# Patient Record
Sex: FEMALE | Race: WHITE | NOT HISPANIC OR LATINO | Employment: OTHER | URBAN - METROPOLITAN AREA
[De-identification: names, ages, dates, MRNs, and addresses within clinical notes are randomized per-mention and may not be internally consistent; named-entity substitution may affect disease eponyms.]

---

## 2020-08-06 ENCOUNTER — TRANSFERRED RECORDS (OUTPATIENT)
Dept: HEALTH INFORMATION MANAGEMENT | Facility: CLINIC | Age: 60
End: 2020-08-06

## 2020-08-06 LAB — EJECTION FRACTION: NORMAL %

## 2021-05-29 ENCOUNTER — RECORDS - HEALTHEAST (OUTPATIENT)
Dept: ADMINISTRATIVE | Facility: CLINIC | Age: 61
End: 2021-05-29

## 2023-04-19 ENCOUNTER — TRANSFERRED RECORDS (OUTPATIENT)
Dept: HEALTH INFORMATION MANAGEMENT | Facility: CLINIC | Age: 63
End: 2023-04-19

## 2023-04-19 LAB — EJECTION FRACTION: NORMAL %

## 2023-04-25 ENCOUNTER — TRANSFERRED RECORDS (OUTPATIENT)
Dept: HEALTH INFORMATION MANAGEMENT | Facility: CLINIC | Age: 63
End: 2023-04-25

## 2023-09-29 ENCOUNTER — TRANSFERRED RECORDS (OUTPATIENT)
Dept: HEALTH INFORMATION MANAGEMENT | Facility: CLINIC | Age: 63
End: 2023-09-29

## 2024-03-26 ENCOUNTER — OFFICE VISIT (OUTPATIENT)
Dept: CARDIOLOGY | Facility: CLINIC | Age: 64
End: 2024-03-26
Payer: MEDICARE

## 2024-03-26 VITALS
HEART RATE: 58 BPM | RESPIRATION RATE: 16 BRPM | DIASTOLIC BLOOD PRESSURE: 72 MMHG | WEIGHT: 94.4 LBS | SYSTOLIC BLOOD PRESSURE: 138 MMHG

## 2024-03-26 DIAGNOSIS — Z86.73 HISTORY OF STROKE: ICD-10-CM

## 2024-03-26 DIAGNOSIS — I48.91 ATRIAL FIBRILLATION, UNSPECIFIED TYPE (H): ICD-10-CM

## 2024-03-26 DIAGNOSIS — F17.200 SMOKER: ICD-10-CM

## 2024-03-26 DIAGNOSIS — Z95.2 S/P MVR (MITRAL VALVE REPLACEMENT): Primary | ICD-10-CM

## 2024-03-26 DIAGNOSIS — E78.5 HYPERLIPIDEMIA, UNSPECIFIED HYPERLIPIDEMIA TYPE: ICD-10-CM

## 2024-03-26 DIAGNOSIS — I10 ESSENTIAL HYPERTENSION: ICD-10-CM

## 2024-03-26 DIAGNOSIS — I25.10 NONOBSTRUCTIVE ATHEROSCLEROSIS OF CORONARY ARTERY: ICD-10-CM

## 2024-03-26 DIAGNOSIS — Z98.890 STATUS POST LIGATION OF LEFT ATRIAL APPENDAGE: ICD-10-CM

## 2024-03-26 PROCEDURE — 99204 OFFICE O/P NEW MOD 45 MIN: CPT | Performed by: GENERAL ACUTE CARE HOSPITAL

## 2024-03-26 PROCEDURE — G2211 COMPLEX E/M VISIT ADD ON: HCPCS | Performed by: GENERAL ACUTE CARE HOSPITAL

## 2024-03-26 RX ORDER — METOPROLOL TARTRATE 25 MG/1
25 TABLET, FILM COATED ORAL 2 TIMES DAILY
Qty: 180 TABLET | Refills: 3 | Status: ON HOLD | OUTPATIENT
Start: 2024-03-26 | End: 2024-09-20

## 2024-03-26 RX ORDER — AMLODIPINE BESYLATE 5 MG/1
5 TABLET ORAL DAILY
Qty: 90 TABLET | Refills: 3 | Status: SHIPPED | OUTPATIENT
Start: 2024-03-26 | End: 2024-09-05 | Stop reason: DRUGHIGH

## 2024-03-26 RX ORDER — GABAPENTIN 300 MG/1
1 CAPSULE ORAL 2 TIMES DAILY
COMMUNITY
End: 2024-04-16

## 2024-03-26 RX ORDER — PRAVASTATIN SODIUM 10 MG
1 TABLET ORAL
COMMUNITY
End: 2024-03-26

## 2024-03-26 RX ORDER — PRAVASTATIN SODIUM 10 MG
10 TABLET ORAL DAILY
Qty: 90 TABLET | Refills: 3 | Status: SHIPPED | OUTPATIENT
Start: 2024-03-26 | End: 2024-05-28

## 2024-03-26 RX ORDER — AMLODIPINE BESYLATE 5 MG/1
5 TABLET ORAL DAILY
COMMUNITY
End: 2024-03-26

## 2024-03-26 RX ORDER — ASPIRIN 325 MG
325 TABLET, DELAYED RELEASE (ENTERIC COATED) ORAL DAILY
COMMUNITY

## 2024-03-26 RX ORDER — LOSARTAN POTASSIUM 100 MG/1
1 TABLET ORAL DAILY
COMMUNITY
End: 2024-03-26

## 2024-03-26 RX ORDER — LOSARTAN POTASSIUM 100 MG/1
100 TABLET ORAL DAILY
Qty: 90 TABLET | Refills: 3 | Status: ON HOLD | OUTPATIENT
Start: 2024-03-26 | End: 2024-09-20

## 2024-03-26 RX ORDER — METOPROLOL TARTRATE 25 MG/1
1 TABLET, FILM COATED ORAL 2 TIMES DAILY
COMMUNITY
End: 2024-03-26

## 2024-03-26 NOTE — PROGRESS NOTES
HEART CARE ENCOUNTER NOTE      Thank you, Christina CASTILLO CNP, for asking the [facility] team to see Ms. Ernestine Morales to establish care for mitral valve disease.    Assessment/Recommendations   Assessment:    Severe mitral regurgitation (unknown mechanism) status post surgical mitral valve replacement with a bioprosthetic mitral valve (unknown manufacture or size) performed 6/9/2017 at [facility]. Transthoracic echocardiogram 4/19/2023 reported normal prosthetic valve function.  Persistent atrial fibrillation status post direct current cardioversion 4/17/2017 then reportedly Maze procedure and left atrial appendage ligation at the time of mitral valve surgery 6/9/2017. No evidence of recurrent atrial fibrillation. [id number] score is 5 (hypertension, age 65-74, history of stroke, female gender).  Left hemispheric cerebrovascular accident 1/9/2017 with residual dysarthria and right hemiparesis. I do not have further details on this but I suspect this was embolic from atrial fibrillation.  Nonobstructive coronary artery disease noted on coronary angiography 4/27/2017.  Essential hypertension. Controlled.  Hyperlipidemia.    Plan:  Transthoracic echocardiogram.  CT cardiac angiography to assess for complete ligation of the left atrial appendage. If there is significant residual appendage, I would recommend resuming oral anticoagulation preferably with a direct oral anticoagulation.  For now, continue aspirin which she prefers to take as full-strength 325 mg daily.  Pravastatin 10 mg daily.  Continue current anti-hypertensive medications.  She has requested a referral to neurology given her history of stroke so I have placed a referral.  Follow-up with me in 1 year.       The longitudinal plan of care for the diagnosis(es)/condition(s) as documented were addressed during this visit. Due to the added complexity in care, I will continue to support Talya in  the subsequent management and with ongoing continuity of care.    Mitral regurgitation status post mitral valve replacement.  Persistent atrial fibrillation    History of Present Illness   Ms. Ernestine Morales is a 64 year old female with a significant past history of MR s/p MVR in 2017, persistent AF s/p DCCV then Maze and ARIANNA ligation at the time of MVR, and CVA presenting to [facility] care.    She previously lived in [place] which is where she had all her cardiac care and just recently removed to [place]. We only have limited records at this time and she is a somewhat difficult historian due to residual deficits from her stroke. It appears she was in her normal state of health until she sustained a massive stroke 1/9/2017 for which she was hospitalized at [facility]. I do not know what intervention she had at the time if any, but it appears it was a left hemispheric stroke as she has speech difficulty and right hemiparesis. It is not clear if she was noted to have AF at that time, but she later on 4/17/2017 underwent ANGE-guided DCCV for AF and was on amiodarone and warfarin for a period of time.    During this evaluation, she was also noted to have severe MR (mechanism not detailed in available medical records). On 6/9/2017 she underwent surgical MVR with a bioprosthetic valve (size and  not known at this time) and reportedly also had a Maze procedure and ARIANNA ligation. At some point afterwards her anticoagulation was discontinued.    Her mobility is limited due to deficits from her stroke. No chest pain/pressure/tightness, shortness of breath at rest or with exertion, light headedness/dizziness, pre-syncope, syncope, lower extremity swelling, palpitations, paroxysmal nocturnal dyspnea (PND), or orthopnea.     Cardiac Problems and Cardiac Diagnostics     Most Recent Cardiac testing:  ECG dated 9/23/2023 (personaly reviewed and interpreted): SR, nonspecific ST change    ECHO 4/19/2023  (report reviewed):   Preserved LV systolic function with EF 55-60%. Abnormal septal motion due to postoperative state. Indeterminate diastolic filling pattern.  Left atrial cavity is severely dilated.  Mild (Grade I) aortic regurgitation. Sclerosis of the aortic valve.  Bioprosthetic mitral valve. Moderate calcification of the mitral annulus. Moderate mitral valve leaflet thickening with moderate calcification. No mitral valve regurgitation.   Moderate tricuspid regurgitation. RVSP measures 46 mmHg.  No pericardial effusion.    Lexiscan nuclear stress test 7/29/2020 (report reviewed):  Normal Lexiscan perfusion stress test with no evidence of ischemia or previous infarction.  Normal heart size with normal LVEF at 65%.  No prior comparison is available.    Cardiac cath 4/27/2017 (report reviewed):   Mild coronary artery disease.  Severe MR.     Medications  Allergies   Current Outpatient Medications   Medication Sig Dispense Refill    amLODIPine (NORVASC) 5 MG tablet Take 5 mg by mouth daily      aspirin (ASA) 325 MG EC tablet Take 325 mg by mouth daily      gabapentin (NEURONTIN) 300 MG capsule Take 1 capsule by mouth 2 times daily      losartan (COZAAR) 100 MG tablet Take 1 tablet by mouth daily      metoprolol tartrate (LOPRESSOR) 25 MG tablet Take 1 tablet by mouth 2 times daily      pravastatin (PRAVACHOL) 10 MG tablet Take 1 tablet by mouth        No Known Allergies     Physical Examination Review of Systems   /72 (BP Location: Right arm, Patient Position: Sitting, Cuff Size: Adult Small)   Pulse 58   Resp 16   Wt 42.8 kg (94 lb 6.4 oz)   Wt Readings from Last 3 Encounters:   03/26/24 42.8 kg (94 lb 6.4 oz)       General Appearance:   Pleasant  female, appears  stated age. no acute distress, thin body habitus   ENT/Mouth: membranes moist, no apparent gingival bleeding.      EYES:  no scleral icterus, normal conjunctivae   Neck: no carotid bruits. No anterior cervical lymphadenopaty   Respiratory:    lungs are clear to auscultation, no rales or wheezing, well-healed sternal scar, equal chest wall expansion    Cardiovascular:   Regular rhythm, normal rate. Normal first and second heart sounds with no murmurs, rubs, or gallops; the carotid, radial and posterior tibial pulses are intact, Jugular venous pressure normal, no edema bilaterally    Abdomen/GI:  no organomegaly, masses, bruits, or tenderness; bowel sounds are present   Extremities: no cyanosis or clubbing   Skin: no xanthelasma, warm.    Heme/lymph/ Immunology No apparent bleeding noted.   Neurologic: Alert and oriented. Right-sides weakness.     Psychiatric: Pleasant, calm, appropriate affect.    A complete 10 system review of systems was performed and is negative except as mentioned in the HPI/subjective.         Past History   Past Medical History:   Past Medical History:   Diagnosis Date    Atrial fibrillation (H)     Cerebrovascular accident (H) 01/09/2017    HLD (hyperlipidemia)     HTN (hypertension)     Mitral regurgitation        Past Surgical History:   Past Surgical History:   Procedure Laterality Date    REPLACE VALVE MITRAL  06/09/2017    bovine mitral valve with Maze and ARIANNA ligation       Family History:   Family History   Problem Relation Age of Onset    Hypertension Mother       Social History:   Social History     Socioeconomic History    Marital status: Single     Spouse name: Not on file    Number of children: Not on file    Years of education: Not on file    Highest education level: Not on file   Occupational History    Not on file   Tobacco Use    Smoking status: Every Day     Types: Cigarettes    Smokeless tobacco: Not on file   Substance and Sexual Activity    Alcohol use: Not on file    Drug use: Not on file    Sexual activity: Not on file   Other Topics Concern    Not on file   Social History Narrative    Not on file     Social Determinants of Health     Financial Resource Strain: Not on file   Food Insecurity: Not on file    Transportation Needs: Not on file   Physical Activity: Not on file   Stress: Not on file   Social Connections: Not on file   Interpersonal Safety: Not on file   Housing Stability: Not on file             Jorge Douglass MD [facility]  Non-Invasive Cardiologist  [facility]  Pager [phone]

## 2024-03-26 NOTE — LETTER
3/26/2024    Christina Fulton, APRN CNP  [address]  [place] [address]    RE: Ernestine Morales       Dear Colleague,     I had the pleasure of seeing Ernestine Morales in the [facility].  HEART CARE ENCOUNTER NOTE      Thank you, Christina Fulton APRN CNP, for asking the [facility] team to see Ms. Ernestine Morales to establish care for mitral valve disease.    Assessment/Recommendations   Assessment:    Severe mitral regurgitation (unknown mechanism) status post surgical mitral valve replacement with a bioprosthetic mitral valve (unknown manufacture or size) performed 6/9/2017 at [facility] [place]. Transthoracic echocardiogram 4/19/2023 reported normal prosthetic valve function.  Persistent atrial fibrillation status post direct current cardioversion 4/17/2017 then reportedly Maze procedure and left atrial appendage ligation at the time of mitral valve surgery 6/9/2017. No evidence of recurrent atrial fibrillation. [id number] score is 5 (hypertension, age 65-74, history of stroke, female gender).  Left hemispheric cerebrovascular accident 1/9/2017 with residual dysarthria and right hemiparesis. I do not have further details on this but I suspect this was embolic from atrial fibrillation.  Nonobstructive coronary artery disease noted on coronary angiography 4/27/2017.  Essential hypertension. Controlled.  Hyperlipidemia.    Plan:  Transthoracic echocardiogram.  CT cardiac angiography to assess for complete ligation of the left atrial appendage. If there is significant residual appendage, I would recommend resuming oral anticoagulation preferably with a direct oral anticoagulation.  For now, continue aspirin which she prefers to take as full-strength 325 mg daily.  Pravastatin 10 mg daily.  Continue current anti-hypertensive medications.  She has requested a referral to neurology given her history of stroke so I have placed a  referral.  Follow-up with me in 1 year.       The longitudinal plan of care for the diagnosis(es)/condition(s) as documented were addressed during this visit. Due to the added complexity in care, I will continue to support Talya in the subsequent management and with ongoing continuity of care.    Mitral regurgitation status post mitral valve replacement.  Persistent atrial fibrillation    History of Present Illness   Ms. Ernestine Morales is a 64 year old female with a significant past history of MR s/p MVR in 2017, persistent AF s/p DCCV then Maze and ARIANNA ligation at the time of MVR, and CVA presenting to [facility].    She previously lived in [place] which is where she had all her cardiac care and just recently removed to [place]. We only have limited records at this time and she is a somewhat difficult historian due to residual deficits from her stroke. It appears she was in her normal state of health until she sustained a massive stroke 1/9/2017 for which she was hospitalized at [facility]. I do not know what intervention she had at the time if any, but it appears it was a left hemispheric stroke as she has speech difficulty and right hemiparesis. It is not clear if she was noted to have AF at that time, but she later on 4/17/2017 underwent ANGE-guided DCCV for AF and was on amiodarone and warfarin for a period of time.    During this evaluation, she was also noted to have severe MR (mechanism not detailed in available medical records). On 6/9/2017 she underwent surgical MVR with a bioprosthetic valve (size and  not known at this time) and reportedly also had a Maze procedure and ARIANNA ligation. At some point afterwards her anticoagulation was discontinued.    Her mobility is limited due to deficits from her stroke. No chest pain/pressure/tightness, shortness of breath at rest or with exertion, light headedness/dizziness, pre-syncope, syncope, lower extremity swelling, palpitations,  paroxysmal nocturnal dyspnea (PND), or orthopnea.     Cardiac Problems and Cardiac Diagnostics     Most Recent Cardiac testing:  ECG dated 9/23/2023 (personaly reviewed and interpreted): SR, nonspecific ST change    ECHO 4/19/2023 (report reviewed):   Preserved LV systolic function with EF 55-60%. Abnormal septal motion due to postoperative state. Indeterminate diastolic filling pattern.  Left atrial cavity is severely dilated.  Mild (Grade I) aortic regurgitation. Sclerosis of the aortic valve.  Bioprosthetic mitral valve. Moderate calcification of the mitral annulus. Moderate mitral valve leaflet thickening with moderate calcification. No mitral valve regurgitation.   Moderate tricuspid regurgitation. RVSP measures 46 mmHg.  No pericardial effusion.    Lexiscan nuclear stress test 7/29/2020 (report reviewed):  Normal Lexiscan perfusion stress test with no evidence of ischemia or previous infarction.  Normal heart size with normal LVEF at 65%.  No prior comparison is available.    Cardiac cath 4/27/2017 (report reviewed):   Mild coronary artery disease.  Severe MR.     Medications  Allergies   Current Outpatient Medications   Medication Sig Dispense Refill    amLODIPine (NORVASC) 5 MG tablet Take 5 mg by mouth daily      aspirin (ASA) 325 MG EC tablet Take 325 mg by mouth daily      gabapentin (NEURONTIN) 300 MG capsule Take 1 capsule by mouth 2 times daily      losartan (COZAAR) 100 MG tablet Take 1 tablet by mouth daily      metoprolol tartrate (LOPRESSOR) 25 MG tablet Take 1 tablet by mouth 2 times daily      pravastatin (PRAVACHOL) 10 MG tablet Take 1 tablet by mouth        No Known Allergies     Physical Examination Review of Systems   /72 (BP Location: Right arm, Patient Position: Sitting, Cuff Size: Adult Small)   Pulse 58   Resp 16   Wt 42.8 kg (94 lb 6.4 oz)   Wt Readings from Last 3 Encounters:   03/26/24 42.8 kg (94 lb 6.4 oz)       General Appearance:   Pleasant  female, appears  stated age. no  acute distress, thin body habitus   ENT/Mouth: membranes moist, no apparent gingival bleeding.      EYES:  no scleral icterus, normal conjunctivae   Neck: no carotid bruits. No anterior cervical lymphadenopaty   Respiratory:   lungs are clear to auscultation, no rales or wheezing, well-healed sternal scar, equal chest wall expansion    Cardiovascular:   Regular rhythm, normal rate. Normal first and second heart sounds with no murmurs, rubs, or gallops; the carotid, radial and posterior tibial pulses are intact, Jugular venous pressure normal, no edema bilaterally    Abdomen/GI:  no organomegaly, masses, bruits, or tenderness; bowel sounds are present   Extremities: no cyanosis or clubbing   Skin: no xanthelasma, warm.    Heme/lymph/ Immunology No apparent bleeding noted.   Neurologic: Alert and oriented. Right-sides weakness.     Psychiatric: Pleasant, calm, appropriate affect.    A complete 10 system review of systems was performed and is negative except as mentioned in the HPI/subjective.         Past History   Past Medical History:   Past Medical History:   Diagnosis Date    Atrial fibrillation (H)     Cerebrovascular accident (H) 01/09/2017    HLD (hyperlipidemia)     HTN (hypertension)     Mitral regurgitation        Past Surgical History:   Past Surgical History:   Procedure Laterality Date    REPLACE VALVE MITRAL  06/09/2017    bovine mitral valve with Maze and ARIANNA ligation       Family History:   Family History   Problem Relation Age of Onset    Hypertension Mother       Social History:   Social History     Socioeconomic History    Marital status: Single     Spouse name: Not on file    Number of children: Not on file    Years of education: Not on file    Highest education level: Not on file   Occupational History    Not on file   Tobacco Use    Smoking status: Every Day     Types: Cigarettes    Smokeless tobacco: Not on file   Substance and Sexual Activity    Alcohol use: Not on file    Drug use: Not on file     Sexual activity: Not on file   Other Topics Concern    Not on file   Social History Narrative    Not on file     Social Determinants of Health     Financial Resource Strain: Not on file   Food Insecurity: Not on file   Transportation Needs: Not on file   Physical Activity: Not on file   Stress: Not on file   Social Connections: Not on file   Interpersonal Safety: Not on file   Housing Stability: Not on file             Jorge Douglass MD [facility]  Non-Invasive Cardiologist  [facility]  Pager [phone]      Thank you for allowing me to participate in the care of your patient.      Sincerely,     Jorge Douglass MD     [facility]  cc:   Christina Fulton, APRN CNP  [address] [place] [address]

## 2024-03-26 NOTE — PATIENT INSTRUCTIONS
We will schedule you for an echocardiogram.  We will also schedule a CT scan to make sure your left atrial appendage was completely removed so it would be safe to continue off blood thinners.  We will have you see a neurologist.  See me back in 1 year

## 2024-04-09 SDOH — HEALTH STABILITY: PHYSICAL HEALTH: ON AVERAGE, HOW MANY DAYS PER WEEK DO YOU ENGAGE IN MODERATE TO STRENUOUS EXERCISE (LIKE A BRISK WALK)?: 7 DAYS

## 2024-04-09 SDOH — HEALTH STABILITY: PHYSICAL HEALTH: ON AVERAGE, HOW MANY MINUTES DO YOU ENGAGE IN EXERCISE AT THIS LEVEL?: 30 MIN

## 2024-04-09 ASSESSMENT — SOCIAL DETERMINANTS OF HEALTH (SDOH): HOW OFTEN DO YOU GET TOGETHER WITH FRIENDS OR RELATIVES?: MORE THAN THREE TIMES A WEEK

## 2024-04-10 ENCOUNTER — HOSPITAL ENCOUNTER (OUTPATIENT)
Dept: CARDIOLOGY | Facility: HOSPITAL | Age: 64
Discharge: HOME | End: 2024-04-10
Attending: GENERAL ACUTE CARE HOSPITAL | Admitting: GENERAL ACUTE CARE HOSPITAL
Payer: MEDICARE

## 2024-04-10 DIAGNOSIS — Z98.890 STATUS POST LIGATION OF LEFT ATRIAL APPENDAGE: ICD-10-CM

## 2024-04-10 DIAGNOSIS — Z95.2 S/P MVR (MITRAL VALVE REPLACEMENT): ICD-10-CM

## 2024-04-10 DIAGNOSIS — I48.91 ATRIAL FIBRILLATION, UNSPECIFIED TYPE (H): ICD-10-CM

## 2024-04-10 PROCEDURE — 93306 TTE W/DOPPLER COMPLETE: CPT | Mod: 26 | Performed by: INTERNAL MEDICINE

## 2024-04-10 PROCEDURE — 93306 TTE W/DOPPLER COMPLETE: CPT

## 2024-04-16 ENCOUNTER — OFFICE VISIT (OUTPATIENT)
Dept: FAMILY MEDICINE | Facility: CLINIC | Age: 64
End: 2024-04-16
Payer: MEDICARE

## 2024-04-16 VITALS
RESPIRATION RATE: 17 BRPM | DIASTOLIC BLOOD PRESSURE: 82 MMHG | HEART RATE: 52 BPM | BODY MASS INDEX: 18.14 KG/M2 | WEIGHT: 96.1 LBS | SYSTOLIC BLOOD PRESSURE: 138 MMHG | HEIGHT: 61 IN

## 2024-04-16 DIAGNOSIS — Z78.0 POST-MENOPAUSAL: ICD-10-CM

## 2024-04-16 DIAGNOSIS — Z11.4 SCREENING FOR HIV (HUMAN IMMUNODEFICIENCY VIRUS): ICD-10-CM

## 2024-04-16 DIAGNOSIS — Z23 NEED FOR SHINGLES VACCINE: ICD-10-CM

## 2024-04-16 DIAGNOSIS — I63.9 CEREBROVASCULAR ACCIDENT (CVA), UNSPECIFIED MECHANISM (H): ICD-10-CM

## 2024-04-16 DIAGNOSIS — Z72.0 TOBACCO USE: ICD-10-CM

## 2024-04-16 DIAGNOSIS — I25.10 NONOBSTRUCTIVE ATHEROSCLEROSIS OF CORONARY ARTERY: ICD-10-CM

## 2024-04-16 DIAGNOSIS — Z29.11 NEED FOR VACCINATION AGAINST RESPIRATORY SYNCYTIAL VIRUS: ICD-10-CM

## 2024-04-16 DIAGNOSIS — R47.89 OTHER SPEECH DISTURBANCE: ICD-10-CM

## 2024-04-16 DIAGNOSIS — Z87.891 PERSONAL HISTORY OF NICOTINE DEPENDENCE: ICD-10-CM

## 2024-04-16 DIAGNOSIS — Z00.00 ENCOUNTER FOR MEDICARE ANNUAL WELLNESS EXAM: Primary | ICD-10-CM

## 2024-04-16 DIAGNOSIS — Z13.1 SCREENING FOR DIABETES MELLITUS: ICD-10-CM

## 2024-04-16 DIAGNOSIS — Z11.59 NEED FOR HEPATITIS C SCREENING TEST: ICD-10-CM

## 2024-04-16 PROBLEM — Z79.01 ANTICOAGULATED: Status: ACTIVE | Noted: 2024-04-16

## 2024-04-16 PROBLEM — J98.4 ACUTE PULMONARY INSUFFICIENCY: Status: ACTIVE | Noted: 2017-06-09

## 2024-04-16 PROBLEM — D62 ACUTE BLOOD LOSS ANEMIA: Status: ACTIVE | Noted: 2017-06-09

## 2024-04-16 PROBLEM — I35.8 AORTIC VALVE SCLEROSIS: Chronic | Status: ACTIVE | Noted: 2017-04-17

## 2024-04-16 PROBLEM — I48.92 ATRIAL FLUTTER (H): Status: ACTIVE | Noted: 2024-04-16

## 2024-04-16 PROBLEM — I50.21 ACUTE SYSTOLIC HEART FAILURE (H): Status: ACTIVE | Noted: 2017-06-09

## 2024-04-16 PROBLEM — I50.32 CHRONIC DIASTOLIC CHF (CONGESTIVE HEART FAILURE) (H): Chronic | Status: ACTIVE | Noted: 2017-04-17

## 2024-04-16 PROCEDURE — 90677 PCV20 VACCINE IM: CPT | Performed by: FAMILY MEDICINE

## 2024-04-16 PROCEDURE — G0009 ADMIN PNEUMOCOCCAL VACCINE: HCPCS | Performed by: FAMILY MEDICINE

## 2024-04-16 PROCEDURE — 99204 OFFICE O/P NEW MOD 45 MIN: CPT | Mod: 25 | Performed by: FAMILY MEDICINE

## 2024-04-16 PROCEDURE — 91320 SARSCV2 VAC 30MCG TRS-SUC IM: CPT | Performed by: FAMILY MEDICINE

## 2024-04-16 PROCEDURE — G0296 VISIT TO DETERM LDCT ELIG: HCPCS | Performed by: FAMILY MEDICINE

## 2024-04-16 PROCEDURE — 80048 BASIC METABOLIC PNL TOTAL CA: CPT | Performed by: FAMILY MEDICINE

## 2024-04-16 PROCEDURE — 36415 COLL VENOUS BLD VENIPUNCTURE: CPT | Performed by: FAMILY MEDICINE

## 2024-04-16 PROCEDURE — 84443 ASSAY THYROID STIM HORMONE: CPT | Performed by: FAMILY MEDICINE

## 2024-04-16 PROCEDURE — 90480 ADMN SARSCOV2 VAC 1/ONLY CMP: CPT | Performed by: FAMILY MEDICINE

## 2024-04-16 PROCEDURE — 87389 HIV-1 AG W/HIV-1&-2 AB AG IA: CPT | Performed by: FAMILY MEDICINE

## 2024-04-16 PROCEDURE — 86803 HEPATITIS C AB TEST: CPT | Performed by: FAMILY MEDICINE

## 2024-04-16 PROCEDURE — 82607 VITAMIN B-12: CPT | Performed by: FAMILY MEDICINE

## 2024-04-16 PROCEDURE — 80061 LIPID PANEL: CPT | Performed by: FAMILY MEDICINE

## 2024-04-16 PROCEDURE — G0438 PPPS, INITIAL VISIT: HCPCS | Performed by: FAMILY MEDICINE

## 2024-04-16 RX ORDER — RESPIRATORY SYNCYTIAL VIRUS VACCINE 120MCG/0.5
0.5 KIT INTRAMUSCULAR ONCE
Qty: 1 EACH | Refills: 0 | Status: SHIPPED | OUTPATIENT
Start: 2024-04-16 | End: 2024-04-16

## 2024-04-16 RX ORDER — GABAPENTIN 300 MG/1
300 CAPSULE ORAL 2 TIMES DAILY
Qty: 180 CAPSULE | Refills: 1 | Status: SHIPPED | OUTPATIENT
Start: 2024-04-16

## 2024-04-16 ASSESSMENT — PAIN SCALES - GENERAL: PAINLEVEL: NO PAIN (0)

## 2024-04-16 NOTE — PATIENT INSTRUCTIONS
Preventive Care Advice   This is general advice given by our system to help you stay healthy. However, your care team may have specific advice just for you. Please talk to your care team about your preventive care needs.  Nutrition  Eat 5 or more servings of fruits and vegetables each day.  Try wheat bread, brown rice and whole grain pasta (instead of white bread, rice, and pasta).  Get enough calcium and vitamin D. Check the label on foods and aim for 100% of the RDA (recommended daily allowance).  Lifestyle  Exercise at least 150 minutes each week   (30 minutes a day, 5 days a week).  Do muscle strengthening activities 2 days a week. These help control your weight and prevent disease.  No smoking.  Wear sunscreen to prevent skin cancer.  Have a dental exam and cleaning every 6 months.  Yearly exams  See your health care team every year to talk about:  Any changes in your health.  Any medicines your care team has prescribed.  Preventive care, family planning, and ways to prevent chronic diseases.  Shots (vaccines)   HPV shots (up to age 26), if you've never had them before.  Hepatitis B shots (up to age 59), if you've never had them before.  COVID-19 shot: Get this shot when it's due.  Flu shot: Get a flu shot every year.  Tetanus shot: Get a tetanus shot every 10 years.  Pneumococcal, hepatitis A, and RSV shots: Ask your care team if you need these based on your risk.  Shingles shot (for age 50 and up).  General health tests  Diabetes screening:  Starting at age 35, Get screened for diabetes at least every 3 years.  If you are younger than age 35, ask your care team if you should be screened for diabetes.  Cholesterol test: At age 39, start having a cholesterol test every 5 years, or more often if advised.  Bone density scan (DEXA): At age 50, ask your care team if you should have this scan for osteoporosis (brittle bones).  Hepatitis C: Get tested at least once in your life.  STIs (sexually transmitted  infections)  Before age 24: Ask your care team if you should be screened for STIs.  After age 24: Get screened for STIs if you're at risk. You are at risk for STIs (including HIV) if:  You are sexually active with more than one person.  You don't use condoms every time.  You or a partner was diagnosed with a sexually transmitted infection.  If you are at risk for HIV, ask about PrEP medicine to prevent HIV.  Get tested for HIV at least once in your life, whether you are at risk for HIV or not.  Cancer screening tests  Cervical cancer screening: If you have a cervix, begin getting regular cervical cancer screening tests at age 21. Most people who have regular screenings with normal results can stop after age 65. Talk about this with your provider.  Breast cancer scan (mammogram): If you've ever had breasts, begin having regular mammograms starting at age 40. This is a scan to check for breast cancer.  Colon cancer screening: It is important to start screening for colon cancer at age 45.  Have a colonoscopy test every 10 years (or more often if you're at risk) Or, ask your provider about stool tests like a FIT test every year or Cologuard test every 3 years.  To learn more about your testing options, visit: https://www.[employer]/784212.pdf.  For help making a decision, visit: https://bit.ly/qx38796.  Prostate cancer screening test: If you have a prostate and are age 55 to 69, ask your provider if you would benefit from a yearly prostate cancer screening test.  Lung cancer screening: If you are a current or former smoker age 50 to 80, ask your care team if ongoing lung cancer screenings are right for you.  For informational purposes only. Not to replace the advice of your health care provider. Copyright   2023 [employer]. All rights reserved. Clinically reviewed by the [facility] Transitions Program. [employer] 630673 - REV 01/24.    Learning About Activities of Daily Living  What are activities  of daily living?     Activities of daily living (ADLs) are the basic self-care tasks you do every day. These include eating, bathing, dressing, and moving around.  As you age, and if you have health problems, you may find that it's harder to do some of these tasks. If so, your doctor can suggest ideas that may help.  To measure what kind of help you may need, your doctor will ask how well you are able to do ADLs. Let your doctor know if there are any tasks that you are having trouble doing. This is an important first step to getting help. And when you have the help you need, you can stay as independent as possible.  How will a doctor assess your ADLs?  Asking about ADLs is part of a routine health checkup your doctor will likely do as you age. Your health check might be done in a doctor's office, in your home, or at a hospital. The goal is to find out if you are having any problems that could make it hard to care for yourself or that make it unsafe for you to be on your own.  To measure your ADLs, your doctor will ask how hard it is for you to do routine tasks. Your doctor may also want to know if you have changed the way you do a task because of a health problem. Your doctor may watch how you:  Walk back and forth.  Keep your balance while you stand or walk.  Move from sitting to standing or from a bed to a chair.  Button or unbutton a shirt or sweater.  Remove and put on your shoes.  It's common to feel a little worried or anxious if you find you can't do all the things you used to be able to do. Talking with your doctor about ADLs is a way to make sure you're as safe as possible and able to care for yourself as well as you can. You may want to bring a caregiver, friend, or family member to your checkup. They can help you talk to your doctor.  Follow-up care is a key part of your treatment and safety. Be sure to make and go to all appointments, and call your doctor if you are having problems. It's also a good idea  to know your test results and keep a list of the medicines you take.  Current as of: October 24, 2023               Content Version: 14.0    5671-5658 [employer].   Care instructions adapted under license by your healthcare professional. If you have questions about a medical condition or this instruction, always ask your healthcare professional. [employer] disclaims any warranty or liability for your use of this information.      Preventing Falls: Care Instructions  Injuries and health problems such as trouble walking or poor eyesight can increase your risk of falling. So can some medicines. But there are things you can do to help prevent falls. You can exercise to get stronger. You can also arrange your home to make it safer.    Talk to your doctor about the medicines you take. Ask if any of them increase the risk of falls and whether they can be changed or stopped.   Try to exercise regularly. It can help improve your strength and balance. This can help lower your risk of falling.     Practice fall safety and prevention.    Wear low-heeled shoes that fit well and give your feet good support. Talk to your doctor if you have foot problems that make this hard.  Carry a cellphone or wear a medical alert device that you can use to call for help.  Use stepladders instead of chairs to reach high objects. Don't climb if you're at risk for falls. Ask for help, if needed.  Wear the correct eyeglasses, if you need them.    Make your home safer.    Remove rugs, cords, clutter, and furniture from walkways.  Keep your house well lit. Use night-lights in hallways and bathrooms.  Install and use sturdy handrails on stairways.  Wear nonskid footwear, even inside. Don't walk barefoot or in socks without shoes.    Be safe outside.    Use handrails, curb cuts, and ramps whenever possible.  Keep your hands free by using a shoulder bag or backpack.  Try to walk in well-lit areas. Watch out for uneven ground,  "changes in pavement, and debris.  Be careful in the winter. Walk on the grass or gravel when sidewalks are slippery. Use de-icer on steps and walkways. Add non-slip devices to shoes.    Put grab bars and nonskid mats in your shower or tub and near the toilet. Try to use a shower chair or bath bench when bathing.   Get into a tub or shower by putting in your weaker leg first. Get out with your strong side first. Have a phone or medical alert device in the bathroom with you.   Where can you learn more?  Go to https://www.[employer]/patiented  Enter G117 in the search box to learn more about \"Preventing Falls: Care Instructions.\"  Current as of: July 17, 2023               Content Version: 14.0    5365-2753 [employer].   Care instructions adapted under license by your healthcare professional. If you have questions about a medical condition or this instruction, always ask your healthcare professional. [employer] disclaims any warranty or liability for your use of this information.      Learning About Stress  What is stress?     Stress is your body's response to a hard situation. Your body can have a physical, emotional, or mental response. Stress is a fact of life for most people, and it affects everyone differently. What causes stress for you may not be stressful for someone else.  A lot of things can cause stress. You may feel stress when you go on a job interview, take a test, or run a race. This kind of short-term stress is normal and even useful. It can help you if you need to work hard or react quickly. For example, stress can help you finish an important job on time.  Long-term stress is caused by ongoing stressful situations or events. Examples of long-term stress include long-term health problems, ongoing problems at work, or conflicts in your family. Long-term stress can harm your health.  How does stress affect your health?  When you are stressed, your body responds as though " you are in danger. It makes hormones that speed up your heart, make you breathe faster, and give you a burst of energy. This is called the fight-or-flight stress response. If the stress is over quickly, your body goes back to normal and no harm is done.  But if stress happens too often or lasts too long, it can have bad effects. Long-term stress can make you more likely to get sick, and it can make symptoms of some diseases worse. If you tense up when you are stressed, you may develop neck, shoulder, or low back pain. Stress is linked to high blood pressure and heart disease.  Stress also harms your emotional health. It can make you flores, tense, or depressed. Your relationships may suffer, and you may not do well at work or school.  What can you do to manage stress?  You can try these things to help manage stress:   Do something active. Exercise or activity can help reduce stress. Walking is a great way to get started. Even everyday activities such as housecleaning or yard work can help.  Try yoga or elton chi. These techniques combine exercise and meditation. You may need some training at first to learn them.  Do something you enjoy. For example, listen to music or go to a movie. Practice your hobby or do volunteer work.  Meditate. This can help you relax, because you are not worrying about what happened before or what may happen in the future.  Do guided imagery. Imagine yourself in any setting that helps you feel calm. You can use online videos, books, or a teacher to guide you.  Do breathing exercises. For example:  From a standing position, bend forward from the waist with your knees slightly bent. Let your arms dangle close to the floor.  Breathe in slowly and deeply as you return to a standing position. Roll up slowly and lift your head last.  Hold your breath for just a few seconds in the standing position.  Breathe out slowly and bend forward from the waist.  Let your feelings out. Talk, laugh, cry, and  "express anger when you need to. Talking with supportive friends or family, a counselor, or a alf leader about your feelings is a healthy way to relieve stress. Avoid discussing your feelings with people who make you feel worse.  Write. It may help to write about things that are bothering you. This helps you find out how much stress you feel and what is causing it. When you know this, you can find better ways to cope.  What can you do to prevent stress?  You might try some of these things to help prevent stress:  Manage your time. This helps you find time to do the things you want and need to do.  Get enough sleep. Your body recovers from the stresses of the day while you are sleeping.  Get support. Your family, friends, and community can make a difference in how you experience stress.  Limit your news feed. Avoid or limit time on social media or news that may make you feel stressed.  Do something active. Exercise or activity can help reduce stress. Walking is a great way to get started.  Where can you learn more?  Go to https://www.[employer].net/patiented  Enter N032 in the search box to learn more about \"Learning About Stress.\"  Current as of: October 24, 2023               Content Version: 14.0    4769-8694 [employer].   Care instructions adapted under license by your healthcare professional. If you have questions about a medical condition or this instruction, always ask your healthcare professional. [employer] disclaims any warranty or liability for your use of this information.      "

## 2024-04-16 NOTE — PROGRESS NOTES
Preventive Care Visit  [facility]  ANDREE MATT DRUMMOND-DO SALINAS, Family Medicine  Apr 16, 2024      Assessment & Plan     Encounter for Medicare annual wellness exam  Patient new to clinic today, has been on medicare prior to this, so not required to do vision test. Today, no concerns.   - REVIEW OF HEALTH MAINTENANCE PROTOCOL ORDERS    Nonobstructive atherosclerosis of coronary artery  Chronic, stable. Patient follows with Cardiology, is on ARB, beta blocker (has some bradycardia), statin, and full dose aspirin. Likely on full dose aspirin due to stroke history.   - Lipid panel reflex to direct LDL Non-fasting  - Basic metabolic panel  (Ca, Cl, CO2, Creat, Gluc, K, Na, BUN)  - TSH with free T4 reflex  - Vitamin B12  - Lipid panel reflex to direct LDL Non-fasting  - Basic metabolic panel  (Ca, Cl, CO2, Creat, Gluc, K, Na, BUN)  - TSH with free T4 reflex  - Vitamin B12    Other speech disturbance  Chronic, likely related to prior stroke. Has some expressive aphasia, but able to write? Recommend speech therapy for evaluation and therapy.   - TSH with free T4 reflex  - Home Care Referral  - TSH with free T4 reflex    Post-menopausal  Chronic, does have a recent fracture that is fall related.   - DEXA HIP/PELVIS/SPINE - Future    Tobacco use  See documentation below.   - CT Chest Lung Cancer Screen Low Dose Without    Cerebrovascular accident (CVA), unspecified mechanism (H)  Chronic, stable on aspirin and statin therapy. Has residual deficits, limiting ambulation. Disability plates paperwork completed today.  - gabapentin (NEURONTIN) 300 MG capsule  Dispense: 180 capsule; Refill: 1    Screening for HIV (human immunodeficiency virus)  - HIV Antigen Antibody Combo  - HIV Antigen Antibody Combo    Need for hepatitis C screening test  - Hepatitis C Screen Reflex to HCV RNA Quant and Genotype  - Hepatitis C Screen Reflex to HCV RNA Quant and Genotype    Need for shingles vaccine  - zoster vaccine  recombinant adjuvanted (SHINGRIX) injection  Dispense: 0.5 mL; Refill: 0    Need for vaccination against respiratory syncytial virus  - respiratory syncytial virus vaccine, bivalent (ABRYSVO) injection  Dispense: 1 each; Refill: 0    Personal history of nicotine dependence  - CT Chest Lung Cancer Screen Low Dose Without    Screening for diabetes mellitus  - Glucose  - Glucose      Patient has been advised of split billing requirements and indicates understanding: Yes  Review of prior external note(s) from - Outside records from [place]  Ordering of each unique test  Prescription drug management  I spent a total of 34 minutes on the day of the visit.   Time spent by me doing chart review, history and exam, documentation and further activities per the note      Nicotine/Tobacco Cessation  She reports that she has been smoking cigarettes. She started smoking about 40 years ago. She has a 34.8 pack-year smoking history. She does not have any smokeless tobacco history on file.  Nicotine/Tobacco Cessation Plan  Information offered: Patient not interested at this time      Counseling  Appropriate preventive services were discussed with this patient, including applicable screening as appropriate for fall prevention, nutrition, physical activity, Tobacco-use cessation, weight loss and cognition.  Checklist reviewing preventive services available has been given to the patient.  Reviewed patient's diet, addressing concerns and/or questions.   The patient was instructed to see the dentist every 6 months.   She is at risk for psychosocial distress and has been provided with information to reduce risk.   Updated plan of care.  Patient reported difficulty with activities of daily living were addressed today.    Lung Cancer Screening Shared Decision Making Visit     Ernestine Morales is eligible for lung cancer screening on the basis of:   has not not experienced symptoms suggestive of lung cancer.   born on 1960, 64 year old  years old.   smoked 0.25-1 packs of cigarettes for 40.3 years for a total of 34.8 pack-years   has not quit smoking.      I have discussed with patient the risks and benefits of screening for lung cancer with low-dose CT.     The risks include:   radiation exposure    false positives     over-diagnosis    The benefit of early detection of lung cancer is contingent upon adherence to annual screening or more frequent follow up if indicated.     Furthermore, reaping the benefits of screening requires Ernestine Morales to be willing and able to undergo diagnostic procedures, if indicated.     We did discuss that the only way to prevent lung cancer is to not smoke. Smoking cessation assistance was offered.      See Patient Instructions    Subjective   Talya is a 64 year old, presenting for the following:  Physical and Establish Care        4/16/2024     9:02 AM   Additional Questions   Roomed by Pilo MCGEE CMA   Accompanied by Friend, SHANTEL        Health Care Directive  Patient does not have a Health Care Directive or Living Will: Discussed advance care planning with patient; however, patient declined at this time.    HPI    Mohs procedure  -Go off aspirin starting today as procedure is next Tuesday. She will be undergoing Mohs for squamous cell carcinoma  -Patient has a history of stroke in 2017, MV replacement June 2017 (bioprosthetic)   -[facility] would like patient to go off [phone] Dr. Luis Reynolds     Broke wrist in September, left, may want to undergo.       4/9/2024   General Health   How would you rate your overall physical health? Good   Feel stress (tense, anxious, or unable to sleep) Only a little   (!) STRESS CONCERN      4/9/2024   Nutrition   Diet: Regular (no restrictions)         4/9/2024   Exercise   Days per week of moderate/strenous exercise 7 days   Average minutes spent exercising at this level 30 min         4/9/2024   Social Factors   Frequency of gathering with friends or relatives  More than three times a week   Worry food won't last until get money to buy more No   Food not last or not have enough money for food? No   Do you have housing?  Yes   Are you worried about losing your housing? No   Lack of transportation? No   Unable to get utilities (heat,electricity)? No         4/16/2024   Fall Risk   Reason Gait Speed Test Not Completed Unable to complete test, patient reported symptoms          4/9/2024   Activities of Daily Living- Home Safety   Needs help with the following daily activites Money management   Safety concerns in the home None of the above         4/9/2024   Dental   Dentist two times every year? (!) NO         4/9/2024   Hearing Screening   Hearing concerns? None of the above         4/9/2024   Driving Risk Screening   Patient/family members have concerns about driving No         4/9/2024   General Alertness/Fatigue Screening   Have you been more tired than usual lately? No         4/9/2024   Urinary Incontinence Screening   Bothered by leaking urine in past 6 months No         4/9/2024   TB Screening   Were you born outside of the [place]? No         Today's PHQ-2 Score:       4/15/2024    10:58 AM   PHQ-2 ( 1999 [employer])   Q1: Little interest or pleasure in doing things 0   Q2: Feeling down, depressed or hopeless 0   PHQ-2 Score 0   Q1: Little interest or pleasure in doing things Not at all   Q2: Feeling down, depressed or hopeless Not at all   PHQ-2 Score 0           4/9/2024   Substance Use   If I could quit smoking, I would Somewhat agree   I want to quit somking, worry about health affects Neutral   Willing to make a plan to quit smoking Somewhat disagree   Willing to cut down before quitting Somewhat agree   Alcohol more than 3/day or more than 7/wk Not Applicable   Do you have a current opioid prescription? No   How severe/bad is pain from 1 to 10? 6/10   Do you use any other substances recreationally? No     Social History     Tobacco Use    Smoking status: Every Day      Current packs/day: 0.25     Average packs/day: 0.9 packs/day for 40.2 years (34.8 ttl pk-yrs)     Types: Cigarettes     Start date: 1/19/1984    Tobacco comments:     I've cut down to just a few a day   Substance Use Topics    Alcohol use: Not Currently    Drug use: Never          Mammogram Screening - Mammogram every 1-2 years updated in Health Maintenance based on mutual decision making          4/9/2024   One time HIV Screening   Previous HIV test? I don't know     History of abnormal Pap smear: NO - age 30-65 PAP every 5 years with negative HPV co-testing recommended       ASCVD Risk   The ASCVD Risk score (Meagan DK, et al., 2019) failed to calculate for the following reasons:    The patient has a prior MI or stroke diagnosis      Reviewed and updated as needed this visit by Provider   Tobacco  Allergies  Meds  Problems  Med Hx  Surg Hx  Fam Hx  Soc   Hx Sexual Activity          Lab work is in process  Current providers sharing in care for this patient include:  Patient Care Team:  Christina Andrew, DO as PCP - General (Family Medicine)    The following health maintenance items are reviewed in Epic and correct as of today:  Health Maintenance   Topic Date Due    DEXA  Never done    LIPID  Never done    ADVANCE CARE PLANNING  Never done    MAMMO SCREENING  Never done    GLUCOSE  Never done    COLORECTAL CANCER SCREENING  Never done    HIV SCREENING  Never done    HEPATITIS C SCREENING  Never done    LUNG CANCER SCREENING  03/05/2009    RSV VACCINE (Pregnancy & 60+) (1 - 1-dose 60+ series) Never done    ZOSTER IMMUNIZATION (2 of 2) 01/07/2020    INFLUENZA VACCINE (1) 06/30/2024 (Originally 9/1/2023)    NICOTINE/TOBACCO CESSATION COUNSELING Q 1 YR  04/16/2025    MEDICARE ANNUAL WELLNESS VISIT  04/16/2025    ANNUAL REVIEW OF HM ORDERS  04/16/2025    FALL RISK ASSESSMENT  04/16/2025    DTAP/TDAP/TD IMMUNIZATION (2 - Td or Tdap) 07/01/2027    PHQ-2 (once per calendar year)  Completed     "Pneumococcal Vaccine: 65+ Years  Completed    COVID-19 Vaccine  Completed    IPV IMMUNIZATION  Aged Out    HPV IMMUNIZATION  Aged Out    MENINGITIS IMMUNIZATION  Aged Out    RSV MONOCLONAL ANTIBODY  Aged Out         Review of Systems  Constitutional, HEENT, cardiovascular, pulmonary, gi and gu systems are negative, except as otherwise noted.     Objective    Exam  /82 (BP Location: Left arm, Patient Position: Sitting, Cuff Size: Adult Regular)   Pulse 52   Resp 17   Ht 1.549 m (5' 1\")   Wt 43.6 kg (96 lb 1.6 oz)   BMI 18.16 kg/m     Estimated body mass index is 18.16 kg/m  as calculated from the following:    Height as of this encounter: 1.549 m (5' 1\").    Weight as of this encounter: 43.6 kg (96 lb 1.6 oz).    Physical Exam  GENERAL: alert and no distress  EYES: Eyes grossly normal to inspection, PERRL and conjunctivae and sclerae normal  HENT: normal cephalic/atraumatic, both ears: occluded with wax, nose and mouth without ulcers or lesions, oropharynx clear, and oral mucous membranes moist  NECK: no adenopathy, no asymmetry, masses, or scars  RESP: lungs clear to auscultation - no rales, rhonchi or wheezes  CV: regular rate and rhythm, normal S1 S2, no S3 or S4, no murmur, click or rub, no peripheral edema  ABDOMEN: soft, nontender, no hepatosplenomegaly, no masses and bowel sounds normal  MS: Favors left side when ambulating, right foot with slight drop as ambulating  SKIN: large brown raised macule on the right cheek   NEURO: Normal strength and tone, mentation intact and speech normal  PSYCH: mentation appears normal, affect normal/bright         4/16/2024   Mini Cog   Mini-Cog Not Completed (choose reason) Mental handicap   Clock Draw Score 2 Normal   3 Item Recall 3 objects recalled   Mini Cog Total Score 5     Vision Screen         Signed Electronically by: ANDREE ALBRIGHT DO    "

## 2024-04-17 LAB
ANION GAP SERPL CALCULATED.3IONS-SCNC: 11 MMOL/L (ref 7–15)
BUN SERPL-MCNC: 22.5 MG/DL (ref 8–23)
CALCIUM SERPL-MCNC: 9.8 MG/DL (ref 8.8–10.2)
CHLORIDE SERPL-SCNC: 104 MMOL/L (ref 98–107)
CHOLEST SERPL-MCNC: 224 MG/DL
CREAT SERPL-MCNC: 1.03 MG/DL (ref 0.51–0.95)
DEPRECATED HCO3 PLAS-SCNC: 24 MMOL/L (ref 22–29)
EGFRCR SERPLBLD CKD-EPI 2021: 60 ML/MIN/1.73M2
FASTING STATUS PATIENT QL REPORTED: NO
FASTING STATUS PATIENT QL REPORTED: NO
GLUCOSE SERPL-MCNC: 85 MG/DL (ref 70–99)
GLUCOSE SERPL-MCNC: 85 MG/DL (ref 70–99)
HDLC SERPL-MCNC: 67 MG/DL
HIV 1+2 AB+HIV1 P24 AG SERPL QL IA: NONREACTIVE
LDLC SERPL CALC-MCNC: 143 MG/DL
NONHDLC SERPL-MCNC: 157 MG/DL
POTASSIUM SERPL-SCNC: 4.8 MMOL/L (ref 3.4–5.3)
SODIUM SERPL-SCNC: 139 MMOL/L (ref 135–145)
TRIGL SERPL-MCNC: 72 MG/DL
TSH SERPL DL<=0.005 MIU/L-ACNC: 1.94 UIU/ML (ref 0.3–4.2)
VIT B12 SERPL-MCNC: 935 PG/ML (ref 232–1245)

## 2024-04-18 ENCOUNTER — TELEPHONE (OUTPATIENT)
Dept: FAMILY MEDICINE | Facility: CLINIC | Age: 64
End: 2024-04-18
Payer: MEDICARE

## 2024-04-18 ENCOUNTER — MEDICAL CORRESPONDENCE (OUTPATIENT)
Dept: HEALTH INFORMATION MANAGEMENT | Facility: CLINIC | Age: 64
End: 2024-04-18

## 2024-04-18 LAB — HCV AB SERPL QL IA: NONREACTIVE

## 2024-04-18 NOTE — RESULT ENCOUNTER NOTE
Hello -    Here are my comments about your recent results:  Because of your stroke history, I would say that we should increase the intensity of the cholesterol medicine. We are quite aggressive, because we want to prevent a second stroke from occurring. I would like to either change the type of brand of cholesterol medicine you are on or add another drug. Is that OK?     -Kidney function is normal (Cr, GFR), Sodium is normal, Potassium is normal, Calcium is normal, Glucose is normal.   For additional lab test information, labtestsonline.org is an excellent reference..    Please let us know if you have any questions or concerns.     Regards,  ANDREE ALBRIGHT, DO

## 2024-04-18 NOTE — TELEPHONE ENCOUNTER
Order/Referral Request    Who is requesting: [facility]    Orders being requested: Speech Therapy 1 time a wk for 4 wks  Every other wk for 4 wks     Reason service is needed/diagnosis: Social work, eval and treat     When are orders needed by: ASAP     Has this been discussed with Provider: No    Does patient have a preference on a Group/Provider/Facility?   [facility]     Does patient have an appointment scheduled?: No    Where to send orders:  [facility]    Could we send this information to you in [facility] or would you prefer to receive a phone call?:   No preference   Okay to leave a detailed message?: Yes at Other phone number:  [phone]

## 2024-04-19 ENCOUNTER — TELEPHONE (OUTPATIENT)
Dept: FAMILY MEDICINE | Facility: CLINIC | Age: 64
End: 2024-04-19
Payer: MEDICARE

## 2024-04-19 DIAGNOSIS — I63.9 CEREBROVASCULAR ACCIDENT (CVA), UNSPECIFIED MECHANISM (H): Primary | ICD-10-CM

## 2024-04-19 RX ORDER — ATORVASTATIN CALCIUM 40 MG/1
40 TABLET, FILM COATED ORAL DAILY
Qty: 90 TABLET | Refills: 3 | Status: SHIPPED | OUTPATIENT
Start: 2024-04-19 | End: 2024-08-29

## 2024-04-19 NOTE — TELEPHONE ENCOUNTER
Marian calling to state the patient has a mychart message about medication and she couldn't respond       -PCP suggested changing medication   - Change cholesterol medication or add another medication to it     -Other then pravastatin what other cholesterol medication would she want to change to?    -Probably would be better to change the pravastatin then to add something else to it    Laura   POA   [phone]

## 2024-04-19 NOTE — TELEPHONE ENCOUNTER
"Recommend atorvastatin.     Usually people start pravastatin because they experienced muscle aches in the past with atorvastatin or rosuvastatin. However, those are the \"higher intensity\" statins. We can try this. If the patient has muscle aches, we will have to consider adding something instead.     ANDREE ALBRIGHT, DO    "

## 2024-04-19 NOTE — TELEPHONE ENCOUNTER
Relayed Dr. Pavon's message and informed Marian that the prescription is already at [facility] pharmacy. No further questions at this time.

## 2024-04-28 ENCOUNTER — HOSPITAL ENCOUNTER (OUTPATIENT)
Dept: CT IMAGING | Facility: CLINIC | Age: 64
Discharge: HOME | End: 2024-04-28
Attending: GENERAL ACUTE CARE HOSPITAL | Admitting: GENERAL ACUTE CARE HOSPITAL
Payer: MEDICARE

## 2024-04-28 DIAGNOSIS — I48.91 ATRIAL FIBRILLATION, UNSPECIFIED TYPE (H): ICD-10-CM

## 2024-04-28 DIAGNOSIS — Z98.890 STATUS POST LIGATION OF LEFT ATRIAL APPENDAGE: ICD-10-CM

## 2024-04-28 PROCEDURE — 75572 CT HRT W/3D IMAGE: CPT | Mod: 26 | Performed by: GENERAL ACUTE CARE HOSPITAL

## 2024-04-28 PROCEDURE — 75572 CT HRT W/3D IMAGE: CPT | Mod: MG

## 2024-04-28 PROCEDURE — 250N000011 HC RX IP 250 OP 636: Performed by: GENERAL ACUTE CARE HOSPITAL

## 2024-04-28 PROCEDURE — G1010 CDSM STANSON: HCPCS | Performed by: GENERAL ACUTE CARE HOSPITAL

## 2024-04-28 RX ORDER — IOPAMIDOL 755 MG/ML
120 INJECTION, SOLUTION INTRAVASCULAR ONCE
Status: COMPLETED | OUTPATIENT
Start: 2024-04-28 | End: 2024-04-28

## 2024-04-28 RX ADMIN — IOPAMIDOL 120 ML: 755 INJECTION, SOLUTION INTRAVENOUS at 09:40

## 2024-04-29 LAB
BSA FOR ECHO PROCEDURE: 1.38 M2
CCTA ASCENDING AORTA: 3.1
CCTA SINUS: 3.6

## 2024-05-03 ENCOUNTER — TELEPHONE (OUTPATIENT)
Dept: CARDIOLOGY | Facility: CLINIC | Age: 64
End: 2024-05-03

## 2024-05-03 DIAGNOSIS — I48.91 ATRIAL FIBRILLATION, UNSPECIFIED TYPE (H): Primary | ICD-10-CM

## 2024-05-03 NOTE — TELEPHONE ENCOUNTER
----- Message from Jorge Douglass MD sent at 5/3/2024  8:52 AM CDT -----  Can we schedule Talya to see Atrial Fibrillation clinic next available? 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 This is a complicated thing to decide over the phone and I think an in-person appointment would be best.    Thanks,  Jorge

## 2024-05-18 ENCOUNTER — HEALTH MAINTENANCE LETTER (OUTPATIENT)
Age: 64
End: 2024-05-18

## 2024-05-28 ENCOUNTER — OFFICE VISIT (OUTPATIENT)
Dept: CARDIOLOGY | Facility: CLINIC | Age: 64
End: 2024-05-28
Payer: MEDICARE

## 2024-05-28 VITALS
RESPIRATION RATE: 14 BRPM | SYSTOLIC BLOOD PRESSURE: 156 MMHG | HEART RATE: 64 BPM | HEIGHT: 61 IN | DIASTOLIC BLOOD PRESSURE: 72 MMHG | WEIGHT: 95 LBS | BODY MASS INDEX: 17.94 KG/M2

## 2024-05-28 DIAGNOSIS — I48.91 ATRIAL FIBRILLATION, UNSPECIFIED TYPE (H): ICD-10-CM

## 2024-05-28 DIAGNOSIS — I48.19 PERSISTENT ATRIAL FIBRILLATION (H): Primary | ICD-10-CM

## 2024-05-28 PROCEDURE — 99204 OFFICE O/P NEW MOD 45 MIN: CPT | Performed by: INTERNAL MEDICINE

## 2024-05-28 NOTE — PATIENT INSTRUCTIONS
[facility]  Cardiac Electrophysiology  [address]  [place] [id number]   Office: [phone]  Fax: [phone]     Thank you for seeing us in clinic today - it is a pleasure to be a part of your care team.  Below is a summary of our plan from today's visit.       You have atrial fibrillation and underwent maze and left atrial appendage clipping at time of your mitral valve replacement 6/9/2017. You left atrial appendage was fully occluded on your 4/28/2024 CT scan.    We will plan for the following:  - continue aspirin  - continue metoprolol 25mg twice daily  - remain active  - continue to follow with Dr. Douglass     Please do not hesitate to be in touch with our office at [phone] with any questions that may arise.       Thank you for trusting us with your care,    Jc Mukherjee MD  Clinical Cardiac Electrophysiology  [facility]  [address]  [place] [id number]   Office: [phone]  Fax: [phone]        ATRIAL FIBRILLATION: Patient Information    What is atrial fibrillation?  Atrial fibrillation (AF, A-fib) is a common heart rhythm problem (arrhythmia) occurring within the upper chambers of the heart (the atria).  In normal rhythm, the upper and lower chambers of the heart are electrically driven to contract in a coordinated sequence.  In atrial fibrillation, the atria lose their ability to contract because of rapid and chaotic electrical activity.  The lower chambers of the heart (the ventricles) continue to pump blood throughout the body, though with irregular and often faster rate due to the chaotic activity within the atria.        How do I know if I have atrial fibrillation?   Some people may feel their heart beating faster, harder, or irregularly while in atrial fibrillation.  Others may be lightheaded, fatigued, feel weak or tired or become more short of breath especially with activities.  Some patients have  no symptoms at all.  Atrial fibrillation may be found due to an irregular pulse or on an electrocardiogram (ECG). Atrial fibrillation can start and stop on its own, and episodes can last from seconds to several months.      How common is atrial fibrillation?   An estimated 3-6 million people in the [place] have atrial fibrillation.  Atrial fibrillation is a common heart rhythm problem for older persons, affecting as estimated 12-15% of people over the age of 65 years of age.    What causes atrial fibrillation?   Age is the most important risk factor for atrial fibrillation.  Atrial fibrillation is more common in people with other heart disease, high blood pressure, diabetes, obesity, sleep apnea and in people who regularly consume alcohol.  Surgery, lung disease, or thyroid problems can lead to atrial fibrillation.  Atrial fibrillation has multiple possible causes, and in most cases a single cause cannot be found.  Atrial fibrillation is a progressive condition, usually starting with at an early stage with short and infrequent episodes.  In later stages of disease, more frequent and longer lasting episodes of atrial fibrillation occur, ultimately culminating in episodes which do not spontaneously terminate.  Generally, more enlargement and scarring within the upper chambers of the heart is observed as atrial fibrillation progresses from early to late-stage disease.    How is atrial fibrillation diagnosed and evaluated?    Because of its start-stop nature, atrial fibrillation can be challenging to diagnose.  Atrial fibrillation is most commonly diagnosed via cardiac rhythm recordings - either an ECG or wearable cardiac rhythm monitor.  For patients with pacemakers, defibrillators or implantable loop recorders, atrial fibrillation may be recorded via these devices.  Recently, commercially available devices (eg. Apple Watch, [employer] device, certain FitBit devices, others) can allow patients to take 30 second  cardiac rhythm recordings which may document atrial fibrillation.  Once atrial fibrillation is diagnosed, additional tests include blood tests and an echocardiogram.  The echocardiogram uses ultrasound to look at your heart to assess your cardiac function and evaluate for other heart disease.  Additional evaluation may include CT or MRI studies.    Is atrial fibrillation dangerous?   Atrial fibrillation is not usually a life-threatening arrhythmia.  The most serious consequences of atrial fibrillation including stroke and worsening of overall cardiac function.  While in atrial fibrillation, the upper cardiac chambers do not contract normally, resulting in slower blood flow and increased risk of clot formation.  If this blood clot becomes detached from the heart a stroke can occur.  Unfortunately, stroke can be the first sign of atrial fibrillation for some people.  With a stroke, you may notice abnormal sensation, weakness on one side of the body or face, changes in your vision or speech.  If you have any of these signs, you should contact EMS and be evaluated in an emergency room as soon as possible.      How is atrial fibrillation treated?     Several treatment options exist for suppressing atrial fibrillation - however, it is not an easily curable arrhythmia.  The first goal in managing atrial fibrillation is to minimize stroke risk.  The second goal is to improve symptoms associated with atrial fibrillation.  Finally, in patients with reduced cardiac function, maintaining normal rhythm can help improve cardiac function.      Blood thinners are used to reduce the risk of stroke in patients with high estimated stroke risk related to atrial fibrillation.  For patients at higher risk of bleeding related to blood thinner use, implantable devices may be an option to reduce stroke risk without the need for long term blood thinner use.      Atrial fibrillation can be managed via two strategies: rate control and rhythm  control.  In a rate control strategy, continued atrial fibrillation is accepted and medications (eg. beta-blockers or calcium channel blockers) are used to control the lower chamber rate.  In a rhythm control strategy, anti-arrhythmic medications or catheter ablation are used to maintain normal cardiac rhythm and slow disease progression by suppressing atrial fibrillation.  A procedure called a cardioversion, in which an electric shock is delivered through patches placed on the chest wall while under deep sedation, can be performed to temporarily restore normal cardiac rhythm, though does not address the chance of atrial fibrillation recurrence.  Treatments are more effective for earlier rather than later stage atrial fibrillation.  Lifestyle modifications (maintaining a healthy weight, aerobic exercise, diagnosing and treating sleep apnea, and minimizing alcohol intake) are important elements of atrial fibrillation rhythm control.     What is catheter ablation for atrial fibrillation?  Cardiac catheter ablation is a commonly performed, minimally invasive procedure performed by a cardiac electrophysiologist to treat many different cardiac rhythm abnormalities.  During catheter ablation, long, thin, flexible tubes are advanced into the heart via small sheaths inserted into the femoral veins and thermal energy (either heating or cooling) is applied within the heart to disrupt abnormal electrical activity.  Atrial fibrillation ablation is performed under general anesthesia, with procedures generally taking approximately 2-3 hours.  Patients are typically observed for 3-5 hours after the ablation, and in most cases can be discharged home the same day.  Atrial fibrillation ablation is associated with better outcomes (mortality, cardiovascular hospitalizations, atrial arrhythmia recurrences) compared to antiarrhythmic drug therapy.  However, atrial fibrillation recurrences are not uncommon, and repeat catheter ablation  may be offered.  Your electrophysiology team can review atrial fibrillation ablation, anticipated success rates, risks, and recovery expectations with you.    What are anti-arrhythmic medications?  Anti-arrhythmic medications are specialized drugs which alter cardiac electrical functioning to suppress arrhythmias.  There are several anti-arrhythmic medications available, each with its own success rate and side effects.  Some anti-arrhythmic medications are less effective though safer to use, others are more effective though have serious potential toxicities.  Atrial fibrillation recurrences are common and may require dose adjustment or change in antiarrhythmic therapy.  Your electrophysiology team will carefully consider which medication would be the best and safest for your particular case.      Can I live a normal life?    The goal of atrial fibrillation management is for patients to live normal lives without being limited by symptoms related to atrial fibrillation.    Are any additional educational resources available?  There are a number of excellent atrial fibrillation education resources available to you online.  A few options you may wish to review include:  hrsonline.org/guide-atrial-fibrillation  afibmatters.org  getsmartaboutafib.com  stopaf.com    What comes next?    Consider your management options and let us know how we can help in your decision process.  Please take medications as they have been prescribed.  You should also get any tests that may have been ordered for you.      When to Call Your Doctor or seek emergency care:  Call your doctor or seek emergency care if you have any significant changes with the following:  Weakness  Dizziness  Fainting  Fatigue  Shortness of breath  Chest pain with increased activity  If you are concerned that your heart rate is too fast or too slow  Bleeding that does not stop in 10 minutes  Coughing or throwing up blood  Bloody diarrhea or bleeding  hemorrhoids  Dark-colored urine or black stool  Allergic reactions:  Rash  Itching  Swelling  Trouble breathing or swallowing      Please call the [facility] at [phone] if you have concerns about your symptoms, your medicines, or your follow-up appointments.

## 2024-05-28 NOTE — PROGRESS NOTES
[facility]  Cardiac Electrophysiology  [address]  [place] [address]   Office: [phone]  Fax: [phone]     Patient: Ernestine Morales   : 1960       CHIEF COMPLAINT/REASON FOR VISIT  Persistent atrial fibrillation following maze      Assessment/Recommendations     Stage 3B to stage 3D/Persistent atrial fibrillation - in the setting of severe mitral stenosis.  [id number] 5  Bioprosthetic MVR with maze and ARIANNA clipping (45mm AtriClip, 2017) with complete ARIANNA exclusion by 2024 CTA LA/PV.  No known AF recurrences.  Limited/no data exist in the setting of valvular atrial fibrillation and LAAO - however, given complete ARIANNA exclusion, inference of benefit re: stroke risk reduction from population with NVAF is reasonable, and cessation of anticoagulation appears reasonable.  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 Douglass for consultation regarding atrial fibrillation.    Limited records available - her prior care had been provided mostly at [place], [place].  Mrs. Yi's atrial fibrillation history is as summarized below:  Diagnosis date: , in the setting of CVA, severe mitral stenosis  Prior medical therapies: amiodarone ()  Prior DCCVs: ANGE/DCCV 2017  Prior ablations: bioprosthetic MVR with maze and ARIANNA clipping (45mm AtriClip, 2017, Dr. Red Sanchez, [facility])  Percutaneous left atrial appendage occlusion: as above    She walks with her dog daily.  She does not drive much.  She  "denies palpitations, chest pain, syncope.       Physical Examination  Review of Systems   VITALS: BP (!) 156/72 (BP Location: Left arm, Patient Position: Sitting, Cuff Size: Adult Small)   Pulse 64   Resp 14   Ht 1.549 m (5' 1\")   Wt 43.1 kg (95 lb)   BMI 17.95 kg/m    Wt Readings from Last 3 Encounters:   04/16/24 43.6 kg (96 lb 1.6 oz)   03/26/24 42.8 kg (94 lb 6.4 oz)     CONSTITUTIONAL: well nourished, comfortable, no distress  EYES:  Conjunctivae pink, sclerae clear.    E/N/T:  Oral mucosa pink  RESPIRATORY:  Respiratory effort is normal  CARDIOVASCULAR:  normal S1 and S2  GASTROINTESTINAL:  Abdomen without masses or tenderness  EXTREMITIES:  No clubbing or cyanosis.    MUSCULOSKELETAL:  Overall grossly normal muscle strength  SKIN:  Overall, skin warm and dry, no lesions.  NEURO/PSYCH:  Oriented x 3 with normal affect.   Constitutional:  No weight loss or loss of appetite    Eyes:  No difficulty with vision, no double vision, no dry eyes  ENT:  No sore throat, difficulty swallowing; changes in hearing or tinnitus  Cardiovascular: As detailed above  Respiratory:  No cough  Musculoskeletal  No joint pain, muscle aches  Neurologic:  No syncope, lightheadedness, fainting spells   Hematologic: No easy bruising, excessive bleeding tendency   Gastrointestinal:  No jaundice, abdominal pain or abdominal bloating  Genitourinary: No changes in urinary habits, no trouble urinating    Psychiatric: No anxiety or depression      Medical History  Surgical History   Past Medical History:   Diagnosis Date    Atrial fibrillation (H)     Cancer (H) 11.17.2022    Squamous cell carcinoma nRight Cheek    Cerebrovascular accident (H) 01/09/2017    HLD (hyperlipidemia)     HTN (hypertension)     Mitral regurgitation     Past Surgical History:   Procedure Laterality Date    BIOPSY  11.18.2022    Right Cheek    REPLACE VALVE MITRAL  06/09/2017    bovine mitral valve with Maze and ARIANNA ligation         Family History Social History " "  Family History   Problem Relation Age of Onset    Hypertension Mother         Social History     Tobacco Use    Smoking status: Every Day     Current packs/day: 0.25     Average packs/day: 0.9 packs/day for 40.4 years (34.8 ttl pk-yrs)     Types: Cigarettes     Start date: 1/19/1984    Tobacco comments:     I've cut down to just a few a day   Substance Use Topics    Alcohol use: Not Currently    Drug use: Never         Medications  Allergies     Current Outpatient Medications:     amLODIPine (NORVASC) 5 MG tablet, Take 1 tablet (5 mg) by mouth daily, Disp: 90 tablet, Rfl: 3    aspirin (ASA) 325 MG EC tablet, Take 325 mg by mouth daily, Disp: , Rfl:     atorvastatin (LIPITOR) 40 MG tablet, Take 1 tablet (40 mg) by mouth daily, Disp: 90 tablet, Rfl: 3    gabapentin (NEURONTIN) 300 MG capsule, Take 1 capsule (300 mg) by mouth 2 times daily, Disp: 180 capsule, Rfl: 1    losartan (COZAAR) 100 MG tablet, Take 1 tablet (100 mg) by mouth daily, Disp: 90 tablet, Rfl: 3    metoprolol tartrate (LOPRESSOR) 25 MG tablet, Take 1 tablet (25 mg) by mouth 2 times daily, Disp: 180 tablet, Rfl: 3    pravastatin (PRAVACHOL) 10 MG tablet, Take 1 tablet (10 mg) by mouth daily, Disp: 90 tablet, Rfl: 3   No Known Allergies       Lab Results    Chemistry CBC Cardiac Enzymes/BNP/TSH/INR   Recent Labs   Lab Test 04/16/24  1026      POTASSIUM 4.8   CHLORIDE 104   CO2 24   GLC 85  85   BUN 22.5   CR 1.03*   GFRESTIMATED 60*   BISHOP 9.8     Recent Labs   Lab Test 04/16/24  1026   CR 1.03*          No results for input(s): \"WBC\", \"HGB\", \"HCT\", \"MCV\", \"PLT\" in the last 00927 hours.  No results for input(s): \"HGB\" in the last 39619 hours. No results for input(s): \"TROPONINI\" in the last 61319 hours.  No results for input(s): \"BNP\", \"NTBNPI\", \"NTBNP\" in the last 36745 hours.  Recent Labs   Lab Test 04/16/24  1026   TSH 1.94     No results for input(s): \"INR\" in the last 85246 hours.      Data Review    ECGs (tracings independently " reviewed)  /29/2023 - SR 79bpm, NSST/T changes    4/10/2024 TTE  1. Normal left ventricular size and systolic performance with a visually  estimated ejection fraction of 55-60%.  2. There is abnormal septal motion consistent with prior cardiac surgery.  3. There is moderate aortic insufficiency.  4. There is a bio-prosthetic mitral valve.  Â  High normal mean at 8 mmHg.  Â  No significant mitral insufficiency is detected.  5. There is moderate tricuspid insufficiency.  6. Normal right ventricular size with low normal right ventricular systolic  performance.  7. There is severe left atrial enlargement. There is mild to moderate right  atrial enlargement.  8. Right ventricular systolic pressure relative to right atrial pressure is  moderately increased. The pulmonary artery pressure is estimated to be 50-55  mmHg plus right atrial pressure (the IVC is of normal caliber).    4/28/2024 CTA LA/PV    Status post surgical excision of the left atrial appendage with a 45 mm AtriClip. The appendage is completely excised.    A well-seated 29 mm Katerina-Mohan Magna bioprosthetic mitral valve is present. Normal appearance of the prosthetic leaflets. Protocol was not tailored to assess prosthetic valve function.    Trileaflet aortic valve with thickening of the leaflet tips suggestive of rheumatic valve disease. No leaflet calcification is present. Protocol was not tailored to assess for valvular stenosis.    Mild biatrial enlargement.    Minimal nonobstructive atherosclerotic coronary artery disease.    Radiology review for incidental non cardiac findings will be under separate report by the radiologist.          Cc: Jorge Douglass MD, Christina Andrew, DO Jc Mukherjee MD  5/28/2024  7:59 AM

## 2024-05-28 NOTE — LETTER
2024    ANDREE ALBRIGHT, DO  [address] [address]    RE: Ernestine Morales       Dear Colleague,     I had the pleasure of seeing Ernestine Morales in the [facility].     [facility]  Cardiac Electrophysiology  [address]  [place] [address]   Office: [phone]  Fax: [phone]     Patient: Ernestine Morales   : 1960       CHIEF COMPLAINT/REASON FOR VISIT  Persistent atrial fibrillation following maze      Assessment/Recommendations     Stage 3B to stage 3D/Persistent atrial fibrillation - in the setting of severe mitral stenosis.  [id number] 5  Bioprosthetic MVR with maze and ARIANNA clipping (45mm AtriClip, 2017) with complete ARIANNA exclusion by 2024 CTA LA/PV.  No known AF recurrences.  Limited/no data exist in the setting of valvular atrial fibrillation and LAAO - however, given complete ARIANNA exclusion, inference of benefit re: stroke risk reduction from population with NVAF is reasonable, and cessation of anticoagulation appears reasonable.  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 Douglass for consultation regarding atrial fibrillation.    Limited records available - her prior care had been provided mostly at [place], [place].  Mrs. Yi's atrial fibrillation history is as summarized below:  Diagnosis date: , in the setting of CVA, severe mitral stenosis  Prior medical therapies: amiodarone ()  Prior DCCVs: ANGE/DCCV 2017  Prior ablations: bioprosthetic MVR with  "maze and ARIANNA clipping (45mm AtriClip, 6/9/2017, Dr. Red Sanchez, [facility])  Percutaneous left atrial appendage occlusion: as above    She walks with her dog daily.  She does not drive much.  She denies palpitations, chest pain, syncope.       Physical Examination  Review of Systems   VITALS: BP (!) 156/72 (BP Location: Left arm, Patient Position: Sitting, Cuff Size: Adult Small)   Pulse 64   Resp 14   Ht 1.549 m (5' 1\")   Wt 43.1 kg (95 lb)   BMI 17.95 kg/m    Wt Readings from Last 3 Encounters:   04/16/24 43.6 kg (96 lb 1.6 oz)   03/26/24 42.8 kg (94 lb 6.4 oz)     CONSTITUTIONAL: well nourished, comfortable, no distress  EYES:  Conjunctivae pink, sclerae clear.    E/N/T:  Oral mucosa pink  RESPIRATORY:  Respiratory effort is normal  CARDIOVASCULAR:  normal S1 and S2  GASTROINTESTINAL:  Abdomen without masses or tenderness  EXTREMITIES:  No clubbing or cyanosis.    MUSCULOSKELETAL:  Overall grossly normal muscle strength  SKIN:  Overall, skin warm and dry, no lesions.  NEURO/PSYCH:  Oriented x 3 with normal affect.   Constitutional:  No weight loss or loss of appetite    Eyes:  No difficulty with vision, no double vision, no dry eyes  ENT:  No sore throat, difficulty swallowing; changes in hearing or tinnitus  Cardiovascular: As detailed above  Respiratory:  No cough  Musculoskeletal  No joint pain, muscle aches  Neurologic:  No syncope, lightheadedness, fainting spells   Hematologic: No easy bruising, excessive bleeding tendency   Gastrointestinal:  No jaundice, abdominal pain or abdominal bloating  Genitourinary: No changes in urinary habits, no trouble urinating    Psychiatric: No anxiety or depression      Medical History  Surgical History   Past Medical History:   Diagnosis Date    Atrial fibrillation (H)     Cancer (H) 11.17.2022    Squamous cell carcinoma nRight Cheek    Cerebrovascular accident (H) 01/09/2017    HLD (hyperlipidemia)     HTN (hypertension)     Mitral " "regurgitation     Past Surgical History:   Procedure Laterality Date    BIOPSY  11.18.2022    Right Cheek    REPLACE VALVE MITRAL  06/09/2017    bovine mitral valve with Maze and ARIANNA ligation         Family History Social History   Family History   Problem Relation Age of Onset    Hypertension Mother         Social History     Tobacco Use    Smoking status: Every Day     Current packs/day: 0.25     Average packs/day: 0.9 packs/day for 40.4 years (34.8 ttl pk-yrs)     Types: Cigarettes     Start date: 1/19/1984    Tobacco comments:     I've cut down to just a few a day   Substance Use Topics    Alcohol use: Not Currently    Drug use: Never         Medications  Allergies     Current Outpatient Medications:     amLODIPine (NORVASC) 5 MG tablet, Take 1 tablet (5 mg) by mouth daily, Disp: 90 tablet, Rfl: 3    aspirin (ASA) 325 MG EC tablet, Take 325 mg by mouth daily, Disp: , Rfl:     atorvastatin (LIPITOR) 40 MG tablet, Take 1 tablet (40 mg) by mouth daily, Disp: 90 tablet, Rfl: 3    gabapentin (NEURONTIN) 300 MG capsule, Take 1 capsule (300 mg) by mouth 2 times daily, Disp: 180 capsule, Rfl: 1    losartan (COZAAR) 100 MG tablet, Take 1 tablet (100 mg) by mouth daily, Disp: 90 tablet, Rfl: 3    metoprolol tartrate (LOPRESSOR) 25 MG tablet, Take 1 tablet (25 mg) by mouth 2 times daily, Disp: 180 tablet, Rfl: 3    pravastatin (PRAVACHOL) 10 MG tablet, Take 1 tablet (10 mg) by mouth daily, Disp: 90 tablet, Rfl: 3   No Known Allergies       Lab Results    Chemistry CBC Cardiac Enzymes/BNP/TSH/INR   Recent Labs   Lab Test 04/16/24  1026      POTASSIUM 4.8   CHLORIDE 104   CO2 24   GLC 85  85   BUN 22.5   CR 1.03*   GFRESTIMATED 60*   BISHOP 9.8     Recent Labs   Lab Test 04/16/24  1026   CR 1.03*          No results for input(s): \"WBC\", \"HGB\", \"HCT\", \"MCV\", \"PLT\" in the last 82202 hours.  No results for input(s): \"HGB\" in the last 62796 hours. No results for input(s): \"TROPONINI\" in the last 35075 hours.  No results for " "input(s): \"BNP\", \"NTBNPI\", \"NTBNP\" in the last 58963 hours.  Recent Labs   Lab Test 04/16/24  1026   TSH 1.94     No results for input(s): \"INR\" in the last 25203 hours.      Data Review    ECGs (tracings independently reviewed)  /29/2023 - SR 79bpm, NSST/T changes    4/10/2024 TTE  1. Normal left ventricular size and systolic performance with a visually  estimated ejection fraction of 55-60%.  2. There is abnormal septal motion consistent with prior cardiac surgery.  3. There is moderate aortic insufficiency.  4. There is a bio-prosthetic mitral valve.  Â  High normal mean at 8 mmHg.  Â  No significant mitral insufficiency is detected.  5. There is moderate tricuspid insufficiency.  6. Normal right ventricular size with low normal right ventricular systolic  performance.  7. There is severe left atrial enlargement. There is mild to moderate right  atrial enlargement.  8. Right ventricular systolic pressure relative to right atrial pressure is  moderately increased. The pulmonary artery pressure is estimated to be 50-55  mmHg plus right atrial pressure (the IVC is of normal caliber).    4/28/2024 CTA LA/PV    Status post surgical excision of the left atrial appendage with a 45 mm AtriClip. The appendage is completely excised.    A well-seated 29 mm Katerina-Mohan Magna bioprosthetic mitral valve is present. Normal appearance of the prosthetic leaflets. Protocol was not tailored to assess prosthetic valve function.    Trileaflet aortic valve with thickening of the leaflet tips suggestive of rheumatic valve disease. No leaflet calcification is present. Protocol was not tailored to assess for valvular stenosis.    Mild biatrial enlargement.    Minimal nonobstructive atherosclerotic coronary artery disease.    Radiology review for incidental non cardiac findings will be under separate report by the radiologist.          Cc: Jorge Douglass MD, Christina Andrew DO Amila Dilusha William, MD  5/28/2024  7:59 " AM        Thank you for allowing me to participate in the care of your patient.      Sincerely,     Jc Mukherjee MD     [facility]  cc:   Jorge Douglass MD  [address]  [place] [address]

## 2024-06-27 ENCOUNTER — MYC MEDICAL ADVICE (OUTPATIENT)
Dept: FAMILY MEDICINE | Facility: CLINIC | Age: 64
End: 2024-06-27
Payer: MEDICARE

## 2024-07-17 ENCOUNTER — OFFICE VISIT (OUTPATIENT)
Dept: FAMILY MEDICINE | Facility: CLINIC | Age: 64
End: 2024-07-17
Payer: MEDICARE

## 2024-07-17 VITALS
HEIGHT: 61 IN | HEART RATE: 55 BPM | BODY MASS INDEX: 18.16 KG/M2 | TEMPERATURE: 98.1 F | DIASTOLIC BLOOD PRESSURE: 74 MMHG | WEIGHT: 96.2 LBS | OXYGEN SATURATION: 96 % | SYSTOLIC BLOOD PRESSURE: 128 MMHG | RESPIRATION RATE: 15 BRPM

## 2024-07-17 DIAGNOSIS — Z01.818 PREOP GENERAL PHYSICAL EXAM: ICD-10-CM

## 2024-07-17 DIAGNOSIS — Z12.11 SCREEN FOR COLON CANCER: ICD-10-CM

## 2024-07-17 DIAGNOSIS — S52.502S CLOSED FRACTURE OF DISTAL END OF LEFT RADIUS, UNSPECIFIED FRACTURE MORPHOLOGY, SEQUELA: ICD-10-CM

## 2024-07-17 DIAGNOSIS — E78.5 HYPERLIPIDEMIA, UNSPECIFIED HYPERLIPIDEMIA TYPE: Primary | ICD-10-CM

## 2024-07-17 DIAGNOSIS — I63.9 CEREBROVASCULAR ACCIDENT (CVA), UNSPECIFIED MECHANISM (H): ICD-10-CM

## 2024-07-17 DIAGNOSIS — I50.32 CHRONIC DIASTOLIC CHF (CONGESTIVE HEART FAILURE) (H): ICD-10-CM

## 2024-07-17 PROCEDURE — 99214 OFFICE O/P EST MOD 30 MIN: CPT | Performed by: FAMILY MEDICINE

## 2024-07-17 RX ORDER — RESPIRATORY SYNCYTIAL VIRUS VACCINE 120MCG/0.5
0.5 KIT INTRAMUSCULAR ONCE
Qty: 1 EACH | Refills: 0 | Status: CANCELLED | OUTPATIENT
Start: 2024-07-17 | End: 2024-07-17

## 2024-07-17 NOTE — PATIENT INSTRUCTIONS
How to Take Your Medication Before Surgery  Preoperative Medication Instructions   Antiplatelet or Anticoagulation Medication Instructions   - aspirin: Discontinue aspirin 7-10 days prior to procedure to reduce bleeding risk. It should be resumed postoperatively.     Additional Medication Instructions  Take all scheduled medications on the day of surgery       Patient Education   Preparing for Your Surgery  Getting started  A nurse will call you to review your health history and instructions. They will give you an arrival time based on your scheduled surgery time. Please be ready to share:  Your doctor's clinic name and phone number  Your medical, surgical, and anesthesia history  A list of allergies and sensitivities  A list of medicines, including herbal treatments and over-the-counter drugs  Whether the patient has a legal guardian (ask how to send us the papers in advance)  Please tell us if you're pregnant--or if there's any chance you might be pregnant. Some surgeries may injure a fetus (unborn baby), so they require a pregnancy test. Surgeries that are safe for a fetus don't always need a test, and you can choose whether to have one.   If you have a child who's having surgery, please ask for a copy of Preparing for Your Child's Surgery.    Preparing for surgery  Within 10 to 30 days of surgery: Have a pre-op exam (sometimes called an H&P, or History and Physical). This can be done at a clinic or pre-operative center.  If you're having a , you may not need this exam. Talk to your care team.  At your pre-op exam, talk to your care team about all medicines you take. If you need to stop any medicines before surgery, ask when to start taking them again.  We do this for your safety. Many medicines can make you bleed too much during surgery. Some change how well surgery (anesthesia) drugs work.  Call your insurance company to let them know you're having surgery. (If you don't have insurance, call  [phone].)  Call your clinic if there's any change in your health. This includes signs of a cold or flu (sore throat, runny nose, cough, rash, fever). It also includes a scrape or scratch near the surgery site.  If you have questions on the day of surgery, call your hospital or surgery center.  Eating and drinking guidelines  For your safety: Unless your surgeon tells you otherwise, follow the guidelines below.  Eat and drink as usual until 8 hours before you arrive for surgery. After that, no food or milk.  Drink clear liquids until 2 hours before you arrive. These are liquids you can see through, like water, Gatorade, and Propel Water. They also include plain black coffee and tea (no cream or milk), candy, and breath mints. You can spit out gum when you arrive.  If you drink alcohol: Stop drinking it the night before surgery.  If your care team tells you to take medicine on the morning of surgery, it's okay to take it with a sip of water.  Preventing infection  Shower or bathe the night before and morning of your surgery. Follow the instructions your clinic gave you. (If no instructions, use regular soap.)  Don't shave or clip hair near your surgery site. We'll remove the hair if needed.  Don't smoke or vape the morning of surgery. You may chew nicotine gum up to 2 hours before surgery. A nicotine patch is okay.  Note: Some surgeries require you to completely quit smoking and nicotine. Check with your surgeon.  Your care team will make every effort to keep you safe from infection. We will:  Clean our hands often with soap and water (or an alcohol-based hand rub).  Clean the skin at your surgery site with a special soap that kills germs.  Give you a special gown to keep you warm. (Cold raises the risk of infection.)  Wear special hair covers, masks, gowns and gloves during surgery.  Give antibiotic medicine, if prescribed. Not all surgeries need antibiotics.  What to bring on the day of surgery  Photo ID and  insurance card  Copy of your health care directive, if you have one  Glasses and hearing aids (bring cases)  You can't wear contacts during surgery  Inhaler and eye drops, if you use them (tell us about these when you arrive)  CPAP machine or breathing device, if you use them  A few personal items, if spending the night  If you have . . .  A pacemaker, ICD (cardiac defibrillator) or other implant: Bring the ID card.  An implanted stimulator: Bring the remote control.  A legal guardian: Bring a copy of the certified (court-stamped) guardianship papers.  Please remove any jewelry, including body piercings. Leave jewelry and other valuables at home.  If you're going home the day of surgery  You must have a responsible adult drive you home. They should stay with you overnight as well.  If you don't have someone to stay with you, and you aren't safe to go home alone, we may keep you overnight. Insurance often won't pay for this.  After surgery  If it's hard to control your pain or you need more pain medicine, please call your surgeon's office.  Questions?   If you have any questions for your care team, list them here: _________________________________________________________________________________________________________________________________________________________________________ ____________________________________ ____________________________________ ____________________________________  For informational purposes only. Not to replace the advice of your health care provider. Copyright   2003, 2019 [facility]. All rights reserved. Clinically reviewed by Marcela Grimm MD. [facility] [id number] - REV 12/22.

## 2024-07-17 NOTE — PROGRESS NOTES
Preoperative Evaluation  [address]  [facility] [address]  Phone: [phone]  Fax: [phone]  Primary Provider: ANDREE ALBRIGHT DO  Pre-op Performing Provider: ANDREE ALBRIGHT DO  Jul 17, 2024 7/11/2024   Surgical Information   What procedure is being done? Distal Radius Osteotomy (Left)   Facility or Hospital where procedure/surgery will be performed: [facility]  [address], [place] [address]   Who is doing the procedure / surgery? Dr. Alyssa Philippe   Date of surgery / procedure: 07.25.2024   Time of surgery / procedure: To be schedueled a few days prior to surgery   Where do you plan to recover after surgery? at home with family        Fax number for surgical facility: [phone]    Assessment & Plan     The proposed surgical procedure is considered INTERMEDIATE risk.    Closed fracture of distal end of left radius, unspecified fracture morphology, sequela  Complicated by malunion and residual pain. Patient is recommended for left distal radius osteotomy. Recommendations as below.     Hyperlipidemia, unspecified hyperlipidemia type  Chronic, stable on atorvastatin. Medication OK to continue.     Cerebrovascular accident (CVA), unspecified mechanism (H)  Chronic with left sided residual weakness, speech difficulties. Given high bleeding risk recommend holding aspirin as below.     Chronic diastolic CHF (congestive heart failure) (H)  Chronic, stable. Continue current medications.     Preop general physical exam  See below.          Implanted Device   - Type of device: 29 mm Katerina-Mohan Magna Bioprosthetic Mitral Valve Patient advised to bring device information on day of surgery.      Risks and Recommendations  The patient has the following additional risks and recommendations for perioperative complications:   - Consult Hospitalist / IM to assist with post-op medical management    Preoperative Medication  Instructions  Antiplatelet or Anticoagulation Medication Instructions   - aspirin: Discontinue aspirin 7-10 days prior to procedure to reduce bleeding risk. It should be resumed postoperatively.     Additional Medication Instructions  Take all scheduled medications on the day of surgery    Recommendation  Approval given to proceed with proposed procedure, without further diagnostic evaluation.    Carrie Babin is a 64 year old, presenting for the following:  Pre-Op Exam (7/25/24 Dr Philippe Wrist Surgery )          7/17/2024    11:09 AM   Additional Questions   Roomed by Xenia   Accompanied by Marian FUNES         7/17/2024   Forms   Any forms needing to be completed Yes    No       Multiple values from one day are sorted in reverse-chronological order     HPI related to upcoming procedure:     Left radius fracture  -Occurred 8 months ago, from a fall on outstretched hand.   -Did casting initially, however, has not healed in a proper alignment.  -Currently having malunion   -She now has residual left wrist pain. Did not do well with cortisone shot and physical therapy. Orthopedics recommending repair.     26.95 points  The higher the score (maximum 58.2), the higher the functional status.  6.05 METs          7/11/2024   Pre-Op Questionnaire   Have you ever had a heart attack or stroke? (!) YES - January 2016   Have you ever had surgery on your heart or blood vessels, such as a stent placement, a coronary artery bypass, or surgery on an artery in your head, neck, heart, or legs? (!) No - no vascular disease. Does have a bovine valve in place   Do you have chest pain with activity? No   Do you have a history of heart failure? (!) UNKNOWN - Yes CHF is stable currently.    Do you currently have a cold, bronchitis or symptoms of other infection? No   Do you have a cough, shortness of breath, or wheezing? No   Do you or anyone in your family have previous history of blood clots? (!) UNKNOWN - no one to her knowledge   Do you  or does anyone in your family have a serious bleeding problem such as prolonged bleeding following surgeries or cuts? (!) UNKNOWN Same   Have you ever had problems with anemia or been told to take iron pills? (!) YES - patient having no active bleeding    Have you had any abnormal blood loss such as black, tarry or bloody stools, or abnormal vaginal bleeding? No   Have you ever had a blood transfusion? (!) UNKNOWN   Are you willing to have a blood transfusion if it is medically needed before, during, or after your surgery? Yes   Have you or any of your relatives ever had problems with anesthesia? No   Do you have sleep apnea, excessive snoring or daytime drowsiness? No   Do you have any artifical heart valves or other implanted medical devices like a pacemaker, defibrillator, or continuous glucose monitor? (!) YES   What type of device do you have? A Bovine Mitral valve 2017   Name of the clinic that manages your device [address]   Do you have artificial joints? No   Are you allergic to latex? No        Health Care Directive  Patient does not have a Health Care Directive or Living Will: Patient states has Advance Directive and will bring in a copy to clinic.    Preoperative Review of    reviewed - controlled substances reflected in medication list.      Status of Chronic Conditions:  A-FIB - Patient has a longstanding history of chronic A-fib currently on rate control. Current treatment regimen includes Aspirin for stroke prevention and denies significant symptoms of lightheadedness, palpitations or dyspnea.     CAD - Patient has a longstanding history of moderate-severe CAD. Patient denies recent chest pain or NTG use, denies exercise induced dyspnea or PND. Last Stress test 04/10/2024, EKG 04/10/2024.     RENAL INSUFFICIENCY - Patient has a longstanding history of moderate-severe chronic renal insufficiency. Last Cr 1.03.     Patient Active Problem List    Diagnosis Date Noted     Persistent atrial fibrillation (H) 05/28/2024     Priority: Medium    Anticoagulated 04/16/2024     Priority: Medium     Formatting of this note might be different from the original.   Warfarin      Atrial flutter (H) 04/16/2024     Priority: Medium    Acute pulmonary insufficiency 06/09/2017     Priority: Medium    Acute blood loss anemia 06/09/2017     Priority: Medium    Acute systolic heart failure (H) 06/09/2017     Priority: Medium    Aortic valve sclerosis 04/17/2017     Priority: Medium    Chronic diastolic CHF (congestive heart failure) (H) 04/17/2017     Priority: Medium    Cerebrovascular accident (H) 12/31/2015     Priority: Medium      Past Medical History:   Diagnosis Date    Atrial fibrillation (H)     Cancer (H) 11.17.2022    Squamous cell carcinoma nRight Cheek    Cerebrovascular accident (H) 01/09/2017    HLD (hyperlipidemia)     HTN (hypertension)     Mitral regurgitation      Past Surgical History:   Procedure Laterality Date    BIOPSY  11.18.2022    Right Cheek    REPLACE VALVE MITRAL  06/09/2017    bovine mitral valve with Maze and ARIANNA ligation     Current Outpatient Medications   Medication Sig Dispense Refill    amLODIPine (NORVASC) 5 MG tablet Take 1 tablet (5 mg) by mouth daily 90 tablet 3    aspirin (ASA) 325 MG EC tablet Take 325 mg by mouth daily      atorvastatin (LIPITOR) 40 MG tablet Take 1 tablet (40 mg) by mouth daily 90 tablet 3    gabapentin (NEURONTIN) 300 MG capsule Take 1 capsule (300 mg) by mouth 2 times daily 180 capsule 1    losartan (COZAAR) 100 MG tablet Take 1 tablet (100 mg) by mouth daily 90 tablet 3    metoprolol tartrate (LOPRESSOR) 25 MG tablet Take 1 tablet (25 mg) by mouth 2 times daily 180 tablet 3       No Known Allergies     Social History     Tobacco Use    Smoking status: Every Day     Current packs/day: 0.25     Average packs/day: 0.9 packs/day for 40.5 years (34.8 ttl pk-yrs)     Types: Cigarettes     Start date: 1/19/1984    Smokeless tobacco: Not on file  "   Tobacco comments:     I've cut down to just a few a day   Substance Use Topics    Alcohol use: Not Currently     Family History   Problem Relation Age of Onset    Hypertension Mother      History   Drug Use Unknown             Review of Systems  CONSTITUTIONAL: NEGATIVE for fever, chills, change in weight  INTEGUMENTARY/SKIN: NEGATIVE for worrisome rashes, moles or lesions  EYES: NEGATIVE for vision changes or irritation  ENT/MOUTH: NEGATIVE for ear, mouth and throat problems  RESP: NEGATIVE for significant cough or SOB  BREAST: NEGATIVE for masses, tenderness or discharge  CV: NEGATIVE for chest pain, palpitations or peripheral edema  GI: NEGATIVE for nausea, abdominal pain, heartburn, or change in bowel habits  : NEGATIVE for frequency, dysuria, or hematuria  MUSCULOSKELETAL: NEGATIVE for significant arthralgias or myalgia  NEURO: NEGATIVE for weakness, dizziness or paresthesias  ENDOCRINE: NEGATIVE for temperature intolerance, skin/hair changes  HEME: NEGATIVE for bleeding problems  PSYCHIATRIC: NEGATIVE for changes in mood or affect    Objective    /74   Pulse 55   Temp 98.1  F (36.7  C) (Oral)   Resp 15   Ht 1.549 m (5' 1\")   Wt 43.6 kg (96 lb 3.2 oz)   SpO2 96%   BMI 18.18 kg/m     Estimated body mass index is 18.18 kg/m  as calculated from the following:    Height as of this encounter: 1.549 m (5' 1\").    Weight as of this encounter: 43.6 kg (96 lb 3.2 oz).  Physical Exam  GENERAL: alert and no distress  EYES: Eyes grossly normal to inspection, PERRL and conjunctivae and sclerae normal  HENT: ear canals and TM's normal, nose and mouth without ulcers or lesions  NECK: no adenopathy, no asymmetry, masses, or scars  RESP: lungs clear to auscultation - no rales, rhonchi or wheezes  CV: regular rate and rhythm, normal S1 S2, no S3 or S4, no murmur, click or rub, no peripheral edema  ABDOMEN: soft, nontender, no hepatosplenomegaly, no masses and bowel sounds normal  MS: no gross musculoskeletal " defects noted, no edema  SKIN: no suspicious lesions or rashes  PSYCH: mentation appears normal, affect normal/bright    Recent Labs   Lab Test 04/16/24  1026      POTASSIUM 4.8   CR 1.03*        Diagnostics  Labs pending at this time.  Results will be reviewed when available.   No EKG this visit, completed in the last 90 days.    Revised Cardiac Risk Index (RCRI)  The patient has the following serious cardiovascular risks for perioperative complications:   - Cerebrovascular Disease (TIA or CVA) = 1 point     RCRI Interpretation: 1 point: Class II (low risk - 0.9% complication rate)         Signed Electronically by: ANDREE ALBRIGHT DO  Copy of this evaluation report is provided to requesting physician.

## 2024-07-26 ENCOUNTER — TELEPHONE (OUTPATIENT)
Dept: FAMILY MEDICINE | Facility: CLINIC | Age: 64
End: 2024-07-26
Payer: MEDICARE

## 2024-07-26 NOTE — TELEPHONE ENCOUNTER
Forms completed. Copy made for monthly hold bin.     Patients want to  form as well. Original placed at .

## 2024-07-26 NOTE — TELEPHONE ENCOUNTER
Forms/Letter Request    Type of form/letter: DMV/Handicap Parking       Do we have the form/letter: No    Who is the form from? Patient    Where did/will the form come from? Patient or family brought in   , pt will be dropping it off today 7/26    When is form/letter needed by: as soon as possible    How would you like the form/letter returned:     Patient Notified form requests are processed in 5-7 business days:Yes    Could we send this information to you in [employer] or would you prefer to receive a phone call?:   Patient would prefer a phone call   Okay to leave a detailed message?: Yes at Cell number on file:    Telephone Information:   Mobile [phone]

## 2024-07-26 NOTE — TELEPHONE ENCOUNTER
July 26, 2024     Patient dropped off Handicap parking permit for Dr. Pavon to complete and sign.  Patient was advised that paperwork takes 5-7 business days to complete.  Patient label was attached to paperwork and placed in  inbox to be processed.     Call when completed and patient will .     Shannan Black              Form received and placed into Dr Pavon inbox for review and completion.    Luisa Rivera CMA ([facility]

## 2024-07-26 NOTE — TELEPHONE ENCOUNTER
July 26, 2024    Patient dropped off Handicap parking permit for Dr. Pavon to complete and sign.  Patient was advised that paperwork takes 5-7 business days to complete.  Patient label was attached to paperwork and placed in  inbox to be processed.    Call when completed and patient will .    Shannan Olea

## 2024-07-31 ENCOUNTER — APPOINTMENT (OUTPATIENT)
Dept: MRI IMAGING | Facility: HOSPITAL | Age: 64
End: 2024-07-31
Attending: STUDENT IN AN ORGANIZED HEALTH CARE EDUCATION/TRAINING PROGRAM
Payer: MEDICARE

## 2024-07-31 ENCOUNTER — APPOINTMENT (OUTPATIENT)
Dept: CT IMAGING | Facility: HOSPITAL | Age: 64
End: 2024-07-31
Attending: STUDENT IN AN ORGANIZED HEALTH CARE EDUCATION/TRAINING PROGRAM
Payer: MEDICARE

## 2024-07-31 ENCOUNTER — APPOINTMENT (OUTPATIENT)
Dept: RADIOLOGY | Facility: HOSPITAL | Age: 64
End: 2024-07-31
Attending: STUDENT IN AN ORGANIZED HEALTH CARE EDUCATION/TRAINING PROGRAM
Payer: MEDICARE

## 2024-07-31 ENCOUNTER — HOSPITAL ENCOUNTER (EMERGENCY)
Facility: HOSPITAL | Age: 64
Discharge: HOME | End: 2024-07-31
Attending: STUDENT IN AN ORGANIZED HEALTH CARE EDUCATION/TRAINING PROGRAM | Admitting: STUDENT IN AN ORGANIZED HEALTH CARE EDUCATION/TRAINING PROGRAM
Payer: MEDICARE

## 2024-07-31 ENCOUNTER — DOCUMENTATION ONLY (OUTPATIENT)
Dept: OTHER | Facility: CLINIC | Age: 64
End: 2024-07-31

## 2024-07-31 VITALS
DIASTOLIC BLOOD PRESSURE: 64 MMHG | BODY MASS INDEX: 17.01 KG/M2 | HEART RATE: 71 BPM | RESPIRATION RATE: 18 BRPM | TEMPERATURE: 98.9 F | OXYGEN SATURATION: 99 % | WEIGHT: 90 LBS | SYSTOLIC BLOOD PRESSURE: 129 MMHG

## 2024-07-31 DIAGNOSIS — I65.22 STENOSIS OF LEFT CAROTID ARTERY: ICD-10-CM

## 2024-07-31 DIAGNOSIS — R42 DIZZINESS: ICD-10-CM

## 2024-07-31 LAB
ALBUMIN UR-MCNC: 10 MG/DL
ANION GAP SERPL CALCULATED.3IONS-SCNC: 11 MMOL/L (ref 7–15)
APPEARANCE UR: CLEAR
BACTERIA #/AREA URNS HPF: ABNORMAL /HPF
BASOPHILS # BLD AUTO: 0.1 10E3/UL (ref 0–0.2)
BASOPHILS NFR BLD AUTO: 1 %
BILIRUB UR QL STRIP: NEGATIVE
BUN SERPL-MCNC: 23.8 MG/DL (ref 8–23)
CALCIUM SERPL-MCNC: 9.8 MG/DL (ref 8.8–10.4)
CHLORIDE SERPL-SCNC: 102 MMOL/L (ref 98–107)
COLOR UR AUTO: ABNORMAL
CREAT SERPL-MCNC: 0.91 MG/DL (ref 0.51–0.95)
EGFRCR SERPLBLD CKD-EPI 2021: 70 ML/MIN/1.73M2
EOSINOPHIL # BLD AUTO: 0 10E3/UL (ref 0–0.7)
EOSINOPHIL NFR BLD AUTO: 0 %
ERYTHROCYTE [DISTWIDTH] IN BLOOD BY AUTOMATED COUNT: 12.7 % (ref 10–15)
GLUCOSE SERPL-MCNC: 86 MG/DL (ref 70–99)
GLUCOSE UR STRIP-MCNC: NEGATIVE MG/DL
HCO3 SERPL-SCNC: 23 MMOL/L (ref 22–29)
HCT VFR BLD AUTO: 41.6 % (ref 35–47)
HGB BLD-MCNC: 14 G/DL (ref 11.7–15.7)
HGB UR QL STRIP: ABNORMAL
HOLD SPECIMEN: NORMAL
HOLD SPECIMEN: NORMAL
HYALINE CASTS: 4 /LPF
IMM GRANULOCYTES # BLD: 0 10E3/UL
IMM GRANULOCYTES NFR BLD: 0 %
KETONES UR STRIP-MCNC: 20 MG/DL
LEUKOCYTE ESTERASE UR QL STRIP: ABNORMAL
LYMPHOCYTES # BLD AUTO: 1.6 10E3/UL (ref 0.8–5.3)
LYMPHOCYTES NFR BLD AUTO: 23 %
MAGNESIUM SERPL-MCNC: 1.9 MG/DL (ref 1.7–2.3)
MCH RBC QN AUTO: 30.3 PG (ref 26.5–33)
MCHC RBC AUTO-ENTMCNC: 33.7 G/DL (ref 31.5–36.5)
MCV RBC AUTO: 90 FL (ref 78–100)
MONOCYTES # BLD AUTO: 0.4 10E3/UL (ref 0–1.3)
MONOCYTES NFR BLD AUTO: 5 %
MUCOUS THREADS #/AREA URNS LPF: PRESENT /LPF
NEUTROPHILS # BLD AUTO: 5 10E3/UL (ref 1.6–8.3)
NEUTROPHILS NFR BLD AUTO: 70 %
NITRATE UR QL: NEGATIVE
NRBC # BLD AUTO: 0 10E3/UL
NRBC BLD AUTO-RTO: 0 /100
PH UR STRIP: 5.5 [PH] (ref 5–7)
PLATELET # BLD AUTO: 223 10E3/UL (ref 150–450)
POTASSIUM SERPL-SCNC: 4.6 MMOL/L (ref 3.4–5.3)
RBC # BLD AUTO: 4.62 10E6/UL (ref 3.8–5.2)
RBC URINE: 4 /HPF
SODIUM SERPL-SCNC: 136 MMOL/L (ref 135–145)
SP GR UR STRIP: 1.01 (ref 1–1.03)
SQUAMOUS EPITHELIAL: 5 /HPF
UROBILINOGEN UR STRIP-MCNC: <2 MG/DL
WBC # BLD AUTO: 7.1 10E3/UL (ref 4–11)
WBC URINE: 5 /HPF

## 2024-07-31 PROCEDURE — 258N000003 HC RX IP 258 OP 636: Performed by: STUDENT IN AN ORGANIZED HEALTH CARE EDUCATION/TRAINING PROGRAM

## 2024-07-31 PROCEDURE — 83735 ASSAY OF MAGNESIUM: CPT | Performed by: STUDENT IN AN ORGANIZED HEALTH CARE EDUCATION/TRAINING PROGRAM

## 2024-07-31 PROCEDURE — 99207 PR NO CHARGE LOS: CPT

## 2024-07-31 PROCEDURE — 36415 COLL VENOUS BLD VENIPUNCTURE: CPT | Performed by: STUDENT IN AN ORGANIZED HEALTH CARE EDUCATION/TRAINING PROGRAM

## 2024-07-31 PROCEDURE — 81001 URINALYSIS AUTO W/SCOPE: CPT | Performed by: STUDENT IN AN ORGANIZED HEALTH CARE EDUCATION/TRAINING PROGRAM

## 2024-07-31 PROCEDURE — 99285 EMERGENCY DEPT VISIT HI MDM: CPT | Mod: 25

## 2024-07-31 PROCEDURE — 250N000011 HC RX IP 250 OP 636: Performed by: STUDENT IN AN ORGANIZED HEALTH CARE EDUCATION/TRAINING PROGRAM

## 2024-07-31 PROCEDURE — 80048 BASIC METABOLIC PNL TOTAL CA: CPT | Performed by: STUDENT IN AN ORGANIZED HEALTH CARE EDUCATION/TRAINING PROGRAM

## 2024-07-31 PROCEDURE — 255N000002 HC RX 255 OP 636: Performed by: STUDENT IN AN ORGANIZED HEALTH CARE EDUCATION/TRAINING PROGRAM

## 2024-07-31 PROCEDURE — 93005 ELECTROCARDIOGRAM TRACING: CPT | Performed by: STUDENT IN AN ORGANIZED HEALTH CARE EDUCATION/TRAINING PROGRAM

## 2024-07-31 PROCEDURE — A9585 GADOBUTROL INJECTION: HCPCS | Performed by: STUDENT IN AN ORGANIZED HEALTH CARE EDUCATION/TRAINING PROGRAM

## 2024-07-31 PROCEDURE — 96360 HYDRATION IV INFUSION INIT: CPT | Mod: 59

## 2024-07-31 PROCEDURE — 71046 X-RAY EXAM CHEST 2 VIEWS: CPT

## 2024-07-31 PROCEDURE — 70496 CT ANGIOGRAPHY HEAD: CPT | Mod: ME

## 2024-07-31 PROCEDURE — 70553 MRI BRAIN STEM W/O & W/DYE: CPT | Mod: MF

## 2024-07-31 PROCEDURE — 85025 COMPLETE CBC W/AUTO DIFF WBC: CPT | Performed by: STUDENT IN AN ORGANIZED HEALTH CARE EDUCATION/TRAINING PROGRAM

## 2024-07-31 PROCEDURE — 250N000013 HC RX MED GY IP 250 OP 250 PS 637: Performed by: STUDENT IN AN ORGANIZED HEALTH CARE EDUCATION/TRAINING PROGRAM

## 2024-07-31 RX ORDER — GADOBUTROL 604.72 MG/ML
4 INJECTION INTRAVENOUS ONCE
Status: COMPLETED | OUTPATIENT
Start: 2024-07-31 | End: 2024-07-31

## 2024-07-31 RX ORDER — MECLIZINE HCL 12.5 MG 12.5 MG/1
12.5 TABLET ORAL ONCE
Status: COMPLETED | OUTPATIENT
Start: 2024-07-31 | End: 2024-07-31

## 2024-07-31 RX ORDER — ACETAMINOPHEN 325 MG/1
650 TABLET ORAL ONCE
Status: COMPLETED | OUTPATIENT
Start: 2024-07-31 | End: 2024-07-31

## 2024-07-31 RX ORDER — MECLIZINE HCL 12.5 MG 12.5 MG/1
12.5 TABLET ORAL 4 TIMES DAILY PRN
Qty: 30 TABLET | Refills: 0 | Status: SHIPPED | OUTPATIENT
Start: 2024-07-31 | End: 2024-08-29

## 2024-07-31 RX ORDER — IOPAMIDOL 755 MG/ML
67 INJECTION, SOLUTION INTRAVASCULAR ONCE
Status: COMPLETED | OUTPATIENT
Start: 2024-07-31 | End: 2024-07-31

## 2024-07-31 RX ADMIN — IOPAMIDOL 67 ML: 755 INJECTION, SOLUTION INTRAVENOUS at 09:59

## 2024-07-31 RX ADMIN — GADOBUTROL 4 ML: 604.72 INJECTION INTRAVENOUS at 13:51

## 2024-07-31 RX ADMIN — MECLIZINE HYDROCHLORIDE 12.5 MG: 12.5 TABLET ORAL at 10:50

## 2024-07-31 RX ADMIN — ACETAMINOPHEN 650 MG: 325 TABLET ORAL at 10:10

## 2024-07-31 RX ADMIN — SODIUM CHLORIDE 500 ML: 9 INJECTION, SOLUTION INTRAVENOUS at 10:39

## 2024-07-31 ASSESSMENT — ACTIVITIES OF DAILY LIVING (ADL)
ADLS_ACUITY_SCORE: 38
ADLS_ACUITY_SCORE: 38
ADLS_ACUITY_SCORE: 35
ADLS_ACUITY_SCORE: 38

## 2024-07-31 ASSESSMENT — COLUMBIA-SUICIDE SEVERITY RATING SCALE - C-SSRS
6. HAVE YOU EVER DONE ANYTHING, STARTED TO DO ANYTHING, OR PREPARED TO DO ANYTHING TO END YOUR LIFE?: NO
2. HAVE YOU ACTUALLY HAD ANY THOUGHTS OF KILLING YOURSELF IN THE PAST MONTH?: NO
1. IN THE PAST MONTH, HAVE YOU WISHED YOU WERE DEAD OR WISHED YOU COULD GO TO SLEEP AND NOT WAKE UP?: NO

## 2024-07-31 NOTE — ED PROVIDER NOTES
EMERGENCY DEPARTMENT ENCOUNTER      NAME: Ernestine Morales  AGE: 64 year old female  YOB: 1960  MRN: [id number]  EVALUATION DATE & TIME: 7/31/2024  8:22 AM    PCP: Christina Andrew    ED PROVIDER: Mihai Spangler MD      Chief Complaint   Patient presents with    Headache    Gait Disturbance         FINAL IMPRESSION:  1. Dizziness    2. Stenosis of left carotid artery          ED COURSE & MEDICAL DECISION MAKING:    Pertinent Labs & Imaging studies reviewed. 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  Says the headache is essentially been constant and perhaps is localized more on the left side of her head than the right side but difficult to localize.  Also has a persistent feeling of imbalance when she is up and trying to walk around although she is been able to ambulate under her own power without any falls.  Denies any headaches.  No neck pain.  No chest pain.   denies any nausea the moment but did report an episode of vomiting few days ago.  Took Tylenol at home yesterday for the pain.  Denies any visual changes or diplopia.    On exam patient is well-appearing in no acute distress.  Hemodynamically stable and afebrile.  No facial droop.  She is slightly slow speech with some word finding difficulties which she reports is baseline since her stroke but no jess dysarthria, slight weakness with flexion extension of the right forearm compared to the left side as well as flexion extension of the right lower leg compared to the left, normal coordination  with finger-nose, is able to ambulate on her own power with some weakness in the right leg.    Given duration of symptoms will obtain CT head to evaluate for acute intracranial abnormality as well as signs of possible stroke or cerebral vascular abnormality.  I will obtain EKG to eval for possible dysrhythmia as cause of dizziness.   0853 EKG shows a sinus rhythm at 60 beats a minute, normal axis, normal TN, QRS and QTc durations, no ST elevations or acute ischemic T wave changes.   1401 CT of the head does not show any acute intracranial normality.  CTA shows stenosis of left carotid artery with 95% narrowing.    MRI does not show any signs of acute CVA.    At this point time with 4 days of persistent symptoms and negative MRI low suspicion for acute central etiology of ongoing dizziness.  Could be peripheral in etiology however patient is able to ambulate safely under her own power here think she requires admission at this point in time.         Discussed carotid stenosis the patient and importance of following up with vascular surgery and she also has a neurology appointment coming up in August.  At this point in time I think she is safe for discharge home with close outpatient follow-up.  Will refer on to vestibular rehab to see if this helps with her persistent dizziness as well as try a short course of meclizine.  Otherwise signs and symptoms of worsening condition were discussed and return precautions given.  Patient comfortable with discharge and was discharged stable condition.      8:38 AM I met and evaluated the patient.       Medical Decision Making  Obtained supplemental history:Supplemental history obtained?: Documented in chart and Friend  Reviewed external records: External records reviewed?: No  Care impacted by chronic illness:Heart Disease, Hyperlipidemia, and Hypertension  Care significantly affected by social determinants of health:N/A  Did you consider but not order tests?: Work up considered  but not performed and documented in chart, if applicable  Did you interpret images independently?: Independent interpretation of ECG and images noted in documentation, when applicable.  Consultation discussion with other provider:Did you involve another provider (consultant, , pharmacy, etc.)?: No  Discharge. I prescribed additional prescription strength medication(s) as charted. I considered admission, but discharged patient after significant clinical improvement.          At the conclusion of the encounter I discussed the results of all of the tests and the disposition. The questions were answered. The patient or family acknowledged understanding and was agreeable with the care plan.     0 minutes of critical care time     MEDICATIONS GIVEN IN THE EMERGENCY:  Medications   acetaminophen (TYLENOL) tablet 650 mg (650 mg Oral $Given 7/31/24 1010)   iopamidol (ISOVUE-370) solution 67 mL (67 mLs Intravenous $Given 7/31/24 0959)   sodium chloride 0.9% BOLUS 500 mL (0 mLs Intravenous Stopped 7/31/24 1132)   meclizine (ANTIVERT) tablet 12.5 mg (12.5 mg Oral $Given 7/31/24 1050)   gadobutrol (GADAVIST) injection 4 mL (4 mLs Intravenous $Given 7/31/24 1351)       NEW PRESCRIPTIONS STARTED AT TODAY'S ER VISIT  Discharge Medication List as of 7/31/2024  2:52 PM        START taking these medications    Details   meclizine (ANTIVERT) 12.5 MG tablet Take 1 tablet (12.5 mg) by mouth 4 times daily as needed for dizziness, Disp-30 tablet, R-0, E-Prescribe                =================================================================    HPI    Patient information was obtained from: the patient, her friend    Use of : N/A       Ernestine Morales is a 64 year old female with a pertinent history of atrial fibrillation, CHF, dysarthria, hyperlipidemia, HTN, mitral valve replacement who presents to this ED by private car for evaluation of headache and loss of balance.    Patient reports a headache and dizziness since  Saturday (4 days ago). She reports feeling off balance but it was getting better until today, it got worse. She reports nausea and vomiting two days ago. Patient has not been able to walk her dog which she is normally able to do at baseline. She reports normal appetite.     Patient denies history of headache, vision changes, chest pain, neck pain, fall or injury.    To note, patient had a stroke in 2017 that impacted the right side of the body. She has slowed speech and weakness in the right side of her body from the stroke.      REVIEW OF SYSTEMS   Refer to the HPI    PAST MEDICAL HISTORY:  Past Medical History:   Diagnosis Date    Atrial fibrillation (H)     Cancer (H) 11.17.2022    Squamous cell carcinoma nRight Cheek    Cerebrovascular accident (H) 01/09/2017    HLD (hyperlipidemia)     HTN (hypertension)     Mitral regurgitation        PAST SURGICAL HISTORY:  Past Surgical History:   Procedure Laterality Date    BIOPSY  11.18.2022    Right Cheek    REPLACE VALVE MITRAL  06/09/2017    bovine mitral valve with Maze and ARIANNA ligation           CURRENT MEDICATIONS:    meclizine (ANTIVERT) 12.5 MG tablet  amLODIPine (NORVASC) 5 MG tablet  aspirin (ASA) 325 MG EC tablet  atorvastatin (LIPITOR) 40 MG tablet  gabapentin (NEURONTIN) 300 MG capsule  losartan (COZAAR) 100 MG tablet  metoprolol tartrate (LOPRESSOR) 25 MG tablet        ALLERGIES:  No Known Allergies    FAMILY HISTORY:  Family History   Problem Relation Age of Onset    Hypertension Mother        SOCIAL HISTORY:   Social History     Socioeconomic History    Marital status: Single   Tobacco Use    Smoking status: Every Day     Current packs/day: 0.25     Average packs/day: 0.9 packs/day for 40.5 years (34.8 ttl pk-yrs)     Types: Cigarettes     Start date: 1/19/1984    Tobacco comments:     I've cut down to just a few a day   Substance and Sexual Activity    Alcohol use: Not Currently    Drug use: Never    Sexual activity: Not Currently     Partners: Female      Birth control/protection: None   Other Topics Concern    Parent/sibling w/ CABG, MI or angioplasty before 65F 55M? No     Social Determinants of Health     Financial Resource Strain: Low Risk  (4/9/2024)    Financial Resource Strain     Within the past 12 months, have you or your family members you live with been unable to get utilities (heat, electricity) when it was really needed?: No   Food Insecurity: Low Risk  (4/9/2024)    Food Insecurity     Within the past 12 months, did you worry that your food would run out before you got money to buy more?: No     Within the past 12 months, did the food you bought just not last and you didn t have money to get more?: No   Transportation Needs: Low Risk  (4/9/2024)    Transportation Needs     Within the past 12 months, has lack of transportation kept you from medical appointments, getting your medicines, non-medical meetings or appointments, work, or from getting things that you need?: No   Physical Activity: Sufficiently Active (4/9/2024)    Exercise Vital Sign     Days of Exercise per Week: 7 days     Minutes of Exercise per Session: 30 min   Stress: No Stress Concern Present (4/9/2024)    [employer] [place] [employer] - Occupational Stress Questionnaire     Feeling of Stress : Only a little   Social Connections: Unknown (4/9/2024)    Social Connection and Isolation Panel [NHANES]     Frequency of Social Gatherings with Friends and Family: More than three times a week   Interpersonal Safety: Low Risk  (9/23/2023)    Received from [facility]    Interpersonal Safety     Feels UN-safe at Home or Work/School: no   Housing Stability: Low Risk  (4/9/2024)    Housing Stability     Do you have housing? : Yes     Are you worried about losing your housing?: No       VITALS:  /64   Pulse 71   Temp 98.9  F (37.2  C) (Tympanic)   Resp 18   Wt 40.8 kg (90 lb)   SpO2 99%   BMI 17.01 kg/m      PHYSICAL EXAM    Constitutional: Well developed, Well  nourished, NAD,  HENT: Normocephalic, Atraumatic,  mucous membranes moist,   Neck- trachea midline, No stridor.    Eyes:EOMI, Conjunctiva normal, No discharge.   Respiratory: Normal breath sounds, No respiratory distress, No wheezing.    Cardiovascular: Normal heart rate, Regular rhythm,  No murmurs,   Abdominal: Soft, No tenderness, No rebound or guarding.     Musculoskeletal: no deformity or malalignment. Stable gait with weakness in the right leg which is chronic  Integument: Warm, Dry, No erythema,    Neurologic: Alert & oriented x 3 Slightly slow speech. No dysarthria.   Psychiatric: Affect normal, Cooperative.   National Institutes of Health Stroke Scale  Exam Interval: Baseline   Score    Level of consciousness: (0)   Alert, keenly responsive    LOC questions: (0)   Answers both questions correctly    LOC commands: (0)   Performs both tasks correctly    Best gaze: (0)   Normal    Visual: (0)   No visual loss    Facial palsy: (0)   Normal symmetrical movements    Motor arm (left): (0)   No drift    Motor arm (right): (0)   No drift    Motor leg (left): (0)   No drift    Motor leg (right): (1)   Drift, baseline since the patient's previous stroke    Limb ataxia: (0)   Absent    Sensory: (0)   Normal- no sensory loss    Best language: (1)   Mild to moderate aphasia, baseline from prior stroke    Dysarthria: (0)   Normal    Extinction and inattention: (0)   No abnormality        Total Score:  2            LAB:  All pertinent labs reviewed and interpreted.  Results for orders placed or performed during the hospital encounter of 07/31/24   CTA Head Neck with Contrast    Impression    IMPRESSION:   HEAD CT:  1.  Chronic left MCA territory frontal, temporal and insular encephalomalacia.    2.  No evidence for acute infarct.    3.  No mass or acute hemorrhage    HEAD CTA:   1.  Attenuated distal left MCA branches correlating with known large territory chronic infarct    2.  No evidence for acute large vessel  occlusion.    3.  Negative for aneurysm or vascular malformation.    4.  Mild cavernous segment stenoses bilaterally.    NECK CTA:  1.  Extremely high-grade stenosis of the left internal carotid artery origin. 95% narrowing.    2.  Aortic arch great vessel origins stenoses as above.    3.  No evidence for carotid or vertebral dissection.   MR Brain w/o & w Contrast    Impression    IMPRESSION:  1.  Extensive remote ischemic findings particularly in the left MCA distribution. There is no evidence of restricted diffusion to suggest acute ischemia on the current examination.   Chest XR,  PA & LAT    Impression    IMPRESSION: Mitral valve prosthesis, left atrial occlusion device, and left anterior chest wall loop recorder. The positions appear stable compared to the recent CT.    Heart size appears normal. Stable heart size. No pulmonary vascular congestion. No pleural effusion. Peripheral interstitial markings suggest interstitial lung disease.    Contrast in kidneys from the recent CT.   Basic metabolic panel   Result Value Ref Range    Sodium 136 135 - 145 mmol/L    Potassium 4.6 3.4 - 5.3 mmol/L    Chloride 102 98 - 107 mmol/L    Carbon Dioxide (CO2) 23 22 - 29 mmol/L    Anion Gap 11 7 - 15 mmol/L    Urea Nitrogen 23.8 (H) 8.0 - 23.0 mg/dL    Creatinine 0.91 0.51 - 0.95 mg/dL    GFR Estimate 70 >60 mL/min/1.73m2    Calcium 9.8 8.8 - 10.4 mg/dL    Glucose 86 70 - 99 mg/dL   Result Value Ref Range    Magnesium 1.9 1.7 - 2.3 mg/dL   CBC with platelets and differential   Result Value Ref Range    WBC Count 7.1 4.0 - 11.0 10e3/uL    RBC Count 4.62 3.80 - 5.20 10e6/uL    Hemoglobin 14.0 11.7 - 15.7 g/dL    Hematocrit 41.6 35.0 - 47.0 %    MCV 90 78 - 100 fL    MCH 30.3 26.5 - 33.0 pg    MCHC 33.7 31.5 - 36.5 g/dL    RDW 12.7 10.0 - 15.0 %    Platelet Count 223 150 - 450 10e3/uL    % Neutrophils 70 %    % Lymphocytes 23 %    % Monocytes 5 %    % Eosinophils 0 %    % Basophils 1 %    % Immature Granulocytes 0 %    NRBCs per  100 WBC 0 <1 /100    Absolute Neutrophils 5.0 1.6 - 8.3 10e3/uL    Absolute Lymphocytes 1.6 0.8 - 5.3 10e3/uL    Absolute Monocytes 0.4 0.0 - 1.3 10e3/uL    Absolute Eosinophils 0.0 0.0 - 0.7 10e3/uL    Absolute Basophils 0.1 0.0 - 0.2 10e3/uL    Absolute Immature Granulocytes 0.0 <=0.4 10e3/uL    Absolute NRBCs 0.0 10e3/uL   Extra Blue Top Tube   Result Value Ref Range    Hold Specimen JIC    Extra Red Top Tube   Result Value Ref Range    Hold Specimen JIC    UA with Microscopic reflex to Culture    Specimen: Urine, Clean Catch   Result Value Ref Range    Color Urine Light Yellow Colorless, Straw, Light Yellow, Yellow    Appearance Urine Clear Clear    Glucose Urine Negative Negative mg/dL    Bilirubin Urine Negative Negative    Ketones Urine 20 (A) Negative mg/dL    Specific Gravity Urine 1.010 1.001 - 1.030    Blood Urine 0.06 mg/dL (A) Negative    pH Urine 5.5 5.0 - 7.0    Protein Albumin Urine 10 (A) Negative mg/dL    Urobilinogen Urine <2.0 <2.0 mg/dL    Nitrite Urine Negative Negative    Leukocyte Esterase Urine 75 Mickey/uL (A) Negative    Bacteria Urine Few (A) None Seen /HPF    Mucus Urine Present (A) None Seen /LPF    RBC Urine 4 (H) <=2 /HPF    WBC Urine 5 <=5 /HPF    Squamous Epithelials Urine 5 (H) <=1 /HPF    Hyaline Casts Urine 4 (H) <=2 /LPF       RADIOLOGY:  Reviewed all pertinent imaging. Please see official radiology report.  MR Brain w/o & w Contrast   Final Result   IMPRESSION:   1.  Extensive remote ischemic findings particularly in the left MCA distribution. There is no evidence of restricted diffusion to suggest acute ischemia on the current examination.      Chest XR,  PA & LAT   Final Result   IMPRESSION: Mitral valve prosthesis, left atrial occlusion device, and left anterior chest wall loop recorder. The positions appear stable compared to the recent CT.      Heart size appears normal. Stable heart size. No pulmonary vascular congestion. No pleural effusion. Peripheral interstitial markings  suggest interstitial lung disease.      Contrast in kidneys from the recent CT.      CTA Head Neck with Contrast   Final Result   IMPRESSION:    HEAD CT:   1.  Chronic left MCA territory frontal, temporal and insular encephalomalacia.      2.  No evidence for acute infarct.      3.  No mass or acute hemorrhage      HEAD CTA:    1.  Attenuated distal left MCA branches correlating with known large territory chronic infarct      2.  No evidence for acute large vessel occlusion.      3.  Negative for aneurysm or vascular malformation.      4.  Mild cavernous segment stenoses bilaterally.      NECK CTA:   1.  Extremely high-grade stenosis of the left internal carotid artery origin. 95% narrowing.      2.  Aortic arch great vessel origins stenoses as above.      3.  No evidence for carotid or vertebral dissection.          EKG:      Impression: EKG shows a sinus rhythm at 60 beats a minute, normal axis, normal DE, QRS and QTc durations, no ST elevations or acute ischemic T wave changes.        I have independently reviewed and interpreted the EKG(s) documented above.    PROCEDURES:         [employer] [facility] Documentation:   CMS Diagnoses:              I, Darnell Romero, am serving as a scribe to document services personally performed by Mihai Spangler MD based on my observation and the provider's statements to me. I, Mihai Spangler MD, attest that Darnell Romero is acting in a scribe capacity, has observed my performance of the services and has documented them in accordance with my direction.    Mihai Spangler MD  [facility] EMERGENCY DEPARTMENT  [address] [place] [id number]  [phone]      Mihai Spangler MD  07/31/24 1518       Mihai Spangler MD  07/31/24 1518

## 2024-07-31 NOTE — CONSULTS
[facility]    Stroke Telephone Note    I was called by Dr. Mihai Spangler on 07/31/24 regarding patient Ernestine Morales. The patient is a 64 year old female with a PMH significant for stroke (2017, L MCA), hx of atrial fib s/p cardioversion and maze procedure with left atrial appendage clipping. History obtained from ED provider.  Gurpreet presents today with persistent dizziness x 3 to 4 days.  At baseline she has residual right-sided weakness as well as aphasia which is present on ED providers examination.  She is able to walk in the ED independently.  Presenting blood pressure was 140/63.  On aspirin 325 mg and Lipitor 40 mg prior to arrival.  Stroke team contacted regarding outpatient follow-up of left ICA stenosis.    Vitals  BP: 135/62   Pulse: 59   Resp: 18   Temp: 98.9  F (37.2  C)   Weight: 40.8 kg (90 lb)    Imaging Findings  HEAD CT:  Chronic left MCA territory frontal, temporal and insular encephalomalacia.  No evidence for acute infarct. No mass or acute hemorrhage     HEAD CTA:   Attenuated distal left MCA branches correlating with known large territory chronic infarct  No evidence for acute large vessel occlusion. Negative for aneurysm or vascular malformation.  Mild cavernous segment stenoses bilaterally.     NECK CTA:  Extremely high-grade stenosis of the left internal carotid artery origin. 95% narrowing. Aortic arch great vessel origins stenoses. No evidence for carotid or vertebral dissection.    MRI CASANDRA:  Chronic left MCA infarct. No acute infarct.     Impression  Dizziness x 3-4 days, unclear etiology. MRI brain negative for acute infarct.     Critical stenosis of the left ICA, ~95% stenosis, suspect asymptomatic     Recommendations  - Continue PTA Aspirin 325 mg daily   - Continue PTA Lipitor 40 mg daily   - Priority outpatient referral to vascular surgery for further evaluation of left ICA stenosis     No further stroke workup is recommended, we will sign off. Please  "contact us for additional questions or concerns.    Case discussed with vascular neurology attending Dr. Martines.    My recommendations are based on the information provided over the phone by Ernestine Morales's in-person providers. They are not intended to replace the clinical judgment of her in-person providers. I was not requested to personally see or examine the patient at this time.     Rosamaria Guzmán PA-C  Vascular Neurology    To page me or covering stroke neurology team member, click here: AMCOM  Choose \"On Call\" tab at top, then select \"NEUROLOGY/ALL SITES\" from middle drop-down box, press Enter, then look for \"stroke\" or \"telestroke\" for your site.    "

## 2024-07-31 NOTE — DISCHARGE INSTRUCTIONS
Workup in the emergency department he does not show any signs of stroke or dangerous underlying electrolyte problems.  Did a CT and MRI we got of your head and neck does show narrowing of your left carotid artery but this is unlikely the cause of your dizziness today and there is no signs of a new stroke on your MRI.  However will be important for you to follow-up with vascular surgery as they may want to perform intervention given the narrowing of your left carotid artery.  Otherwise you may benefit following up with vestibular rehab specialist given your persistent dizziness for the past several days.  A referral for this has been placed.

## 2024-07-31 NOTE — ED TRIAGE NOTES
Patient arrives by private car for evaluation of headache and feeling off balance x 4  days.  States that she has lost her appetite and has generalized body aches.

## 2024-08-01 ENCOUNTER — PATIENT OUTREACH (OUTPATIENT)
Dept: FAMILY MEDICINE | Facility: CLINIC | Age: 64
End: 2024-08-01
Payer: MEDICARE

## 2024-08-01 ENCOUNTER — TELEPHONE (OUTPATIENT)
Dept: VASCULAR SURGERY | Facility: CLINIC | Age: 64
End: 2024-08-01
Payer: MEDICARE

## 2024-08-01 DIAGNOSIS — I65.29 CAROTID STENOSIS, ASYMPTOMATIC: Primary | ICD-10-CM

## 2024-08-01 DIAGNOSIS — I65.29 CAROTID STENOSIS: ICD-10-CM

## 2024-08-01 NOTE — TELEPHONE ENCOUNTER
No answer and unable to leave voicemail. If patient calls back, please send to nurse line for TCM follow-up.   n/a

## 2024-08-01 NOTE — TELEPHONE ENCOUNTER
Vascular Referral Intake    Referred by: Dr. Spangler for Carotid stenosis    Specialty: Vascular Surgery    Specific Provider if Necessary:  MD Margarita Tobin or MD Gayle Johnson    Visit Type: New    Time Frame: Next Available    Testing/Imaging Needed Before Consult: carotid ultrasound    Appt Note: (to be pasted into appt comments, also add where additional information is located, ie, outside images pushed to PACS, in Epic, sent to HIMS, etc)  95% stenosis of Left carotid, hx of stroke. Went to ED with dizziness, no vision changes.

## 2024-08-02 NOTE — TELEPHONE ENCOUNTER
Where would you like us to schedule this patient on Dr. Tobin's schedule?  He is already double booked the week he is back.

## 2024-08-02 NOTE — TELEPHONE ENCOUNTER
Appointment rescheduled to 8/19 and informed Marian that the recommendation is to wait for surgery until after she sees Dr. Tobin.  She thanked me and confirmed 8/19 will work for them.

## 2024-08-02 NOTE — TELEPHONE ENCOUNTER
Transitions of Care Outreach  Chief Complaint   Patient presents with    Hospital F/U       Most Recent Admission Date: 7/31/2024   Most Recent Admission Diagnosis:      Most Recent Discharge Date: 7/31/2024   Most Recent Discharge Diagnosis: Dizziness - R42  Stenosis of left carotid artery - I65.22     Transitions of Care Assessment         Follow up Plan          Future Appointments   Date Time Provider Department Center   8/19/2024  1:40 [facility]   8/19/2024  2:30 PM [facility]   8/29/2024 10:00 AM Андрей Burden MD [facility]   10/16/2024  9:00 AM Christina Andrew, DO [facility]       Outpatient Plan as outlined on AVS reviewed with patient.    For any urgent concerns, please contact our 24 hour nurse triage line: [phone] ([phone])       Tisha Jackson RN

## 2024-08-08 ENCOUNTER — TELEPHONE (OUTPATIENT)
Dept: FAMILY MEDICINE | Facility: CLINIC | Age: 64
End: 2024-08-08
Payer: MEDICARE

## 2024-08-08 NOTE — TELEPHONE ENCOUNTER
General Call    Contacts       Contact Date/Time Type Contact Phone/Fax    08/08/2024 11:34 AM CDT Phone (Incoming) Marily w[facility] (Other)           Reason for Call:  Pre Op Documentation    What are your questions or concerns:  Marily with [facility] calling for Pre Op Documentation. Marily shares she has called medical records multiple times and was told individual is not a patient here.  Marily called POA and confirmed patient had Pre Op with Dr. Christina Andrew 7/22/2024    Procedure is Tuesday 8/13.  Please fax pre op to     Date of last appointment with provider: 7/22/2024

## 2024-08-08 NOTE — TELEPHONE ENCOUNTER
This was faxed at time of appt notes signed.       Will fax again.       Pilo Sheikh Jr., CMA on 8/8/2024 at 12:19 PM

## 2024-08-12 LAB
ATRIAL RATE - MUSE: 60 BPM
DIASTOLIC BLOOD PRESSURE - MUSE: NORMAL MMHG
INTERPRETATION ECG - MUSE: NORMAL
P AXIS - MUSE: 53 DEGREES
PR INTERVAL - MUSE: 146 MS
QRS DURATION - MUSE: 84 MS
QT - MUSE: 458 MS
QTC - MUSE: 458 MS
R AXIS - MUSE: 50 DEGREES
SYSTOLIC BLOOD PRESSURE - MUSE: NORMAL MMHG
T AXIS - MUSE: 77 DEGREES
VENTRICULAR RATE- MUSE: 60 BPM

## 2024-08-19 ENCOUNTER — OFFICE VISIT (OUTPATIENT)
Dept: VASCULAR SURGERY | Facility: CLINIC | Age: 64
End: 2024-08-19
Attending: SURGERY
Payer: MEDICARE

## 2024-08-19 ENCOUNTER — ANCILLARY PROCEDURE (OUTPATIENT)
Dept: VASCULAR ULTRASOUND | Facility: CLINIC | Age: 64
End: 2024-08-19
Attending: SURGERY
Payer: MEDICARE

## 2024-08-19 ENCOUNTER — TELEPHONE (OUTPATIENT)
Dept: VASCULAR SURGERY | Facility: CLINIC | Age: 64
End: 2024-08-19
Payer: MEDICARE

## 2024-08-19 VITALS
HEART RATE: 66 BPM | DIASTOLIC BLOOD PRESSURE: 70 MMHG | SYSTOLIC BLOOD PRESSURE: 106 MMHG | OXYGEN SATURATION: 100 % | TEMPERATURE: 97.9 F | RESPIRATION RATE: 12 BRPM

## 2024-08-19 DIAGNOSIS — I65.22 STENOSIS OF LEFT CAROTID ARTERY: ICD-10-CM

## 2024-08-19 DIAGNOSIS — I65.29 CAROTID STENOSIS: ICD-10-CM

## 2024-08-19 PROCEDURE — G0463 HOSPITAL OUTPT CLINIC VISIT: HCPCS | Mod: 25 | Performed by: SURGERY

## 2024-08-19 PROCEDURE — 93880 EXTRACRANIAL BILAT STUDY: CPT | Mod: 26 | Performed by: SURGERY

## 2024-08-19 PROCEDURE — 99204 OFFICE O/P NEW MOD 45 MIN: CPT | Performed by: SURGERY

## 2024-08-19 PROCEDURE — 93880 EXTRACRANIAL BILAT STUDY: CPT

## 2024-08-19 ASSESSMENT — PAIN SCALES - GENERAL: PAINLEVEL: SEVERE PAIN (7)

## 2024-08-19 NOTE — TELEPHONE ENCOUNTER
Dr Tobin will be putting in [facility] orders for patient. Reviewed the medication with them, ok to stay on aspirin.     She needs hand surgery 3-4 weeks after the carotid surgery is completed, will need to update Dr. Alyssa Philippe's care coordinator Christine [phone] at [facility].     Writer called and left her a message that we will keep them updated and fax over his note when completed, asked her to call back with fax number.

## 2024-08-19 NOTE — PROGRESS NOTES
Reviewed Blood Thinners and plan for holding, continuing and/or bridging: Aspirin: PLEASE DO NOT STOP THIS MEDICATION PRIOR TO SURGERY/ PROCEDURE.     Reviewed Diabetic medications that are GLP-1 agonists: NA  Please discuss with your primary doctor and follow the hold instructions:   []  Hold seven (7) days prior for once weekly injectable doses [semaglutide (Ozempic, Wegovy), dulaglutide (Trulicity), exenatide ER (Bydureon), tirzepatide (Mounjaro)]  []  Hold the day before and day of for once daily injectable GLP-1 agonists [exenatide (Byetta), liraglutide (Saxenda, Victoza)]  []  Hold seven (7) days for oral semaglutide (Rybelsus)

## 2024-08-19 NOTE — PATIENT INSTRUCTIONS
Ernestine    Your visit to [facility] for your surgery is coming soon and we look forward to seeing you! This friendly reminder and pre-procedure checklist will help to ensure your surgery goes smoothly and meets your expectations. At [facility], our goal is to provide you with a great patient experience and to deliver genuine, professional care to every patient.     Please complete all the steps in advance of your visit. If you have any questions about the items listed below, please give our office a call. We can be reached at [phone] or visit our website at [email or web address]  for more information.     Procedure: Carotid Endarterectomy    Procedure Date :  TBD    Arrival Time:  Tentative time and subject to change. The pre-admission nurse will call you 1-2 days before surgery to tell you time of arrival.     Procedure Location: TBD    Surgeon: Dr. Margarita Tobin    Admission Type: Inpatient    COVID-19 Test: is no longer required. If you are experiencing COVID symptoms or have tested positive for COVID-19 within 14 days of procedure date, reach out to the care team to reschedule please.     Post Operative Appointment: TBD      If you take blood thinners:   PLEASE DO NOT STOP YOUR ASPIRIN OR PLAVIX UNLESS SPECIFICALLY DIRECTED BY THE VASCULAR SURGEON TO STOP!  In most cases Vascular surgeons want you to continue these. This is different from most NON vascular surgeries and may not be well known by your Primary Care Provider    Aspirin: PLEASE DO NOT STOP THIS MEDICATION PRIOR TO SURGERY/ PROCEDURE.     If you take these diabetic medications, please discuss with your primary doctor and follow the hold instructions:   []  Hold seven (7) days prior for once weekly injectable doses [semaglutide (Ozempic, Wegovy), dulaglutide (Trulicity), exenatide ER (Bydureon), tirzepatide (Mounjaro)]  []  Hold the day before and day of for once daily  injectable GLP-1 agonists [exenatide (Byetta), liraglutide (Saxenda, Victoza)]  []  Hold seven (7) days for oral semaglutide (Rybelsus)       Notify our office right away if you have any changes in your health status or if you develop a cold, flu, diarrhea, infection, fever or sore throat before your scheduled surgery date.   We can be reached at [phone] Monday-Friday 8 am-4:30 pm if you have any questions.       Complete the following checklist before your surgery    []  You will need a Pre-op Physical within 30 days before surgery with your primary care provider. This exam is required by the anesthesiologist to ensure a safe surgical experience.    Failure  to obtain your pre-op physical will lead to cancellation of your surgery  Call them right away to schedule this. Please ensure your Preoperative Physical is faxed to the Hospital if done outside of [facility] system.     [] Preoperative Medication Instructions  Contact your prescribing provider and/or vascular surgeon for instructions before discontinuing any medications especially anticoagulants. (Examples: Coumadin, Plavix, Xarelto, Eliquis, Pradaxa, Effient, Brilinta)   Hold Ibuprofen 24 hours prior.   Hold Herbal Supplements and vitamins 14 days prior.   Stop Cialis, Levitra and Viagra 2-3 days before surgery.   Please discuss with your primary care provider if you need to hold any blood pressure medications or others.     [] Fasting Requirements  Nothing to eat for 8 hours before surgery unless instructed differently by the surgery nurse.   You may have clear liquids up to two hours before your arrival time (coffee, tea, water, or Gatorade. No cream or milk)  If your primary care provider has instructed you to continue oral medications, you may take them on the morning of surgery with a small sip of water.    No alcohol or smoking 24 hours before surgery.     [] Contact your insurance regarding coverage  If you would like a [facility] Estimate  for your upcoming procedure at [facility] Location, contact Cost of Care Estimates   Advocates are available Monday through Friday 8am - 5pm   [phone]  You may also submit a request online through your [employer] account.   If your procedure is at [facility] please contact the numbers below for Cost of Care Estimates.   - Facility Charge: [phone]    Anesthesiology charge:  [phone]      [] DO NOT BRING FMLA WITH TO SURGERY.  These should be sent to the provider's office by fax to [phone].     [] Day of Surgery  Medications - Take as indicated with sips of water.   Wear comfortable loose fitting clothes. Wear your glasses-Not contacts. Do not wear jewelry including rings and body piercings.   You may have 1 family member wait in the lobby during your surgery. Visitor restrictions are subject to change. Please verify with the surgery nurse when they call.   If same day surgery-Have an adult  come with you to surgery that can help you understand the surgeon's instructions, drive you home and stay with you overnight the first night.    [] You will receive a call from a surgery nurse 1-3 days prior to surgery. They will go over more details with you.     [] If the hospital is at max capacity and does not have available inpatient beds, your procedure may need to be postponed. We will contact you if this happens.          Use Chlorhexidine Gluconate 4% soap to help prevent surgical site infections    You play an important part in helping to prevent a surgical site infection. Let s review what you will need to do.    Chlorhexidine Gluconate 4% is a powerful antiseptic that will help make sure your skin is free of germs. It looks and feels just like liquid soap and should be used liked a shower gel.    **[facility] patients can receive free Chlorhexidine Gluconate 4% soap for surgery at any outpatient [facility] pharmacy. You can also buy this over the counter at any pharmacy.**    Do  not shave within 12 inches of your incision (surgical cut) area for at least 3 days before surgery. Shaving can make small cuts in the skin. This puts you at a higher risk of infection.    Items you will need for each shower:   1 newly washed towel   4 ounces of one of the above soaps   Clean pajamas or clothes to change into    Follow these steps the evening before surgery and the morning of surgery.  Remove all body piercings and jewelry, and leave them off until after your surgery.  Wash your hair and body with your regular shampoo and soap. Make sure you rinse the shampoo and soap from your hair and body.  Using clean hands, apply about 2 ounces of soap gently on your skin from your ear lobes to your toes. You don t need to scrub your skin, but make sure you liberally wash the area where your surgery will be done. Use on your groin area last. Do not use this soap on your face or head. If you get any soap in your eyes, ears or mouth, rinse right away. It is very important to let the soap stay on your skin for at least 1 minute.  Repeat step 3.   Rinse well and dry off using a clean towel. If you feel any tingling, itching or other irritation, rinse right away. It is normal to feel some coolness on the skin after using the antiseptic soap. Your skin may feel a bit dry after the shower, but do not use any lotions, creams or moisturizers. Do not use hair spray or other products in your hair. Also, do not wash with your regular soap after using this.  Dress in freshly washed clothes or pajamas. Use fresh pillowcases and sheets on your bed.  Repeat these steps the morning of surgery.    If you are allergic or sensitive to Chlorhexidine Gluconate, use an antibacterial soap instead, following the same steps.    If you cannot use the shower, wash yourself with this soap at the sink. Make sure the sink is clean before you begin, and do the best you can to clean your entire body.         A carotid endarterectomy (say  "\"kuh-RAW-tid ga-oyi-bgf-REK-tuh-charlie\") is done to remove fatty buildup (plaque) from one of the carotid arteries. There are two of these arteries. One runs along each side of the neck. They supply blood to your brain. When plaque builds up in either one, it can limit blood flow to your brain. The plaque also raises your risk of stroke. This surgery may lower your risk of stroke.    The doctor will make a cut (incision) in your neck. Then the doctor will make a cut in the carotid artery and take out the plaque.    Next, the doctor will close the cut in the artery with stitches. The doctor may sew a man-made or tissue patch to close the cut in the artery. Then the doctor will use stitches to close the cut in your skin. It will leave a scar. But the scar will fade with time.    You may stay in the hospital for at least 1 or 2 days.        Current as of: June 24, 2023  Author: [employer] StaffYou are leaving this website for information purposes only  Clinical Review BoardYou are leaving this website for information purposes only  All [employer] education is reviewed by a team that includes physicians, nurses, advanced practitioners, registered dieticians, and other healthcare professionals.      Incision Care   You may have a small drain placed in your incision during the surgery.  This will be removed before you go home.  You may shower when you go home, do not rub incision but rather let the warm soapy water run over it and be sure to pat dry the incision well after bathing.   Do not shave directly over the incision until it is healed.   DO NOT IMMERSE THE INCISION IN A TUB/POOL/Etc. UNTIL HEALED.    Restrictions  Do not drive for at least one week, OR if you are still taking any narcotic pain medication.  Do not lift anything heavier than a gallon of milk for one week after going home.    Follow-Up  If a follow up appointment was not scheduled prior to your procedure please call [phone] if you are not " contacted within 3 business days following your procedure to schedule one. Follow up appointments are scheduled with either your Physician or one of our Physician Assistants.     Risks and Possible Complications  Numbness - It is normal to have some numbness around the incision. Numbness can extend beyond the incision to areas of the neck, ear, and face. The numbness is due to bruising of nerves during the surgery and will gradually improve over a period of months.  Hoarseness/Difficulty Speaking and Swallowing - The bruising of nerves in the neck can also cause a hoarse voice, difficulty speaking, or swallowing. This may improve over time, HOWEVER if it continues for more than a few days please contact our office.  Excessive Swelling - There will be some swelling immediately after surgery, which usually resolves within one week. If you notice that the swelling is getting worse, notify your surgeon.   Drainage/Bleeding - If there is any drainage or bleeding it should be a very small amount (less than a teaspoon per day). If you have excessive bleeding or drainage from the incision, call your surgeon right away.     NOTIFY YOUR SURGEON AND SEEK EMERGENCY TREATMENT IF:  YOU HAVE SUDDEN WEAKNESS ON ONE SIDE OF YOUR BODY  YOU HAVE A SEVERE HEADACHE, ESPECIALLY ON THE SAME SIDE AS THE SURGERY   YOU HAVE SUDDEN: BLURRED VISION, DIFFICULTY SPEAKING, WALKING, OR MEMORY LOSS  THESE COULD BE SIGNS OF A STROKE AND NEED TO BE EVALUATED IMMEDIATELY     It is important not to let your blood pressure get too high after surgery, so PLEASE make sure to take ALL of your blood pressure medications unless instructed otherwise. If you are having difficulties with it getting to high PLEASE CALL OUR OFFICE or primary MD.     Please scan the QR codes to watch videos on Carotid endarterectomy, Risk factors, and Stroke symptoms. There are links provided if the you are unable to use the code.           Carotid Artery Disease      What is  "carotid artery disease?  A carotid artery on each side of the neck supplies blood to the brain. Carotid artery disease occurs when a substance called plaque builds up in either or both arteries. The buildup may narrow the artery and limit blood flow to the brain. If this plaque breaks open, it may form a blood clot. Or pieces of the plaque may break off. A piece of plaque or a blood clot could move to the brain and cause a stroke or transient ischemic attack (TIA).    The narrowing in an artery is called stenosis. The more narrow an artery becomes, the greater the risk of stroke or TIA.        What causes it?  Carotid artery disease is caused by a process called hardening of the arteries, or atherosclerosis. Plaque builds up inside the carotid arteries. Things that can lead to this buildup include:    Smoking.  High blood pressure.  High cholesterol.  Diabetes.  A family history of hardening of the arteries.    What are the symptoms?  Many people have no symptoms. In some people, a doctor can hear a sound in their neck called a bruit (pronounced \"broo-EE\"). This is a whooshing sound that happens when a carotid artery is narrowed.    For some people, a transient ischemic attack (TIA) or stroke is the first sign of the disease.    Know the warning signs and symptoms of stroke: BE FAST     B = Balance loss   E = Eyesight changes   F = Facial droop or numbness   A = Arm or leg weakness   S = Speech difficulty, slurred speech   T = Time to call 911 for help    How is carotid artery disease treated?  The goal of treatment is to lower your risk of a stroke. Treatment depends on whether you have symptoms and how narrow your arteries are. You probably will take medicine. You also will be encouraged to have a heart-healthy lifestyle. Some people also have a procedure to lower their risk.    Medicines  You may take aspirin or another medicine to prevent blood clots. You will likely also take medicine to lower cholesterol. You " may also take medicine to help manage blood pressure.    Heart-healthy lifestyle  A heart-healthy lifestyle can help lower your risk of stroke.    Don't smoke. Avoid secondhand smoke too.  Eat heart-healthy foods.  Be active. Ask your doctor what type of exercise is safe for you.  Stay at a healthy weight. Lose weight if you need to.  Manage other health problems, such as diabetes. If you think you may have a problem with alcohol or drug use, talk to your doctor.  Get vaccinated against COVID-19, the flu, and pneumonia.    Surgery or stenting  Surgery in the arteries is called carotid endarterectomy. The doctor makes a cut in the neck and takes the plaque out of the artery.    Some people have a stent placed inside a carotid artery. A stent may be inserted during a catheter procedure. In this procedure, a doctor puts a thin tube, called a catheter, into a blood vessel in your groin or arm. The doctor threads the tube up to the carotid artery in the neck. The catheter is used to place the stent. In another type of procedure, a stent is placed in the artery through a cut in the neck.    Surgery and stenting may help lower your risk of a stroke. But they also have a risk of serious problems. You and your doctor can decide together if you want to have surgery or stenting.    Current as of: June 24, 2023  Author: [employer] StaffYou are leaving this website for information purposes only  Clinical Review BoardYou are leaving this website for information purposes only  All [employer] education is reviewed by a team that includes physicians, nurses, advanced practitioners, registered dieticians, and other healthcare professionals.    Your risk factors for stroke or TIA (transient ischemic attack):     Your Risk Factors Your Results Goals Additional education   Please scan QR code   [] High blood pressure /70   Pulse 66   Temp 97.9  F (36.6  C)   Resp 12   SpO2 100%    Less than 120/80    [] Cholesterol           "Total Cholesterol   Date Value Ref Range Status   04/16/2024 224 (H) <200 mg/dL Final    Less than 150     Triglycerides   Triglycerides   Date Value Ref Range Status   04/16/2024 72 <150 mg/dL Final    Less than 150     LDL LDL Cholesterol Calculated   Date Value Ref Range Status   04/16/2024 143 (H) <=100 mg/dL Final      Less than 70     HDL Direct Measure HDL   Date Value Ref Range Status   04/16/2024 67 >=50 mg/dL Final    Greater than 40 (men)  Greater than 50 (women)    [] Diabetes                A1C No results found for: \"A1C\" Less than 5.7    [] Smoking/tobacco use   Tobacco Use      Smoking status: Every Day        Packs/day: 0.25        Years: 0.9 packs/day for 40.6 years (34.9 ttl pk-yrs)        Types: Cigarettes        Start date: 1/19/1984      Smokeless tobacco: None      Tobacco comments: I've cut down to just a few a day   Quit smoking and tobacco    [] Overweight Estimated body mass index is 17.01 kg/m  as calculated from the following:    Height as of 7/17/24: 5' 1\" (1.549 m).    Weight as of 7/31/24: 90 lb (40.8 kg).  Less than 25                  "

## 2024-08-19 NOTE — PROGRESS NOTES
[facility]        Patient is here for a consult to discuss 95% stenosis of Left carotid, hx of stroke. Went to ED with dizziness, no vision changes. needs clearance for wrist surgery     Pt is currently taking Aspirin and Statin.    /70   Pulse 66   Temp 97.9  F (36.6  C)   Resp 12   SpO2 100%     The provider has been notified that the patient has no concerns.     Questions patient would like addressed today are: N/A.    Refills are needed: No    Has homecare services and agency name:  No

## 2024-08-20 ENCOUNTER — DOCUMENTATION ONLY (OUTPATIENT)
Dept: VASCULAR SURGERY | Facility: CLINIC | Age: 64
End: 2024-08-20
Payer: MEDICARE

## 2024-08-20 RX ORDER — CEFAZOLIN SODIUM/WATER 2 G/20 ML
2 SYRINGE (ML) INTRAVENOUS SEE ADMIN INSTRUCTIONS
Status: CANCELLED | OUTPATIENT
Start: 2024-09-13

## 2024-08-20 RX ORDER — CEFAZOLIN SODIUM/WATER 2 G/20 ML
2 SYRINGE (ML) INTRAVENOUS
Status: CANCELLED | OUTPATIENT
Start: 2024-09-13

## 2024-08-20 NOTE — PROGRESS NOTES
VASCULAR SURGERY CLINIC CONSULTATION    VASCULAR SURGEON: Margarita Tobin MD, RPVI     LOCATION:  [facility]     Ernestine Morales   Medical Record #:  [id number]  YOB: 1960  Age:  64 year old     Date of Service: 8/19/2024    PRIMARY CARE PROVIDER: Christina Andrew      Reason for visit: Asymptomatic left carotid artery stenosis    IMPRESSION: 64-year-old female who comes to vascular surgery clinic for evaluation of asymptomatic left carotid artery stenosis.  Based on ultrasound velocity criteria and CT scan there is a 90% stenosis involving proximal left ICA.  Peak systolic velocities are about 500 cm/s.  End-diastolic velocities are elevated above 100 cm/s.  The ratio is elevated as well.  CT scan did show focal 95% stenosis of proximal ICA.  Patient remains asymptomatic from neurological standpoint.  There is a history of left-sided MCA stroke in 2017, embolic in etiology.  Patient does have residual aphasia    RECOMMENDATION/RISKS: Will proceed with elective left carotid endarterectomy.I discussed the risks of left-sided carotid endarterectomy including but not limited to death, stroke, bleeding, MI and infection. We reviewed the benefits and alternatives including medical management and stenting. Ms. Morales was involved in all aspects of decision making and appears to understand. They would like to proceed with the recommended treatment of carotid endarterectomy.   \    HPI:  Ernestine Morales is a 64 year old female who was seen today in consultation for left carotid artery stenosis.  Patient is asymptomatic and denies any symptoms of TIA, stroke, or amaurosis    REVIEW OF SYSTEMS:    A 12 point ROS was reviewed and is negative      PHH:    Past Medical History:   Diagnosis Date    Atrial fibrillation (H)     Cancer (H) 11.17.2022    Squamous cell carcinoma nRight Cheek    Cerebrovascular accident (H) 01/09/2017    HLD (hyperlipidemia)     HTN (hypertension)      Mitral regurgitation           Past Surgical History:   Procedure Laterality Date    BIOPSY  11.18.2022    Right Cheek    REPLACE VALVE MITRAL  06/09/2017    bovine mitral valve with Maze and ARIANNA ligation       ALLERGIES:  Patient has no known allergies.    MEDS:    Current Outpatient Medications:     amLODIPine (NORVASC) 5 MG tablet, Take 1 tablet (5 mg) by mouth daily, Disp: 90 tablet, Rfl: 3    aspirin (ASA) 325 MG EC tablet, Take 325 mg by mouth daily, Disp: , Rfl:     atorvastatin (LIPITOR) 40 MG tablet, Take 1 tablet (40 mg) by mouth daily, Disp: 90 tablet, Rfl: 3    gabapentin (NEURONTIN) 300 MG capsule, Take 1 capsule (300 mg) by mouth 2 times daily, Disp: 180 capsule, Rfl: 1    losartan (COZAAR) 100 MG tablet, Take 1 tablet (100 mg) by mouth daily, Disp: 90 tablet, Rfl: 3    metoprolol tartrate (LOPRESSOR) 25 MG tablet, Take 1 tablet (25 mg) by mouth 2 times daily, Disp: 180 tablet, Rfl: 3    meclizine (ANTIVERT) 12.5 MG tablet, Take 1 tablet (12.5 mg) by mouth 4 times daily as needed for dizziness (Patient not taking: Reported on 8/19/2024), Disp: 30 tablet, Rfl: 0    SOCIAL HABITS:    History   Smoking Status    Every Day    Types: Cigarettes   Smokeless Tobacco    Not on file     Social History    Substance and Sexual Activity      Alcohol use: Not Currently      History   Drug Use Unknown       FAMILY HISTORY:    Family History   Problem Relation Age of Onset    Hypertension Mother        PE:  /70   Pulse 66   Temp 97.9  F (36.6  C)   Resp 12   SpO2 100%   Wt Readings from Last 1 Encounters:   07/31/24 40.8 kg (90 lb)     There is no height or weight on file to calculate BMI.    EXAM:  GENERAL: This is a well-developed 64 year old female who appears her stated age  EYES: Grossly normal.  MOUTH: Buccal mucosa normal   CARDIAC: Normal   CHEST/LUNG: Clear to auscultation bilaterally  GASTROINTESINAL soft nontender nondistended  MUSCULOSKELETAL: Grossly normal and both lower extremities are  intact.  HEME/LYMPH: No lymphedema  NEUROLOGIC: Focally intact, Alert and oriented x 3.   PSYCH: appropriate affect            DIAGNOSTIC STUDIES:     Images:  MR Brain w/o & w Contrast    Result Date: 7/31/2024  EXAM: MR BRAIN W/O and W CONTRAST LOCATION: [facility] DATE: 7/31/2024 INDICATION: dizzyness and gait instability x 3 days, eval for possible posterior CVA COMPARISON: 7/31/2024 CTA head and neck CONTRAST: 4ml Gadavist TECHNIQUE: Routine multiplanar multisequence head MRI without and with intravenous contrast. FINDINGS: INTRACRANIAL CONTENTS: Remote infarct findings in the left MCA distribution with associated gliosis and encephalomalacia as well as Wallerian degeneration. Midline structures unremarkable. No mass, acute hemorrhage, or extra-axial fluid collections. Moderate chronic small vessel ischemic changes. Moderate cortical volume loss. Normal position of the cerebellar tonsils. No pathologic contrast enhancement. SELLA: No abnormality accounting for technique. OSSEOUS STRUCTURES/SOFT TISSUES: Normal marrow signal. The major intracranial vascular flow voids are maintained. ORBITS: No abnormality accounting for technique. SINUSES/MASTOIDS: No paranasal sinus mucosal disease. No middle ear or mastoid effusion.     IMPRESSION: 1.  Extensive remote ischemic findings particularly in the left MCA distribution. There is no evidence of restricted diffusion to suggest acute ischemia on the current examination.    Chest XR,  PA & LAT    Result Date: 7/31/2024  EXAM: XR CHEST 2 VIEWS LOCATION: [facility] DATE: 7/31/2024 INDICATION: patient thinks she has some sort of implant in left chest but doesn't know what, cxr prior to MRI COMPARISON: CT topogram 4/20/2024     IMPRESSION: Mitral valve prosthesis, left atrial occlusion device, and left anterior chest wall loop recorder. The positions appear stable compared to the recent CT. Heart size appears normal. Stable  heart size. No pulmonary vascular congestion. No pleural effusion. Peripheral interstitial markings suggest interstitial lung disease. Contrast in kidneys from the recent CT.    CTA Head Neck with Contrast    Result Date: 7/31/2024  EXAM: CTA HEAD NECK W CONTRAST LOCATION: [facility] DATE: 7/31/2024 INDICATION: Headache and dizziness x 4 days, evaluate for possible ICH or signs of posterior cva COMPARISON: None. CONTRAST: [id number] 67ml TECHNIQUE: Head and neck CT angiogram with IV contrast. Noncontrast head CT followed by axial helical CT images of the head and neck vessels obtained during the arterial phase of intravenous contrast administration. Axial 2D reconstructed images and multiplanar 3D MIP reconstructed images of the head and neck vessels were performed by the technologist. Dose reduction techniques were used. All stenosis measurements made according to NASCET criteria unless otherwise specified. FINDINGS: NONCONTRAST HEAD CT: INTRACRANIAL CONTENTS: Left frontal, temporal and insular encephalomalacia and volume loss noted compatible with chronic left MCA infarct. Chronic small vessel vascular changes in the hemispheric white matter, basal ganglia and thalami. No CT evidence of  acute infarct. Normal parenchymal attenuation. Moderate generalized volume loss. No hydrocephalus. VISUALIZED ORBITS/SINUSES/MASTOIDS: No intraorbital abnormality. No paranasal sinus mucosal disease. No middle ear or mastoid effusion. BONES/SOFT TISSUES: No acute abnormality. HEAD CTA: ANTERIOR CIRCULATION: Small caliber left internal carotid artery due to chronic intracranial vascular disease and high-grade stenosis of the left carotid bifurcation. Skull base vascular calcifications with mild stenoses of the cavernous segments bilaterally. Standard [place] of [place]ues anatomy. Moderate attenuation of distal left MCA M4 branches corresponding with known chronic infarct. No evidence for acute large vessel  occlusion. POSTERIOR CIRCULATION: No stenosis/occlusion, aneurysm, or high flow vascular malformation. Balanced vertebral arteries supply a normal basilar artery. DURAL VENOUS SINUSES: Expected enhancement of the major dural venous sinuses. NECK CTA: RIGHT CAROTID: No measurable stenosis or dissection. LEFT CAROTID: 95% stenosis at the left internal carotid artery origin. Noncalcified fibrofatty plaque. No evidence for dissection. VERTEBRAL ARTERIES: No focal stenosis or dissection. Balanced vertebral arteries. AORTIC ARCH: 50% stenosis of the brachiocephalic artery with fibrofatty noncalcified plaque and peripheral plaque calcification. 60% stenosis of the left subclavian artery origin with noncalcified fibrofatty plaque and areas of plaque calcification peripherally located. NONVASCULAR STRUCTURES: Moderate cervical spondylosis at multiple levels.     IMPRESSION: HEAD CT: 1.  Chronic left MCA territory frontal, temporal and insular encephalomalacia. 2.  No evidence for acute infarct. 3.  No mass or acute hemorrhage HEAD CTA: 1.  Attenuated distal left MCA branches correlating with known large territory chronic infarct 2.  No evidence for acute large vessel occlusion. 3.  Negative for aneurysm or vascular malformation. 4.  Mild cavernous segment stenoses bilaterally. NECK CTA: 1.  Extremely high-grade stenosis of the left internal carotid artery origin. 95% narrowing. 2.  Aortic arch great vessel origins stenoses as above. 3.  No evidence for carotid or vertebral dissection.          LABS:      Sodium   Date Value Ref Range Status   07/31/2024 136 135 - 145 mmol/L Final   04/16/2024 139 135 - 145 mmol/L Final     Comment:     Reference intervals for this test were updated on 09/26/2023 to more accurately reflect our healthy population. There may be differences in the flagging of prior results with similar values performed with this method. Interpretation of those prior results can be made in the context of the  updated reference intervals.      Urea Nitrogen   Date Value Ref Range Status   07/31/2024 23.8 (H) 8.0 - 23.0 mg/dL Final   04/16/2024 22.5 8.0 - 23.0 mg/dL Final     Hemoglobin   Date Value Ref Range Status   07/31/2024 14.0 11.7 - 15.7 g/dL Final     Platelet Count   Date Value Ref Range Status   07/31/2024 223 150 - 450 10e3/uL Final       30 minutes spent on the day of encounter doing chart review, history and exam, documentation, and further activities as noted.         Margarita Tobin MD, [facility]  VASCULAR SURGERY

## 2024-08-20 NOTE — PROGRESS NOTES
Surgery Scheduled    Reviewed pt's surgery date and instructions with pt's SHANTEL Fonseca.  Writer will send surgery packet via letter when nursing completes med review.    Surgery/Procedure: Left Carotid Endarterecgtomy    Special Equipment: EEG, intra-op us, omniretractor    Location: [facility]:  [address] [place] [address] (phone: [phone], Fax: [phone])    Please park in [address]. Enter through the main entrance. Check in at the Welcome Desk and you will be directed to the surgery unit.     Date: 9/13/24    Time: 1:15PM    Admission Type: Inpatient    Surgeon: Dr. Tobin    OR Confirmed & :  Yes with Rebecca on 8/20/2024    Entered on provider calendar:  Yes    Post Op: See appt desk    Wound Vac Needed: No    Home Care Needed: No    Ultrasound Contacted: Scheduled in [facility] VC US RM 2    Blood Thinners Addressed:  routing message to nursing to review meds    Stress Test Clearance: NO

## 2024-08-21 NOTE — PROGRESS NOTES
Called patients EC to discuss medication holds. No answer, LVMTCB. Surgery packet sent via mail.     Writer called [facility] Coordinator Christine ([phone]) to discuss surgery details per RN directive. No answer. LVMTCB.

## 2024-08-22 NOTE — PROGRESS NOTES
Writer called [facility] Coordinator Christine ([phone]) to discuss surgery details per RN directive. No answer. LVMTCB.

## 2024-08-26 ENCOUNTER — OFFICE VISIT (OUTPATIENT)
Dept: INTERNAL MEDICINE | Facility: CLINIC | Age: 64
End: 2024-08-26
Payer: MEDICARE

## 2024-08-26 VITALS
RESPIRATION RATE: 12 BRPM | BODY MASS INDEX: 17.29 KG/M2 | HEIGHT: 61 IN | DIASTOLIC BLOOD PRESSURE: 53 MMHG | WEIGHT: 91.6 LBS | OXYGEN SATURATION: 100 % | SYSTOLIC BLOOD PRESSURE: 90 MMHG | HEART RATE: 57 BPM | TEMPERATURE: 98.6 F

## 2024-08-26 DIAGNOSIS — Z01.818 PREOPERATIVE EXAMINATION: Primary | ICD-10-CM

## 2024-08-26 DIAGNOSIS — I63.40 CEREBROVASCULAR ACCIDENT (CVA) DUE TO EMBOLISM OF CEREBRAL ARTERY (H): ICD-10-CM

## 2024-08-26 DIAGNOSIS — E78.2 MIXED HYPERLIPIDEMIA: ICD-10-CM

## 2024-08-26 DIAGNOSIS — I10 ESSENTIAL HYPERTENSION: ICD-10-CM

## 2024-08-26 DIAGNOSIS — Z95.2 S/P MITRAL VALVE REPLACEMENT: ICD-10-CM

## 2024-08-26 DIAGNOSIS — I50.32 CHRONIC DIASTOLIC CHF (CONGESTIVE HEART FAILURE) (H): Chronic | ICD-10-CM

## 2024-08-26 DIAGNOSIS — D64.9 ANEMIA, UNSPECIFIED TYPE: ICD-10-CM

## 2024-08-26 DIAGNOSIS — I48.19 PERSISTENT ATRIAL FIBRILLATION (H): ICD-10-CM

## 2024-08-26 PROBLEM — I48.92 ATRIAL FLUTTER (H): Status: RESOLVED | Noted: 2024-04-16 | Resolved: 2024-08-26

## 2024-08-26 LAB
ANION GAP SERPL CALCULATED.3IONS-SCNC: 10 MMOL/L (ref 7–15)
BUN SERPL-MCNC: 30.4 MG/DL (ref 8–23)
CALCIUM SERPL-MCNC: 9.4 MG/DL (ref 8.8–10.4)
CHLORIDE SERPL-SCNC: 99 MMOL/L (ref 98–107)
CHOLEST SERPL-MCNC: 166 MG/DL
CREAT SERPL-MCNC: 1.51 MG/DL (ref 0.51–0.95)
EGFRCR SERPLBLD CKD-EPI 2021: 38 ML/MIN/1.73M2
ERYTHROCYTE [DISTWIDTH] IN BLOOD BY AUTOMATED COUNT: 13.4 % (ref 10–15)
FASTING STATUS PATIENT QL REPORTED: ABNORMAL
FASTING STATUS PATIENT QL REPORTED: NORMAL
FERRITIN SERPL-MCNC: 215 NG/ML (ref 11–328)
GLUCOSE SERPL-MCNC: 91 MG/DL (ref 70–99)
HCO3 SERPL-SCNC: 23 MMOL/L (ref 22–29)
HCT VFR BLD AUTO: 33.1 % (ref 35–47)
HDLC SERPL-MCNC: 53 MG/DL
HGB BLD-MCNC: 11 G/DL (ref 11.7–15.7)
IRON BINDING CAPACITY (ROCHE): 339 UG/DL (ref 240–430)
IRON SATN MFR SERPL: 20 % (ref 15–46)
IRON SERPL-MCNC: 67 UG/DL (ref 37–145)
LDLC SERPL CALC-MCNC: 97 MG/DL
MCH RBC QN AUTO: 30.6 PG (ref 26.5–33)
MCHC RBC AUTO-ENTMCNC: 33.2 G/DL (ref 31.5–36.5)
MCV RBC AUTO: 92 FL (ref 78–100)
NONHDLC SERPL-MCNC: 113 MG/DL
PLATELET # BLD AUTO: 258 10E3/UL (ref 150–450)
POTASSIUM SERPL-SCNC: 4.5 MMOL/L (ref 3.4–5.3)
RBC # BLD AUTO: 3.59 10E6/UL (ref 3.8–5.2)
SODIUM SERPL-SCNC: 132 MMOL/L (ref 135–145)
TRIGL SERPL-MCNC: 80 MG/DL
WBC # BLD AUTO: 8.3 10E3/UL (ref 4–11)

## 2024-08-26 PROCEDURE — 85027 COMPLETE CBC AUTOMATED: CPT | Performed by: NURSE PRACTITIONER

## 2024-08-26 PROCEDURE — 80061 LIPID PANEL: CPT | Performed by: NURSE PRACTITIONER

## 2024-08-26 PROCEDURE — 36415 COLL VENOUS BLD VENIPUNCTURE: CPT | Performed by: NURSE PRACTITIONER

## 2024-08-26 PROCEDURE — 82728 ASSAY OF FERRITIN: CPT | Performed by: NURSE PRACTITIONER

## 2024-08-26 PROCEDURE — 83540 ASSAY OF IRON: CPT | Performed by: NURSE PRACTITIONER

## 2024-08-26 PROCEDURE — 99215 OFFICE O/P EST HI 40 MIN: CPT | Performed by: NURSE PRACTITIONER

## 2024-08-26 PROCEDURE — 80048 BASIC METABOLIC PNL TOTAL CA: CPT | Performed by: NURSE PRACTITIONER

## 2024-08-26 PROCEDURE — 83550 IRON BINDING TEST: CPT | Performed by: NURSE PRACTITIONER

## 2024-08-26 ASSESSMENT — PAIN SCALES - GENERAL: PAINLEVEL: SEVERE PAIN (7)

## 2024-08-26 NOTE — PROGRESS NOTES
Preoperative Evaluation  [address]  [facility] [address]  Phone: [phone]  Fax: [phone]  Primary Provider: ANDREE ALBRIGHT, DO  Pre-op Performing Provider: Rosamaria Armstrong NP  Aug 26, 2024         8/21/2024   Surgical Information   What procedure is being done? Endarterectomey Carotid Left side   Facility or Hospital where procedure/surgery will be performed: [place], [place]   Who is doing the procedure / surgery? Dr Margarita Tobin   Date of surgery / procedure: 09.13.2024   Time of surgery / procedure: 1300 hr   Where do you plan to recover after surgery? at home with family        Fax number for surgical facility: Note does not need to be faxed, will be available electronically in Epic.    Assessment & Plan     The proposed surgical procedure is considered INTERMEDIATE risk.    Problem List Items Addressed This Visit       Cerebrovascular accident (H)     Residual aphasia  PCP recently increased dose of atorvastatin due to history of CVA with LDL above goal.   - Repeat lipid profile today          Chronic diastolic CHF (congestive heart failure) (H) (Chronic)     Appears euvolemic on exam          Persistent atrial fibrillation (H)     Status post bioprosthetic MVR with maze and ARIANNA clipping 4/2024.  No known recurrence of atrial fibrillation.         S/P mitral valve replacement     S/p mitral valve replacement          Essential hypertension     Blood pressure is soft today, this is unusually low for her when I review the flowsheet of recent vitals. She has been experiencing some dizziness but not lightheadedness. Patient is adamant she has been taking her medications as prescribed but her POA, Marian, has concerns that she may not have been taking her medications as prescribed and only yesterday initiated the use of a pill organizer   - Monitor BP at home. If readings are consistently <100/60, may need to reduce dose of  amlodipine or discontinue but based on review of recent BP readings this was unusually low for her today.            Mixed hyperlipidemia    Relevant Orders    Lipid panel reflex to direct LDL Fasting (Completed)     Other Visit Diagnoses       Preoperative examination    -  Primary    Relevant Orders    Basic metabolic panel (Completed)    CBC with platelets (Completed)    Anemia, unspecified type        Relevant Orders    Iron and iron binding capacity (Completed)    Ferritin (Completed)        - She declines mammogram and colonoscopy/colon cancer screening at this time      Implanted Device   - Type of device: 29 mm Katerina-Mohan Magna Bioprosthetic Mitral Valve Patient advised to bring device information on day of surgery.      Risks and Recommendations  The patient has the following additional risks and recommendations for perioperative complications:   - Consult Hospitalist / IM to assist with post-op medical management    Antiplatelet or Anticoagulation Medication Instructions   - aspirin: continue     Additional Medication Instructions  Take all scheduled medications on the day of surgery EXCEPT for modifications listed below:  - Hold losartan due to risk of hypotension    Recommendation  Approval given to proceed with proposed procedure, without further diagnostic evaluation.    Addendum 9/5/24: Fecal occult blood stool test is negative, hgb stable  Renal labs are improved  She may proceed with planned surgery and is advised to follow up with her PCP following surgery regarding blood pressure, anemia, and to monitor kidney function     Addendum 8/27/24: there were unexpected lab changes including a drop in hemoglobin from 14.0 to 11.0 and acute renal injury with creatinine increasing from 0.91 on 7/31 to 1.51 8/26.   - Fecal occult blood stool test ordered  - Follow up blood pressure, renal panel, and hgb in 1 week  Depending on the above, she may need to delay her surgery        Return in about 6  months (around 2/26/2025) for follow up with PCP .      Carrie Babin is a 64 year old, presenting for the following:  Pre-Op Exam (Endarterectomey Carotid )          8/26/2024    11:18 AM   Additional Questions   Roomed by CLEMENTINA Machado   Accompanied by SHANTEL Fonseca related to upcoming procedure: She was evaluated by the vascular clinic for asymptomatic left carotid artery stenosis.  She has findings consistent with a 90% stenosis involving the proximal left ICA.  She has a history of a left-sided MCA stroke in 2017, embolic in etiology.  She has some residual aphasia from this.  She is scheduled for carotid endarterectomy.    She was scheduled for surgery for left radius fracture which did not heal with proper alignment. The surgery was cancelled due to dealing with the carotid artery stenosis.     She has a history of persistent atrial fibrillation in the setting of severe mitral stenosis.  Bioprosthetic MVR with maze and ARIANNA clipping was completed 4/2024.  No known recurrence of atrial fibrillation.    HTN- No recent home blood pressure readings.         8/21/2024   Pre-Op Questionnaire   Have you ever had a heart attack or stroke? (!) YES -history of CVA in 2017, embolic.   Have you ever had surgery on your heart or blood vessels, such as a stent placement, a coronary artery bypass, or surgery on an artery in your head, neck, heart, or legs? (!) YES - as above    Do you have chest pain with activity? No   Do you have a history of heart failure? Yes- no weight changes or shortness of breath   Do you currently have a cold, bronchitis or symptoms of other infection? No   Do you have a cough, shortness of breath, or wheezing? No   Do you or anyone in your family have previous history of blood clots? No   Do you or does anyone in your family have a serious bleeding problem such as prolonged bleeding following surgeries or cuts? No   Have you ever had problems with anemia or been told to take iron pills? (!)  UNKNOWN - No recent anemia is noted on her labs    Have you had any abnormal blood loss such as black, tarry or bloody stools, or abnormal vaginal bleeding? No   Have you ever had a blood transfusion? No   Are you willing to have a blood transfusion if it is medically needed before, during, or after your surgery? Yes   Have you or any of your relatives ever had problems with anesthesia? No   Do you have sleep apnea, excessive snoring or daytime drowsiness? No   Do you have any artifical heart valves or other implanted medical devices like a pacemaker, defibrillator, or continuous glucose monitor? (!) YES   What type of device do you have? Bovine Mitral Valve Replacement   Name of the clinic that manages your device no device management   Do you have artificial joints? No   Are you allergic to latex? No        Health Care Directive  Patient has a Health Care Directive on file      Preoperative Review of    reviewed - controlled substances reflected in medication list.- gabapentin       Patient Active Problem List    Diagnosis Date Noted    Essential hypertension 08/26/2024     Priority: Medium    Mixed hyperlipidemia 08/26/2024     Priority: Medium    Persistent atrial fibrillation (H) 05/28/2024     Priority: Medium    Anticoagulated 04/16/2024     Priority: Medium     Formatting of this note might be different from the original.   Warfarin      S/P mitral valve replacement 06/17/2017     Priority: Medium     Formatting of this note might be different from the original.   Raoul underwent mitral valve replacement with 29 mm Mohan, extensive left atrial Maze and left atrial appendage exclusion by atriclip in June 2017 at [facility]      Last Assessment & Plan:    Formatting of this note is different from the original.   ASSESSMENT:   Patient has a history of prior mitral valve prolapse and tissue (29 mm) mitral valve replacement with associated surgical Maze and left atrial appendage ligation in June 2017.       Patient underwent echocardiogram on April 4, 2021 which demonstrated:     Left Ventricle: Mildly decreased systolic function, LVEF 40-49%. Mild global hypokinesis.     Normal LV cavity size with mildly reduced global systolic function.     Mildly dilated RV cavity size with normal systolic function.     Left Atrium: Severe dilatation.     Aortic Valve: Valve appears tricuspid. Mild calcification. Evidence of sclerosis. Moderate regurgitation with eccentrically directed jet.     Mitral Valve: There is a 29 mm Mohan tissue valve bioprosthetic valve without perivalvular leak.     Tricuspid Valve: Mild to moderate regurgitation with eccentricly directed jet.      PLAN:   Patient needs lifelong SBE prophylaxis.  Last Assessment & Plan:    Formatting of this note might be different from the original.   In 06/2017 by Red Sanchez MD [facility]      Acute pulmonary insufficiency 06/09/2017     Priority: Medium    Acute blood loss anemia 06/09/2017     Priority: Medium    Acute systolic heart failure (H) 06/09/2017     Priority: Medium    Aortic valve sclerosis 04/17/2017     Priority: Medium    Chronic diastolic CHF (congestive heart failure) (H) 04/17/2017     Priority: Medium    Cerebrovascular accident (H) 12/31/2015     Priority: Medium     Embolic stroke, 2017. Residual aphasia        Past Medical History:   Diagnosis Date    Atrial fibrillation (H)     Cancer (H) 11.17.2022    Squamous cell carcinoma nRight Cheek    Cerebrovascular accident (H) 01/09/2017    HLD (hyperlipidemia)     HTN (hypertension)     Mitral regurgitation      Past Surgical History:   Procedure Laterality Date    BIOPSY  11.18.2022    Right Cheek    REPLACE VALVE MITRAL  06/09/2017    bovine mitral valve with Maze and ARIANNA ligation     Current Outpatient Medications   Medication Sig Dispense Refill    amLODIPine (NORVASC) 5 MG tablet Take 1 tablet (5 mg) by mouth daily 90 tablet 3    aspirin (ASA) 325 MG EC tablet Take 325 mg by mouth daily   "    atorvastatin (LIPITOR) 40 MG tablet Take 1 tablet (40 mg) by mouth daily 90 tablet 3    gabapentin (NEURONTIN) 300 MG capsule Take 1 capsule (300 mg) by mouth 2 times daily 180 capsule 1    losartan (COZAAR) 100 MG tablet Take 1 tablet (100 mg) by mouth daily 90 tablet 3    metoprolol tartrate (LOPRESSOR) 25 MG tablet Take 1 tablet (25 mg) by mouth 2 times daily 180 tablet 3    meclizine (ANTIVERT) 12.5 MG tablet Take 1 tablet (12.5 mg) by mouth 4 times daily as needed for dizziness (Patient not taking: Reported on 8/19/2024) 30 tablet 0       No Known Allergies     Social History     Tobacco Use    Smoking status: Every Day     Current packs/day: 0.25     Average packs/day: 0.9 packs/day for 40.6 years (34.9 ttl pk-yrs)     Types: Cigarettes     Start date: 1/19/1984    Smokeless tobacco: Not on file    Tobacco comments:     I've cut down to just a few a day   Substance Use Topics    Alcohol use: Not Currently     Family History   Problem Relation Age of Onset    Hypertension Mother      History   Drug Use Unknown             Objective    BP 90/53   Pulse 57   Temp 98.6  F (37  C) (Oral)   Resp 12   Ht 1.549 m (5' 1\")   Wt 41.5 kg (91 lb 9.6 oz)   SpO2 100%   BMI 17.31 kg/m     Estimated body mass index is 17.31 kg/m  as calculated from the following:    Height as of this encounter: 1.549 m (5' 1\").    Weight as of this encounter: 41.5 kg (91 lb 9.6 oz).  Physical Exam  GENERAL: alert and no distress  EYES: Eyes grossly normal to inspection, conjunctivae and sclerae normal  HENT: ear canals and TM's normal, mouth without ulcers or lesions  NECK: no adenopathy, no asymmetry  RESP: lungs clear to auscultation - no rales, rhonchi or wheezes  CV: regular rate and rhythm, normal S1 S2, no S3 or S4, no murmur, click or rub, no peripheral edema  ABDOMEN: soft, non-tender, no masses and bowel sounds normal  NEURO: Normal strength and tone, +expressive aphasia   PSYCH: mentation appears normal, affect " normal/bright    Recent Labs   Lab Test 07/31/24  0855 04/16/24  1026   HGB 14.0  --      --     139   POTASSIUM 4.6 4.8   CR 0.91 1.03*        Diagnostics  Labs pending at this time.  Results will be reviewed when available.   No EKG this visit, completed in the last 90 days.    Revised Cardiac Risk Index (RCRI)  The patient has the following serious cardiovascular risks for perioperative complications:   - Cerebrovascular Disease (TIA or CVA) = 1 point     RCRI Interpretation: 1 point: Class II (low risk - 0.9% complication rate)         Signed Electronically by: Rosamaria Armstrong NP  A copy of this evaluation report is provided to the requesting physician.

## 2024-08-26 NOTE — ASSESSMENT & PLAN NOTE
Residual aphasia  PCP recently increased dose of atorvastatin due to history of CVA with LDL above goal.   - Repeat lipid profile today

## 2024-08-26 NOTE — ASSESSMENT & PLAN NOTE
Status post bioprosthetic MVR with maze and ARIANNA clipping 4/2024.  No known recurrence of atrial fibrillation.

## 2024-08-26 NOTE — H&P (VIEW-ONLY)
Preoperative Evaluation  [address]  [facility] [address]  Phone: [phone]  Fax: [phone]  Primary Provider: ANDREE ALBRIGHT, DO  Pre-op Performing Provider: Rosamaria Armstrong NP  Aug 26, 2024         8/21/2024   Surgical Information   What procedure is being done? Endarterectomey Carotid Left side   Facility or Hospital where procedure/surgery will be performed: [place], [place]   Who is doing the procedure / surgery? Dr Margarita Tobin   Date of surgery / procedure: 09.13.2024   Time of surgery / procedure: 1300 hr   Where do you plan to recover after surgery? at home with family        Fax number for surgical facility: Note does not need to be faxed, will be available electronically in Epic.    Assessment & Plan     The proposed surgical procedure is considered INTERMEDIATE risk.    Problem List Items Addressed This Visit       Cerebrovascular accident (H)     Residual aphasia  PCP recently increased dose of atorvastatin due to history of CVA with LDL above goal.   - Repeat lipid profile today          Chronic diastolic CHF (congestive heart failure) (H) (Chronic)     Appears euvolemic on exam          Persistent atrial fibrillation (H)     Status post bioprosthetic MVR with maze and ARIANNA clipping 4/2024.  No known recurrence of atrial fibrillation.         S/P mitral valve replacement     S/p mitral valve replacement          Essential hypertension     Blood pressure is soft today, this is unusually low for her when I review the flowsheet of recent vitals. She has been experiencing some dizziness but not lightheadedness. Patient is adamant she has been taking her medications as prescribed but her POA, Marian, has concerns that she may not have been taking her medications as prescribed and only yesterday initiated the use of a pill organizer   - Monitor BP at home. If readings are consistently <100/60, may need to reduce dose of  amlodipine or discontinue but based on review of recent BP readings this was unusually low for her today.            Mixed hyperlipidemia    Relevant Orders    Lipid panel reflex to direct LDL Fasting (Completed)     Other Visit Diagnoses       Preoperative examination    -  Primary    Relevant Orders    Basic metabolic panel (Completed)    CBC with platelets (Completed)    Anemia, unspecified type        Relevant Orders    Iron and iron binding capacity (Completed)    Ferritin (Completed)        - She declines mammogram and colonoscopy/colon cancer screening at this time      Implanted Device   - Type of device: 29 mm Katerina-Mohan Magna Bioprosthetic Mitral Valve Patient advised to bring device information on day of surgery.      Risks and Recommendations  The patient has the following additional risks and recommendations for perioperative complications:   - Consult Hospitalist / IM to assist with post-op medical management    Antiplatelet or Anticoagulation Medication Instructions   - aspirin: continue     Additional Medication Instructions  Take all scheduled medications on the day of surgery EXCEPT for modifications listed below:  - Hold losartan due to risk of hypotension    Recommendation  Approval given to proceed with proposed procedure, without further diagnostic evaluation.    Addendum 9/5/24: Fecal occult blood stool test is negative, hgb stable  Renal labs are improved  She may proceed with planned surgery and is advised to follow up with her PCP following surgery regarding blood pressure, anemia, and to monitor kidney function     Addendum 8/27/24: there were unexpected lab changes including a drop in hemoglobin from 14.0 to 11.0 and acute renal injury with creatinine increasing from 0.91 on 7/31 to 1.51 8/26.   - Fecal occult blood stool test ordered  - Follow up blood pressure, renal panel, and hgb in 1 week  Depending on the above, she may need to delay her surgery        Return in about 6  months (around 2/26/2025) for follow up with PCP .      Carrie Babni is a 64 year old, presenting for the following:  Pre-Op Exam (Endarterectomey Carotid )          8/26/2024    11:18 AM   Additional Questions   Roomed by CLEMENTINA Machado   Accompanied by SHANTEL Fonseca related to upcoming procedure: She was evaluated by the vascular clinic for asymptomatic left carotid artery stenosis.  She has findings consistent with a 90% stenosis involving the proximal left ICA.  She has a history of a left-sided MCA stroke in 2017, embolic in etiology.  She has some residual aphasia from this.  She is scheduled for carotid endarterectomy.    She was scheduled for surgery for left radius fracture which did not heal with proper alignment. The surgery was cancelled due to dealing with the carotid artery stenosis.     She has a history of persistent atrial fibrillation in the setting of severe mitral stenosis.  Bioprosthetic MVR with maze and ARIANNA clipping was completed 4/2024.  No known recurrence of atrial fibrillation.    HTN- No recent home blood pressure readings.         8/21/2024   Pre-Op Questionnaire   Have you ever had a heart attack or stroke? (!) YES -history of CVA in 2017, embolic.   Have you ever had surgery on your heart or blood vessels, such as a stent placement, a coronary artery bypass, or surgery on an artery in your head, neck, heart, or legs? (!) YES - as above    Do you have chest pain with activity? No   Do you have a history of heart failure? Yes- no weight changes or shortness of breath   Do you currently have a cold, bronchitis or symptoms of other infection? No   Do you have a cough, shortness of breath, or wheezing? No   Do you or anyone in your family have previous history of blood clots? No   Do you or does anyone in your family have a serious bleeding problem such as prolonged bleeding following surgeries or cuts? No   Have you ever had problems with anemia or been told to take iron pills? (!)  UNKNOWN - No recent anemia is noted on her labs    Have you had any abnormal blood loss such as black, tarry or bloody stools, or abnormal vaginal bleeding? No   Have you ever had a blood transfusion? No   Are you willing to have a blood transfusion if it is medically needed before, during, or after your surgery? Yes   Have you or any of your relatives ever had problems with anesthesia? No   Do you have sleep apnea, excessive snoring or daytime drowsiness? No   Do you have any artifical heart valves or other implanted medical devices like a pacemaker, defibrillator, or continuous glucose monitor? (!) YES   What type of device do you have? Bovine Mitral Valve Replacement   Name of the clinic that manages your device no device management   Do you have artificial joints? No   Are you allergic to latex? No        Health Care Directive  Patient has a Health Care Directive on file      Preoperative Review of    reviewed - controlled substances reflected in medication list.- gabapentin       Patient Active Problem List    Diagnosis Date Noted    Essential hypertension 08/26/2024     Priority: Medium    Mixed hyperlipidemia 08/26/2024     Priority: Medium    Persistent atrial fibrillation (H) 05/28/2024     Priority: Medium    Anticoagulated 04/16/2024     Priority: Medium     Formatting of this note might be different from the original.   Warfarin      S/P mitral valve replacement 06/17/2017     Priority: Medium     Formatting of this note might be different from the original.   Raoul underwent mitral valve replacement with 29 mm Mohan, extensive left atrial Maze and left atrial appendage exclusion by atriclip in June 2017 at [facility]      Last Assessment & Plan:    Formatting of this note is different from the original.   ASSESSMENT:   Patient has a history of prior mitral valve prolapse and tissue (29 mm) mitral valve replacement with associated surgical Maze and left atrial appendage ligation in June 2017.       Patient underwent echocardiogram on April 4, 2021 which demonstrated:     Left Ventricle: Mildly decreased systolic function, LVEF 40-49%. Mild global hypokinesis.     Normal LV cavity size with mildly reduced global systolic function.     Mildly dilated RV cavity size with normal systolic function.     Left Atrium: Severe dilatation.     Aortic Valve: Valve appears tricuspid. Mild calcification. Evidence of sclerosis. Moderate regurgitation with eccentrically directed jet.     Mitral Valve: There is a 29 mm Mohan tissue valve bioprosthetic valve without perivalvular leak.     Tricuspid Valve: Mild to moderate regurgitation with eccentricly directed jet.      PLAN:   Patient needs lifelong SBE prophylaxis.  Last Assessment & Plan:    Formatting of this note might be different from the original.   In 06/2017 by Red Sanchez MD [facility]      Acute pulmonary insufficiency 06/09/2017     Priority: Medium    Acute blood loss anemia 06/09/2017     Priority: Medium    Acute systolic heart failure (H) 06/09/2017     Priority: Medium    Aortic valve sclerosis 04/17/2017     Priority: Medium    Chronic diastolic CHF (congestive heart failure) (H) 04/17/2017     Priority: Medium    Cerebrovascular accident (H) 12/31/2015     Priority: Medium     Embolic stroke, 2017. Residual aphasia        Past Medical History:   Diagnosis Date    Atrial fibrillation (H)     Cancer (H) 11.17.2022    Squamous cell carcinoma nRight Cheek    Cerebrovascular accident (H) 01/09/2017    HLD (hyperlipidemia)     HTN (hypertension)     Mitral regurgitation      Past Surgical History:   Procedure Laterality Date    BIOPSY  11.18.2022    Right Cheek    REPLACE VALVE MITRAL  06/09/2017    bovine mitral valve with Maze and ARIANNA ligation     Current Outpatient Medications   Medication Sig Dispense Refill    amLODIPine (NORVASC) 5 MG tablet Take 1 tablet (5 mg) by mouth daily 90 tablet 3    aspirin (ASA) 325 MG EC tablet Take 325 mg by mouth daily   "    atorvastatin (LIPITOR) 40 MG tablet Take 1 tablet (40 mg) by mouth daily 90 tablet 3    gabapentin (NEURONTIN) 300 MG capsule Take 1 capsule (300 mg) by mouth 2 times daily 180 capsule 1    losartan (COZAAR) 100 MG tablet Take 1 tablet (100 mg) by mouth daily 90 tablet 3    metoprolol tartrate (LOPRESSOR) 25 MG tablet Take 1 tablet (25 mg) by mouth 2 times daily 180 tablet 3    meclizine (ANTIVERT) 12.5 MG tablet Take 1 tablet (12.5 mg) by mouth 4 times daily as needed for dizziness (Patient not taking: Reported on 8/19/2024) 30 tablet 0       No Known Allergies     Social History     Tobacco Use    Smoking status: Every Day     Current packs/day: 0.25     Average packs/day: 0.9 packs/day for 40.6 years (34.9 ttl pk-yrs)     Types: Cigarettes     Start date: 1/19/1984    Smokeless tobacco: Not on file    Tobacco comments:     I've cut down to just a few a day   Substance Use Topics    Alcohol use: Not Currently     Family History   Problem Relation Age of Onset    Hypertension Mother      History   Drug Use Unknown             Objective    BP 90/53   Pulse 57   Temp 98.6  F (37  C) (Oral)   Resp 12   Ht 1.549 m (5' 1\")   Wt 41.5 kg (91 lb 9.6 oz)   SpO2 100%   BMI 17.31 kg/m     Estimated body mass index is 17.31 kg/m  as calculated from the following:    Height as of this encounter: 1.549 m (5' 1\").    Weight as of this encounter: 41.5 kg (91 lb 9.6 oz).  Physical Exam  GENERAL: alert and no distress  EYES: Eyes grossly normal to inspection, conjunctivae and sclerae normal  HENT: ear canals and TM's normal, mouth without ulcers or lesions  NECK: no adenopathy, no asymmetry  RESP: lungs clear to auscultation - no rales, rhonchi or wheezes  CV: regular rate and rhythm, normal S1 S2, no S3 or S4, no murmur, click or rub, no peripheral edema  ABDOMEN: soft, non-tender, no masses and bowel sounds normal  NEURO: Normal strength and tone, +expressive aphasia   PSYCH: mentation appears normal, affect " normal/bright    Recent Labs   Lab Test 07/31/24  0855 04/16/24  1026   HGB 14.0  --      --     139   POTASSIUM 4.6 4.8   CR 0.91 1.03*        Diagnostics  Labs pending at this time.  Results will be reviewed when available.   No EKG this visit, completed in the last 90 days.    Revised Cardiac Risk Index (RCRI)  The patient has the following serious cardiovascular risks for perioperative complications:   - Cerebrovascular Disease (TIA or CVA) = 1 point     RCRI Interpretation: 1 point: Class II (low risk - 0.9% complication rate)         Signed Electronically by: Rosamaria Armstrong NP  A copy of this evaluation report is provided to the requesting physician.

## 2024-08-26 NOTE — PATIENT INSTRUCTIONS
- On the morning of the surgery, do not take losartan  - Take all other pills on the morning of surgery with a small sip of water

## 2024-08-26 NOTE — ASSESSMENT & PLAN NOTE
Blood pressure is soft today, this is unusually low for her when I review the flowsheet of recent vitals. She has been experiencing some dizziness but not lightheadedness. Patient is adamant she has been taking her medications as prescribed but her POA, Marian, has concerns that she may not have been taking her medications as prescribed and only yesterday initiated the use of a pill organizer   - Monitor BP at home. If readings are consistently <100/60, may need to reduce dose of amlodipine or discontinue but based on review of recent BP readings this was unusually low for her today.

## 2024-08-27 ENCOUNTER — TELEPHONE (OUTPATIENT)
Dept: INTERNAL MEDICINE | Facility: CLINIC | Age: 64
End: 2024-08-27

## 2024-08-27 DIAGNOSIS — D64.9 ANEMIA, UNSPECIFIED TYPE: Primary | ICD-10-CM

## 2024-08-27 DIAGNOSIS — N17.9 AKI (ACUTE KIDNEY INJURY) (H): ICD-10-CM

## 2024-08-27 NOTE — TELEPHONE ENCOUNTER
Call patient's Marian FUNES (she is expecting this call)- needs to be scheduled for a lab appointment (non-fasting) and a CSS BP check in 1 week

## 2024-08-28 NOTE — PROGRESS NOTES
Writer called [facility] Coordinator Christine ([phone]) to discuss surgery details per RN directive. No answer. LVMTCB #2.

## 2024-08-29 ENCOUNTER — OFFICE VISIT (OUTPATIENT)
Dept: NEUROLOGY | Facility: CLINIC | Age: 64
End: 2024-08-29
Attending: GENERAL ACUTE CARE HOSPITAL
Payer: MEDICARE

## 2024-08-29 VITALS
HEART RATE: 58 BPM | BODY MASS INDEX: 17.37 KG/M2 | SYSTOLIC BLOOD PRESSURE: 96 MMHG | DIASTOLIC BLOOD PRESSURE: 51 MMHG | HEIGHT: 61 IN | WEIGHT: 92 LBS

## 2024-08-29 DIAGNOSIS — Z86.73 HISTORY OF CEREBRAL EMBOLIC INFARCTION: ICD-10-CM

## 2024-08-29 DIAGNOSIS — I65.22 MORE THAN 50 PERCENT STENOSIS OF LEFT INTERNAL CAROTID ARTERY: Primary | ICD-10-CM

## 2024-08-29 DIAGNOSIS — D64.9 ANEMIA, UNSPECIFIED TYPE: ICD-10-CM

## 2024-08-29 DIAGNOSIS — I63.9 CEREBROVASCULAR ACCIDENT (CVA), UNSPECIFIED MECHANISM (H): ICD-10-CM

## 2024-08-29 PROBLEM — I51.7 LAE (LEFT ATRIAL ENLARGEMENT): Chronic | Status: ACTIVE | Noted: 2017-04-17

## 2024-08-29 PROBLEM — F32.9 MDD (MAJOR DEPRESSIVE DISORDER): Status: ACTIVE | Noted: 2024-08-29

## 2024-08-29 PROBLEM — E78.5 DYSLIPIDEMIA: Status: ACTIVE | Noted: 2023-09-29

## 2024-08-29 PROBLEM — Z79.01 ANTICOAGULATED: Status: RESOLVED | Noted: 2024-04-16 | Resolved: 2024-08-29

## 2024-08-29 PROCEDURE — G2211 COMPLEX E/M VISIT ADD ON: HCPCS | Performed by: PSYCHIATRY & NEUROLOGY

## 2024-08-29 PROCEDURE — 99205 OFFICE O/P NEW HI 60 MIN: CPT | Performed by: PSYCHIATRY & NEUROLOGY

## 2024-08-29 RX ORDER — ATORVASTATIN CALCIUM 80 MG/1
80 TABLET, FILM COATED ORAL DAILY
Qty: 90 TABLET | Refills: 3 | Status: SHIPPED | OUTPATIENT
Start: 2024-08-29

## 2024-08-29 NOTE — NURSING NOTE
Chief Complaint   Patient presents with    Stroke     H/o stroke 2017 ([place]). Reports episodes of dizzy spells lately, headache, imbalance     Sharita Keating CMA on 8/29/2024 at 9:50 AM  [facility]

## 2024-08-29 NOTE — LETTER
2024      Ernestine Morales  [address]  [facility] [address]      Dear Colleague,    Thank you for referring your patient, Ernestine Morales, to the [facility] [place]. Please see a copy of my visit note below.    NEUROLOGY CONSULTATION NOTE       [facility] [place]  [address] [place] [address]  Tel: [phone]  Fax: [phone]  [email or web address]     Ernestine Morales,  1960, MRN [id number]  PCP: Christina Andrew  Date: 2024     ASSESSMENT & PLAN     Visit Diagnosis  Anemia, unspecified type  History of cerebral embolic infarction     H/O left MCA embolic infarction  Left ICA 70 to 99% stenosis  64-year-old female with A-fib, HTN, HLD s/p MVR with a bioprosthetic mitral valve, s/p direct-current cardioversion, Maze procedure and left atrial appendage ligation in 2017, 70 to 99% left ICA stenosis who was referred for ongoing care for CVA, sense of imbalance and dizziness.  Patient had a left MCA embolic infarction resulting in expressive aphasia in .  Subsequently she had direct-current cardioversion and maze procedure on 2017 and was switched to aspirin and continue on atorvastatin.  Most recent LDL checked on 3/26/2024 is 97.  She was seen in the ER for sense of dizziness and had a CTA that showed 70 to 99% left ICA stenosis and saw vascular surgery and is scheduled for endarterectomy next month.  Her sense of dizziness and fuzziness likely is combination of severe left ICA stenosis and blood pressure.  I have recommended:    1.  Keep blood pressure 120-130/80-90.  I have told her to talk to her primary doctor to back off on her blood pressure medication  2.  Continue enteric-coated aspirin 325 mg daily  3.  LDL is 97, target less than 70.  I have increased the dose of Lipitor to 80 mg daily.  Repeat lipid panel after 6 months  4.  Refer to physical therapy for gait evaluation and training.   "She was encouraged to use a cane or a walker  5.  Follow-up in 3 months after the left carotid endarterectomy    Thank you again for this referral, please feel free to contact me if you have any questions.    Андрей Burden MD  [facility]     REASON FOR CONSULTATION Stroke        HISTORY OF PRESENT ILLNESS     We have been requested by Dr. Douglass to evaluate Ernestine Morales who is a 64 year old  female for history of left MCA embolic infarction    Patient is a 64-year-old female with history of atrial fibrillation, HTN, HLD s/p MVR with a bioprosthetic mitral valve, s/p direct current cardioversion on 4/17/2017 and a maze procedure and left atrial appendage ligation in 2017 with no recurrent atrial fibrillation who had a CVA in 2017 was referred for ongoing care.  Previously patient was on Coumadin that was switched to aspirin after atrial appendage ligation and maze procedure. Patient was seen in the emergency room on 7/31/2021 with persistent dizziness.  She had a CTA of the head and neck that showed 95% left ICA stenosis.  Subsequently she was seen by vascular surgery and had a carotid ultrasound that confirmed 79 99% left ICA stenosis.  She was seen by vascular surgery and is scheduled for endarterectomy.  According to patient she has been experiencing episodes of dizziness and \"snow\".  She feels her brain feels fuzzy.     PROBLEM LIST   Patient Active Problem List   Diagnosis     Acute pulmonary insufficiency     Acute blood loss anemia     Acute systolic heart failure (H)     Aortic valve sclerosis     Chronic diastolic CHF (congestive heart failure) (H)     Persistent atrial fibrillation (H)     S/P mitral valve replacement     Essential hypertension     Mixed hyperlipidemia     Dyslipidemia     LAE (left atrial enlargement)     MDD (major depressive disorder)     Lt ICA 70-99% stenosis         PAST MEDICAL & SURGICAL HISTORY     Past Medical History:   Patient  has a past " "medical history of Atrial fibrillation (H), Cancer (H) (11.17.2022), Cerebrovascular accident (H) (01/09/2017), HLD (hyperlipidemia), HTN (hypertension), and Mitral regurgitation.    Surgical History:  She  has a past surgical history that includes Replace valve mitral (06/09/2017) and biopsy (11.18.2022).     SOCIAL HISTORY     Reviewed, and she  reports that she has been smoking cigarettes. She started smoking about 40 years ago. She has a 34.9 pack-year smoking history. She does not have any smokeless tobacco history on file. She reports that she does not currently use alcohol. She reports that she does not use drugs.     FAMILY HISTORY     Reviewed, and family history includes Hypertension in her mother.     ALLERGIES     No Known Allergies      REVIEW OF SYSTEMS     A 12 point review of system was performed and was negative except as outlined in the history of present illness.     HOME MEDICATIONS     Current Outpatient Rx   Medication Sig Dispense Refill     amLODIPine (NORVASC) 5 MG tablet Take 1 tablet (5 mg) by mouth daily 90 tablet 3     aspirin (ASA) 325 MG EC tablet Take 325 mg by mouth daily       atorvastatin (LIPITOR) 80 MG tablet Take 1 tablet (80 mg) by mouth daily. 90 tablet 3     gabapentin (NEURONTIN) 300 MG capsule Take 1 capsule (300 mg) by mouth 2 times daily 180 capsule 1     losartan (COZAAR) 100 MG tablet Take 1 tablet (100 mg) by mouth daily 90 tablet 3     metoprolol tartrate (LOPRESSOR) 25 MG tablet Take 1 tablet (25 mg) by mouth 2 times daily 180 tablet 3         PHYSICAL EXAM     Vital signs  BP 96/51 (BP Location: Right arm, Patient Position: Sitting)   Pulse 58   Ht 1.549 m (5' 1\")   Wt 41.7 kg (92 lb)   BMI 17.38 kg/m      Weight:   92 lbs 0 oz    Middle-age female who is alert and oriented vital signs are reviewed and documented in electronic medical record.  Neck supple.  She has expressive aphasia.  Strength on the right 4+/5 on the left 5/5 reflexes are 3+ with upgoing toe.  " She has minimal dysmetria on finger-nose testing.  She has a wide-based gait and is quite unstable     PERTINENT DIAGNOSTIC STUDIES     Following studies were reviewed:     CTA HEAD AND NECK 7/31/2024  HEAD CT:  1.  Chronic left MCA territory frontal, temporal and insular encephalomalacia.     2.  No evidence for acute infarct.     3.  No mass or acute hemorrhage     HEAD CTA:   1.  Attenuated distal left MCA branches correlating with known large territory chronic infarct     2.  No evidence for acute large vessel occlusion.     3.  Negative for aneurysm or vascular malformation.     4.  Mild cavernous segment stenoses bilaterally.     NECK CTA:  1.  Extremely high-grade stenosis of the left internal carotid artery origin. 95% narrowing.     2.  Aortic arch great vessel origins stenoses as above.     3.  No evidence for carotid or vertebral dissection.    MRI BRAIN 7/31/2024  1. Extensive remote ischemic findings particularly in the left MCA distribution. There is no evidence of restricted diffusion to suggest acute ischemia on the current examination.     CAROTID ULTRASOUND 8/19/2024  1. Normal diameter of the right ICA relative to the proximal ICA diameter.  2. 70-99% diameter stenosis of the left ICA relative to the proximal ICA diameter.  Evaluation based on SRU Consensus Conference Criteria for Internal Carotid Artery Stenosis for Implementation in [facility]AC-Accredited Vascular Laboratories     PERTINENT LABS  Following labs were reviewed:  Office Visit on 08/26/2024   Component Date Value Ref Range Status     Sodium 08/26/2024 132 (L)  135 - 145 mmol/L Final     Potassium 08/26/2024 4.5  3.4 - 5.3 mmol/L Final     Chloride 08/26/2024 99  98 - 107 mmol/L Final     Carbon Dioxide (CO2) 08/26/2024 23  22 - 29 mmol/L Final     Anion Gap 08/26/2024 10  7 - 15 mmol/L Final     Urea Nitrogen 08/26/2024 30.4 (H)  8.0 - 23.0 mg/dL Final     Creatinine 08/26/2024 1.51 (H)  0.51 - 0.95 mg/dL Final     GFR Estimate 08/26/2024  38 (L)  >60 mL/min/1.73m2 Final     Calcium 08/26/2024 9.4  8.8 - 10.4 mg/dL Final     Glucose 08/26/2024 91  70 - 99 mg/dL Final     Patient Fasting > 8hrs? 08/26/2024 Unknown   Final     WBC Count 08/26/2024 8.3  4.0 - 11.0 10e3/uL Final     RBC Count 08/26/2024 3.59 (L)  3.80 - 5.20 10e6/uL Final     Hemoglobin 08/26/2024 11.0 (L)  11.7 - 15.7 g/dL Final     Hematocrit 08/26/2024 33.1 (L)  35.0 - 47.0 % Final     MCV 08/26/2024 92  78 - 100 fL Final     MCH 08/26/2024 30.6  26.5 - 33.0 pg Final     MCHC 08/26/2024 33.2  31.5 - 36.5 g/dL Final     RDW 08/26/2024 13.4  10.0 - 15.0 % Final     Platelet Count 08/26/2024 258  150 - 450 10e3/uL Final     Cholesterol 08/26/2024 166  <200 mg/dL Final     Triglycerides 08/26/2024 80  <150 mg/dL Final     Direct Measure HDL 08/26/2024 53  >=50 mg/dL Final     LDL Cholesterol Calculated 08/26/2024 97  <=100 mg/dL Final     Non HDL Cholesterol 08/26/2024 113  <130 mg/dL Final     Patient Fasting > 8hrs? 08/26/2024 Unknown   Final     Ferritin 08/26/2024 215  11 - 328 ng/mL Final     Iron 08/26/2024 67  37 - 145 ug/dL Final     Iron Binding Capacity 08/26/2024 339  240 - 430 ug/dL Final     Iron Sat Index 08/26/2024 20  15 - 46 % Final   Admission on 07/31/2024, Discharged on 07/31/2024   Component Date Value Ref Range Status     Sodium 07/31/2024 136  135 - 145 mmol/L Final     Potassium 07/31/2024 4.6  3.4 - 5.3 mmol/L Final     Chloride 07/31/2024 102  98 - 107 mmol/L Final     Carbon Dioxide (CO2) 07/31/2024 23  22 - 29 mmol/L Final     Anion Gap 07/31/2024 11  7 - 15 mmol/L Final     Urea Nitrogen 07/31/2024 23.8 (H)  8.0 - 23.0 mg/dL Final     Creatinine 07/31/2024 0.91  0.51 - 0.95 mg/dL Final     GFR Estimate 07/31/2024 70  >60 mL/min/1.73m2 Final     Calcium 07/31/2024 9.8  8.8 - 10.4 mg/dL Final     Glucose 07/31/2024 86  70 - 99 mg/dL Final     Ventricular Rate 07/31/2024 60  BPM Final     Atrial Rate 07/31/2024 60  BPM Final     OK Interval 07/31/2024 146  ms  Final     QRS Duration 07/31/2024 84  ms Final     QT 07/31/2024 458  ms Final     QTc 07/31/2024 458  ms Final     P Axis 07/31/2024 53  degrees Final     R AXIS 07/31/2024 50  degrees Final     T Axis 07/31/2024 77  degrees Final     Interpretation ECG 07/31/2024    Final                    Value:Sinus rhythm  Normal ECG  No previous ECGs available  Confirmed by SEE ED PROVIDER NOTE FOR, ECG INTERPRETATION ([id number]),  MuseAdmin, MuseAdmin ([id number]) on 8/12/2024 11:56:09 AM       Magnesium 07/31/2024 1.9  1.7 - 2.3 mg/dL Final     WBC Count 07/31/2024 7.1  4.0 - 11.0 10e3/uL Final     RBC Count 07/31/2024 4.62  3.80 - 5.20 10e6/uL Final     Hemoglobin 07/31/2024 14.0  11.7 - 15.7 g/dL Final     Hematocrit 07/31/2024 41.6  35.0 - 47.0 % Final     MCV 07/31/2024 90  78 - 100 fL Final     MCH 07/31/2024 30.3  26.5 - 33.0 pg Final     MCHC 07/31/2024 33.7  31.5 - 36.5 g/dL Final     RDW 07/31/2024 12.7  10.0 - 15.0 % Final     Platelet Count 07/31/2024 223  150 - 450 10e3/uL Final     % Neutrophils 07/31/2024 70  % Final     % Lymphocytes 07/31/2024 23  % Final     % Monocytes 07/31/2024 5  % Final     % Eosinophils 07/31/2024 0  % Final     % Basophils 07/31/2024 1  % Final     % Immature Granulocytes 07/31/2024 0  % Final     NRBCs per 100 WBC 07/31/2024 0  <1 /100 Final     Absolute Neutrophils 07/31/2024 5.0  1.6 - 8.3 10e3/uL Final     Absolute Lymphocytes 07/31/2024 1.6  0.8 - 5.3 10e3/uL Final     Absolute Monocytes 07/31/2024 0.4  0.0 - 1.3 10e3/uL Final     Absolute Eosinophils 07/31/2024 0.0  0.0 - 0.7 10e3/uL Final     Absolute Basophils 07/31/2024 0.1  0.0 - 0.2 10e3/uL Final     Absolute Immature Granulocytes 07/31/2024 0.0  <=0.4 10e3/uL Final     Absolute NRBCs 07/31/2024 0.0  10e3/uL Final     Hold Specimen 07/31/2024 JI   Final     Hold Specimen 07/31/2024 [facility]   Final     Color Urine 07/31/2024 Light Yellow  Colorless, Straw, Light Yellow, Yellow Final     Appearance Urine 07/31/2024 Clear  Clear  Final     Glucose Urine 07/31/2024 Negative  Negative mg/dL Final     Bilirubin Urine 07/31/2024 Negative  Negative Final     Ketones Urine 07/31/2024 20 (A)  Negative mg/dL Final     Specific Gravity Urine 07/31/2024 1.010  1.001 - 1.030 Final     Blood Urine 07/31/2024 0.06 mg/dL (A)  Negative Final     pH Urine 07/31/2024 5.5  5.0 - 7.0 Final     Protein Albumin Urine 07/31/2024 10 (A)  Negative mg/dL Final     Urobilinogen Urine 07/31/2024 <2.0  <2.0 mg/dL Final     Nitrite Urine 07/31/2024 Negative  Negative Final     Leukocyte Esterase Urine 07/31/2024 75 Mickey/uL (A)  Negative Final     Bacteria Urine 07/31/2024 Few (A)  None Seen /HPF Final     Mucus Urine 07/31/2024 Present (A)  None Seen /LPF Final     RBC Urine 07/31/2024 4 (H)  <=2 /HPF Final     WBC Urine 07/31/2024 5  <=5 /HPF Final     Squamous Epithelials Urine 07/31/2024 5 (H)  <=1 /HPF Final     Hyaline Casts Urine 07/31/2024 4 (H)  <=2 /LPF Final        Total time spent for face to face visit, reviewing labs/imaging studies, counseling and coordination of care was: 1 Hour spent on the date of the encounter doing chart review, review of outside records, review of test results, interpretation of tests, patient visit, and documentation     The longitudinal plan of care for the diagnosis(es)/condition(s) as documented were addressed during this visit. Due to the added complexity in care, I will continue to support Talya in the subsequent management and with ongoing continuity of care.    This note was dictated using voice recognition software.  Any grammatical or context distortions are unintentional and inherent to the software.    Orders Placed This Encounter   Procedures     Physical Therapy  Referral      New Prescriptions    No medications on file      Modified Medications    Modified Medication Previous Medication    ATORVASTATIN (LIPITOR) 80 MG TABLET atorvastatin (LIPITOR) 40 MG tablet       Take 1 tablet (80 mg) by mouth daily.    Take 1  tablet (40 mg) by mouth daily                Again, thank you for allowing me to participate in the care of your patient.        Sincerely,        Андрей Burden MD

## 2024-08-30 NOTE — PROGRESS NOTES
[facility], per VM the number we have been contacting is NOT being checked regularly. VM indicated to call [phone]. Writer called main number for [facility]. Spoke to Марина, relayed surgery information to [facility].

## 2024-09-03 LAB — HEMOCCULT STL QL IA: NEGATIVE

## 2024-09-04 ENCOUNTER — LAB (OUTPATIENT)
Dept: LAB | Facility: CLINIC | Age: 64
End: 2024-09-04
Payer: MEDICARE

## 2024-09-04 ENCOUNTER — ALLIED HEALTH/NURSE VISIT (OUTPATIENT)
Dept: FAMILY MEDICINE | Facility: CLINIC | Age: 64
End: 2024-09-04
Payer: MEDICARE

## 2024-09-04 VITALS — SYSTOLIC BLOOD PRESSURE: 108 MMHG | DIASTOLIC BLOOD PRESSURE: 64 MMHG

## 2024-09-04 DIAGNOSIS — I95.9 HYPOTENSION, UNSPECIFIED HYPOTENSION TYPE: Primary | ICD-10-CM

## 2024-09-04 DIAGNOSIS — N17.9 AKI (ACUTE KIDNEY INJURY) (H): ICD-10-CM

## 2024-09-04 DIAGNOSIS — D64.9 ANEMIA, UNSPECIFIED TYPE: ICD-10-CM

## 2024-09-04 LAB
ALBUMIN SERPL BCG-MCNC: 4.3 G/DL (ref 3.5–5.2)
ALP SERPL-CCNC: 74 U/L (ref 40–150)
ALT SERPL W P-5'-P-CCNC: 45 U/L (ref 0–50)
ANION GAP SERPL CALCULATED.3IONS-SCNC: 11 MMOL/L (ref 7–15)
AST SERPL W P-5'-P-CCNC: 45 U/L (ref 0–45)
BILIRUB SERPL-MCNC: 0.6 MG/DL
BUN SERPL-MCNC: 24.6 MG/DL (ref 8–23)
CALCIUM SERPL-MCNC: 9.7 MG/DL (ref 8.8–10.4)
CHLORIDE SERPL-SCNC: 103 MMOL/L (ref 98–107)
CREAT SERPL-MCNC: 0.91 MG/DL (ref 0.51–0.95)
EGFRCR SERPLBLD CKD-EPI 2021: 70 ML/MIN/1.73M2
ERYTHROCYTE [DISTWIDTH] IN BLOOD BY AUTOMATED COUNT: 13.3 % (ref 10–15)
GLUCOSE SERPL-MCNC: 86 MG/DL (ref 70–99)
HCO3 SERPL-SCNC: 20 MMOL/L (ref 22–29)
HCT VFR BLD AUTO: 33 % (ref 35–47)
HGB BLD-MCNC: 11.2 G/DL (ref 11.7–15.7)
MCH RBC QN AUTO: 31.2 PG (ref 26.5–33)
MCHC RBC AUTO-ENTMCNC: 33.9 G/DL (ref 31.5–36.5)
MCV RBC AUTO: 92 FL (ref 78–100)
PLATELET # BLD AUTO: 210 10E3/UL (ref 150–450)
POTASSIUM SERPL-SCNC: 5.4 MMOL/L (ref 3.4–5.3)
PROT SERPL-MCNC: 7.6 G/DL (ref 6.4–8.3)
RBC # BLD AUTO: 3.59 10E6/UL (ref 3.8–5.2)
SODIUM SERPL-SCNC: 134 MMOL/L (ref 135–145)
WBC # BLD AUTO: 7.5 10E3/UL (ref 4–11)

## 2024-09-04 PROCEDURE — 99207 PR NO CHARGE NURSE ONLY: CPT

## 2024-09-04 PROCEDURE — 85027 COMPLETE CBC AUTOMATED: CPT

## 2024-09-04 PROCEDURE — 36415 COLL VENOUS BLD VENIPUNCTURE: CPT

## 2024-09-04 PROCEDURE — 80053 COMPREHEN METABOLIC PANEL: CPT

## 2024-09-04 NOTE — PROGRESS NOTES
I met with Ernestine Morales at the request of Rosamaria Armstrong to recheck her blood pressure.  Blood pressure medications on the med list were reviewed with patient.    Patient has taken all medications as per usual regimen: Yes  Patient reports tolerating them without any issues or concerns: Yes    Vitals:    09/04/24 1317   BP: 108/64   BP Location: Right arm   Patient Position: Sitting   Cuff Size: Adult Regular       Blood pressure was taken, previous encounter was reviewed, recorded blood pressure below 140/90.  Patient was discharged and the note will be sent to the provider for final review.    Patient states she feel good overall. She is not dizzy, lightheaded or weak today.

## 2024-09-05 DIAGNOSIS — I10 ESSENTIAL HYPERTENSION: Primary | ICD-10-CM

## 2024-09-05 RX ORDER — AMLODIPINE BESYLATE 2.5 MG/1
2.5 TABLET ORAL DAILY
Qty: 90 TABLET | Refills: 0 | Status: ON HOLD | OUTPATIENT
Start: 2024-09-05 | End: 2024-09-13

## 2024-09-13 ENCOUNTER — ANESTHESIA (OUTPATIENT)
Dept: SURGERY | Facility: HOSPITAL | Age: 64
DRG: 037 | End: 2024-09-13
Payer: MEDICARE

## 2024-09-13 ENCOUNTER — ANESTHESIA EVENT (OUTPATIENT)
Dept: SURGERY | Facility: HOSPITAL | Age: 64
DRG: 037 | End: 2024-09-13
Payer: MEDICARE

## 2024-09-13 ENCOUNTER — ANCILLARY PROCEDURE (OUTPATIENT)
Dept: VASCULAR ULTRASOUND | Facility: CLINIC | Age: 64
DRG: 037 | End: 2024-09-13
Attending: SURGERY
Payer: MEDICARE

## 2024-09-13 ENCOUNTER — HOSPITAL ENCOUNTER (INPATIENT)
Facility: HOSPITAL | Age: 64
LOS: 7 days | Discharge: NURSING, REHAB OR LONG-TERM CARE | DRG: 037 | End: 2024-09-20
Attending: SURGERY | Admitting: SURGERY
Payer: MEDICARE

## 2024-09-13 ENCOUNTER — APPOINTMENT (OUTPATIENT)
Dept: RADIOLOGY | Facility: HOSPITAL | Age: 64
DRG: 037 | End: 2024-09-13
Attending: HOSPITALIST
Payer: MEDICARE

## 2024-09-13 DIAGNOSIS — I65.22 STENOSIS OF LEFT CAROTID ARTERY: ICD-10-CM

## 2024-09-13 DIAGNOSIS — I27.20 PULMONARY HYPERTENSION (H): ICD-10-CM

## 2024-09-13 DIAGNOSIS — I65.29 STENOSIS OF CAROTID ARTERY, UNSPECIFIED LATERALITY: ICD-10-CM

## 2024-09-13 DIAGNOSIS — I27.20 PULMONARY HYPERTENSION (H): Primary | ICD-10-CM

## 2024-09-13 LAB
ABO/RH(D): NORMAL
ACT BLD: 224 SECONDS (ref 74–150)
ANION GAP SERPL CALCULATED.3IONS-SCNC: 12 MMOL/L (ref 7–15)
ANTIBODY SCREEN: NEGATIVE
BASE EXCESS BLDA CALC-SCNC: -3.7 MMOL/L (ref -3–3)
BASE EXCESS BLDA CALC-SCNC: -5 MMOL/L (ref -3–3)
BASOPHILS # BLD AUTO: 0.1 10E3/UL (ref 0–0.2)
BASOPHILS NFR BLD AUTO: 1 %
BUN SERPL-MCNC: 14.4 MG/DL (ref 8–23)
CALCIUM SERPL-MCNC: 9.1 MG/DL (ref 8.8–10.4)
CHLORIDE SERPL-SCNC: 102 MMOL/L (ref 98–107)
COHGB MFR BLD: 100 % (ref 96–97)
CPB POCT: NO
CREAT SERPL-MCNC: 0.94 MG/DL (ref 0.51–0.95)
EGFRCR SERPLBLD CKD-EPI 2021: 67 ML/MIN/1.73M2
EOSINOPHIL # BLD AUTO: 0 10E3/UL (ref 0–0.7)
EOSINOPHIL NFR BLD AUTO: 0 %
ERYTHROCYTE [DISTWIDTH] IN BLOOD BY AUTOMATED COUNT: 13.5 % (ref 10–15)
GLUCOSE BLDC GLUCOMTR-MCNC: 85 MG/DL (ref 70–99)
GLUCOSE SERPL-MCNC: 81 MG/DL (ref 70–99)
HCO3 BLD-SCNC: 22 MMOL/L (ref 21–28)
HCO3 BLDA-SCNC: 22 MMOL/L (ref 21–28)
HCO3 SERPL-SCNC: 21 MMOL/L (ref 22–29)
HCT VFR BLD AUTO: 33.2 % (ref 35–47)
HCT VFR BLD CALC: 32 % (ref 35–47)
HGB BLD-MCNC: 10.8 G/DL (ref 11.7–15.7)
HGB BLD-MCNC: 10.9 G/DL (ref 11.7–15.7)
IMM GRANULOCYTES # BLD: 0 10E3/UL
IMM GRANULOCYTES NFR BLD: 0 %
LYMPHOCYTES # BLD AUTO: 2.3 10E3/UL (ref 0.8–5.3)
LYMPHOCYTES NFR BLD AUTO: 23 %
MCH RBC QN AUTO: 30.5 PG (ref 26.5–33)
MCHC RBC AUTO-ENTMCNC: 32.5 G/DL (ref 31.5–36.5)
MCV RBC AUTO: 94 FL (ref 78–100)
MONOCYTES # BLD AUTO: 0.5 10E3/UL (ref 0–1.3)
MONOCYTES NFR BLD AUTO: 5 %
NEUTROPHILS # BLD AUTO: 6.9 10E3/UL (ref 1.6–8.3)
NEUTROPHILS NFR BLD AUTO: 70 %
NRBC # BLD AUTO: 0 10E3/UL
NRBC BLD AUTO-RTO: 0 /100
O2/TOTAL GAS SETTING VFR VENT: 100 %
PCO2 BLD: 42 MM HG (ref 35–45)
PCO2 BLDA: 47 MM HG (ref 35–45)
PH BLD: 7.33 [PH] (ref 7.35–7.45)
PH BLDA: 7.28 [PH] (ref 7.35–7.45)
PLATELET # BLD AUTO: 224 10E3/UL (ref 150–450)
PLATELET # BLD AUTO: 266 10E3/UL (ref 150–450)
PO2 BLD: 304 MM HG (ref 80–105)
PO2 BLDA: 112 MM HG (ref 80–105)
POTASSIUM BLD-SCNC: 4 MMOL/L (ref 3.4–5.3)
POTASSIUM SERPL-SCNC: 3.7 MMOL/L (ref 3.4–5.3)
RBC # BLD AUTO: 3.54 10E6/UL (ref 3.8–5.2)
SAO2 % BLDA: 98 % (ref 92–100)
SAO2 % BLDA: 98 % (ref 92–100)
SODIUM BLD-SCNC: 136 MMOL/L (ref 135–145)
SODIUM SERPL-SCNC: 135 MMOL/L (ref 135–145)
SPECIMEN EXPIRATION DATE: NORMAL
WBC # BLD AUTO: 9.9 10E3/UL (ref 4–11)

## 2024-09-13 PROCEDURE — 80048 BASIC METABOLIC PNL TOTAL CA: CPT | Performed by: SURGERY

## 2024-09-13 PROCEDURE — 4A10X4G MONITORING OF CENTRAL NERVOUS ELECTRICAL ACTIVITY, INTRAOPERATIVE, EXTERNAL APPROACH: ICD-10-PCS | Performed by: SURGERY

## 2024-09-13 PROCEDURE — 85347 COAGULATION TIME ACTIVATED: CPT

## 2024-09-13 PROCEDURE — 35301 RECHANNELING OF ARTERY: CPT | Performed by: NURSE ANESTHETIST, CERTIFIED REGISTERED

## 2024-09-13 PROCEDURE — 82803 BLOOD GASES ANY COMBINATION: CPT

## 2024-09-13 PROCEDURE — 35301 RECHANNELING OF ARTERY: CPT | Performed by: STUDENT IN AN ORGANIZED HEALTH CARE EDUCATION/TRAINING PROGRAM

## 2024-09-13 PROCEDURE — 272N000004 HC RX 272: Performed by: SURGERY

## 2024-09-13 PROCEDURE — 250N000011 HC RX IP 250 OP 636: Performed by: STUDENT IN AN ORGANIZED HEALTH CARE EDUCATION/TRAINING PROGRAM

## 2024-09-13 PROCEDURE — 82805 BLOOD GASES W/O2 SATURATION: CPT | Performed by: NURSE ANESTHETIST, CERTIFIED REGISTERED

## 2024-09-13 PROCEDURE — 250N000009 HC RX 250: Performed by: ANESTHESIOLOGY

## 2024-09-13 PROCEDURE — 370N000017 HC ANESTHESIA TECHNICAL FEE, PER MIN: Performed by: SURGERY

## 2024-09-13 PROCEDURE — 999N000063 US INTRAOPERATIVE

## 2024-09-13 PROCEDURE — 250N000013 HC RX MED GY IP 250 OP 250 PS 637: Performed by: SURGERY

## 2024-09-13 PROCEDURE — 200N000001 HC R&B ICU

## 2024-09-13 PROCEDURE — 999N000141 HC STATISTIC PRE-PROCEDURE NURSING ASSESSMENT: Performed by: SURGERY

## 2024-09-13 PROCEDURE — C1760 CLOSURE DEV, VASC: HCPCS | Performed by: SURGERY

## 2024-09-13 PROCEDURE — 258N000003 HC RX IP 258 OP 636: Performed by: NURSE ANESTHETIST, CERTIFIED REGISTERED

## 2024-09-13 PROCEDURE — 88311 DECALCIFY TISSUE: CPT | Mod: TC | Performed by: SURGERY

## 2024-09-13 PROCEDURE — 250N000025 HC SEVOFLURANE, PER MIN: Performed by: SURGERY

## 2024-09-13 PROCEDURE — 250N000009 HC RX 250: Performed by: NURSE ANESTHETIST, CERTIFIED REGISTERED

## 2024-09-13 PROCEDURE — 3E043XZ INTRODUCTION OF VASOPRESSOR INTO CENTRAL VEIN, PERCUTANEOUS APPROACH: ICD-10-PCS | Performed by: SURGERY

## 2024-09-13 PROCEDURE — 03UJ0KZ SUPPLEMENT LEFT COMMON CAROTID ARTERY WITH NONAUTOLOGOUS TISSUE SUBSTITUTE, OPEN APPROACH: ICD-10-PCS | Performed by: SURGERY

## 2024-09-13 PROCEDURE — 71045 X-RAY EXAM CHEST 1 VIEW: CPT

## 2024-09-13 PROCEDURE — 36415 COLL VENOUS BLD VENIPUNCTURE: CPT | Performed by: SURGERY

## 2024-09-13 PROCEDURE — 250N000009 HC RX 250: Performed by: SURGERY

## 2024-09-13 PROCEDURE — 85049 AUTOMATED PLATELET COUNT: CPT | Performed by: SURGERY

## 2024-09-13 PROCEDURE — 250N000011 HC RX IP 250 OP 636: Performed by: NURSE ANESTHETIST, CERTIFIED REGISTERED

## 2024-09-13 PROCEDURE — 250N000011 HC RX IP 250 OP 636: Performed by: SURGERY

## 2024-09-13 PROCEDURE — 99222 1ST HOSP IP/OBS MODERATE 55: CPT | Performed by: HOSPITALIST

## 2024-09-13 PROCEDURE — C1763 CONN TISS, NON-HUMAN: HCPCS | Performed by: SURGERY

## 2024-09-13 PROCEDURE — 03CJ0ZZ EXTIRPATION OF MATTER FROM LEFT COMMON CAROTID ARTERY, OPEN APPROACH: ICD-10-PCS | Performed by: SURGERY

## 2024-09-13 PROCEDURE — 258N000003 HC RX IP 258 OP 636: Performed by: SURGERY

## 2024-09-13 PROCEDURE — 86900 BLOOD TYPING SEROLOGIC ABO: CPT | Performed by: STUDENT IN AN ORGANIZED HEALTH CARE EDUCATION/TRAINING PROGRAM

## 2024-09-13 PROCEDURE — 360N000077 HC SURGERY LEVEL 4, PER MIN: Performed by: SURGERY

## 2024-09-13 PROCEDURE — 258N000003 HC RX IP 258 OP 636: Performed by: STUDENT IN AN ORGANIZED HEALTH CARE EDUCATION/TRAINING PROGRAM

## 2024-09-13 PROCEDURE — 250N000009 HC RX 250: Performed by: STUDENT IN AN ORGANIZED HEALTH CARE EDUCATION/TRAINING PROGRAM

## 2024-09-13 PROCEDURE — 85025 COMPLETE CBC W/AUTO DIFF WBC: CPT | Performed by: SURGERY

## 2024-09-13 PROCEDURE — 272N000001 HC OR GENERAL SUPPLY STERILE: Performed by: SURGERY

## 2024-09-13 PROCEDURE — 710N000010 HC RECOVERY PHASE 1, LEVEL 2, PER MIN: Performed by: SURGERY

## 2024-09-13 DEVICE — IMP PATCH PERICARDIUM PHOTOFIX BOVINE 0.8X8CM PFP0.8X8: Type: IMPLANTABLE DEVICE | Site: NECK | Status: FUNCTIONAL

## 2024-09-13 RX ORDER — NALOXONE HYDROCHLORIDE 1 MG/ML
0.1 INJECTION INTRAMUSCULAR; INTRAVENOUS; SUBCUTANEOUS
Status: DISCONTINUED | OUTPATIENT
Start: 2024-09-13 | End: 2024-09-13 | Stop reason: HOSPADM

## 2024-09-13 RX ORDER — ASPIRIN 325 MG
325 TABLET, DELAYED RELEASE (ENTERIC COATED) ORAL DAILY
Status: DISCONTINUED | OUTPATIENT
Start: 2024-09-14 | End: 2024-09-20 | Stop reason: HOSPADM

## 2024-09-13 RX ORDER — LIDOCAINE 40 MG/G
CREAM TOPICAL
Status: DISCONTINUED | OUTPATIENT
Start: 2024-09-13 | End: 2024-09-13 | Stop reason: HOSPADM

## 2024-09-13 RX ORDER — ONDANSETRON 4 MG/1
4 TABLET, ORALLY DISINTEGRATING ORAL EVERY 6 HOURS PRN
Status: DISCONTINUED | OUTPATIENT
Start: 2024-09-13 | End: 2024-09-20 | Stop reason: HOSPADM

## 2024-09-13 RX ORDER — DEXAMETHASONE SODIUM PHOSPHATE 4 MG/ML
4 INJECTION, SOLUTION INTRA-ARTICULAR; INTRALESIONAL; INTRAMUSCULAR; INTRAVENOUS; SOFT TISSUE
Status: DISCONTINUED | OUTPATIENT
Start: 2024-09-13 | End: 2024-09-13 | Stop reason: HOSPADM

## 2024-09-13 RX ORDER — ONDANSETRON 2 MG/ML
4 INJECTION INTRAMUSCULAR; INTRAVENOUS EVERY 30 MIN PRN
Status: DISCONTINUED | OUTPATIENT
Start: 2024-09-13 | End: 2024-09-13 | Stop reason: HOSPADM

## 2024-09-13 RX ORDER — DEXAMETHASONE SODIUM PHOSPHATE 4 MG/ML
INJECTION, SOLUTION INTRA-ARTICULAR; INTRALESIONAL; INTRAMUSCULAR; INTRAVENOUS; SOFT TISSUE PRN
Status: DISCONTINUED | OUTPATIENT
Start: 2024-09-13 | End: 2024-09-13

## 2024-09-13 RX ORDER — ONDANSETRON 2 MG/ML
4 INJECTION INTRAMUSCULAR; INTRAVENOUS EVERY 6 HOURS PRN
Status: DISCONTINUED | OUTPATIENT
Start: 2024-09-13 | End: 2024-09-20 | Stop reason: HOSPADM

## 2024-09-13 RX ORDER — IBUPROFEN 600 MG/1
600 TABLET, FILM COATED ORAL 3 TIMES DAILY
Status: DISCONTINUED | OUTPATIENT
Start: 2024-09-13 | End: 2024-09-15

## 2024-09-13 RX ORDER — EPHEDRINE SULFATE 50 MG/ML
INJECTION, SOLUTION INTRAVENOUS PRN
Status: DISCONTINUED | OUTPATIENT
Start: 2024-09-13 | End: 2024-09-13

## 2024-09-13 RX ORDER — NALOXONE HYDROCHLORIDE 0.4 MG/ML
0.1 INJECTION, SOLUTION INTRAMUSCULAR; INTRAVENOUS; SUBCUTANEOUS
Status: DISCONTINUED | OUTPATIENT
Start: 2024-09-13 | End: 2024-09-16

## 2024-09-13 RX ORDER — ATORVASTATIN CALCIUM 40 MG/1
80 TABLET, FILM COATED ORAL EVERY EVENING
Status: DISCONTINUED | OUTPATIENT
Start: 2024-09-13 | End: 2024-09-20 | Stop reason: HOSPADM

## 2024-09-13 RX ORDER — LIDOCAINE HYDROCHLORIDE 10 MG/ML
INJECTION, SOLUTION INFILTRATION; PERINEURAL PRN
Status: DISCONTINUED | OUTPATIENT
Start: 2024-09-13 | End: 2024-09-13

## 2024-09-13 RX ORDER — SODIUM CHLORIDE, SODIUM LACTATE, POTASSIUM CHLORIDE, CALCIUM CHLORIDE 600; 310; 30; 20 MG/100ML; MG/100ML; MG/100ML; MG/100ML
INJECTION, SOLUTION INTRAVENOUS CONTINUOUS PRN
Status: DISCONTINUED | OUTPATIENT
Start: 2024-09-13 | End: 2024-09-13

## 2024-09-13 RX ORDER — PROTAMINE SULFATE 10 MG/ML
INJECTION, SOLUTION INTRAVENOUS PRN
Status: DISCONTINUED | OUTPATIENT
Start: 2024-09-13 | End: 2024-09-13

## 2024-09-13 RX ORDER — ONDANSETRON 4 MG/1
4 TABLET, ORALLY DISINTEGRATING ORAL EVERY 30 MIN PRN
Status: DISCONTINUED | OUTPATIENT
Start: 2024-09-13 | End: 2024-09-13

## 2024-09-13 RX ORDER — CEFAZOLIN SODIUM/WATER 2 G/20 ML
2 SYRINGE (ML) INTRAVENOUS
Status: DISCONTINUED | OUTPATIENT
Start: 2024-09-13 | End: 2024-09-13 | Stop reason: HOSPADM

## 2024-09-13 RX ORDER — IPRATROPIUM BROMIDE AND ALBUTEROL SULFATE 2.5; .5 MG/3ML; MG/3ML
3 SOLUTION RESPIRATORY (INHALATION) EVERY 4 HOURS PRN
Status: COMPLETED | OUTPATIENT
Start: 2024-09-13 | End: 2024-09-13

## 2024-09-13 RX ORDER — IPRATROPIUM BROMIDE AND ALBUTEROL SULFATE 2.5; .5 MG/3ML; MG/3ML
3 SOLUTION RESPIRATORY (INHALATION)
Status: COMPLETED | OUTPATIENT
Start: 2024-09-13 | End: 2024-09-14

## 2024-09-13 RX ORDER — SODIUM CHLORIDE, SODIUM LACTATE, POTASSIUM CHLORIDE, CALCIUM CHLORIDE 600; 310; 30; 20 MG/100ML; MG/100ML; MG/100ML; MG/100ML
INJECTION, SOLUTION INTRAVENOUS CONTINUOUS
Status: DISCONTINUED | OUTPATIENT
Start: 2024-09-13 | End: 2024-09-13 | Stop reason: HOSPADM

## 2024-09-13 RX ORDER — ACETAMINOPHEN 325 MG/1
650 TABLET ORAL EVERY 4 HOURS PRN
Status: DISCONTINUED | OUTPATIENT
Start: 2024-09-16 | End: 2024-09-20 | Stop reason: HOSPADM

## 2024-09-13 RX ORDER — ONDANSETRON 4 MG/1
4 TABLET, ORALLY DISINTEGRATING ORAL EVERY 30 MIN PRN
Status: DISCONTINUED | OUTPATIENT
Start: 2024-09-13 | End: 2024-09-13 | Stop reason: HOSPADM

## 2024-09-13 RX ORDER — GABAPENTIN 300 MG/1
300 CAPSULE ORAL 2 TIMES DAILY
Status: DISCONTINUED | OUTPATIENT
Start: 2024-09-13 | End: 2024-09-20 | Stop reason: HOSPADM

## 2024-09-13 RX ORDER — NITROGLYCERIN 10 MG/100ML
INJECTION INTRAVENOUS PRN
Status: DISCONTINUED | OUTPATIENT
Start: 2024-09-13 | End: 2024-09-13

## 2024-09-13 RX ORDER — PROPOFOL 10 MG/ML
INJECTION, EMULSION INTRAVENOUS PRN
Status: DISCONTINUED | OUTPATIENT
Start: 2024-09-13 | End: 2024-09-13

## 2024-09-13 RX ORDER — FENTANYL CITRATE 50 UG/ML
50 INJECTION, SOLUTION INTRAMUSCULAR; INTRAVENOUS EVERY 5 MIN PRN
Status: DISCONTINUED | OUTPATIENT
Start: 2024-09-13 | End: 2024-09-13 | Stop reason: HOSPADM

## 2024-09-13 RX ORDER — METOPROLOL TARTRATE 25 MG/1
25 TABLET, FILM COATED ORAL 2 TIMES DAILY
Status: DISCONTINUED | OUTPATIENT
Start: 2024-09-13 | End: 2024-09-20 | Stop reason: HOSPADM

## 2024-09-13 RX ORDER — AMLODIPINE BESYLATE 5 MG/1
5 TABLET ORAL DAILY
Status: DISCONTINUED | OUTPATIENT
Start: 2024-09-14 | End: 2024-09-20 | Stop reason: HOSPADM

## 2024-09-13 RX ORDER — LOSARTAN POTASSIUM 50 MG/1
100 TABLET ORAL DAILY
Status: DISCONTINUED | OUTPATIENT
Start: 2024-09-13 | End: 2024-09-20 | Stop reason: HOSPADM

## 2024-09-13 RX ORDER — CEFAZOLIN SODIUM/WATER 2 G/20 ML
2 SYRINGE (ML) INTRAVENOUS SEE ADMIN INSTRUCTIONS
Status: DISCONTINUED | OUTPATIENT
Start: 2024-09-13 | End: 2024-09-13 | Stop reason: HOSPADM

## 2024-09-13 RX ORDER — HEPARIN SODIUM 5000 [USP'U]/.5ML
5000 INJECTION, SOLUTION INTRAVENOUS; SUBCUTANEOUS EVERY 8 HOURS
Status: DISCONTINUED | OUTPATIENT
Start: 2024-09-14 | End: 2024-09-20 | Stop reason: HOSPADM

## 2024-09-13 RX ORDER — ONDANSETRON 2 MG/ML
4 INJECTION INTRAMUSCULAR; INTRAVENOUS EVERY 30 MIN PRN
Status: DISCONTINUED | OUTPATIENT
Start: 2024-09-13 | End: 2024-09-13

## 2024-09-13 RX ORDER — BISACODYL 10 MG
10 SUPPOSITORY, RECTAL RECTAL DAILY PRN
Status: DISCONTINUED | OUTPATIENT
Start: 2024-09-16 | End: 2024-09-20 | Stop reason: HOSPADM

## 2024-09-13 RX ORDER — OXYCODONE HYDROCHLORIDE 5 MG/1
5 TABLET ORAL
Status: DISCONTINUED | OUTPATIENT
Start: 2024-09-13 | End: 2024-09-15

## 2024-09-13 RX ORDER — DEXMEDETOMIDINE HYDROCHLORIDE 4 UG/ML
INJECTION, SOLUTION INTRAVENOUS
Status: DISCONTINUED
Start: 2024-09-13 | End: 2024-09-13 | Stop reason: HOSPADM

## 2024-09-13 RX ORDER — OXYCODONE HYDROCHLORIDE 5 MG/1
10 TABLET ORAL
Status: DISCONTINUED | OUTPATIENT
Start: 2024-09-13 | End: 2024-09-15

## 2024-09-13 RX ORDER — NITROGLYCERIN 5 MG/ML
VIAL (ML) INTRAVENOUS
Status: DISCONTINUED
Start: 2024-09-13 | End: 2024-09-13 | Stop reason: HOSPADM

## 2024-09-13 RX ORDER — ACETAMINOPHEN 325 MG/1
975 TABLET ORAL EVERY 8 HOURS
Status: COMPLETED | OUTPATIENT
Start: 2024-09-13 | End: 2024-09-16

## 2024-09-13 RX ORDER — HEPARIN SODIUM 1000 [USP'U]/ML
INJECTION, SOLUTION INTRAVENOUS; SUBCUTANEOUS PRN
Status: DISCONTINUED | OUTPATIENT
Start: 2024-09-13 | End: 2024-09-13

## 2024-09-13 RX ORDER — PROCHLORPERAZINE MALEATE 5 MG
5 TABLET ORAL EVERY 6 HOURS PRN
Status: DISCONTINUED | OUTPATIENT
Start: 2024-09-13 | End: 2024-09-20 | Stop reason: HOSPADM

## 2024-09-13 RX ORDER — CEFOTETAN DISODIUM 1 G/10ML
INJECTION, POWDER, FOR SOLUTION INTRAMUSCULAR; INTRAVENOUS PRN
Status: DISCONTINUED | OUTPATIENT
Start: 2024-09-13 | End: 2024-09-13

## 2024-09-13 RX ORDER — LIDOCAINE 40 MG/G
CREAM TOPICAL
Status: DISCONTINUED | OUTPATIENT
Start: 2024-09-13 | End: 2024-09-16

## 2024-09-13 RX ORDER — ONDANSETRON 2 MG/ML
INJECTION INTRAMUSCULAR; INTRAVENOUS PRN
Status: DISCONTINUED | OUTPATIENT
Start: 2024-09-13 | End: 2024-09-13

## 2024-09-13 RX ORDER — CALCIUM CHLORIDE 100 MG/ML
INJECTION INTRAVENOUS; INTRAVENTRICULAR PRN
Status: DISCONTINUED | OUTPATIENT
Start: 2024-09-13 | End: 2024-09-13

## 2024-09-13 RX ORDER — AMLODIPINE BESYLATE 5 MG/1
5 TABLET ORAL DAILY
Status: ON HOLD | COMMUNITY
End: 2024-09-20

## 2024-09-13 RX ORDER — LABETALOL HYDROCHLORIDE 5 MG/ML
INJECTION, SOLUTION INTRAVENOUS PRN
Status: DISCONTINUED | OUTPATIENT
Start: 2024-09-13 | End: 2024-09-13

## 2024-09-13 RX ORDER — FENTANYL CITRATE 50 UG/ML
25 INJECTION, SOLUTION INTRAMUSCULAR; INTRAVENOUS EVERY 5 MIN PRN
Status: DISCONTINUED | OUTPATIENT
Start: 2024-09-13 | End: 2024-09-13 | Stop reason: HOSPADM

## 2024-09-13 RX ORDER — BUPIVACAINE HYDROCHLORIDE 2.5 MG/ML
INJECTION, SOLUTION INFILTRATION; PERINEURAL PRN
Status: DISCONTINUED | OUTPATIENT
Start: 2024-09-13 | End: 2024-09-13 | Stop reason: HOSPADM

## 2024-09-13 RX ORDER — LABETALOL HYDROCHLORIDE 5 MG/ML
10 INJECTION, SOLUTION INTRAVENOUS EVERY 10 MIN PRN
Status: DISCONTINUED | OUTPATIENT
Start: 2024-09-13 | End: 2024-09-20 | Stop reason: HOSPADM

## 2024-09-13 RX ORDER — CEFAZOLIN SODIUM 1 G/3ML
INJECTION, POWDER, FOR SOLUTION INTRAMUSCULAR; INTRAVENOUS PRN
Status: DISCONTINUED | OUTPATIENT
Start: 2024-09-13 | End: 2024-09-13

## 2024-09-13 RX ORDER — PHENYLEPHRINE HCL IN 0.9% NACL 50MG/250ML
.5-1.25 PLASTIC BAG, INJECTION (ML) INTRAVENOUS CONTINUOUS
Status: DISCONTINUED | OUTPATIENT
Start: 2024-09-13 | End: 2024-09-14

## 2024-09-13 RX ORDER — DEXAMETHASONE SODIUM PHOSPHATE 4 MG/ML
4 INJECTION, SOLUTION INTRA-ARTICULAR; INTRALESIONAL; INTRAMUSCULAR; INTRAVENOUS; SOFT TISSUE
Status: DISCONTINUED | OUTPATIENT
Start: 2024-09-13 | End: 2024-09-16

## 2024-09-13 RX ORDER — FENTANYL CITRATE 50 UG/ML
INJECTION, SOLUTION INTRAMUSCULAR; INTRAVENOUS PRN
Status: DISCONTINUED | OUTPATIENT
Start: 2024-09-13 | End: 2024-09-13

## 2024-09-13 RX ORDER — PHENYLEPHRINE HCL IN 0.9% NACL 50MG/250ML
.1-6 PLASTIC BAG, INJECTION (ML) INTRAVENOUS CONTINUOUS
Status: DISCONTINUED | OUTPATIENT
Start: 2024-09-13 | End: 2024-09-13

## 2024-09-13 RX ORDER — POLYETHYLENE GLYCOL 3350 17 G/17G
17 POWDER, FOR SOLUTION ORAL DAILY
Status: DISCONTINUED | OUTPATIENT
Start: 2024-09-14 | End: 2024-09-20 | Stop reason: HOSPADM

## 2024-09-13 RX ORDER — FENTANYL CITRATE-0.9 % NACL/PF 10 MCG/ML
100 PLASTIC BAG, INJECTION (ML) INTRAVENOUS
Status: DISCONTINUED | OUTPATIENT
Start: 2024-09-13 | End: 2024-09-16

## 2024-09-13 RX ORDER — SODIUM CHLORIDE, SODIUM LACTATE, POTASSIUM CHLORIDE, CALCIUM CHLORIDE 600; 310; 30; 20 MG/100ML; MG/100ML; MG/100ML; MG/100ML
INJECTION, SOLUTION INTRAVENOUS CONTINUOUS
Status: ACTIVE | OUTPATIENT
Start: 2024-09-13 | End: 2024-09-13

## 2024-09-13 RX ORDER — AMOXICILLIN 250 MG
1 CAPSULE ORAL 2 TIMES DAILY
Status: DISCONTINUED | OUTPATIENT
Start: 2024-09-13 | End: 2024-09-20 | Stop reason: HOSPADM

## 2024-09-13 RX ADMIN — LABETALOL HYDROCHLORIDE 5 MG: 5 INJECTION, SOLUTION INTRAVENOUS at 11:52

## 2024-09-13 RX ADMIN — ROCURONIUM BROMIDE 20 MG: 50 INJECTION, SOLUTION INTRAVENOUS at 09:08

## 2024-09-13 RX ADMIN — PHENYLEPHRINE HYDROCHLORIDE 50 MCG: 10 INJECTION INTRAVENOUS at 09:28

## 2024-09-13 RX ADMIN — DEXMEDETOMIDINE HYDROCHLORIDE 4 MCG: 100 INJECTION, SOLUTION INTRAVENOUS at 12:00

## 2024-09-13 RX ADMIN — FENTANYL CITRATE 25 MCG: 50 INJECTION INTRAMUSCULAR; INTRAVENOUS at 10:12

## 2024-09-13 RX ADMIN — PHENYLEPHRINE HYDROCHLORIDE 0.5 MCG/KG/MIN: 10 INJECTION INTRAVENOUS at 08:26

## 2024-09-13 RX ADMIN — SODIUM CHLORIDE, POTASSIUM CHLORIDE, SODIUM LACTATE AND CALCIUM CHLORIDE 100 ML/HR: 600; 310; 30; 20 INJECTION, SOLUTION INTRAVENOUS at 07:39

## 2024-09-13 RX ADMIN — FENTANYL CITRATE 25 MCG: 50 INJECTION INTRAMUSCULAR; INTRAVENOUS at 12:02

## 2024-09-13 RX ADMIN — LIDOCAINE HYDROCHLORIDE 2 ML: 10 INJECTION, SOLUTION INFILTRATION; PERINEURAL at 12:00

## 2024-09-13 RX ADMIN — CALCIUM CHLORIDE INJECTION 200 MG: 100 INJECTION, SOLUTION INTRAVENOUS at 08:35

## 2024-09-13 RX ADMIN — ONDANSETRON 4 MG: 2 INJECTION INTRAMUSCULAR; INTRAVENOUS at 11:36

## 2024-09-13 RX ADMIN — HYDROMORPHONE HYDROCHLORIDE 0.2 MG: 1 INJECTION, SOLUTION INTRAMUSCULAR; INTRAVENOUS; SUBCUTANEOUS at 13:19

## 2024-09-13 RX ADMIN — DEXAMETHASONE SODIUM PHOSPHATE 5 MG: 4 INJECTION, SOLUTION INTRA-ARTICULAR; INTRALESIONAL; INTRAMUSCULAR; INTRAVENOUS; SOFT TISSUE at 09:01

## 2024-09-13 RX ADMIN — PROPOFOL 10 MG: 10 INJECTION, EMULSION INTRAVENOUS at 12:02

## 2024-09-13 RX ADMIN — PROPOFOL 10 MG: 10 INJECTION, EMULSION INTRAVENOUS at 08:12

## 2024-09-13 RX ADMIN — PROPOFOL 100 MG: 10 INJECTION, EMULSION INTRAVENOUS at 08:22

## 2024-09-13 RX ADMIN — NALOXONE HYDROCHLORIDE 0.04 MG: 1 INJECTION PARENTERAL at 12:14

## 2024-09-13 RX ADMIN — ROCURONIUM BROMIDE 10 MG: 50 INJECTION, SOLUTION INTRAVENOUS at 09:50

## 2024-09-13 RX ADMIN — DEXMEDETOMIDINE HYDROCHLORIDE 8 MCG: 100 INJECTION, SOLUTION INTRAVENOUS at 08:12

## 2024-09-13 RX ADMIN — HYDROMORPHONE HYDROCHLORIDE 0.4 MG: 1 INJECTION, SOLUTION INTRAMUSCULAR; INTRAVENOUS; SUBCUTANEOUS at 14:37

## 2024-09-13 RX ADMIN — PHENYLEPHRINE HYDROCHLORIDE 100 MCG: 10 INJECTION INTRAVENOUS at 09:59

## 2024-09-13 RX ADMIN — PHENYLEPHRINE HYDROCHLORIDE 50 MCG: 10 INJECTION INTRAVENOUS at 09:56

## 2024-09-13 RX ADMIN — PHENYLEPHRINE HYDROCHLORIDE 100 MCG: 10 INJECTION INTRAVENOUS at 08:24

## 2024-09-13 RX ADMIN — CALCIUM CHLORIDE INJECTION 200 MG: 100 INJECTION, SOLUTION INTRAVENOUS at 08:28

## 2024-09-13 RX ADMIN — SUGAMMADEX 50 MG: 100 INJECTION, SOLUTION INTRAVENOUS at 12:02

## 2024-09-13 RX ADMIN — HYDROMORPHONE HYDROCHLORIDE 0.4 MG: 1 INJECTION, SOLUTION INTRAMUSCULAR; INTRAVENOUS; SUBCUTANEOUS at 15:46

## 2024-09-13 RX ADMIN — PROPOFOL 10 MG: 10 INJECTION, EMULSION INTRAVENOUS at 08:16

## 2024-09-13 RX ADMIN — Medication 100 MCG: at 12:53

## 2024-09-13 RX ADMIN — EPHEDRINE SULFATE 5 MG: 50 INJECTION INTRAVENOUS at 12:29

## 2024-09-13 RX ADMIN — IBUPROFEN 600 MG: 600 TABLET, FILM COATED ORAL at 22:31

## 2024-09-13 RX ADMIN — PHENYLEPHRINE HYDROCHLORIDE 50 MCG: 10 INJECTION INTRAVENOUS at 09:55

## 2024-09-13 RX ADMIN — ACETAMINOPHEN 975 MG: 325 TABLET ORAL at 21:15

## 2024-09-13 RX ADMIN — CALCIUM CHLORIDE INJECTION 200 MG: 100 INJECTION, SOLUTION INTRAVENOUS at 09:58

## 2024-09-13 RX ADMIN — METOPROLOL TARTRATE 25 MG: 25 TABLET, FILM COATED ORAL at 21:16

## 2024-09-13 RX ADMIN — HYDROMORPHONE HYDROCHLORIDE 0.2 MG: 1 INJECTION, SOLUTION INTRAMUSCULAR; INTRAVENOUS; SUBCUTANEOUS at 14:26

## 2024-09-13 RX ADMIN — ROCURONIUM BROMIDE 20 MG: 50 INJECTION, SOLUTION INTRAVENOUS at 11:10

## 2024-09-13 RX ADMIN — NALOXONE HYDROCHLORIDE 0.04 MG: 1 INJECTION PARENTERAL at 12:12

## 2024-09-13 RX ADMIN — FENTANYL CITRATE 25 MCG: 50 INJECTION, SOLUTION INTRAMUSCULAR; INTRAVENOUS at 12:53

## 2024-09-13 RX ADMIN — ROCURONIUM BROMIDE 40 MG: 50 INJECTION, SOLUTION INTRAVENOUS at 08:23

## 2024-09-13 RX ADMIN — PROPOFOL 10 MG: 10 INJECTION, EMULSION INTRAVENOUS at 12:05

## 2024-09-13 RX ADMIN — LIDOCAINE HYDROCHLORIDE 2 ML: 10 INJECTION, SOLUTION INFILTRATION; PERINEURAL at 08:12

## 2024-09-13 RX ADMIN — ATORVASTATIN CALCIUM 80 MG: 40 TABLET, FILM COATED ORAL at 21:17

## 2024-09-13 RX ADMIN — SODIUM CHLORIDE, POTASSIUM CHLORIDE, SODIUM LACTATE AND CALCIUM CHLORIDE: 600; 310; 30; 20 INJECTION, SOLUTION INTRAVENOUS at 08:12

## 2024-09-13 RX ADMIN — SODIUM CHLORIDE 500 ML: 9 INJECTION, SOLUTION INTRAVENOUS at 21:44

## 2024-09-13 RX ADMIN — HYDROMORPHONE HYDROCHLORIDE 0.2 MG: 1 INJECTION, SOLUTION INTRAMUSCULAR; INTRAVENOUS; SUBCUTANEOUS at 13:44

## 2024-09-13 RX ADMIN — Medication 0.5 MCG/KG/MIN: at 13:40

## 2024-09-13 RX ADMIN — FENTANYL CITRATE 50 MCG: 50 INJECTION INTRAMUSCULAR; INTRAVENOUS at 08:55

## 2024-09-13 RX ADMIN — LIDOCAINE HYDROCHLORIDE 3 ML: 10 INJECTION, SOLUTION INFILTRATION; PERINEURAL at 08:22

## 2024-09-13 RX ADMIN — HEPARIN SODIUM 5000 UNITS: 1000 INJECTION INTRAVENOUS; SUBCUTANEOUS at 09:40

## 2024-09-13 RX ADMIN — SUGAMMADEX 50 MG: 100 INJECTION, SOLUTION INTRAVENOUS at 12:05

## 2024-09-13 RX ADMIN — ROCURONIUM BROMIDE 20 MG: 50 INJECTION, SOLUTION INTRAVENOUS at 10:25

## 2024-09-13 RX ADMIN — FENTANYL CITRATE 50 MCG: 50 INJECTION INTRAMUSCULAR; INTRAVENOUS at 09:23

## 2024-09-13 RX ADMIN — GABAPENTIN 300 MG: 300 CAPSULE ORAL at 21:17

## 2024-09-13 RX ADMIN — PHENYLEPHRINE HYDROCHLORIDE 100 MCG: 10 INJECTION INTRAVENOUS at 10:27

## 2024-09-13 RX ADMIN — PHENYLEPHRINE HYDROCHLORIDE 100 MCG: 10 INJECTION INTRAVENOUS at 10:34

## 2024-09-13 RX ADMIN — SUGAMMADEX 100 MG: 100 INJECTION, SOLUTION INTRAVENOUS at 11:59

## 2024-09-13 RX ADMIN — NITROGLYCERIN 20 MCG: 10 INJECTION INTRAVENOUS at 11:52

## 2024-09-13 RX ADMIN — FENTANYL CITRATE 25 MCG: 50 INJECTION INTRAMUSCULAR; INTRAVENOUS at 08:16

## 2024-09-13 RX ADMIN — HYDROMORPHONE HYDROCHLORIDE 0.4 MG: 1 INJECTION, SOLUTION INTRAMUSCULAR; INTRAVENOUS; SUBCUTANEOUS at 15:07

## 2024-09-13 RX ADMIN — HEPARIN SODIUM 2000 UNITS: 1000 INJECTION INTRAVENOUS; SUBCUTANEOUS at 09:58

## 2024-09-13 RX ADMIN — FENTANYL CITRATE 25 MCG: 50 INJECTION INTRAMUSCULAR; INTRAVENOUS at 08:12

## 2024-09-13 RX ADMIN — CEFAZOLIN 2 G: 1 INJECTION, POWDER, FOR SOLUTION INTRAMUSCULAR; INTRAVENOUS at 08:40

## 2024-09-13 RX ADMIN — FENTANYL CITRATE 25 MCG: 50 INJECTION, SOLUTION INTRAMUSCULAR; INTRAVENOUS at 13:11

## 2024-09-13 RX ADMIN — LABETALOL HYDROCHLORIDE 5 MG: 5 INJECTION, SOLUTION INTRAVENOUS at 12:02

## 2024-09-13 RX ADMIN — PHENYLEPHRINE HYDROCHLORIDE 50 MCG: 10 INJECTION INTRAVENOUS at 09:30

## 2024-09-13 RX ADMIN — ROCURONIUM BROMIDE 10 MG: 50 INJECTION, SOLUTION INTRAVENOUS at 10:51

## 2024-09-13 RX ADMIN — FENTANYL CITRATE 50 MCG: 50 INJECTION INTRAMUSCULAR; INTRAVENOUS at 08:22

## 2024-09-13 RX ADMIN — IPRATROPIUM BROMIDE AND ALBUTEROL SULFATE 3 ML: .5; 3 SOLUTION RESPIRATORY (INHALATION) at 14:09

## 2024-09-13 RX ADMIN — SENNOSIDES AND DOCUSATE SODIUM 1 TABLET: 8.6; 5 TABLET ORAL at 21:16

## 2024-09-13 RX ADMIN — PROTAMINE SULFATE 20 MG: 10 INJECTION, SOLUTION INTRAVENOUS at 11:33

## 2024-09-13 ASSESSMENT — ACTIVITIES OF DAILY LIVING (ADL)
ADLS_ACUITY_SCORE: 32
ADLS_ACUITY_SCORE: 28
ADLS_ACUITY_SCORE: 32
ADLS_ACUITY_SCORE: 26
ADLS_ACUITY_SCORE: 28
ADLS_ACUITY_SCORE: 32
ADLS_ACUITY_SCORE: 28
ADLS_ACUITY_SCORE: 32
ADLS_ACUITY_SCORE: 28

## 2024-09-13 ASSESSMENT — ENCOUNTER SYMPTOMS: DYSRHYTHMIAS: 1

## 2024-09-13 NOTE — PHARMACY-ADMISSION MEDICATION HISTORY
"Pharmacist Admission Medication History    Admission medication history is complete. The information provided in this note is only as accurate as the sources available at the time of the update.    Information Source(s): Patient and CareEverywhere/SureScripts via in-person    Pertinent Information: Patient was not overly familiar with her medications, but stated she takes five meds in addition to her aspirin, which matches with fill history. Amlodipine 2.5 mg daily was on PTA list, but patient denies change of dose and per chart review, looks like a dose reduction was preemptively sent to pharmacy in case BP continued to be low.  Per 8/26 visit with Rosamaria Armstrong, NP: \"Monitor BP at home. If readings are consistently <100/60, may need to reduce dose of amlodipine or discontinue but based on review of recent BP readings this was unusually low for her today.\"    Changes made to PTA medication list:  Added: None  Deleted: None  Changed: Amlodipine from 2.5 mg daily to 5 mg daily    Allergies reviewed with patient and updates made in EHR: yes    Medication History Completed By: CAMPBELL PARSON [facility] 9/13/2024 6:23 AM    PTA Med List   Medication Sig Last Dose    amLODIPine (NORVASC) 5 MG tablet Take 5 mg by mouth daily. 9/13/2024 at am    aspirin (ASA) 325 MG EC tablet Take 325 mg by mouth daily 9/13/2024 at am    atorvastatin (LIPITOR) 80 MG tablet Take 1 tablet (80 mg) by mouth daily. 9/12/2024    gabapentin (NEURONTIN) 300 MG capsule Take 1 capsule (300 mg) by mouth 2 times daily 9/13/2024 at am    losartan (COZAAR) 100 MG tablet Take 1 tablet (100 mg) by mouth daily 9/13/2024 at am    metoprolol tartrate (LOPRESSOR) 25 MG tablet Take 1 tablet (25 mg) by mouth 2 times daily 9/13/2024 at am     "

## 2024-09-13 NOTE — Clinical Note
Potential access sites were evaluated for patency using ultrasound.   The right brachial vein was selected. Access was obtained under with Sonosite guidance using a micropuncture 21 gauge needle with direct visualization of needle entry.

## 2024-09-13 NOTE — INTERVAL H&P NOTE
"I have reviewed the surgical (or preoperative) H&P that is linked to this encounter, and examined the patient. There are no significant changes    Clinical Conditions Present on Arrival:  Clinically Significant Risk Factors Present on Admission        # Hyperkalemia: Highest K = 5.4 mmol/L in last 30 days, will monitor as appropriate  # Hyponatremia: Lowest Na = 132 mmol/L in last 30 days, will monitor as appropriate         # Drug Induced Platelet Defect: home medication list includes an antiplatelet medication      # Cachexia: Estimated body mass index is 17.19 kg/m  as calculated from the following:    Height as of 8/29/24: 1.549 m (5' 1\").    Weight as of this encounter: 41.3 kg (91 lb).       "
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
## 2024-09-13 NOTE — ANESTHESIA PREPROCEDURE EVALUATION
Anesthesia Pre-Procedure Evaluation    Patient: Ernestine Morales   MRN: [id number] : 1960        Procedure : Procedure(s):  ENDARTERECTOMY, CAROTID          Past Medical History:   Diagnosis Date     Anemia      Aphasia      Atrial fibrillation (H)      Cancer (H) 2022    Squamous cell carcinoma nRight Cheek     Carotid artery stenosis      Cerebrovascular accident (H) 2017     Congestive heart failure (H)      HLD (hyperlipidemia)      HTN (hypertension)      Mitral regurgitation       Past Surgical History:   Procedure Laterality Date     BIOPSY  2022    Right Cheek     REPLACE VALVE MITRAL  2017    bovine mitral valve with Maze and ARIANNA ligation      No Known Allergies   Social History     Tobacco Use     Smoking status: Every Day     Current packs/day: 0.25     Average packs/day: 0.9 packs/day for 40.7 years (34.9 ttl pk-yrs)     Types: Cigarettes     Start date: 1984     Smokeless tobacco: Not on file     Tobacco comments:     I've cut down to just a few a day   Substance Use Topics     Alcohol use: Not Currently      Wt Readings from Last 1 Encounters:   24 41.3 kg (91 lb)        Anesthesia Evaluation            ROS/MED HX  ENT/Pulmonary:       Neurologic:     (+)          CVA,  with deficits, - right leg, arm weakness, sensory deficits.                   Cardiovascular: Comment: Impression:      1. Normal diameter of the right ICA relative to the proximal ICA diameter.  2. 70-99% diameter stenosis of the left ICA relative to the proximal ICA diameter.      Interpretation Summary     1. Normal left ventricular size and systolic performance with a visually  estimated ejection fraction of 55-60%.  2. There is abnormal septal motion consistent with prior cardiac surgery.  3. There is moderate aortic insufficiency.  4. There is a bio-prosthetic mitral valve.  Â  High normal mean at 8 mmHg.  Â  No significant mitral insufficiency is detected.  5. There is moderate  "tricuspid insufficiency.  6. Normal right ventricular size with low normal right ventricular systolic  performance.  7. There is severe left atrial enlargement. There is mild to moderate right  atrial enlargement.  8. Right ventricular systolic pressure relative to right atrial pressure is  moderately increased. The pulmonary artery pressure is estimated to be 50-55  mmHg plus right atrial pressure (the IVC is of normal caliber).      (+)  hypertension- -   -  - -      CHF                  dysrhythmias, a-fib,             METS/Exercise Tolerance:     Hematologic:       Musculoskeletal:       GI/Hepatic:       Renal/Genitourinary:       Endo:       Psychiatric/Substance Use:       Infectious Disease:       Malignancy:   (+) Malignancy,     Other:            Physical Exam    Airway        Mallampati: II   TM distance: > 3 FB   Neck ROM: full   Mouth opening: > 3 cm    Respiratory Devices and Support         Dental       (+) Minor Abnormalities - some fillings, tiny chips      Cardiovascular   cardiovascular exam normal          Pulmonary   pulmonary exam normal            OUTSIDE LABS:  CBC:   Lab Results   Component Value Date    WBC 9.9 09/13/2024    WBC 7.5 09/04/2024    HGB 10.8 (L) 09/13/2024    HGB 11.2 (L) 09/04/2024    HCT 33.2 (L) 09/13/2024    HCT 33.0 (L) 09/04/2024     09/13/2024     09/04/2024     BMP:   Lab Results   Component Value Date     09/13/2024     (L) 09/04/2024    POTASSIUM 3.7 09/13/2024    POTASSIUM 5.4 (H) 09/04/2024    CHLORIDE 102 09/13/2024    CHLORIDE 103 09/04/2024    CO2 21 (L) 09/13/2024    CO2 20 (L) 09/04/2024    BUN 14.4 09/13/2024    BUN 24.6 (H) 09/04/2024    CR 0.94 09/13/2024    CR 0.91 09/04/2024    GLC 85 09/13/2024    GLC 81 09/13/2024     COAGS: No results found for: \"PTT\", \"INR\", \"FIBR\"  POC: No results found for: \"BGM\", \"HCG\", \"HCGS\"  HEPATIC:   Lab Results   Component Value Date    ALBUMIN 4.3 09/04/2024    PROTTOTAL 7.6 09/04/2024    ALT 45 " "09/04/2024    AST 45 09/04/2024    ALKPHOS 74 09/04/2024    BILITOTAL 0.6 09/04/2024     OTHER:   Lab Results   Component Value Date    BISHOP 9.1 09/13/2024    MAG 1.9 07/31/2024    TSH 1.94 04/16/2024       Anesthesia Plan    ASA Status:  4    NPO Status:  NPO Appropriate    Anesthesia Type: General.     - Airway: ETT      Maintenance: TIVA.   Techniques and Equipment:     - Lines/Monitors: 2nd IV, Arterial Line     Consents    Anesthesia Plan(s) and associated risks, benefits, and realistic alternatives discussed. Questions answered and patient/representative(s) expressed understanding.     - Discussed: Risks, Benefits and Alternatives for BOTH SEDATION and the PROCEDURE were discussed     - Discussed with:  Patient      - Extended Intubation/Ventilatory Support Discussed: No.      - Patient is DNR/DNI Status: No     Use of blood products discussed: Yes.     - Discussed with: Patient.     - Consented: consented to blood products     Postoperative Care    Pain management: IV analgesics, Oral pain medications.   PONV prophylaxis: Ondansetron (or other 5HT-3), Dexamethasone or Solumedrol     Comments:    Other Comments: Prop, adebayo  Lindsey after induction  Nasal intubation per surgeon request, discussed risks benefits with patient.            Neil Kyle, DO    I have reviewed the pertinent notes and labs in the chart from the past 30 days and (re)examined the patient.  Any updates or changes from those notes are reflected in this note.    # Hyperkalemia: Highest K = 5.4 mmol/L in last 30 days, will monitor as appropriate  # Hyponatremia: Lowest Na = 132 mmol/L in last 30 days, will monitor as appropriate         # Drug Induced Platelet Defect: home medication list includes an antiplatelet medication  # Cachexia: Estimated body mass index is 17.19 kg/m  as calculated from the following:    Height as of 8/29/24: 1.549 m (5' 1\").    Weight as of this encounter: 41.3 kg (91 lb).      "

## 2024-09-13 NOTE — OR NURSING
Patient interviewed and unable to give this nurse current date.  Patient gave various numbers.  States aphasia but she understands and she can write with out issue.  Patient asked date and wrote it down with out hesitation.  Patient states aphasia since stroke 2017, with no real improvements with aphasia.  Clipboard left at bedside for any further needs for her to write.

## 2024-09-13 NOTE — Clinical Note
Prepped: groin and right brachial. Prepped with: ChloraPrep. The patient was draped. .Pre-procedure site marking:Insertion site not predetermined

## 2024-09-13 NOTE — CONSULTS
[facility]  Consult Note - Hospitalist Service  Date of Admission:  9/13/2024  Consult Requested by: Dr. Avila  Reason for Consult: Perioperative medical management    Assessment & Plan   Ernestine Morales is a 64 year old female admitted on 9/13/2024. She is being admitted for left carotid endarterectomy.    #History of CVA, resultant dysphagia, right-sided hemiparesis  #Left carotid stenosis status post left CEA 9/13  #Postop dysphonia  -Postoperative cares per vascular surgery  -Systolic blood pressure goal over 100, using low-dose phenylephrine, continue with art line monitoring, consider discontinuation 9/14  -Limiting narcotics per vascular surgery, offer NSAIDs, Tylenol, topicals  -Speech and language pathology consultation for dysphonia and dysphagia, n.p.o. until evaluation  -Continues on full dose aspirin    #Postop hypoxia  -Noted to have some increased work of breathing and oxygen needs in recovery  -Offer DuoNebs as needed  -Chest x-ray ordered to evaluate for any aspiration or other infiltrate    #Paroxysmal atrial fibrillation  #History of mitral stenosis status post partial mitral valve replacement  -Also had maze ablation procedure  -No anticoagulation prior to admission  -Resume usual metoprolol tartrate    #Essential hypertension  -Holding usual amlodipine, losartan given systolic blood pressure goal per vascular surgery  -Resume amlodipine, losartan when further from surgery    #Preop elevated creatinine  -This seems spurious or perhaps related to losartan  -Lab normalized prior to above surgery, trend     Clinically Significant Risk Factors Present on Admission                # Drug Induced Platelet Defect: home medication list includes an antiplatelet medication   # Hypertension: Noted on problem list   # Circulatory Shock: required vasopressors within past 24 hours     # Anemia: based on hgb <11       # Cachexia: Estimated body mass index is 17.19 kg/m  as  "calculated from the following:    Height as of 8/29/24: 1.549 m (5' 1\").    Weight as of this encounter: 41.3 kg (91 lb).                    Trav Marroquin MD  Hospitalist Service  Securely message with [employer] (more info)  Text page via [facility] Paging/Directory   ______________________________________________________________________    Chief Complaint   Here for surgery    History is obtained from the patient    History of Present Illness   Ernestine Morales is a 64 year old female who presents today for essentially elective left carotid endarterectomy.  In recent months she has been diagnosed with acute CVA, workup revealing critical stenosis left carotid internal artery.  She is seen postoperatively.  She is bothered by some hoarseness and admits to some shortness of breath.  Denies any chest pain, notes soreness in her left neck.  Notes chronic right-sided weakness.  No issues prior to admission.  Denies belly pain or nausea.    Past Medical History    Past Medical History:   Diagnosis Date    Anemia     Aphasia     Atrial fibrillation (H)     Cancer (H) 11.17.2022    Squamous cell carcinoma nRight Cheek    Carotid artery stenosis     Cerebrovascular accident (H) 01/09/2017    Congestive heart failure (H)     HLD (hyperlipidemia)     HTN (hypertension)     Mitral regurgitation        Past Surgical History   Past Surgical History:   Procedure Laterality Date    BIOPSY  11.18.2022    Right Cheek    REPLACE VALVE MITRAL  06/09/2017    bovine mitral valve with Maze and ARIANNA ligation       Medications   I have reviewed this patient's current medications          Physical Exam   Vital Signs: Temp: (!) 95.7  F (35.4  C) Temp src: Core BP: 116/56 Pulse: 89   Resp: 21 SpO2: 91 % O2 Device: Nasal cannula Oxygen Delivery: 5 LPM  Weight: 91 lbs 0 oz    Underweight, mild distress, increased work of breathing, left incision neck incision benign, lungs with scattered coarse sounds, abdomen soft nontender, legs without edema, " right side weak    Medical Decision Making       58 MINUTES SPENT BY ME on the date of service doing chart review, history, exam, documentation & further activities per the note.  NOTE(S)/MEDICAL RECORDS REVIEWED over the past 24 hours: Vitals, labs, new imaging, documentation and medication administrations       Data     I have personally reviewed the following data over the past 24 hrs:    9.9  \   10.9 (L)   / 266     136 102 14.4 /  85   4.0 21 (L) 0.94 \       Imaging results reviewed over the past 24 hrs:   No results found for this or any previous visit (from the past 24 hour(s)).

## 2024-09-13 NOTE — ANESTHESIA POSTPROCEDURE EVALUATION
Patient: Ernestine Morales    Procedure: Procedure(s):  ENDARTERECTOMY, CAROTID WITH NEURO MONITORING       Anesthesia Type:  General    Note:  Disposition: Outpatient   Postop Pain Control: Uneventful            Sign Out: Well controlled pain   PONV: No   Neuro/Psych: Uneventful            Sign Out: Acceptable/Baseline neuro status   Airway/Respiratory: Uneventful            Sign Out: Acceptable/Baseline resp. status   CV/Hemodynamics: Uneventful            Sign Out: Acceptable CV status; No obvious hypovolemia; No obvious fluid overload   Other NRE: NONE   DID A NON-ROUTINE EVENT OCCUR? No    Event details/Postop Comments:  Patient recovering comfortably.        Last vitals:  Vitals Value Taken Time   BP 94/52 09/13/24 1515   Temp 35.9  C (96.62  F) 09/13/24 1526   Pulse 76 09/13/24 1526   Resp 18 09/13/24 1526   SpO2 95 % 09/13/24 1526   Vitals shown include unfiled device data.    Electronically Signed By: Neil Kyle DO  September 13, 2024  3:27 PM

## 2024-09-13 NOTE — PROGRESS NOTES
Post-op check    POD#0 s/p left carotid endarterectomy with bovine pericardial patch angioplasty.    Seen in pacu, uncomfortable, L neck pain and headache.    VS reviewed. On low dose phenylephrine gtt.  Exam  Leftward tongue deviation, face otherwise symmetric  Chronic expressive aphasia is unchanged  Symmetric  strength, symmetric plantar/dorsiflexion  Left neck incision flat, clean, dry, no hematoma.    A/P:   63 yo female with chronic previous stroke, chronic expressive aphasia and right sided weakness, asymptomatic high-grade left ICA stenosis, now POD#0 s/p left carotid endarterectomy.  No new neurologic symptoms aside from leftward tongue deviation as result of left hypoglossal nerve retraction required for high carotid exposure.     Multimodal pain control  Goal systolic 100-140  SLP consult. NPO until speech eval  Aspirin, high-dose statin  Prophylactic subQ heparin tomorrow    Aramis Womack MD

## 2024-09-13 NOTE — OP NOTE
VASCULAR SURGERY OPERATIVE REPORT        LOCATION:    [facility]    Ernestine Morales   Medical Record #:  [id number]  YOB: 1960  Age:  64 year old     Date of Service: September 13, 2024    PRIMARY CARE PROVIDER: Christina Andrew    Preoperative diagnosis    Symptomatic left carotid artery stenosis  Postoperative diagnosis    Same    Surgeon: Margarita Tobin MD, RPVI   Assistant: Aramis Womack MD, vascular surgery resident PGY 5    Name of the procedure    left carotid endarterectomy with bovine patch angioplasty  2.   Intraoperative carotid ultrasound  3.   EEG monitoring    Indication for procedure:    64-year-old female with possibly symptomatic left carotid artery stenosis.  I discussed the risks of left-sided carotid endarterectomy including but not limited to death, stroke, bleeding, MI and infection. We reviewed the benefits and alternatives including medical management and stenting. Ms. Morales was involved in all aspects of decision making and appears to understand. They would like to proceed with the recommended treatment of carotid endarterectomy.       Description of procedure:    Patient was brought to the operating room and transferred to the operating room table in stable condition.  Patient was induced with general anesthesia.  Patient was then positioned in the supine position with both arms tucked with the neck turned to the patient's right.  Patient neck was also extended using shoulder roll placed horizontally in the upper mid back.  EEG leads were placed for neuro monitoring during the case.  Ultrasound was then used to zo the carotid bifurcation.  Patient's left neck was then prepped and draped in the usual sterile fashion.    An incision was made along the anterior border of the sternocleidomastoid given the extensive nature of the disease.  Incision was then carried down through the platysma using electrocautery. All cutaneous oozing was  controlled with electrocautery.  Dissection was carried down along the medial aspect of the sternocleidomastoid.  A small subcutaneous nerve was identified and divided between 2 small clips.  After sufficient mobilization of the sternocleidomastoid superiorly and inferiorly, the sternocleidomastoid was retracted laterally and self retainer retractors were placed.  The internal jugular vein was identified and sharply dissected free with Metzenbaum scissors.  Small crossing branches of the internal jugular vein were ligated using small clips.  After sufficient mobilization of the internal jugular vein, the carotid artery was identified medially.  Patient was then systemically heparinized with 8000 units of heparin with plans to redosed every 45 minutes as needed.  Furthermore, the patient's mean arterial pressure was increased to greater than 100 mmHg.  We started with a dissection of internal carotid artery.   Sharp dissection was used to circumferentially dissect out the internal carotid artery.  The internal carotid artery was then encircled with a vessel loop.  A blunt right angle was then used to open up the space posterior to the internal carotid artery so as to facilitate placement of the vascular clamp. The internal carotid artery was clamped after confirming that the MAP was above 100 mmHg.  There is no EEG changes immediately.  Sharp dissection of the carotid artery was performed using Metzenbaum scissors.  The common carotid artery was circumferentially dissected free.  Proximal part of the common carotid artery was noted to be soft without any obvious evidence of disease.  This area was encircled with a vessel loop.  Dissection was carried distally.  The external carotid artery was dissected free from the surrounding subcutaneous tissue and encircled with a vessel loop.   Dissection of the remainder of the common carotid artery and carotid bulb was performed.  The superior thyroid branch of the external  carotid artery was also dissected freely and encircled with a vessel loop.  The common carotid artery was clamped using an angled DeBakey clamp.  The external carotid artery and superior thyroid branch of the external carotid artery were clamped using the previously placed Vesseloops.    An 11 blade was used to make a nick along the anterior wall of the common carotid artery.  Gabriel scissors were then used to extend this arteriotomy along the anterior wall of the internal carotid artery to an area in the internal carotid artery that appeared free of atherosclerotic disease.  High-grade stenosis in the carotid bulb and proximal internal carotid artery was noted.  The arteriotomy was then extended proximally along the common carotid artery to an area in the proximal common carotid artery that appeared free of disease.  There was noted to be atherosclerotic plaque along the medial wall of the common carotid artery.  Bodfish elevator was used to free the calcific plaque along the common carotid artery.  After sufficient mobilization of the carotid plaque, a blunt right angle was used to come around circumferentially and the carotid plaque was divided using Gabriel scissors.  The carotid plaque was then freed proximally along the common carotid artery and removed.  Eversion endarterectomy of the external carotid artery was then performed.  The carotid plaque was then mobilized distally.  Downward traction was then used to remove the plaque from the internal carotid artery so that it tapered smoothly.  Clark forceps were used to create a smooth tapered endpoint of the internal carotid artery.  The endarterectomized carotid artery was then examined carefully using heparinized saline and any loose intimal flaps were then removed using a combination of peanut and Mills forceps.  The distal endpoint was noted to be stable and not loose.  The arterial wall was then closed using a 0.8 cm x 8 cm bovine pericardial patch using a  running 5-0 Prolene suture.  Prior to completing the patch, standard flushing maneuvers were performed.  After completion of the patch, the external carotid artery was open first followed by the common carotid artery to flush any loose debris up the external carotid artery.  Internal carotid artery was opened last.  Additional hemostasis was achieved by using interrupted 6-0 Prolene sutures.  Completion carotid duplex was performed.  There were noted to be no loose intimal flaps.  Velocities were measured in the right common carotid artery, internal carotid artery, and external carotid artery.  Velocities in all 3 were noted to be not hemodynamically significant.  No visible defects were noted.    40 mg of protamine was administered for heparin reversal.  Thrombin-soaked Gelfoam was placed along the suture line.  Again there was noted to be significant oozing from the surrounding dissected tissue.  Surgicel was placed along the suture line as well.  Valsalva maneuver was performed by anesthesia, no additional bleeding was noted.   A small skin nick was made laterally.  A 10 Bahamian flat ROSA drain was placed along the wound bed.  The drain was secured using a 3-0 nylon suture.  After confirming appropriate hemostasis along the suture line, the wound was copiously irrigated using warm saline solution.  Surgi-Francis hemostatic agent was placed along the suture line.  The platysma layer was then closed with a running 3-0 Vicryl suture.  The skin was closed with a running 4-0 Monocryl subcuticular suture.  Dermabond was placed along the wound.  All needle, sponge, and instrument counts were correct at the end of the operation.    Patient tolerated the operation well.  There were noted to be no EEG changes throughout the entirety of the case.  Patient was extubated without any complications.    After extubation, patient was noted to be neurologically intact.  Patient was transferred to the postanesthesia care unit in stable  condition.  In the postanesthesia care unit, patient was more alert and moving all extremities and was neurologically at baseline.      Estimated blood loss: 100 cc    Specimens: Carotid plaque    Complications: None          Margarita Tobin MD, [facility] Vascular and Endovascular Surgery  [phone]  Fax: [phone]

## 2024-09-13 NOTE — ANESTHESIA PROCEDURE NOTES
Arterial Line Procedure Note    Pre-Procedure   Staff -        Anesthesiologist:  Neil Kyel DO       Performed By: anesthesiologist       Pre-Anesthestic Checklist: patient identified, IV checked, risks and benefits discussed, informed consent, monitors and equipment checked, pre-op evaluation and at physician/surgeon's request  Timeout:       Correct Patient: Yes        Correct Procedure: Yes        Correct Site: Yes        Correct Position: Yes   Line Placement:   This line was placed Post Induction  Procedure   Procedure: arterial line       Laterality: left       Insertion Site: brachial.  Sterile Prep        Standard elements of sterile barrier followed       Skin prep: Chloraprep  Insertion/Injection        Technique: Seldinger Technique        Catheter Type/Size: 20 G, 12 cm  Narrative        Tegaderm dressing used.       Complications: None apparent,        Arterial waveform: Yes        IBP within 10% of NIBP: Yes

## 2024-09-13 NOTE — ANESTHESIA PROCEDURE NOTES
Airway       Patient location during procedure: OR       Procedure Start/Stop Times: 9/13/2024 8:25 AM  Staff -        Anesthesiologist:  Neil Kyle DO       CRNA: Rashida Owen APRN CRNA       Performed By: CRNA  Consent for Airway        Urgency: elective  Indications and Patient Condition       Indications for airway management: nash-procedural       Induction type:intravenous       Mask difficulty assessment: 0 - not attempted    Final Airway Details       Final airway type: endotracheal airway       Successful airway: Nasal  Endotracheal Airway Details        ETT size (mm): 7.0       Cuffed: yes       Cuff volume (mL): 10       Successful intubation technique: video laryngoscopy       VL Blade Size: Glidescope 3       Grade View of Cords: 1       Position: Left       Measured from: nares       Secured at (cm): 26       Bite block used: None    Post intubation assessment        Placement verified by: capnometry, equal breath sounds and chest rise        Number of attempts at approach: 1       Number of other approaches attempted: 0       Secured with: tape       Ease of procedure: easy       Dentition: Intact and Unchanged    Medication(s) Administered   Medication Administration Time: 9/13/2024 8:25 AM    Additional Comments       Following gentle sedation with precedex, fentanyl, and propofol, patient was treated bilaterally with oxymetolazine in nares. Difficult passage with 28 fr. nasal airway in R nare. Easy passage in L nare.

## 2024-09-13 NOTE — ANESTHESIA CARE TRANSFER NOTE
Patient: Ernestine Morales    Procedure: Procedure(s):  ENDARTERECTOMY, CAROTID WITH NEURO MONITORING       Diagnosis: Stenosis of left carotid artery [I65.22]  Diagnosis Additional Information: No value filed.    Anesthesia Type:   General     Note:    Oropharynx: oropharynx clear of all foreign objects and spontaneously breathing  Level of Consciousness: awake  Oxygen Supplementation: face mask  Level of Supplemental Oxygen (L/min / FiO2): 6  Independent Airway: airway patency satisfactory and stable  Dentition: dentition unchanged  Vital Signs Stable: post-procedure vital signs reviewed and stable  Report to RN Given: handoff report given  Patient transferred to: PACU  Comments: A&Ox3, follows commands, equal bilateral  strength and LE movements. Vocalizes appropriately  Handoff Report: Identifed the Patient, Identified the Reponsible Provider, Reviewed the pertinent medical history, Discussed the surgical course, Reviewed Intra-OP anesthesia mangement and issues during anesthesia, Set expectations for post-procedure period and Allowed opportunity for questions and acknowledgement of understanding      Vitals:  Vitals Value Taken Time   BP 90/51 09/13/24 1236   Temp 37.1  C (98.8  F) 09/13/24 1236   Pulse 72 09/13/24 1236   Resp 24 09/13/24 1236   SpO2 94 1236       Electronically Signed By: ANNA Sheth CRNA  September 13, 2024  12:38 PM

## 2024-09-14 ENCOUNTER — APPOINTMENT (OUTPATIENT)
Dept: RADIOLOGY | Facility: HOSPITAL | Age: 64
DRG: 037 | End: 2024-09-14
Attending: INTERNAL MEDICINE
Payer: MEDICARE

## 2024-09-14 ENCOUNTER — APPOINTMENT (OUTPATIENT)
Dept: CARDIOLOGY | Facility: HOSPITAL | Age: 64
DRG: 037 | End: 2024-09-14
Attending: STUDENT IN AN ORGANIZED HEALTH CARE EDUCATION/TRAINING PROGRAM
Payer: MEDICARE

## 2024-09-14 ENCOUNTER — APPOINTMENT (OUTPATIENT)
Dept: RADIOLOGY | Facility: HOSPITAL | Age: 64
DRG: 037 | End: 2024-09-14
Attending: SURGERY
Payer: MEDICARE

## 2024-09-14 LAB
ALBUMIN SERPL BCG-MCNC: 3.7 G/DL (ref 3.5–5.2)
ANION GAP SERPL CALCULATED.3IONS-SCNC: 13 MMOL/L (ref 7–15)
BASE EXCESS BLDA CALC-SCNC: -1.2 MMOL/L (ref -3–3)
BASE EXCESS BLDA CALC-SCNC: -3.5 MMOL/L (ref -3–3)
BASOPHILS # BLD AUTO: 0 10E3/UL (ref 0–0.2)
BASOPHILS NFR BLD AUTO: 0 %
BUN SERPL-MCNC: 18.7 MG/DL (ref 8–23)
CALCIUM SERPL-MCNC: 8.8 MG/DL (ref 8.8–10.4)
CHLORIDE SERPL-SCNC: 104 MMOL/L (ref 98–107)
COHGB MFR BLD: 88.9 % (ref 96–97)
COHGB MFR BLD: 91.3 % (ref 96–97)
CREAT SERPL-MCNC: 0.86 MG/DL (ref 0.51–0.95)
EGFRCR SERPLBLD CKD-EPI 2021: 75 ML/MIN/1.73M2
EOSINOPHIL # BLD AUTO: 0 10E3/UL (ref 0–0.7)
EOSINOPHIL NFR BLD AUTO: 0 %
ERYTHROCYTE [DISTWIDTH] IN BLOOD BY AUTOMATED COUNT: 13.7 % (ref 10–15)
GLUCOSE BLDC GLUCOMTR-MCNC: 98 MG/DL (ref 70–99)
GLUCOSE SERPL-MCNC: 121 MG/DL (ref 70–99)
HCO3 BLD-SCNC: 21 MMOL/L (ref 21–28)
HCO3 BLD-SCNC: 23 MMOL/L (ref 21–28)
HCO3 SERPL-SCNC: 19 MMOL/L (ref 22–29)
HCT VFR BLD AUTO: 27 % (ref 35–47)
HGB BLD-MCNC: 9 G/DL (ref 11.7–15.7)
IMM GRANULOCYTES # BLD: 0 10E3/UL
IMM GRANULOCYTES NFR BLD: 0 %
LVEF ECHO: NORMAL
LYMPHOCYTES # BLD AUTO: 2 10E3/UL (ref 0.8–5.3)
LYMPHOCYTES NFR BLD AUTO: 16 %
MAGNESIUM SERPL-MCNC: 1.7 MG/DL (ref 1.7–2.3)
MCH RBC QN AUTO: 30.9 PG (ref 26.5–33)
MCHC RBC AUTO-ENTMCNC: 33.3 G/DL (ref 31.5–36.5)
MCV RBC AUTO: 93 FL (ref 78–100)
MONOCYTES # BLD AUTO: 0.8 10E3/UL (ref 0–1.3)
MONOCYTES NFR BLD AUTO: 7 %
NEUTROPHILS # BLD AUTO: 9.2 10E3/UL (ref 1.6–8.3)
NEUTROPHILS NFR BLD AUTO: 77 %
NRBC # BLD AUTO: 0 10E3/UL
NRBC BLD AUTO-RTO: 0 /100
O2/TOTAL GAS SETTING VFR VENT: 68 %
O2/TOTAL GAS SETTING VFR VENT: 75 %
PCO2 BLD: 36 MM HG (ref 35–45)
PCO2 BLD: 37 MM HG (ref 35–45)
PH BLD: 7.38 [PH] (ref 7.35–7.45)
PH BLD: 7.41 [PH] (ref 7.35–7.45)
PHOSPHATE SERPL-MCNC: 4.6 MG/DL (ref 2.5–4.5)
PLATELET # BLD AUTO: 247 10E3/UL (ref 150–450)
PO2 BLD: 55 MM HG (ref 80–105)
PO2 BLD: 60 MM HG (ref 80–105)
POTASSIUM SERPL-SCNC: 4.4 MMOL/L (ref 3.4–5.3)
RBC # BLD AUTO: 2.91 10E6/UL (ref 3.8–5.2)
SAO2 % BLDA: 87 % (ref 92–100)
SAO2 % BLDA: 90 % (ref 92–100)
SODIUM SERPL-SCNC: 136 MMOL/L (ref 135–145)
WBC # BLD AUTO: 12 10E3/UL (ref 4–11)

## 2024-09-14 PROCEDURE — 999N000065 XR CHEST PORT 1 VIEW

## 2024-09-14 PROCEDURE — 272N000452 HC KIT SHRLOCK 5FR POWER PICC TRIPLE LUMEN

## 2024-09-14 PROCEDURE — 250N000011 HC RX IP 250 OP 636: Performed by: SURGERY

## 2024-09-14 PROCEDURE — 36569 INSJ PICC 5 YR+ W/O IMAGING: CPT

## 2024-09-14 PROCEDURE — 258N000003 HC RX IP 258 OP 636: Performed by: SURGERY

## 2024-09-14 PROCEDURE — 99291 CRITICAL CARE FIRST HOUR: CPT | Performed by: INTERNAL MEDICINE

## 2024-09-14 PROCEDURE — 250N000009 HC RX 250: Performed by: HOSPITALIST

## 2024-09-14 PROCEDURE — 250N000009 HC RX 250: Performed by: STUDENT IN AN ORGANIZED HEALTH CARE EDUCATION/TRAINING PROGRAM

## 2024-09-14 PROCEDURE — C8929 TTE W OR WO FOL WCON,DOPPLER: HCPCS

## 2024-09-14 PROCEDURE — 85025 COMPLETE CBC W/AUTO DIFF WBC: CPT | Performed by: HOSPITALIST

## 2024-09-14 PROCEDURE — 250N000013 HC RX MED GY IP 250 OP 250 PS 637: Performed by: STUDENT IN AN ORGANIZED HEALTH CARE EDUCATION/TRAINING PROGRAM

## 2024-09-14 PROCEDURE — 80069 RENAL FUNCTION PANEL: CPT | Performed by: HOSPITALIST

## 2024-09-14 PROCEDURE — 83735 ASSAY OF MAGNESIUM: CPT | Performed by: INTERNAL MEDICINE

## 2024-09-14 PROCEDURE — 82805 BLOOD GASES W/O2 SATURATION: CPT | Performed by: INTERNAL MEDICINE

## 2024-09-14 PROCEDURE — 250N000009 HC RX 250: Performed by: INTERNAL MEDICINE

## 2024-09-14 PROCEDURE — 255N000002 HC RX 255 OP 636: Performed by: STUDENT IN AN ORGANIZED HEALTH CARE EDUCATION/TRAINING PROGRAM

## 2024-09-14 PROCEDURE — 94640 AIRWAY INHALATION TREATMENT: CPT

## 2024-09-14 PROCEDURE — 999N000157 HC STATISTIC RCP TIME EA 10 MIN

## 2024-09-14 PROCEDURE — 250N000013 HC RX MED GY IP 250 OP 250 PS 637: Performed by: SURGERY

## 2024-09-14 PROCEDURE — 99233 SBSQ HOSP IP/OBS HIGH 50: CPT | Performed by: INTERNAL MEDICINE

## 2024-09-14 PROCEDURE — 71045 X-RAY EXAM CHEST 1 VIEW: CPT

## 2024-09-14 PROCEDURE — 200N000001 HC R&B ICU

## 2024-09-14 PROCEDURE — 93306 TTE W/DOPPLER COMPLETE: CPT | Mod: 26 | Performed by: INTERNAL MEDICINE

## 2024-09-14 PROCEDURE — 250N000011 HC RX IP 250 OP 636: Performed by: INTERNAL MEDICINE

## 2024-09-14 RX ORDER — PHENYLEPHRINE HCL IN 0.9% NACL 50MG/250ML
.1-6 PLASTIC BAG, INJECTION (ML) INTRAVENOUS CONTINUOUS
Status: DISCONTINUED | OUTPATIENT
Start: 2024-09-14 | End: 2024-09-16

## 2024-09-14 RX ORDER — NICOTINE POLACRILEX 4 MG
15-30 LOZENGE BUCCAL
Status: DISCONTINUED | OUTPATIENT
Start: 2024-09-14 | End: 2024-09-20 | Stop reason: HOSPADM

## 2024-09-14 RX ORDER — FUROSEMIDE 10 MG/ML
10 INJECTION INTRAMUSCULAR; INTRAVENOUS ONCE
Status: DISCONTINUED | OUTPATIENT
Start: 2024-09-14 | End: 2024-09-14

## 2024-09-14 RX ORDER — DEXTROSE MONOHYDRATE 25 G/50ML
25-50 INJECTION, SOLUTION INTRAVENOUS
Status: DISCONTINUED | OUTPATIENT
Start: 2024-09-14 | End: 2024-09-20 | Stop reason: HOSPADM

## 2024-09-14 RX ORDER — FUROSEMIDE 10 MG/ML
20 INJECTION INTRAMUSCULAR; INTRAVENOUS EVERY 12 HOURS
Status: DISCONTINUED | OUTPATIENT
Start: 2024-09-14 | End: 2024-09-15

## 2024-09-14 RX ORDER — LIDOCAINE 40 MG/G
CREAM TOPICAL
Status: DISCONTINUED | OUTPATIENT
Start: 2024-09-14 | End: 2024-09-14 | Stop reason: ALTCHOICE

## 2024-09-14 RX ADMIN — SENNOSIDES AND DOCUSATE SODIUM 1 TABLET: 8.6; 5 TABLET ORAL at 10:33

## 2024-09-14 RX ADMIN — VASOPRESSIN 2.4 UNITS/HR: 20 INJECTION, SOLUTION INTRAMUSCULAR; SUBCUTANEOUS at 06:18

## 2024-09-14 RX ADMIN — FUROSEMIDE 20 MG: 10 INJECTION, SOLUTION INTRAMUSCULAR; INTRAVENOUS at 18:55

## 2024-09-14 RX ADMIN — IBUPROFEN 600 MG: 600 TABLET, FILM COATED ORAL at 14:41

## 2024-09-14 RX ADMIN — ASPIRIN 325 MG: 325 TABLET, COATED ORAL at 13:31

## 2024-09-14 RX ADMIN — LIDOCAINE HYDROCHLORIDE 2 ML: 10 INJECTION, SOLUTION EPIDURAL; INFILTRATION; INTRACAUDAL; PERINEURAL at 11:29

## 2024-09-14 RX ADMIN — PERFLUTREN 3 ML: 6.52 INJECTION, SUSPENSION INTRAVENOUS at 13:39

## 2024-09-14 RX ADMIN — Medication 0.5 MCG/KG/MIN: at 20:32

## 2024-09-14 RX ADMIN — ACETAMINOPHEN 975 MG: 325 TABLET ORAL at 06:47

## 2024-09-14 RX ADMIN — NITROGLYCERIN 15 MG: 20 OINTMENT TOPICAL at 13:31

## 2024-09-14 RX ADMIN — HEPARIN SODIUM 5000 UNITS: 10000 INJECTION, SOLUTION INTRAVENOUS; SUBCUTANEOUS at 13:30

## 2024-09-14 RX ADMIN — ACETAMINOPHEN 975 MG: 325 TABLET ORAL at 13:30

## 2024-09-14 RX ADMIN — HEPARIN SODIUM 5000 UNITS: 10000 INJECTION, SOLUTION INTRAVENOUS; SUBCUTANEOUS at 21:25

## 2024-09-14 RX ADMIN — GABAPENTIN 300 MG: 300 CAPSULE ORAL at 21:25

## 2024-09-14 RX ADMIN — GABAPENTIN 300 MG: 300 CAPSULE ORAL at 10:34

## 2024-09-14 RX ADMIN — ACETAMINOPHEN 975 MG: 325 TABLET ORAL at 21:25

## 2024-09-14 RX ADMIN — SENNOSIDES AND DOCUSATE SODIUM 1 TABLET: 8.6; 5 TABLET ORAL at 21:25

## 2024-09-14 RX ADMIN — IPRATROPIUM BROMIDE AND ALBUTEROL SULFATE 3 ML: .5; 3 SOLUTION RESPIRATORY (INHALATION) at 00:39

## 2024-09-14 RX ADMIN — IBUPROFEN 600 MG: 600 TABLET, FILM COATED ORAL at 21:52

## 2024-09-14 RX ADMIN — HEPARIN SODIUM 5000 UNITS: 10000 INJECTION, SOLUTION INTRAVENOUS; SUBCUTANEOUS at 06:47

## 2024-09-14 RX ADMIN — ATORVASTATIN CALCIUM 80 MG: 40 TABLET, FILM COATED ORAL at 21:25

## 2024-09-14 RX ADMIN — POLYETHYLENE GLYCOL 3350 17 G: 17 POWDER, FOR SOLUTION ORAL at 10:33

## 2024-09-14 ASSESSMENT — ACTIVITIES OF DAILY LIVING (ADL)
ADLS_ACUITY_SCORE: 32
ADLS_ACUITY_SCORE: 32
ADLS_ACUITY_SCORE: 33
ADLS_ACUITY_SCORE: 32
ADLS_ACUITY_SCORE: 32
ADLS_ACUITY_SCORE: 33
ADLS_ACUITY_SCORE: 32
ADLS_ACUITY_SCORE: 33
ADLS_ACUITY_SCORE: 32
ADLS_ACUITY_SCORE: 33
ADLS_ACUITY_SCORE: 32
ADLS_ACUITY_SCORE: 33
ADLS_ACUITY_SCORE: 32

## 2024-09-14 NOTE — PLAN OF CARE
[facility] - ICU    RN Progress Note:            Pertinent Assessments:      Please refer to flowsheet rows for full assessment     Aox4, somewhat impulsive when she wants to get up as she does not realize her blood pressures are low.  Asks often if she can go home.    Bps and O2 sats very labile today.  PIV infiltrated this am, PICC placed.  nitroglycerin paste ordered for site.  Pressors off but pt does get orthostatic when sitting to edge of bed- 70s/40s.   Manual cuff does correlate with arterial line.  Was up to 12L Oxymask at one point during the day but now back down to 6LNC.             Key Events - This Shift:       Voided 1 time only for 225ml this afternoon, last time was last night at 2245.  Encouraging fluid.                 Barriers to Discharge / Downgrade:     Labile Bps and O2 needs.         Point of Contact Update: YES-OR-NO: Yes  If No, reason:   Name:Marian  Phone Number:  Summary of Conversation: Update    Neha Calhoun RN           Problem: Adult Inpatient Plan of Care  Goal: Absence of Hospital-Acquired Illness or Injury  Intervention: Identify and Manage Fall Risk  Recent Flowsheet Documentation  Taken 9/14/2024 1200 by Neha Calhoun RN  Safety Promotion/Fall Prevention:   activity supervised   assistive device/personal items within reach   clutter free environment maintained   nonskid shoes/slippers when out of bed   room door open   room near nurse's station   safety round/check completed  Taken 9/14/2024 0800 by Neha Calhoun RN  Safety Promotion/Fall Prevention:   activity supervised   assistive device/personal items within reach   clutter free environment maintained   nonskid shoes/slippers when out of bed   room door open   room near nurse's station   safety round/check completed   Goal Outcome Evaluation:

## 2024-09-14 NOTE — PROGRESS NOTES
Vascular surgery progress note    Hypertensive overnight, increasing pressor requirements, now on 0.9 phenylephrine and 2.4 vaso. Feels well, denies any vision changes, headache, new numbness or tingling, tolerating clears without issue. Has not yet ambulated. Up to 12 L oxymask overnight, down to 3 L nasal cannula now.    /46   Pulse 79   Temp 98.2  F (36.8  C) (Oral)   Resp (!) 36   Wt 41.5 kg (91 lb 9.6 oz)   SpO2 93%   BMI 17.31 kg/m      No UOP since MN    On exam resting comfortably in bed  Nonlabored breathing on 3 L nasal cannula  Leftward tongue deviation, face is otherwise symmetric. Normal and symmetric sensation to light touch in hands and feet. Symmetric  strength and plantar/dorsflexion  Left neck incision c/d/I, mild ecchymosis, no hematoma        BMP unremarkable  Wbc 12.0, hgb 9.0, plts 247  Abg 7.38 / 36 / 60 / 21/ BD 3.5    CXR with mild pulmonary vascular congestion.    A/P:  63 yo female with h/o paroxysmal A-fib and mitral stenosis s/p MVR, MAZE, and Atriclip not on anticoagulation, prior L hemispheric stroke with chronic expressive aphasia and right sided weakness, asymptomatic high-grade left ICA stenosis now POD#1 s/p left carotid endarterectomy with distal ICA exposure.    Mild hypotension post-op, worse overnight after home dose metoprolol last night. +orthostatics with nurse this morning. Low Intravascular volume and vasoplegic from loss of baroreceptor function with carotid exposure and possibly lingering effects of general anesthesia in this petite patient.  No new neuro changes, no signs of acute stroke, L eben deviation 2/2 CNXII retraction intra-op.    LR bolus  Echo  Wean pressors, goal systolic BP > 100, Map > 60  Hold home antihypertensives and   Encourage PO  She is swallowing clears without issue, will cancel SLP consult and swallow eval unless swallowing issues arise  Aspirin, prophylactic heparin    Discussed with Dr. Crista Womack,  MD      Addendum: R arm PIV infiltrated, BP stable > 100 systolic and MAPs 60-70s, presumably without actually any intravenous infusion of vasopressors. IV stopped. Given her stable BP without hypotension now we will hold of on IV fluid bolus. Echo pending. Discussed with ICU Dr. Skelton. Topical nitro cream for R arm vasopressor extravasation, watch for skin necrosis.

## 2024-09-14 NOTE — CONSULTS
"PULMONARY/CRITICAL CARE CONSULT NOTE    Date / Time of Admission:  9/13/2024  6:00 AM  Assessment:   64yoF with COPD and pulmonary hypertension, elevated filling pressures s/p mitral valve repair who presented for elective carotid endarterectomy. This has been complicated by labile blood pressures and intermittent hypoxia.     Clinically Significant Risk Factors                  # Hypertension: Noted on problem list           # Cachexia: Estimated body mass index is 17.31 kg/m  as calculated from the following:    Height as of 8/29/24: 1.549 m (5' 1\").    Weight as of this encounter: 41.5 kg (91 lb 9.6 oz)., PRESENT ON ADMISSION                       Active Problems:    Carotid stenosis      Advance Directives:  Full Code  Critical Care Time greater than: 50 minutes, patient with hemodynamic instability requiring vasopressor.       Plan:   Pulmonary:   1) Presumed COPD but unable to locate PFTs  2) Pulmonary hypertension   3) Acute hypoxic respiratory failure  -Wean O2 as able.   -No evidence of hypercapnea  -May eventually require diuresis.     C/V:  1) Severe pulmonary hypertension (WHO II and III)  2) Hypotension potentially due to vasoplegia related to the carotid surgery  3) New finding of Mitral valve stenosis   4) Concern for volume overload  -Hold on any further fluid administration  -Consider levophed instead of phenylephrine given echo results.   -May need to consider diuresis if hypoxia worsens. She is already 1.3L positive.    Renal:  No issues    GI:  Diet advanced to clears per vascular surgery.     Heme: No issues              Subjective:    cc: hypoxia, hypotension    HPI: 64 year old female with history of presumed COPD, ongoing tobacco abuse, CVA with right-sided weakness, afib on anticoagulation who presented for elective carotid endarterectomy. Case was complicated by superior location of the lesion but otherwise hemodynamically went well. The patient was admitted to the ICU for BP management to " keep Systolic BP >110.   Overnight, her oxygen requirement increased from 3L-->12L and SBP needed escalating vasopressor. Phenylephrine was 1.0 and vasopressin added. This was being administered peripherally and IV access was lost this morning. SBP transiently 80s, but rebounded quickly and neuro exam never changed.     Past Medical History:   Diagnosis Date    Anemia     Aphasia     Atrial fibrillation (H)     Cancer (H) 11.17.2022    Squamous cell carcinoma nRight Cheek    Carotid artery stenosis     Cerebrovascular accident (H) 01/09/2017    Congestive heart failure (H)     HLD (hyperlipidemia)     HTN (hypertension)     Mitral regurgitation        Social History     Tobacco Use    Smoking status: Every Day     Current packs/day: 0.25     Average packs/day: 0.9 packs/day for 40.7 years (34.9 ttl pk-yrs)     Types: Cigarettes     Start date: 1/19/1984    Smokeless tobacco: Not on file    Tobacco comments:     I've cut down to just a few a day   Substance Use Topics    Alcohol use: Not Currently       Family History   Problem Relation Age of Onset    Hypertension Mother        Current Facility-Administered Medications   Medication Dose Route Frequency Provider Last Rate Last Admin    [START ON 9/16/2024] acetaminophen (TYLENOL) tablet 650 mg  650 mg Oral Q4H PRN Margarita Tobin MD        acetaminophen (TYLENOL) tablet 975 mg  975 mg Oral Q8H Margarita Tobin MD   975 mg at 09/14/24 0647    [Held by provider] amLODIPine (NORVASC) tablet 5 mg  5 mg Oral Daily Margarita Tobin MD        aspirin (ASA) EC tablet 325 mg  325 mg Oral Daily Margarita Tobin MD        atorvastatin (LIPITOR) tablet 80 mg  80 mg Oral QPM Margarita Tobin MD   80 mg at 09/13/24 2367    benzocaine-menthol (CHLORASEPTIC) 6-10 MG lozenge 1-2 lozenge  1-2 lozenge Buccal Q1H PRN Margarita Tobin MD        [START ON 9/16/2024] bisacodyl (DULCOLAX) suppository 10 mg  10 mg Rectal  Daily PRN Margarita Tobin MD        dexAMETHasone (DECADRON) injection 4 mg  4 mg Intravenous Once PRN Neil Kyle DO        gabapentin (NEURONTIN) capsule 300 mg  300 mg Oral BID Margarita Tobin MD   300 mg at 09/13/24 2117    heparin ANTICOAGULANT injection 5,000 Units  5,000 Units Subcutaneous Q8H Margarita Tobin MD   5,000 Units at 09/14/24 0647    ibuprofen (ADVIL/MOTRIN) tablet 600 mg  600 mg Oral TID Margarita Tobin MD   600 mg at 09/13/24 2231    labetalol (NORMODYNE/TRANDATE) injection 10 mg  10 mg Intravenous Q10 Min PRN Margarita Tobin MD        lactated ringers BOLUS 500 mL  500 mL Intravenous Once DoAramis palm MD        lidocaine (LMX4) cream   Topical Q1H PRN Margarita Tobin MD        lidocaine 1 % 0.1-1 mL  0.1-1 mL Other Q1H PRN Margarita Tobin MD        [Held by provider] losartan (COZAAR) tablet 100 mg  100 mg Oral Daily Margarita Tobin MD        [START ON 9/15/2024] magnesium hydroxide (MILK OF MAGNESIA) suspension 30 mL  30 mL Oral Daily PRN Margarita Tobin MD        [Held by provider] metoprolol tartrate (LOPRESSOR) tablet 25 mg  25 mg Oral BID Evonne Arrington MD   25 mg at 09/13/24 2116    naloxone (NARCAN) injection 0.1 mg  0.1 mg Intravenous Q2 Min PRN Neil Kyle DO        nitroPRUsside (NIPRIDE) 50 mg in D5W 125 mL IV infusion (conc: 0.4 mg/mL)  0.1-5 mcg/kg/min Intravenous Continuous PRN Margarita Tobin MD        ondansetron (ZOFRAN ODT) ODT tab 4 mg  4 mg Oral Q6H PRN Margarita Tobin MD        Or    ondansetron (ZOFRAN) injection 4 mg  4 mg Intravenous Q6H PRN Margarita Tobin MD        oxyCODONE (ROXICODONE) tablet 10 mg  10 mg Oral Once PRN Neil Kyle DO        oxyCODONE (ROXICODONE) tablet 5 mg  5 mg Oral Once PRN Neil Kyle DO        phenylephrine (DERRICK-SYNEPHRINE) 50 mg in  NaCl 0.9 % 250 mL infusion PERIPHERAL  0.5-1.25 mcg/kg/min Intravenous Continuous Neil Kyle DO 12.4 mL/hr at 09/14/24 0900 1 mcg/kg/min at 09/14/24 0900    phenylephrine (DERRICK-SYNEPHRINE) injection 100 mcg  100 mcg Intravenous 5 x daily PRN Neil Kyle DO   100 mcg at 09/13/24 1253    polyethylene glycol (MIRALAX) Packet 17 g  17 g Oral Daily Margarita Tobin MD        prochlorperazine (COMPAZINE) injection 5 mg  5 mg Intravenous Q6H PRN Margarita Tobin MD        Or    prochlorperazine (COMPAZINE) tablet 5 mg  5 mg Oral Q6H PRN Margarita Tobin MD        senna-docusate (SENOKOT-S/PERICOLACE) 8.6-50 MG per tablet 1 tablet  1 tablet Oral BID Margarita Tobin MD   1 tablet at 09/13/24 2116    sodium chloride (PF) 0.9% PF flush 3 mL  3 mL Intracatheter Q8H Margarita Tobin MD   3 mL at 09/13/24 2144    sodium chloride (PF) 0.9% PF flush 3 mL  3 mL Intracatheter q1 min prn Margarita Tobin MD        vasopressin (VASOSTRICT) 20 Units in sodium chloride 0.9 % 100 mL standard conc infusion  2.4 Units/hr Intravenous Continuous Margarita Tobin MD 12 mL/hr at 09/14/24 0750 2.4 Units/hr at 09/14/24 0750         Review of Systems: 12-point Review performed and negative aside from that noted in HPI.    Objective:    Vital signs:  /52   Pulse 81   Temp 98.2  F (36.8  C) (Oral)   Resp 28   Wt 41.5 kg (91 lb 9.6 oz)   SpO2 92%   BMI 17.31 kg/m      GENERAL APPEARANCE: appears older than stated age, frail     EYES: EOMI, - PERRL     NECK: no adenopathy, no asymmetry, masses, or scars and thyroid normal to palpation     RESP: lungs clear to auscultation - no rales, rhonchi or wheezes     CV: irregularly irregular rhythm     ABDOMEN:  soft, nontender, no HSM or masses and bowel sounds normal     MS: extremities normal- no gross deformities noted, no evidence of inflammation in joints, FROM in all extremities.      SKIN: no suspicious lesions or rashes     NEURO: Normal strength and tone, sensory exam grossly normal, mentation intact and speech normal     PSYCH: mentation appears normal. and affect normal/bright     LYMPHATICS: No axillary, cervical, inguinal, or supraclavicular nodes        Data    CXR: personally reviewed  EXAM: XR CHEST PORT 1 VIEW  LOCATION: [facility]  DATE: 9/14/2024     INDICATION: RN placed PICC   verify tip placement  COMPARISON: 9/14/2024, 0011 hours                                                                      IMPRESSION: Left upper extremity PICC tip at the SVC-RA junction. No other significant interval change.    Laboratory:  Results for orders placed or performed during the hospital encounter of 09/13/24   Basic metabolic panel   Result Value Ref Range    Sodium 135 135 - 145 mmol/L    Potassium 3.7 3.4 - 5.3 mmol/L    Chloride 102 98 - 107 mmol/L    Carbon Dioxide (CO2) 21 (L) 22 - 29 mmol/L    Anion Gap 12 7 - 15 mmol/L    Urea Nitrogen 14.4 8.0 - 23.0 mg/dL    Creatinine 0.94 0.51 - 0.95 mg/dL    GFR Estimate 67 >60 mL/min/1.73m2    Calcium 9.1 8.8 - 10.4 mg/dL    Glucose 81 70 - 99 mg/dL     Lab Results   Component Value Date    WBC 12.0 (H) 09/14/2024    HGB 9.0 (L) 09/14/2024    HCT 27.0 (L) 09/14/2024    MCV 93 09/14/2024     09/14/2024     7.41/37/55/23

## 2024-09-14 NOTE — PROGRESS NOTES
[facility]    Medicine Progress Note - Hospitalist Service    Date of Admission:  9/13/2024    Assessment & Plan      64 year old female admitted on 9/13/2024. She is being admitted for left carotid endarterectomy. Now POD #1. She has significant post op acute hypoxic resp failure      1.History of CVA, resultant dysphagia, right-sided hemiparesis  -Left carotid stenosis status post left CEA 9/13  -Postop dysphonia  -Postoperative cares per vascular surgery  -Systolic blood pressure goal over 100, using low-dose phenylephrine, continue with art line monitoring,  -Limiting narcotics per vascular surgery, offer NSAIDs, Tylenol, topicals  -Speech and language pathology consultation for dysphonia and dysphagia  -Continues on full dose aspirin     2.Postop hypoxia, acute hypoxic resp failure  -not on O2 at home  -Noted to have some increased work of breathing and oxygen needs in recovery  -Offer DuoNebs as needed  -concern for pulm edema and ?pulm htn  -I think she has some pulm edema, however vascular wants to bolus and she is on 2 pressors --I think she would benefit from ICU team or pulm seeing to assist as I think she will need diurese carefully here   -agree with checking echo  -wean O2    3.Paroxysmal atrial fibrillation  -History of mitral stenosis status post partial mitral valve replacement  -Also had maze ablation procedure  -No anticoagulation prior to admission  -Resume usual metoprolol tartrate     4.Essential hypertension  -Holding usual amlodipine, losartan given systolic blood pressure goal per vascular surgery  -Resume amlodipine, losartan when further from surgery   -hold metoprolol now on 2 pressors still     She will need to stay at least another day                Diet: Advance Diet as Tolerated: Regular Diet Adult    DVT Prophylaxis: Pneumatic Compression Devices  Nuñez Catheter: Not present  Lines: PRESENT      Arterial Line 09/13/24 Brachial-Site Assessment: STEFFEN     "  Cardiac Monitoring: None  Code Status: Full Code      Clinically Significant Risk Factors                  # Hypertension: Noted on problem list           # Cachexia: Estimated body mass index is 17.31 kg/m  as calculated from the following:    Height as of 8/29/24: 1.549 m (5' 1\").    Weight as of this encounter: 41.5 kg (91 lb 9.6 oz)., PRESENT ON ADMISSION                  Disposition Plan     Medically Ready for Discharge: Anticipated in 2-4 Days             Evonne Arrington MD  Hospitalist Service  [facility]  Securely message with [employer] (more info)  Text page via [employer] Paging/Directory   ______________________________________________________________________    Interval History   She required up to 5 liters post op this am was on 3 liters  Not on o2 at home  Denied much pain in neck, feels a little sore  No nausea      Physical Exam   Vital Signs: Temp: 98.2  F (36.8  C) Temp src: Oral BP: 112/46 Pulse: 79   Resp: (!) 36 SpO2: 93 % O2 Device: Nasal cannula Oxygen Delivery: 3 LPM  Weight: 91 lbs 9.6 oz    Constitutional: awake, alert, cooperative, and no apparent distress  Eyes: sclera clear  ENT: left neck incision c/d/i  Respiratory: tachypneic, moderate air exchange, no retractions, and diminished breath sounds right base and left base  Cardiovascular: regular rate and rhythm and normal S1 and S2  GI: normal bowel sounds, soft, non-distended, and non-tender  Skin: no bruising or bleeding  Musculoskeletal: no lower extremity pitting edema present  Neurologic: Mental Status Exam:  Level of Alertness:   awake  Language:  abnormal - some dysarthria  Motor Exam:  moves ext, was in chair  Neuropsychiatric: General: normal, calm, and normal eye contact    Medical Decision Making       55 MINUTES SPENT BY ME on the date of service doing chart review, history, exam, documentation & further activities per the note.      Data     I have personally reviewed the following data over the past " 24 hrs:    12.0 (H)  \   9.0 (L)   / 247     136 104 18.7 /  98   4.4 19 (L) 0.86 \     ALT: N/A AST: N/A AP: N/A TBILI: N/A   ALB: 3.7 TOT PROTEIN: N/A LIPASE: N/A       Imaging results reviewed over the past 24 hrs:   Recent Results (from the past 24 hour(s))   XR Chest Port 1 View    Narrative    EXAM: CHEST SINGLE VIEW PORTABLE  LOCATION: [facility]  DATE: 9/13/2024    INDICATION: Postprocedure hypoxia.  COMPARISON: 7/31/2024.    FINDINGS: Note that the evaluation is mildly limited due to rotation of the patient to the right. Mildly increased interstitial opacities in both lungs. An apparent 1.3 cm patchy nodular opacity projects over the central aspect of the upper right lung,   over the posterior aspect of the fifth rib, not visualized on 7/31/2024. No other convincing pulmonary opacities. Unchanged cardiac silhouette. Atherosclerotic calcification in the thoracic aorta. A cardiac device projects over left chest wall. A left   atrial appendage clip and artificial cardiac valve in place. A small amount of subcutaneous gas is present in the inferior left neck, where there are a few surgical clips that were not present previously.      Impression    IMPRESSION:   1. Mildly increased intersitial opacities in the lungs, likely relating to interstitial pulmonary edema.  2. An apparent small nonspecific 1.3 cm patchy nodular opacity projects over the upper right lung. This could be artifactual or represent a pulmonary parenchymal abnormality. A follow-up two-view chest radiograph is recommended in a few days to evaluate   for the persistence of this finding.  3. No other findings suspicious for active cardiopulmonary disease.  4. Subcutaneous gas in the soft tissues of the inferior left neck in the region of a few new surgical clips. This could could represent the site of recent surgery. Recommend clinical correlation.   XR Chest Port 1 View    Narrative    EXAM: XR CHEST PORT 1  VIEW  LOCATION: [facility]  DATE: 9/14/2024    INDICATION: Hypoxia.  COMPARISON: 9/13/2024.      Impression    IMPRESSION: Mild pulmonary vascular congestion. No pleural effusion or pneumothorax.    Unchanged cardiomegaly. Mitral valve prosthesis. Left atrial appendage clip. Enlarged main pulmonary artery, suggesting underlying pulmonary hypertension. Atherosclerotic calcifications of the thoracic aorta.    Left chest wall implantable loop recorder device. Clips at the inferior left neck soft tissues.    Persistent, though improving subcutaneous emphysema within the inferior left neck soft tissues.

## 2024-09-14 NOTE — PROCEDURES
"PICC Line Insertion Procedure Note  Pt. Name: Ernestine Morales  MRN:        [id number]    Procedure: Insertion of a  triple Lumen  5 fr  Bard SOLO (valved) Power PICC, Lot number [id number]    Indications: Vasopressor    Contraindications : none    Procedure Details     Patient identified with 2 identifiers and \"Time Out\" conducted.  .     Central line insertion bundle followed: hand hygiene performed prior to procedure, site cleansed with cholraprep, hat, mask, sterile gloves, sterile gown worn, patient draped with maximum barrier head to toe drape, sterile field maintained.    The vein was assessed and found to be compressible and of adequate size.     Lidocaine 1% 2 ml administered sq to the insertion site. A 5 Fr PICC was inserted into the basilic vein of the left arm with ultrasound guidance. 1 attempt(s) required to access vein.   Catheter threaded without difficulty. Good blood return noted.    Modified Seldinger Technique used for insertion.    The 8 sharps that are included in the PICC insertion kit were accounted for and disposed of in the sharps container prior to breakdown of the sterile field.    Catheter secured with Statlock, biopatch and Tegaderm dressing applied.    Findings:    Total catheter length  44 cm, with 0 cm exposed. Mid upper arm circumference is 22 cm. Catheter was flushed with 30 cc NS. Patient  tolerated procedure well.    Tip placement verified by xray, result read by Dr. everett .Catheter tip in SVC/RA Junction.    CLABSI prevention brochure left at bedside.    Primary RN notified that PICC is ready for use      Comments:        Luis Patel PICC RN  Vascular Access - [facility]      "

## 2024-09-14 NOTE — PLAN OF CARE
Goal Outcome Evaluation:  [facility] - ICU    RN Progress Note:            Pertinent Assessments:      Please refer to flowsheet rows for full assessment     Aox4, aphasia baseline.  Left tongue deviation.  Pt SOB, desats to high 70s on RA.  On 5LNC to keep Sats greater than 92%.    Pt unable to void when getting up to commode, bladder scan of 360.               Key Events - This Shift:       CXR.   Titrating emily to keep Systolic 100-160.                   Barriers to Discharge / Downgrade:     Pressor         Point of Contact Update: YES-OR-NO: Yes  If No, reason:   Name:Marian at bedside when pt arrived to floor  Phone Number:  Summary of Conversation: Plan of care, pain control, prior meds

## 2024-09-14 NOTE — PROGRESS NOTES
This am bladder scanned patient for 0 ml.  Upon standing her BP dropped to 70s/30s and pt did admit to some dizziness. Cuff pressure correlates with arterial line. Vascular notified and LR bolus ordered, but pt has one piv only.    Asked Vascular for PICC line for continued access.      At 0945, Right IV noted to be infiltrated, edematous.  Spoke with Asked Vascular to please place Stat PICC line order.    Continued the pressors while blood pressures were in the 80s/ 30s but stopped when pressures were stable.  Spoke with Dr. Gardner about possible need for internal jugular but wanted to wait for PICC.    Vascular states okay for now for SBP 90 and MAP 60 while waiting for PICC line.    Neha Calhoun RN

## 2024-09-15 LAB
ANION GAP SERPL CALCULATED.3IONS-SCNC: 10 MMOL/L (ref 7–15)
BUN SERPL-MCNC: 17.4 MG/DL (ref 8–23)
CALCIUM SERPL-MCNC: 8.3 MG/DL (ref 8.8–10.4)
CHLORIDE SERPL-SCNC: 104 MMOL/L (ref 98–107)
CREAT SERPL-MCNC: 0.79 MG/DL (ref 0.51–0.95)
EGFRCR SERPLBLD CKD-EPI 2021: 83 ML/MIN/1.73M2
ERYTHROCYTE [DISTWIDTH] IN BLOOD BY AUTOMATED COUNT: 13.9 % (ref 10–15)
GLUCOSE SERPL-MCNC: 94 MG/DL (ref 70–99)
HCO3 SERPL-SCNC: 24 MMOL/L (ref 22–29)
HCT VFR BLD AUTO: 25.7 % (ref 35–47)
HGB BLD-MCNC: 8.4 G/DL (ref 11.7–15.7)
MAGNESIUM SERPL-MCNC: 1.6 MG/DL (ref 1.7–2.3)
MCH RBC QN AUTO: 30.3 PG (ref 26.5–33)
MCHC RBC AUTO-ENTMCNC: 32.7 G/DL (ref 31.5–36.5)
MCV RBC AUTO: 93 FL (ref 78–100)
PLATELET # BLD AUTO: 199 10E3/UL (ref 150–450)
POTASSIUM SERPL-SCNC: 3.5 MMOL/L (ref 3.4–5.3)
RBC # BLD AUTO: 2.77 10E6/UL (ref 3.8–5.2)
SODIUM SERPL-SCNC: 138 MMOL/L (ref 135–145)
WBC # BLD AUTO: 9.7 10E3/UL (ref 4–11)

## 2024-09-15 PROCEDURE — 999N000226 HC STATISTIC SLP IP EVAL DEFER

## 2024-09-15 PROCEDURE — 200N000001 HC R&B ICU

## 2024-09-15 PROCEDURE — 250N000013 HC RX MED GY IP 250 OP 250 PS 637: Performed by: SURGERY

## 2024-09-15 PROCEDURE — 85027 COMPLETE CBC AUTOMATED: CPT | Performed by: INTERNAL MEDICINE

## 2024-09-15 PROCEDURE — 99291 CRITICAL CARE FIRST HOUR: CPT | Performed by: INTERNAL MEDICINE

## 2024-09-15 PROCEDURE — 83735 ASSAY OF MAGNESIUM: CPT | Performed by: INTERNAL MEDICINE

## 2024-09-15 PROCEDURE — 99233 SBSQ HOSP IP/OBS HIGH 50: CPT | Performed by: INTERNAL MEDICINE

## 2024-09-15 PROCEDURE — 250N000013 HC RX MED GY IP 250 OP 250 PS 637: Performed by: INTERNAL MEDICINE

## 2024-09-15 PROCEDURE — 250N000011 HC RX IP 250 OP 636: Performed by: INTERNAL MEDICINE

## 2024-09-15 PROCEDURE — 250N000011 HC RX IP 250 OP 636: Performed by: SURGERY

## 2024-09-15 PROCEDURE — 80048 BASIC METABOLIC PNL TOTAL CA: CPT | Performed by: INTERNAL MEDICINE

## 2024-09-15 RX ORDER — MAGNESIUM OXIDE 400 MG/1
400 TABLET ORAL EVERY 4 HOURS
Status: COMPLETED | OUTPATIENT
Start: 2024-09-15 | End: 2024-09-15

## 2024-09-15 RX ORDER — ALBUTEROL SULFATE 0.83 MG/ML
2.5 SOLUTION RESPIRATORY (INHALATION) EVERY 4 HOURS PRN
Status: DISCONTINUED | OUTPATIENT
Start: 2024-09-15 | End: 2024-09-20 | Stop reason: HOSPADM

## 2024-09-15 RX ORDER — FUROSEMIDE 10 MG/ML
20 INJECTION INTRAMUSCULAR; INTRAVENOUS EVERY 8 HOURS
Status: DISCONTINUED | OUTPATIENT
Start: 2024-09-15 | End: 2024-09-16

## 2024-09-15 RX ORDER — FUROSEMIDE 10 MG/ML
20 INJECTION INTRAMUSCULAR; INTRAVENOUS ONCE
Status: COMPLETED | OUTPATIENT
Start: 2024-09-15 | End: 2024-09-15

## 2024-09-15 RX ADMIN — POLYETHYLENE GLYCOL 3350 17 G: 17 POWDER, FOR SOLUTION ORAL at 09:04

## 2024-09-15 RX ADMIN — ASPIRIN 325 MG: 325 TABLET, COATED ORAL at 09:14

## 2024-09-15 RX ADMIN — ACETAMINOPHEN 975 MG: 325 TABLET ORAL at 20:58

## 2024-09-15 RX ADMIN — FUROSEMIDE 20 MG: 10 INJECTION, SOLUTION INTRAMUSCULAR; INTRAVENOUS at 01:04

## 2024-09-15 RX ADMIN — GABAPENTIN 300 MG: 300 CAPSULE ORAL at 09:03

## 2024-09-15 RX ADMIN — FUROSEMIDE 20 MG: 10 INJECTION, SOLUTION INTRAMUSCULAR; INTRAVENOUS at 16:38

## 2024-09-15 RX ADMIN — ACETAMINOPHEN 975 MG: 325 TABLET ORAL at 05:53

## 2024-09-15 RX ADMIN — MAGNESIUM OXIDE TAB 400 MG (241.3 MG ELEMENTAL MG) 400 MG: 400 (241.3 MG) TAB at 11:07

## 2024-09-15 RX ADMIN — SENNOSIDES AND DOCUSATE SODIUM 1 TABLET: 8.6; 5 TABLET ORAL at 09:05

## 2024-09-15 RX ADMIN — HEPARIN SODIUM 5000 UNITS: 10000 INJECTION, SOLUTION INTRAVENOUS; SUBCUTANEOUS at 15:00

## 2024-09-15 RX ADMIN — IBUPROFEN 600 MG: 600 TABLET, FILM COATED ORAL at 09:06

## 2024-09-15 RX ADMIN — FUROSEMIDE 20 MG: 10 INJECTION, SOLUTION INTRAMUSCULAR; INTRAVENOUS at 09:22

## 2024-09-15 RX ADMIN — FUROSEMIDE 20 MG: 10 INJECTION, SOLUTION INTRAMUSCULAR; INTRAVENOUS at 09:04

## 2024-09-15 RX ADMIN — ACETAMINOPHEN 975 MG: 325 TABLET ORAL at 15:00

## 2024-09-15 RX ADMIN — HEPARIN SODIUM 5000 UNITS: 10000 INJECTION, SOLUTION INTRAVENOUS; SUBCUTANEOUS at 05:53

## 2024-09-15 RX ADMIN — TRAMADOL HYDROCHLORIDE 25 MG: 50 TABLET, COATED ORAL at 19:50

## 2024-09-15 RX ADMIN — ATORVASTATIN CALCIUM 80 MG: 40 TABLET, FILM COATED ORAL at 20:58

## 2024-09-15 RX ADMIN — MAGNESIUM OXIDE TAB 400 MG (241.3 MG ELEMENTAL MG) 400 MG: 400 (241.3 MG) TAB at 15:00

## 2024-09-15 RX ADMIN — GABAPENTIN 300 MG: 300 CAPSULE ORAL at 20:57

## 2024-09-15 RX ADMIN — SENNOSIDES AND DOCUSATE SODIUM 1 TABLET: 8.6; 5 TABLET ORAL at 20:58

## 2024-09-15 RX ADMIN — HEPARIN SODIUM 5000 UNITS: 10000 INJECTION, SOLUTION INTRAVENOUS; SUBCUTANEOUS at 20:58

## 2024-09-15 ASSESSMENT — ACTIVITIES OF DAILY LIVING (ADL)
ADLS_ACUITY_SCORE: 33
ADLS_ACUITY_SCORE: 41
ADLS_ACUITY_SCORE: 41
ADLS_ACUITY_SCORE: 34
ADLS_ACUITY_SCORE: 34
ADLS_ACUITY_SCORE: 41
ADLS_ACUITY_SCORE: 41
ADLS_ACUITY_SCORE: 34
ADLS_ACUITY_SCORE: 41
ADLS_ACUITY_SCORE: 34
ADLS_ACUITY_SCORE: 41
ADLS_ACUITY_SCORE: 41
ADLS_ACUITY_SCORE: 33
ADLS_ACUITY_SCORE: 34
ADLS_ACUITY_SCORE: 41
ADLS_ACUITY_SCORE: 34
ADLS_ACUITY_SCORE: 34
ADLS_ACUITY_SCORE: 41
ADLS_ACUITY_SCORE: 33
ADLS_ACUITY_SCORE: 41
ADLS_ACUITY_SCORE: 34
ADLS_ACUITY_SCORE: 41
ADLS_ACUITY_SCORE: 34

## 2024-09-15 NOTE — PLAN OF CARE
Goal Outcome Evaluation:      Plan of Care Reviewed With: patient    Overall Patient Progress: no changeOverall Patient Progress: no change    [facility] - ICU    RN Progress Note:            Pertinent Assessments:      Please refer to flowsheet rows for full assessment     Patient continues to have labile blood pressure and O2 sats.  Neosynephrine needed to be resumed again after being off for several hours and oxygen turned up from 6L/nasal cannula to 12 L/oxymask.   Patient alert and oriented.  Right arm pain relieved with scheduled Tylenol and Ibuprofen  and patient denied surgical site discomfort.           Key Events - This Shift:       Labile BP's and oxygen saturation.                Barriers to Discharge / Downgrade:              Point of Contact Update: YES-OR-NO: Yes  Patient and her friend, Marian updated with jamal.

## 2024-09-15 NOTE — PROGRESS NOTES
Pageramakrishna Doenges with vascular as the patient has had systolic pressures less than 100 and MAPs between 60-65 for about 40 minutes.  Asked if he wanted pressors restarted or if parameters were to be changed.    He spoke with Dr. Gardner about holding lasix until pressures stabilized and he also adjusted parameters for the phenylephrine.  Patient is mentating well, has no increased SOB or dizziness.  Neha Calhoun RN

## 2024-09-15 NOTE — PROGRESS NOTES
Vascular surgery progress note    Briefly on low-dose phenylephrine overnight, otherwise off pressors since yest morning.  Varying O2 requirements, up to 12 L overnight, down to 3 L this morning. Tolerating regular diet without swallowing issues.    /63   Pulse 96   Temp 98  F (36.7  C) (Oral)   Resp (!) 34   Wt 39.7 kg (87 lb 8 oz)   SpO2 95%   BMI 16.53 kg/m      Sitting up in chair  Nonlabored on 3 L nasal cannula  Mild left tongue deviation, improving since surgery, chronic expressive aphasia, otherwise no appreciable neurologic deficits  Left neck incision is clean dry intact, mild ecchymosis, no hematoma    Cr 0.79  Mg 1.6  No leukocytosis, hgb 8.4, plts 199  Abg 7.41 / 37 / 55 / 23 / BD 1.2    Echo with new severe tricuspid regurgitation, moderate aorti regurg, severe pulmonary hypertension, and bioprosthetic mitral valve stenosis.    A/P:  63 yo female with h/o paroxysmal A-fib and mitral stenosis s/p MVR, MAZE, and Atriclip not on anticoagulation, prior L hemispheric stroke with chronic expressive aphasia and right sided weakness, asymptomatic high-grade left ICA stenosis now POD#2 s/p left carotid endarterectomy.    -L tongue deviation resulting from intra-op CN-XII retraction, thankfully no difficulties with speech or swallowing. No other new neuro changes.    -Systolic BP goal > 90 mmHg, MAP > 60 mmHg, hold home antihypertensive for now.    -Significant pulmonary edema related to worsening valvular disease and right heart failure seen on echocardiogram.  Appreciate cardiology consult, continue diureses.     - continue aspirin 325 mg (outpatient neuro recs) and prophylactic SQH    D/w Dr. Tobin.    Aramis Womack MD

## 2024-09-15 NOTE — CONSULTS
"HEART CARE NOTE        Thank you, Dr. Tobin, for asking the [facility] Heart Care team to see Ernestine Morales to evaluate MS and pulm HTN.      Assessment/Recommendations     1. RV dysfunction + valvular heart disease c/b cardiogenic shock  Assessment / Plan  Hypervolemic, but diuresing well on current regimen - no changes at this time; continue to monitor UOP and renal function closely; wean pressors as tolerated  Patient is high risk for adverse cardiac events 2/2 advanced age, frailty, RV dysfunction, pulm HTN  GDMT on hold given the above    2. Valvular heart disease  Assessment / Plan  Severe TR, moderate AI, and bioprosthetic mitral valve stenosis - will get repeat echo to better assess dysfunction    3. Pulmonary HTN  Assessment / Plan  Estimated PASP of ~ 69 mmHg; repeat echo once hemodynamically stable and near euvolemia  Diuresis and supportive care as above    4. Acute hypoxic respiratory failure  Assessment / Plan  Cardiogenic pulmonary edema - diuresis and supportive care as above    Clinically Significant Risk Factors          # Hypocalcemia: Lowest Ca = 8.3 mg/dL in last 2 days, will monitor and replace as appropriate         # Hypertension: Noted on problem list  # Acute heart failure with preserved ejection fraction: heart failure noted on problem list, last echo with EF >50%, and receiving IV diuretics          # Cachexia: Estimated body mass index is 16.53 kg/m  as calculated from the following:    Height as of 8/29/24: 1.549 m (5' 1\").    Weight as of this encounter: 39.7 kg (87 lb 8 oz)., PRESENT ON ADMISSION                 Cardiomyopathy  Non-Rheumatic Valve Disease: Aortic valve insufficiency  Mitral valve stenosis  Tricuspid valve insufficiency  Shock: Cardiogenic shock  Combined acute    Fluid overload, unspecified, Other fluid overload, and Other disorders of electrolyte and fluid balance, not elsewhere classified    Pulmonary Heart Disease (Pulmonary hypertension or Cor " pulmonale): Pulmonary Hypertension, unspecified  Cor pulmonale  .    Patient remains critically ill in the ICU requiring hemodynamic support in the setting of cardiogenic shock s/p vascular surgery. 80 minutes spent on critical care.      History of Present Illness/Subjective    Ms. Ernestine Morales is a 64 year old female with a PMHx significant for (per Epic notation) COPD and pulmonary hypertension, elevated filling pressures s/p mitral valve repair who presented for elective carotid endarterectomy. This has been complicated by labile blood pressures and intermittent hypoxia.     Today, Mrs. Morales denies is alert and oriented; denies acute cardiac events or complaints; Management plan as detailed above    ECG: Personally reviewed. normal EKG, normal sinus rhythm.    ECHO (personnaly Reviewed on 9/15/24):   The left ventricle is normal in size.  Left ventricular function is normal.The ejection fraction is 60-65%.  The right ventricle is mildly dilated. Mildly decreased right ventricular  systolic function  The left atrium is severely dilated. The right atrium is moderately dilated.  There is severe (4+) tricuspid regurgitation.  Right ventricular systolic pressure is elevated, consistent with severe  pulmonary hypertension.  The aortic valve is trileaflet with aortic valve sclerosis.  There is moderate (2+) aortic regurgitation.  There is a bioprosthetic mitral valve. Bioprosthetic mitral leaflets are not  well visualized. Mean gradient elevated at 17 mmHg suggesting prosthetic  stenosis however this is at an elevated heart rate of 91 mmHg on pressor  support. There is mild (1+) prosthetic mitral valve regurgitation.  Compared to the prior study dated 4/10/24, there are changes as noted.  Increased pulmonary hypertension and increased mean gradient across the  bioprosthetic mitral valve. Prosthesis is not well visualized and appears  thickened. Consider ANGE for further evaluation.    Telemetry: personally  reviewed September 15, 2024; notable for sinus rhythm     Lab results: personally reviewed September 15, 2024; notable for renal function wnl    Medical history and pertinent documents reviewed in Care Everywhere please where applicable see details above          Physical Examination Review of Systems   /63   Pulse 81   Temp 97.9  F (36.6  C) (Oral)   Resp (!) 34   Wt 39.7 kg (87 lb 8 oz)   SpO2 91%   BMI 16.53 kg/m    Body mass index is 16.53 kg/m .  Wt Readings from Last 3 Encounters:   09/15/24 39.7 kg (87 lb 8 oz)   08/29/24 41.7 kg (92 lb)   08/26/24 41.5 kg (91 lb 9.6 oz)     General Appearance:   no distress, normal body habitus   ENT/Mouth: membranes moist, no oral lesions or bleeding gums.      EYES:  no scleral icterus, normal conjunctivae   Neck: no carotid bruits or thyromegaly   Chest/Lungs:   lungs are clear to auscultation, no rales or wheezing, equal chest wall expansion    Cardiovascular:   Regular. Normal first and second heart sounds with +SAMUEL; no rubs, or gallops; the carotid, radial and posterior tibial pulses are intact, + JVD and trace LE edema bilaterally    Abdomen:  no organomegaly, masses, bruits, or tenderness; bowel sounds are present   Extremities: no cyanosis or clubbing   Skin: no xanthelasma, warm.    Neurologic: NAD     Psychiatric: alert and oriented x3, calm     A complete 10 systems ROS was reviewed  And is negative except what is listed in the HPI.          Medical History  Surgical History Family History Social History   Past Medical History:   Diagnosis Date    Anemia     Aphasia     Atrial fibrillation (H)     Cancer (H) 11.17.2022    Squamous cell carcinoma nRight Cheek    Carotid artery stenosis     Cerebrovascular accident (H) 01/09/2017    Congestive heart failure (H)     HLD (hyperlipidemia)     HTN (hypertension)     Mitral regurgitation     Past Surgical History:   Procedure Laterality Date    BIOPSY  11.18.2022    Right Cheek    PICC TRIPLE LUMEN PLACEMENT   9/14/2024    REPLACE VALVE MITRAL  06/09/2017    bovine mitral valve with Maze and ARIANNA ligation    no family history of premature coronary artery disease Social History     Socioeconomic History    Marital status: Single     Spouse name: Not on file    Number of children: Not on file    Years of education: Not on file    Highest education level: Not on file   Occupational History    Not on file   Tobacco Use    Smoking status: Every Day     Current packs/day: 0.25     Average packs/day: 0.9 packs/day for 40.7 years (34.9 ttl pk-yrs)     Types: Cigarettes     Start date: 1/19/1984    Smokeless tobacco: Not on file    Tobacco comments:     I've cut down to just a few a day   Substance and Sexual Activity    Alcohol use: Not Currently    Drug use: Never    Sexual activity: Not Currently     Partners: Female     Birth control/protection: None   Other Topics Concern    Parent/sibling w/ CABG, MI or angioplasty before 65F 55M? No   Social History Narrative    Not on file     Social Determinants of Health     Financial Resource Strain: Low Risk  (4/9/2024)    Financial Resource Strain     Within the past 12 months, have you or your family members you live with been unable to get utilities (heat, electricity) when it was really needed?: No   Food Insecurity: Low Risk  (4/9/2024)    Food Insecurity     Within the past 12 months, did you worry that your food would run out before you got money to buy more?: No     Within the past 12 months, did the food you bought just not last and you didn t have money to get more?: No   Transportation Needs: Low Risk  (4/9/2024)    Transportation Needs     Within the past 12 months, has lack of transportation kept you from medical appointments, getting your medicines, non-medical meetings or appointments, work, or from getting things that you need?: No   Physical Activity: Sufficiently Active (4/9/2024)    Exercise Vital Sign     Days of Exercise per Week: 7 days     Minutes of Exercise per  "Session: 30 min   Stress: No Stress Concern Present (4/9/2024)    [employer] [place] [employer] - Occupational Stress Questionnaire     Feeling of Stress : Only a little   Social Connections: Unknown (4/9/2024)    Social Connection and Isolation Panel [NHANES]     Frequency of Communication with Friends and Family: Not on file     Frequency of Social Gatherings with Friends and Family: More than three times a week     Attends [employer] Services: Not on file     Active Member of Clubs or Organizations: Not on file     Attends Club or Organization Meetings: Not on file     Marital Status: Not on file   Interpersonal Safety: Low Risk  (9/13/2024)    Interpersonal Safety     Do you feel physically and emotionally safe where you currently live?: Yes     Within the past 12 months, have you been hit, slapped, kicked or otherwise physically hurt by someone?: No     Within the past 12 months, have you been humiliated or emotionally abused in other ways by your partner or ex-partner?: No   Housing Stability: Low Risk  (4/9/2024)    Housing Stability     Do you have housing? : Yes     Are you worried about losing your housing?: No           Lab Results    Chemistry/lipid CBC Cardiac Enzymes/BNP/TSH/INR   Lab Results   Component Value Date    CHOL 166 08/26/2024    HDL 53 08/26/2024    TRIG 80 08/26/2024    BUN 17.4 09/15/2024     09/15/2024    CO2 24 09/15/2024    Lab Results   Component Value Date    WBC 9.7 09/15/2024    HGB 8.4 (L) 09/15/2024    HCT 25.7 (L) 09/15/2024    MCV 93 09/15/2024     09/15/2024    Lab Results   Component Value Date    TSH 1.94 04/16/2024     No results found for: \"CKTOTAL\", \"CKMB\", \"TROPONINI\"       Weight:    Wt Readings from Last 3 Encounters:   09/15/24 39.7 kg (87 lb 8 oz)   08/29/24 41.7 kg (92 lb)   08/26/24 41.5 kg (91 lb 9.6 oz)       Allergies  No Known Allergies      Surgical History  Past Surgical History:   Procedure Laterality Date    BIOPSY  11.18.2022    " Right Cheek    PICC TRIPLE LUMEN PLACEMENT  9/14/2024    REPLACE VALVE MITRAL  06/09/2017    bovine mitral valve with Maze and ARIANNA ligation       Social History  Tobacco:   History   Smoking Status    Every Day    Types: Cigarettes   Smokeless Tobacco    Not on file    Alcohol:   Social History    Substance and Sexual Activity      Alcohol use: Not Currently   Illicit Drugs:   History   Drug Use Unknown       Family History  Family History   Problem Relation Age of Onset    Hypertension Mother           Clotilde Ravi MD on 9/15/2024      cc: Christina Andrew,

## 2024-09-15 NOTE — PROGRESS NOTES
SLP orders received. Chart reviewed and discussed with care team.? Speech-Language Pathology Evaluations not indicated due to patient tolerating current diet of Regular and Thin with no s/s aspiration per RN. Pt does have new left lingual deviation from surgery that mildly impacts speech and is improving. Baseline aphasia. Consulted with ordering provider who is in agreement to defer evaluations at this time. Pt will be seen in cardiovascular clinic in 2 weeks, if lingual deviation is still present OP SLP consult will be considered at that time. No acute SLP needs identified.? Defer discharge recommendations to treatment team.? Will complete orders.

## 2024-09-15 NOTE — PROGRESS NOTES
PULMONARY / CRITICAL CARE PROGRESS NOTE    Date / Time of Admission:  9/13/2024  6:00 AM    Assessment:   Active Problems:    Carotid stenosis      Code Status:  Full Code    64yoF with COPD and pulmonary hypertension, elevated filling pressures s/p mitral valve repair who presented for elective carotid endarterectomy. This has been complicated by labile blood pressures and intermittent hypoxia.     Plan:   Pulmonary:   1) Presumed COPD but unable to locate PFTs  2) Pulmonary hypertension--worse now compared to 4/2024  3) Acute hypoxic respiratory failure  -Wean O2 as able.   -No evidence of hypercapnea  -Diuresing  -Nebs PRN. No indication for steroids currently as not wheezing and ventilating well.   -No CTA given improvement in oxygenation.      C/V:  1) Severe pulmonary hypertension (WHO II and III)  2) Hypotension potentially due to vasoplegia related to the carotid surgery--improved  3) New finding of Mitral valve stenosis of bioprosthetic valve  4) Concern for volume overload  -Lasix dose increased, Bps have tolerated diuresis overnight.   -Hospitalist consulted ED     Renal:  No issues  -Stopping ibuprofen.      GI:  Regular diet.      Heme: No issues           ICU DAILY CHECKLIST                           Can patient transfer out of MICU? no    FAST HUG:    Feeding:  Feeding: Yes.  Patient is receiving ORAL    Nuñez:Yes  Analgesia/Sedation:No   Thromboembolic prophylaxis: Yes; Mode:  Heparin  HOB>30:  Yes  Stress Ulcer Protocol Active: No; Mode: Not Indicated  Glycemic Control: Any glucose > 180 no; Mode of Insulin Therapy: Not indicated    Clinically Significant Risk Factors          # Hypocalcemia: Lowest Ca = 8.3 mg/dL in last 2 days, will monitor and replace as appropriate   # Hypomagnesemia: Lowest Mg = 1.6 mg/dL in last 2 days, will replace as needed       # Hypertension: Noted on problem list  # Acute heart failure with preserved ejection fraction: heart failure noted on problem list, last echo with  "EF >50%, and receiving IV diuretics          # Cachexia: Estimated body mass index is 16.53 kg/m  as calculated from the following:    Height as of 8/29/24: 1.549 m (5' 1\").    Weight as of this encounter: 39.7 kg (87 lb 8 oz)., PRESENT ON ADMISSION                    PHYSICAL THERAPY AND MOBILITY:  Can patient have PT and mobility trial: yes  Activity: up as tolerated with assistance      Called Marian gardner POÁLVARO to update.       Subjective:   HPI:  Ernestine Morales is a 64 year old female with history of presumed COPD, ongoing tobacco abuse, CVA with right-sided weakness, afib on anticoagulation who presented for elective carotid endarterectomy. Case was complicated by superior location of the lesion but otherwise hemodynamically went well. The patient was admitted to the ICU for BP management to keep Systolic BP >110.   Overnight, her oxygen requirement increased from 3L-->12L and SBP needed escalating vasopressor. Phenylephrine was 1.0 and vasopressin added. This was being administered peripherally and IV access was lost this morning. SBP transiently 80s, but rebounded quickly and neuro exam never changed.     She had improvement in blood pressures 9/14 but required vasopressor overnight. She has responded to lasix injections and is net negative 1L. Her oxygenation also improved from 13L-->3L. She is up in a chair today and not orthostatic.     Active Problems:    Carotid stenosis      Allergies: Patient has no known allergies.     MEDS:  Scheduled Meds:  Current Facility-Administered Medications   Medication Dose Route Frequency Provider Last Rate Last Admin    acetaminophen (TYLENOL) tablet 975 mg  975 mg Oral Q8H Margarita Tobin MD   975 mg at 09/15/24 0535    [Held by provider] amLODIPine (NORVASC) tablet 5 mg  5 mg Oral Daily Margarita Tobin MD        aspirin (ASA) EC tablet 325 mg  325 mg Oral Daily Margarita Tobin MD   325 mg at 09/14/24 1331    atorvastatin (LIPITOR) " tablet 80 mg  80 mg Oral QPM Margarita Tobin MD   80 mg at 09/14/24 2125    furosemide (LASIX) injection 20 mg  20 mg Intravenous Q8H Rosanne Gardner MD        gabapentin (NEURONTIN) capsule 300 mg  300 mg Oral BID Margarita Tobin MD   300 mg at 09/14/24 2125    heparin ANTICOAGULANT injection 5,000 Units  5,000 Units Subcutaneous Q8H Margarita Tobin MD   5,000 Units at 09/15/24 0553    ibuprofen (ADVIL/MOTRIN) tablet 600 mg  600 mg Oral TID Margarita Tobin MD   600 mg at 09/14/24 2152    [Held by provider] losartan (COZAAR) tablet 100 mg  100 mg Oral Daily Margarita Tobin MD        [Held by provider] metoprolol tartrate (LOPRESSOR) tablet 25 mg  25 mg Oral BID Evonne Arrington MD   25 mg at 09/13/24 2116    polyethylene glycol (MIRALAX) Packet 17 g  17 g Oral Daily Margarita Tobin MD   17 g at 09/14/24 1033    senna-docusate (SENOKOT-S/PERICOLACE) 8.6-50 MG per tablet 1 tablet  1 tablet Oral BID Margarita Tobin MD   1 tablet at 09/14/24 2125    sodium chloride (PF) 0.9% PF flush 10-40 mL  10-40 mL Intracatheter Q7 Days Margarita Tobin MD   10 mL at 09/14/24 1351    sodium chloride (PF) 0.9% PF flush 3 mL  3 mL Intracatheter Q8H Margarita Tobin MD   3 mL at 09/13/24 2144     Continuous Infusions:  Current Facility-Administered Medications   Medication Dose Route Frequency Provider Last Rate Last Admin    nitroPRUsside (NIPRIDE) 50 mg in D5W 125 mL IV infusion (conc: 0.4 mg/mL)  0.1-5 mcg/kg/min Intravenous Continuous PRN Margarita Tobin MD        phenylephrine (DERRICK-SYNEPHRINE) 50 mg in NaCl 0.9 % 250 mL infusion  0.1-6 mcg/kg/min Intravenous Continuous Rosanne Gardner MD   Stopped at 09/15/24 0600    vasopressin (VASOSTRICT) 20 Units in sodium chloride 0.9 % 100 mL standard conc infusion  2.4 Units/hr Intravenous Continuous Margarita Tobin MD   Stopped at 09/14/24 1024     PRN  Meds:.  Current Facility-Administered Medications   Medication Dose Route Frequency Provider Last Rate Last Admin    [START ON 9/16/2024] acetaminophen (TYLENOL) tablet 650 mg  650 mg Oral Q4H PRN Margarita Tobin MD        benzocaine-menthol (CHLORASEPTIC) 6-10 MG lozenge 1-2 lozenge  1-2 lozenge Buccal Q1H PRN Margarita Tobin MD        [START ON 9/16/2024] bisacodyl (DULCOLAX) suppository 10 mg  10 mg Rectal Daily PRN Margarita Tobin MD        dexAMETHasone (DECADRON) injection 4 mg  4 mg Intravenous Once PRN Neil Kyle DO        glucose gel 15-30 g  15-30 g Oral Q15 Min PRN Rosanne Gardner MD        Or    dextrose 50 % injection 25-50 mL  25-50 mL Intravenous Q15 Min PRN Rosanne Gardner MD        Or    glucagon injection 1 mg  1 mg Subcutaneous Q15 Min PRN Rosanne Gardner MD        labetalol (NORMODYNE/TRANDATE) injection 10 mg  10 mg Intravenous Q10 Min PRN Margarita Tobin MD        lidocaine (LMX4) cream   Topical Q1H PRN Margarita Tobin MD        lidocaine 1 % 0.1-1 mL  0.1-1 mL Other Q1H PRN Margarita Tobin MD        lidocaine 1 % 0.1-5 mL  0.1-5 mL Other Q1H PRN Aramis Womack MD   2 mL at 09/14/24 1129    magnesium hydroxide (MILK OF MAGNESIA) suspension 30 mL  30 mL Oral Daily PRN Margarita Tobin MD        naloxone (NARCAN) injection 0.1 mg  0.1 mg Intravenous Q2 Min PRN Neil Kyle DO        nitroPRUsside (NIPRIDE) 50 mg in D5W 125 mL IV infusion (conc: 0.4 mg/mL)  0.1-5 mcg/kg/min Intravenous Continuous PRN Margarita Tobin MD        ondansetron (ZOFRAN ODT) ODT tab 4 mg  4 mg Oral Q6H PRN Margarita Tobin MD        Or    ondansetron (ZOFRAN) injection 4 mg  4 mg Intravenous Q6H PRN Margarita Tobin MD        oxyCODONE (ROXICODONE) tablet 10 mg  10 mg Oral Once PRN Neil Kyle DO        oxyCODONE (ROXICODONE) tablet 5 mg  5 mg Oral Once PRN Saroj,  Neil Goel DO        phenylephrine (DERRICK-SYNEPHRINE) injection 100 mcg  100 mcg Intravenous 5 x daily PRN Neil Kyle DO   100 mcg at 09/13/24 1253    prochlorperazine (COMPAZINE) injection 5 mg  5 mg Intravenous Q6H PRN Margarita Tobin MD        Or    prochlorperazine (COMPAZINE) tablet 5 mg  5 mg Oral Q6H PRN Margarita Tobin MD        sodium chloride (PF) 0.9% PF flush 10-20 mL  10-20 mL Intracatheter q1 min prn Margarita Tobin MD   10 mL at 09/14/24 1659    sodium chloride (PF) 0.9% PF flush 10-40 mL  10-40 mL Intracatheter Once PRN Aramis Womack MD        sodium chloride (PF) 0.9% PF flush 10-40 mL  10-40 mL Intracatheter Q1H PRN Margarita Tobin MD        sodium chloride (PF) 0.9% PF flush 3 mL  3 mL Intracatheter q1 min prn Margarita Tobin MD             Objective:   VITALS:  /63   Pulse 81   Temp 97.9  F (36.6  C) (Oral)   Resp (!) 34   Wt 39.7 kg (87 lb 8 oz)   SpO2 91%   BMI 16.53 kg/m    EXAM:  General appearance: alert, appears older than stated age, and cooperative  Neck: no adenopathy, supple, symmetrical, trachea midline, and Scar healing well, clean and dry  Lungs:  rales at bases  Heart: irregularly irregular, S1 and S2  Abdomen: soft, non-tender; bowel sounds normal; no masses,  no organomegaly  Extremities: No edema  Skin: Skin color, texture, turgor normal. No rashes or lesions  Neurologic: Grossly normal, word finding difficulty noted.     I&O:     Intake/Output Summary (Last 24 hours) at 9/15/2024 0806  Last data filed at 9/15/2024 0600  Gross per 24 hour   Intake 702.62 ml   Output 1825 ml   Net -1122.38 ml           Data Review:  Chest Xray  Personally reviewed image/s and Personally reviewed impression/s  EXAM: XR CHEST PORT 1 VIEW  LOCATION: [facility]  DATE: 9/14/2024     INDICATION: RN placed PICC   verify tip placement  COMPARISON: 9/14/2024, 0011 hours                                                                       IMPRESSION: Left upper extremity PICC tip at the SVC-RA junction. No other significant interval change.    LABS      Latest Ref Rng & Units 8/26/2024    12:08 PM 9/4/2024     1:29 PM 9/13/2024     6:22 AM 9/14/2024     4:00 AM 9/15/2024     5:28 AM   Glucose Values   Glucose 70 - 99 mg/dL 91  86  81  121  94        Results for orders placed or performed during the hospital encounter of 09/13/24   Basic metabolic panel   Result Value Ref Range    Sodium 138 135 - 145 mmol/L    Potassium 3.5 3.4 - 5.3 mmol/L    Chloride 104 98 - 107 mmol/L    Carbon Dioxide (CO2) 24 22 - 29 mmol/L    Anion Gap 10 7 - 15 mmol/L    Urea Nitrogen 17.4 8.0 - 23.0 mg/dL    Creatinine 0.79 0.51 - 0.95 mg/dL    GFR Estimate 83 >60 mL/min/1.73m2    Calcium 8.3 (L) 8.8 - 10.4 mg/dL    Glucose 94 70 - 99 mg/dL     Lab Results   Component Value Date    WBC 9.7 09/15/2024    HGB 8.4 (L) 09/15/2024    HCT 25.7 (L) 09/15/2024    MCV 93 09/15/2024     09/15/2024       ABG:  Recent Labs   Lab 09/14/24  1252 09/14/24  0402 09/13/24  1006 09/13/24  0900   PH 7.41 7.38 7.28* 7.33*   PCO2 37 36 47* 42   PO2 55* 60* 112* 304*   HCO3 23 21 22 22   O2PER 68 75  --  100             By:  Rosanne Gardner MD  Pulmonary/Critical Care

## 2024-09-15 NOTE — PLAN OF CARE
[facility] - ICU    RN Progress Note:            Pertinent Assessments:      Please refer to flowsheet rows for full assessment     Stood patient to chair with no changes in blood pressure.  Switched to NC at 3.5L this am and has been on since.  Did drop pressures while sitting in the chair, spoke with vascular to change parameters (see previous note).  Pt ate breakfast with no difficulty but does report numbness to left side of face and some increased difficulty speaking since the surgery d/t the nerve damage.             Key Events - This Shift:       Nuñez with good output.  Cardiology did see patient.  POA asked for social work to call her so did put in a consult.                Barriers to Discharge / Downgrade:     Needs to be off pressors for 24 hours.         Point of Contact Update: YES-OR-NO: Yes  Name:Marian  Summary of Conversation: Updated by RN and cardiology         Goal Outcome Evaluation:      Plan of Care Reviewed With: patient, friend    Overall Patient Progress: improvingOverall Patient Progress: improving    Neha Calhoun RN

## 2024-09-15 NOTE — PROGRESS NOTES
[facility] [place]    Due to echo finding concerning her mitral valve gradient, I did request cardiology to see her.  I appreciate ICU team cares and will defer management today to ICU team    [facility] [place]

## 2024-09-15 NOTE — PLAN OF CARE
[facility] - ICU    RN Progress Note:            Pertinent Assessments:      Please refer to flowsheet rows for full assessment     - Alert and orientedx4. Denies of pain.         Key Events - This Shift:     - Labile BP and 0xygen when in deep sleep. Keep SBP >100 and 02 Sats >89%. Jeancarlos drip off. Oxymask at 12L. 02 Sats >89%.           Barriers to Discharge / Downgrade:     - Labile BP/high 02 Needs.    Problem: Pain Acute  Goal: Optimal Pain Control and Function  Outcome: Progressing  Intervention: Prevent or Manage Pain  Intervention: Optimize Psychosocial Wellbeing    Problem: Gas Exchange Impaired  Goal: Optimal Gas Exchange  9/15/2024 0650 by Emerson Chauhan, RN  Outcome: Not Progressing  9/15/2024 0650 by Emerson Chauhan, RN  Outcome: Not Progressing  Intervention: Optimize Oxygenation and Ventilation

## 2024-09-16 ENCOUNTER — APPOINTMENT (OUTPATIENT)
Dept: OCCUPATIONAL THERAPY | Facility: HOSPITAL | Age: 64
DRG: 037 | End: 2024-09-16
Attending: INTERNAL MEDICINE
Payer: MEDICARE

## 2024-09-16 ENCOUNTER — TRANSFERRED RECORDS (OUTPATIENT)
Dept: HEALTH INFORMATION MANAGEMENT | Facility: CLINIC | Age: 64
End: 2024-09-16
Payer: MEDICARE

## 2024-09-16 ENCOUNTER — APPOINTMENT (OUTPATIENT)
Dept: PHYSICAL THERAPY | Facility: HOSPITAL | Age: 64
DRG: 037 | End: 2024-09-16
Attending: INTERNAL MEDICINE
Payer: MEDICARE

## 2024-09-16 LAB
ANION GAP SERPL CALCULATED.3IONS-SCNC: 12 MMOL/L (ref 7–15)
BUN SERPL-MCNC: 16.7 MG/DL (ref 8–23)
CALCIUM SERPL-MCNC: 8.4 MG/DL (ref 8.8–10.4)
CHLORIDE SERPL-SCNC: 100 MMOL/L (ref 98–107)
CREAT SERPL-MCNC: 0.79 MG/DL (ref 0.51–0.95)
EGFRCR SERPLBLD CKD-EPI 2021: 83 ML/MIN/1.73M2
ERYTHROCYTE [DISTWIDTH] IN BLOOD BY AUTOMATED COUNT: 13.9 % (ref 10–15)
GLUCOSE SERPL-MCNC: 94 MG/DL (ref 70–99)
HCO3 SERPL-SCNC: 26 MMOL/L (ref 22–29)
HCT VFR BLD AUTO: 26.6 % (ref 35–47)
HGB BLD-MCNC: 8.9 G/DL (ref 11.7–15.7)
MAGNESIUM SERPL-MCNC: 1.7 MG/DL (ref 1.7–2.3)
MCH RBC QN AUTO: 30.2 PG (ref 26.5–33)
MCHC RBC AUTO-ENTMCNC: 33.5 G/DL (ref 31.5–36.5)
MCV RBC AUTO: 90 FL (ref 78–100)
PATH REPORT.COMMENTS IMP SPEC: NORMAL
PATH REPORT.COMMENTS IMP SPEC: NORMAL
PATH REPORT.FINAL DX SPEC: NORMAL
PATH REPORT.GROSS SPEC: NORMAL
PATH REPORT.MICROSCOPIC SPEC OTHER STN: NORMAL
PATH REPORT.RELEVANT HX SPEC: NORMAL
PHOTO IMAGE: NORMAL
PLATELET # BLD AUTO: 203 10E3/UL (ref 150–450)
POTASSIUM SERPL-SCNC: 3.3 MMOL/L (ref 3.4–5.3)
POTASSIUM SERPL-SCNC: 4 MMOL/L (ref 3.4–5.3)
RBC # BLD AUTO: 2.95 10E6/UL (ref 3.8–5.2)
SODIUM SERPL-SCNC: 138 MMOL/L (ref 135–145)
WBC # BLD AUTO: 7.3 10E3/UL (ref 4–11)

## 2024-09-16 PROCEDURE — 97116 GAIT TRAINING THERAPY: CPT | Mod: GP | Performed by: PHYSICAL THERAPIST

## 2024-09-16 PROCEDURE — 999N000287 HC ICU ADULT ROUNDING, EACH 10 MINS

## 2024-09-16 PROCEDURE — 120N000004 HC R&B MS OVERFLOW

## 2024-09-16 PROCEDURE — 99232 SBSQ HOSP IP/OBS MODERATE 35: CPT | Performed by: INTERNAL MEDICINE

## 2024-09-16 PROCEDURE — 99233 SBSQ HOSP IP/OBS HIGH 50: CPT | Performed by: INTERNAL MEDICINE

## 2024-09-16 PROCEDURE — 97162 PT EVAL MOD COMPLEX 30 MIN: CPT | Mod: GP | Performed by: PHYSICAL THERAPIST

## 2024-09-16 PROCEDURE — 97535 SELF CARE MNGMENT TRAINING: CPT | Mod: GO

## 2024-09-16 PROCEDURE — 84132 ASSAY OF SERUM POTASSIUM: CPT | Performed by: INTERNAL MEDICINE

## 2024-09-16 PROCEDURE — 250N000011 HC RX IP 250 OP 636: Performed by: INTERNAL MEDICINE

## 2024-09-16 PROCEDURE — 250N000013 HC RX MED GY IP 250 OP 250 PS 637: Performed by: INTERNAL MEDICINE

## 2024-09-16 PROCEDURE — 250N000013 HC RX MED GY IP 250 OP 250 PS 637: Performed by: SURGERY

## 2024-09-16 PROCEDURE — 80048 BASIC METABOLIC PNL TOTAL CA: CPT | Performed by: INTERNAL MEDICINE

## 2024-09-16 PROCEDURE — 250N000011 HC RX IP 250 OP 636: Performed by: SURGERY

## 2024-09-16 PROCEDURE — 85014 HEMATOCRIT: CPT | Performed by: INTERNAL MEDICINE

## 2024-09-16 PROCEDURE — 88311 DECALCIFY TISSUE: CPT | Mod: 26 | Performed by: PATHOLOGY

## 2024-09-16 PROCEDURE — 83735 ASSAY OF MAGNESIUM: CPT | Performed by: INTERNAL MEDICINE

## 2024-09-16 PROCEDURE — 97166 OT EVAL MOD COMPLEX 45 MIN: CPT | Mod: GO

## 2024-09-16 PROCEDURE — 88304 TISSUE EXAM BY PATHOLOGIST: CPT | Mod: 26 | Performed by: PATHOLOGY

## 2024-09-16 RX ORDER — TORSEMIDE 20 MG/1
20 TABLET ORAL DAILY
Status: DISCONTINUED | OUTPATIENT
Start: 2024-09-16 | End: 2024-09-17

## 2024-09-16 RX ORDER — POTASSIUM CHLORIDE 1.5 G/1.58G
20 POWDER, FOR SOLUTION ORAL ONCE
Status: COMPLETED | OUTPATIENT
Start: 2024-09-16 | End: 2024-09-16

## 2024-09-16 RX ORDER — MAGNESIUM OXIDE 400 MG/1
400 TABLET ORAL EVERY 4 HOURS
Status: COMPLETED | OUTPATIENT
Start: 2024-09-16 | End: 2024-09-16

## 2024-09-16 RX ADMIN — ATORVASTATIN CALCIUM 80 MG: 40 TABLET, FILM COATED ORAL at 20:29

## 2024-09-16 RX ADMIN — ACETAMINOPHEN 975 MG: 325 TABLET ORAL at 05:13

## 2024-09-16 RX ADMIN — SENNOSIDES AND DOCUSATE SODIUM 1 TABLET: 8.6; 5 TABLET ORAL at 08:11

## 2024-09-16 RX ADMIN — GABAPENTIN 300 MG: 300 CAPSULE ORAL at 20:29

## 2024-09-16 RX ADMIN — TORSEMIDE 20 MG: 20 TABLET ORAL at 08:13

## 2024-09-16 RX ADMIN — HEPARIN SODIUM 5000 UNITS: 10000 INJECTION, SOLUTION INTRAVENOUS; SUBCUTANEOUS at 05:13

## 2024-09-16 RX ADMIN — POLYETHYLENE GLYCOL 3350 17 G: 17 POWDER, FOR SOLUTION ORAL at 08:11

## 2024-09-16 RX ADMIN — ASPIRIN 325 MG: 325 TABLET, COATED ORAL at 08:11

## 2024-09-16 RX ADMIN — ACETAMINOPHEN 650 MG: 325 TABLET ORAL at 13:43

## 2024-09-16 RX ADMIN — POTASSIUM CHLORIDE 20 MEQ: 1.5 POWDER, FOR SOLUTION ORAL at 13:41

## 2024-09-16 RX ADMIN — HEPARIN SODIUM 5000 UNITS: 10000 INJECTION, SOLUTION INTRAVENOUS; SUBCUTANEOUS at 20:30

## 2024-09-16 RX ADMIN — HEPARIN SODIUM 5000 UNITS: 10000 INJECTION, SOLUTION INTRAVENOUS; SUBCUTANEOUS at 13:41

## 2024-09-16 RX ADMIN — ACETAMINOPHEN 650 MG: 325 TABLET ORAL at 21:58

## 2024-09-16 RX ADMIN — MAGNESIUM OXIDE TAB 400 MG (241.3 MG ELEMENTAL MG) 400 MG: 400 (241.3 MG) TAB at 08:11

## 2024-09-16 RX ADMIN — FUROSEMIDE 20 MG: 10 INJECTION, SOLUTION INTRAMUSCULAR; INTRAVENOUS at 00:12

## 2024-09-16 RX ADMIN — GABAPENTIN 300 MG: 300 CAPSULE ORAL at 08:11

## 2024-09-16 RX ADMIN — SENNOSIDES AND DOCUSATE SODIUM 1 TABLET: 8.6; 5 TABLET ORAL at 20:29

## 2024-09-16 RX ADMIN — MAGNESIUM OXIDE TAB 400 MG (241.3 MG ELEMENTAL MG) 400 MG: 400 (241.3 MG) TAB at 05:13

## 2024-09-16 ASSESSMENT — ACTIVITIES OF DAILY LIVING (ADL)
ADLS_ACUITY_SCORE: 41
DEPENDENT_IADLS:: SHOPPING
ADLS_ACUITY_SCORE: 41

## 2024-09-16 NOTE — PROGRESS NOTES
[facility]    Medicine Progress Note - Hospitalist Service    Date of Admission:  9/13/2024    Assessment & Plan   64 year old female admitted on 9/13/2024. She is being admitted for left carotid endarterectomy. Now POD #1. She has significant post op acute hypoxic resp failure      1.History of CVA, resultant dysphagia, right-sided hemiparesis  -s/p Left carotid stenosis status post left CEA 9/13/24  -Postop dysphonia  -Postoperative cares per vascular surgery  -Systolic blood pressure goal over 90, had used low-dose phenylephrine  -Limiting narcotics per vascular surgery, offer NSAIDs, Tylenol, topicals  -Speech and language pathology consultation for dysphonia and dysphagia  -Continues on full dose aspirin     2.Postop hypoxia, acute hypoxic resp failure  -not on O2 at home  -Noted to have some increased work of breathing and oxygen needs in recovery  -Offer DuoNebs as needed  -concern for pulm edema and severe pulm htn that likely was not appreciated pre-op  -echo checked and worse mitral stenosis over biovalve and severe pulm htn  -diuresed  -wean O2 was on higher amount, now weaned to 4 liters--I do think she will need it at home and will need home O2 eval day of discharge     3.Paroxysmal atrial fibrillation  -History of mitral stenosis status post partial mitral valve replacement  -Also had maze ablation procedure  -No anticoagulation prior to admission  -Resume usual metoprolol tartrate     4.Essential hypertension  -Holding bp meds til more stable    5.Mitral bio-valvea, now concern more stenosis  -new  echo  The left ventricle is normal in size.  Left ventricular function is normal.The ejection fraction is 60-65%.  The right ventricle is mildly dilated. Mildly decreased right ventricular  systolic function  The left atrium is severely dilated. The right atrium is moderately dilated.  There is severe (4+) tricuspid regurgitation.  Right ventricular systolic pressure is elevated,  "consistent with severe  pulmonary hypertension.  The aortic valve is trileaflet with aortic valve sclerosis.  There is moderate (2+) aortic regurgitation.  There is a bioprosthetic mitral valve. Bioprosthetic mitral leaflets are not  well visualized. Mean gradient elevated at 17 mmHg suggesting prosthetic  stenosis however this is at an elevated heart rate of 91 mmHg on pressor  support. There is mild (1+) prosthetic mitral valve regurgitation.  Compared to the prior study dated 4/10/24, there are changes as noted.  Increased pulmonary hypertension and increased mean gradient across the  bioprosthetic mitral valve. Prosthesis is not well visualized and appears  thickened. Consider ANGE for further evaluation.    -cards seeing and want another repeat echo tomorrow  after complete diureses and more ideal volume status    6.Hypokalemia  -replace  -and watch mag     HMS will follow and she will likely be here 1- 2 more days          Diet: Advance Diet as Tolerated: Regular Diet Adult    DVT Prophylaxis: Heparin SQ  Nuñez Catheter: Not present  Lines: PRESENT      PICC 09/14/24 Triple Lumen Left Brachial vein medial vasopressors-Site Assessment: WDL  [REMOVED] Arterial Line 09/13/24 Brachial-Site Assessment: WDL      Cardiac Monitoring: None  Code Status: Full Code      Clinically Significant Risk Factors        # Hypokalemia: Lowest K = 3.3 mmol/L in last 2 days, will replace as needed     # Hypomagnesemia: Lowest Mg = 1.6 mg/dL in last 2 days, will replace as needed       # Hypertension: Noted on problem list  # Acute heart failure with preserved ejection fraction: heart failure noted on problem list, last echo with EF >50%, and receiving IV diuretics          # Cachexia: Estimated body mass index is 15.7 kg/m  as calculated from the following:    Height as of 8/29/24: 1.549 m (5' 1\").    Weight as of this encounter: 37.7 kg (83 lb 1.6 oz)., PRESENT ON ADMISSION                  Disposition Plan     Medically Ready for " Discharge: Anticipated in 2-4 Days  -1-2 days likely              Evonne Arrington MD  Hospitalist Service  [facility]  Securely message with [employer] (more info)  Text page via [employer] Paging/Directory   ______________________________________________________________________    Interval History   She feels better   Some numbness by incision on neck  Thinks breathing is better  She admits to not knowing about pulm htn pre-op  Lives alone      Physical Exam   Vital Signs: Temp: 98.2  F (36.8  C) Temp src: Oral   Pulse: 111   Resp: 23 SpO2: 96 % O2 Device: Nasal cannula Oxygen Delivery: 2 LPM  Weight: 83 lbs 1.6 oz    Constitutional: awake, alert, cooperative, and no apparent distress  Eyes: sclera clear  Respiratory: no increased work of breathing, moderate air exchange, no retractions, and diminished breath sounds throughout lungs  Cardiovascular: tachycardic with regular rhythm and normal S1 and S2  GI: normal bowel sounds, soft, non-distended, and non-tender  Skin: left neck c/d/i  Musculoskeletal: no lower extremity pitting edema present  Neurologic: Mental Status Exam:  Level of Alertness:   awake  Neuropsychiatric: General: normal, calm, and normal eye contact    Medical Decision Making       35 MINUTES SPENT BY ME on the date of service doing chart review, history, exam, documentation & further activities per the note.      Data     I have personally reviewed the following data over the past 24 hrs:    7.3  \   8.9 (L)   / 203     138 100 16.7 /  94   3.3 (L) 26 0.79 \       Imaging results reviewed over the past 24 hrs:   No results found for this or any previous visit (from the past 24 hour(s)).

## 2024-09-16 NOTE — PROGRESS NOTES
CLINICAL NUTRITION SERVICES - ASSESSMENT NOTE     Nutrition Prescription    RECOMMENDATIONS FOR MDs/PROVIDERS TO ORDER:  Consider MVI daily with decreased po intake    Malnutrition Status:    Severe in acute on chronic illness    Recommendations already ordered by Registered Dietitian (RD):  Magic cup supplement 2x/day    Future/Additional Recommendations:       REASON FOR ASSESSMENT  Ernestine Morales is a/an 64 year old female assessed by the dietitian for Interdisciplinary Rounds    Patient is s/p carotid endarterectomy 9/13/24.  History of COPD, pulmonary hypertension, mitral valve repair, cerebral embolic infarction, aphasia.    NUTRITION HISTORY  Patient reports decreased po intake and weight loss.  Her usual weight is closer to 101 lbs.  Patient reports left side of mouth  has numbness due to surgery.  Patient reports that her friend is bringing in lunch today.  Patient drinks a protein drink at home that is made with fruit, kale and protein.      CURRENT NUTRITION ORDERS  Diet: Regular  Eating % at meals.  Patient's friend brings in some food for patient.    LABS  Potassium 3.3 (L)-to be replaced.  Others noted.   Labs reviewed    MEDICATIONS  Torsemide  Medications reviewed    ANTHROPOMETRICS  Height: 0 cm (Data Unavailable)  Most Recent Weight: 37.7 kg (83 lb 1.6 oz)    BMI: Underweight BMI <18.5  Weight History:   09/13/24 : 41.5 kg (91 lb 9.6 oz)  08/29/24 : 41.7 kg (92 lb)   08/26/24 : 41.5 kg (91 lb 9.6 oz)   07/31/24 : 40.8 kg (90 lb)   07/17/24 : 43.6 kg (96 lb 3.2 oz)   05/28/24 : 43.1 kg (95 lb)   04/16/24 : 43.6 kg (96 lb 1.6 oz)   03/26/24 : 42.8 kg (94 lb 6.4 oz)   8.8 % loss < 1 month    Dosing Weight: 37.7 kg    ASSESSED NUTRITION NEEDS  Estimated Energy Needs: 1320+ kcals/day (35+ kcals/kg)  Justification: Repletion and Underweight  Estimated Protein Needs: 45+ grams protein/day (1.2+ grams of pro/kg)  Justification: Repletion  Estimated Fluid Needs: 1320 mL/day (1 mL/kcal)    Justification: Maintenance    PHYSICAL FINDINGS  See malnutrition section below.  thin   Surgical incision  Last BM 9/13/24.    MALNUTRITION:  % Weight Loss:  > 5% in 1 month (severe malnutrition)  % Intake:  </= 75% for >/= 1 month (severe malnutrition)  Subcutaneous Fat Loss:  Orbital region severe depletion  Muscle Loss:  Temporal region severe depletion, Clavicle bone region severe depletion, and Acromion bone region severe depletion  Fluid Retention:  None noted    Malnutrition Diagnosis: Severe malnutrition  In Context of:  Acute illness or injury  Chronic illness or disease    NUTRITION DIAGNOSIS  Malnutrition related to acute on chronic illness as evidenced by weight loss, decreased intake, loss of lean body mass      INTERVENTIONS  Implementation  Nutrition Education: we discussed protein needs for repletion.   Medical food supplement therapy -Magic cup 2x/day  Consider MVI daily with severe malnutrition.    Goals  Maintain weight.  Patient to consume % of nutritionally adequate meals three times per day, or the equivalent with supplements/snacks.     Monitoring/Evaluation  Progress toward goals will be monitored and evaluated per protocol.

## 2024-09-16 NOTE — CONSULTS
Care Management Initial Consult    General Information  Assessment completed with: Patient, Friend, Friend Marian  Type of CM/SW Visit: Initial Assessment    Primary Care Provider verified and updated as needed: Yes   Readmission within the last 30 days: no previous admission in last 30 days         Advance Care Planning:            Communication Assessment  Patient's communication style: spoken language (English or Bilingual)    Hearing Difficulty or Deaf: no        Cognitive  Cognitive/Neuro/Behavioral: .WDL except  Level of Consciousness: alert  Arousal Level: opens eyes spontaneously  Orientation: oriented x 4  Mood/Behavior: calm, cooperative  Best Language: 1 - Mild to moderate  Speech: other (see comments)    Living Environment:   People in home: alone     Current living Arrangements:  (cottages)      Able to return to prior arrangements: yes       Family/Social Support:  Care provided by: self, friend  Provides care for: no one, unable/limited ability to care for self     Support system: Friend, Sibling(s)          Description of Support System: Supportive, Involved         Current Resources:   Patient receiving home care services: No        Community Resources: None  Equipment currently used at home: cane, straight  Supplies currently used at home: None    Employment/Financial:  Employment Status: retired            Does the patient's insurance plan have a 3 day qualifying hospital stay waiver?  No    Lifestyle & Psychosocial Needs:  Social Determinants of Health     Food Insecurity: Low Risk  (4/9/2024)    Food Insecurity     Within the past 12 months, did you worry that your food would run out before you got money to buy more?: No     Within the past 12 months, did the food you bought just not last and you didn t have money to get more?: No   Depression: Not at risk (4/16/2024)    PHQ-2     PHQ-2 Score: 0   Housing Stability: Low Risk  (4/9/2024)    Housing Stability     Do you have housing? : Yes     Are  you worried about losing your housing?: No   Tobacco Use: High Risk (8/29/2024)    Patient History     Smoking Tobacco Use: Every Day     Smokeless Tobacco Use: Unknown     Passive Exposure: Not on file   Financial Resource Strain: Low Risk  (4/9/2024)    Financial Resource Strain     Within the past 12 months, have you or your family members you live with been unable to get utilities (heat, electricity) when it was really needed?: No   Alcohol Use: Not on file   Transportation Needs: Low Risk  (4/9/2024)    Transportation Needs     Within the past 12 months, has lack of transportation kept you from medical appointments, getting your medicines, non-medical meetings or appointments, work, or from getting things that you need?: No   Physical Activity: Sufficiently Active (4/9/2024)    Exercise Vital Sign     Days of Exercise per Week: 7 days     Minutes of Exercise per Session: 30 min   Interpersonal Safety: Low Risk  (9/13/2024)    Interpersonal Safety     Do you feel physically and emotionally safe where you currently live?: Yes     Within the past 12 months, have you been hit, slapped, kicked or otherwise physically hurt by someone?: No     Within the past 12 months, have you been humiliated or emotionally abused in other ways by your partner or ex-partner?: No   Stress: No Stress Concern Present (4/9/2024)    [employer] [place] [employer] - Occupational Stress Questionnaire     Feeling of Stress : Only a little   Social Connections: Unknown (4/9/2024)    Social Connection and Isolation Panel [NHANES]     Frequency of Communication with Friends and Family: Not on file     Frequency of Social Gatherings with Friends and Family: More than three times a week     Attends Moravian Services: Not on file     Active Member of Clubs or Organizations: Not on file     Attends Club or Organization Meetings: Not on file     Marital Status: Not on file   Health Literacy: Not on file       Functional Status:  Prior to  admission patient needed assistance:   Dependent ADLs:: Independent  Dependent IADLs:: Shopping             Discussed  Partnership in Safe Discharge Planning  document with patient/family: No    Additional Information:    Assessment completed with patient. Her friend Marian present at bedside. Patient reports she lives with her dog Josh in the [facility]. She has been independent with ADLs and with most IADLs, ambulates without devices but has a cane. She reports increase difficulty with some IADLs. Provided her with [facility] brochures to access some community support services.  Patient was going to start outpatient PT but now hospitalized. She states interest in having home PT/OT.  MD is anticipating patient will need home O2 at discharge. Marian is primary family contact and willing to transport at discharge.    Referral made to [facility] for home PT/OT.       Shaniat Smith RN

## 2024-09-16 NOTE — PLAN OF CARE
Goal Outcome Evaluation:      Plan of Care Reviewed With: patient    Overall Patient Progress: improvingOverall Patient Progress: improving         A-line and baker d/c'd per order  Pt ambulated with therapy   Potassium replaced per protocol  O2 down to 2L n/c   Tylenol given for headache      Joanna Garcia RN

## 2024-09-16 NOTE — PROGRESS NOTES
HEART CARE NOTE          Assessment/Recommendations   1. RV dysfunction + valvular heart disease c/b cardiogenic shock - resolving   Assessment / Plan  Transition to oral diuretic regimen; continue to monitor UOP and renal function closely; wean pressors as tolerated  Patient is high risk for adverse cardiac events 2/2 advanced age, frailty, RV dysfunction, pulm HTN  GDMT on hold given the above     2. Valvular heart disease  Assessment / Plan  Severe TR, moderate AI, and bioprosthetic mitral valve stenosis - will get repeat echo when near euvolemia to better assess dysfunction     3. Pulmonary HTN  Assessment / Plan  Estimated PASP of ~ 69 mmHg; repeat echo once hemodynamically stable and near euvolemia - likely tomorrow  Diuresis and supportive care as above     4. Acute hypoxic respiratory failure  Assessment / Plan  Cardiogenic pulmonary edema - resolving; diuresis and supportive care as above       Plan of care discussed on September 16, 2024 with patient at bedside, and primary team overseeing patient's care      History of Present Illness/Subjective    Ms. Ernestine Morales is a 64 year old female with a PMHx significant for (per Epic notation) COPD and pulmonary hypertension, elevated filling pressures s/p mitral valve repair who presented for elective carotid endarterectomy. This has been complicated by labile blood pressures and intermittent hypoxia.      Today, Mrs. Morales denies is alert and oriented; denies acute cardiac events or complaints; Management plan as detailed above     ECG: Personally reviewed. normal EKG, normal sinus rhythm.     ECHO (personnaly Reviewed on 9/15/24):   The left ventricle is normal in size.  Left ventricular function is normal.The ejection fraction is 60-65%.  The right ventricle is mildly dilated. Mildly decreased right ventricular  systolic function  The left atrium is severely dilated. The right atrium is moderately dilated.  There is severe (4+) tricuspid  regurgitation.  Right ventricular systolic pressure is elevated, consistent with severe  pulmonary hypertension.  The aortic valve is trileaflet with aortic valve sclerosis.  There is moderate (2+) aortic regurgitation.  There is a bioprosthetic mitral valve. Bioprosthetic mitral leaflets are not  well visualized. Mean gradient elevated at 17 mmHg suggesting prosthetic  stenosis however this is at an elevated heart rate of 91 mmHg on pressor  support. There is mild (1+) prosthetic mitral valve regurgitation.  Compared to the prior study dated 4/10/24, there are changes as noted.  Increased pulmonary hypertension and increased mean gradient across the  bioprosthetic mitral valve. Prosthesis is not well visualized and appears  thickened. Consider ANGE for further evaluation.       Telemetry: personally reviewed September 16, 2024; notable for sinus rhythm     Lab results: personally reviewed September 16, 2024; notable for hypokalemia     Medical history and pertinent documents reviewed in Care Everywhere please where applicable see details above        Physical Examination Review of Systems   /63   Pulse 86   Temp 98.3  F (36.8  C) (Oral)   Resp 12   Wt 37.7 kg (83 lb 1.6 oz)   SpO2 95%   BMI 15.70 kg/m    Body mass index is 15.7 kg/m .  Wt Readings from Last 3 Encounters:   09/16/24 37.7 kg (83 lb 1.6 oz)   08/29/24 41.7 kg (92 lb)   08/26/24 41.5 kg (91 lb 9.6 oz)     General Appearance:   no distress, normal body habitus   ENT/Mouth: membranes moist, no oral lesions or bleeding gums.      EYES:  no scleral icterus, normal conjunctivae   Neck: no carotid bruits or thyromegaly   Chest/Lungs:   lungs are clear to auscultation, no rales or wheezing, equal chest wall expansion    Cardiovascular:   Regular. Normal first and second heart sounds with +SAMUEL; no rubs, or gallops; the carotid, radial and posterior tibial pulses are intact, no JVD or LE edema bilaterally    Abdomen:  no organomegaly, masses, bruits,  or tenderness; bowel sounds are present   Extremities: no cyanosis or clubbing   Skin: no xanthelasma, warm.    Neurologic: NAD     Psychiatric: alert and oriented x3, calm     A complete 10 systems ROS was reviewed  And is negative except what is listed in the HPI.          Medical History  Surgical History Family History Social History   Past Medical History:   Diagnosis Date    Anemia     Aphasia     Atrial fibrillation (H)     Cancer (H) 11.17.2022    Squamous cell carcinoma nRight Cheek    Carotid artery stenosis     Cerebrovascular accident (H) 01/09/2017    Congestive heart failure (H)     HLD (hyperlipidemia)     HTN (hypertension)     Mitral regurgitation     Past Surgical History:   Procedure Laterality Date    BIOPSY  11.18.2022    Right Cheek    PICC TRIPLE LUMEN PLACEMENT  9/14/2024    REPLACE VALVE MITRAL  06/09/2017    bovine mitral valve with Maze and ARIANNA ligation    no family history of premature coronary artery disease Social History     Socioeconomic History    Marital status: Single     Spouse name: Not on file    Number of children: Not on file    Years of education: Not on file    Highest education level: Not on file   Occupational History    Not on file   Tobacco Use    Smoking status: Every Day     Current packs/day: 0.25     Average packs/day: 0.9 packs/day for 40.7 years (34.9 ttl pk-yrs)     Types: Cigarettes     Start date: 1/19/1984    Smokeless tobacco: Not on file    Tobacco comments:     I've cut down to just a few a day   Substance and Sexual Activity    Alcohol use: Not Currently    Drug use: Never    Sexual activity: Not Currently     Partners: Female     Birth control/protection: None   Other Topics Concern    Parent/sibling w/ CABG, MI or angioplasty before 65F 55M? No   Social History Narrative    Not on file     Social Determinants of Health     Financial Resource Strain: Low Risk  (4/9/2024)    Financial Resource Strain     Within the past 12 months, have you or your family  members you live with been unable to get utilities (heat, electricity) when it was really needed?: No   Food Insecurity: Low Risk  (4/9/2024)    Food Insecurity     Within the past 12 months, did you worry that your food would run out before you got money to buy more?: No     Within the past 12 months, did the food you bought just not last and you didn t have money to get more?: No   Transportation Needs: Low Risk  (4/9/2024)    Transportation Needs     Within the past 12 months, has lack of transportation kept you from medical appointments, getting your medicines, non-medical meetings or appointments, work, or from getting things that you need?: No   Physical Activity: Sufficiently Active (4/9/2024)    Exercise Vital Sign     Days of Exercise per Week: 7 days     Minutes of Exercise per Session: 30 min   Stress: No Stress Concern Present (4/9/2024)    [employer] [place] [employer] - Occupational Stress Questionnaire     Feeling of Stress : Only a little   Social Connections: Unknown (4/9/2024)    Social Connection and Isolation Panel [NHANES]     Frequency of Communication with Friends and Family: Not on file     Frequency of Social Gatherings with Friends and Family: More than three times a week     Attends Protestant Services: Not on file     Active Member of Clubs or Organizations: Not on file     Attends Club or Organization Meetings: Not on file     Marital Status: Not on file   Interpersonal Safety: Low Risk  (9/13/2024)    Interpersonal Safety     Do you feel physically and emotionally safe where you currently live?: Yes     Within the past 12 months, have you been hit, slapped, kicked or otherwise physically hurt by someone?: No     Within the past 12 months, have you been humiliated or emotionally abused in other ways by your partner or ex-partner?: No   Housing Stability: Low Risk  (4/9/2024)    Housing Stability     Do you have housing? : Yes     Are you worried about losing your housing?: No  "          Lab Results    Chemistry/lipid CBC Cardiac Enzymes/BNP/TSH/INR   Lab Results   Component Value Date    CHOL 166 08/26/2024    HDL 53 08/26/2024    TRIG 80 08/26/2024    BUN 16.7 09/16/2024     09/16/2024    CO2 26 09/16/2024    Lab Results   Component Value Date    WBC 7.3 09/16/2024    HGB 8.9 (L) 09/16/2024    HCT 26.6 (L) 09/16/2024    MCV 90 09/16/2024     09/16/2024    Lab Results   Component Value Date    TSH 1.94 04/16/2024     No results found for: \"CKTOTAL\", \"CKMB\", \"TROPONINI\"       Weight:    Wt Readings from Last 3 Encounters:   09/16/24 37.7 kg (83 lb 1.6 oz)   08/29/24 41.7 kg (92 lb)   08/26/24 41.5 kg (91 lb 9.6 oz)       Allergies  No Known Allergies      Surgical History  Past Surgical History:   Procedure Laterality Date    BIOPSY  11.18.2022    Right Cheek    PICC TRIPLE LUMEN PLACEMENT  9/14/2024    REPLACE VALVE MITRAL  06/09/2017    bovine mitral valve with Maze and ARIANNA ligation       Social History  Tobacco:   History   Smoking Status    Every Day    Types: Cigarettes   Smokeless Tobacco    Not on file    Alcohol:   Social History    Substance and Sexual Activity      Alcohol use: Not Currently   Illicit Drugs:   History   Drug Use Unknown       Family History  Family History   Problem Relation Age of Onset    Hypertension Mother           Clotilde Ravi MD on 9/16/2024      cc: Christina Andrew    "

## 2024-09-16 NOTE — PROGRESS NOTES
"   09/16/24 1300   Appointment Info   Signing Clinician's Name / Credentials (PT) Nalini Valdez, PT, DPT   Living Environment   People in Home alone   Current Living Arrangements other (see comments)  (the cottages)   Home Accessibility no concerns   Self-Care   Equipment Currently Used at Home cane, straight   Fall history within last six months no   Activity/Exercise/Self-Care Comment Pt independent with all ADLs/IADLs. Has a close friend who can help if needed.   General Information   Onset of Illness/Injury or Date of Surgery 09/13/24   Referring Physician Rosanne Gardner MD   Patient/Family Therapy Goals Statement (PT) None stated.   Pertinent History of Current Problem (include personal factors and/or comorbidities that impact the POC) Per H&P: \"64yoF with COPD and pulmonary hypertension, elevated filling pressures s/p mitral valve repair who presented for elective carotid endarterectomy. This has been complicated by worsening pulmonary hypertension and new bioprosthetic valve mitral stenosis. \"   Existing Precautions/Restrictions fall;oxygen therapy device and L/min  (2L O2)   Cognition   Cognitive Status Comments Pt has expressive aphasia from a previous stroke.   Range of Motion (ROM)   Range of Motion ROM is WFL   Strength (Manual Muscle Testing)   Strength (Manual Muscle Testing) Deficits observed during functional mobility   Bed Mobility   Comment, (Bed Mobility) Patient seated in chair at start of session.   Transfers   Transfers sit-stand transfer   Transfer Safety Concerns Noted decreased weight-shifting ability   Impairments Contributing to Impaired Transfers impaired balance;decreased strength   Bed-Chair Transfer   Assistive Device (Bed-Chair Transfers) walker, front-wheeled   Bed-Chair [place] (Transfers) contact guard;verbal cues   Sit-Stand Transfer   Sit-Stand [place] (Transfers) contact guard;verbal cues   Assistive Device (Sit-Stand Transfers) walker, front-wheeled   Gait/Stairs " (Locomotion)   [place] (Gait) contact guard;verbal cues   Assistive Device (Gait) walker, front-wheeled   Distance in Feet (Gait) 20'   Pattern (Gait) step-to   Deviations/Abnormal Patterns (Gait) lyla decreased;gait speed decreased   Clinical Impression   Criteria for Skilled Therapeutic Intervention Yes, treatment indicated   PT Diagnosis (PT) impaired functional mobility   Influenced by the following impairments decreased strength, impaired balance   Functional limitations due to impairments transfers, ambulation   Clinical Presentation (PT Evaluation Complexity) evolving   Clinical Presentation Rationale Clinical judgment.   Clinical Decision Making (Complexity) moderate complexity   Planned Therapy Interventions (PT) balance training;bed mobility training;gait training;home exercise program;neuromuscular re-education;patient/family education;strengthening;transfer training   Risk & Benefits of therapy have been explained evaluation/treatment results reviewed;participants voiced agreement with care plan;participants included;patient   PT Total Evaluation Time   PT Eval, Moderate Complexity Minutes (95943) 10   Physical Therapy Goals   PT Frequency Daily   PT Predicted Duration/Target Date for Goal Attainment 09/23/24   PT Goals Bed Mobility;Transfers;Gait   PT: Bed Mobility Supervision/stand-by assist;Supine to/from sit   PT: Transfers Supervision/stand-by assist;Sit to/from stand;Assistive device   PT: Gait Supervision/stand-by assist;Assistive device;Rolling walker;Greater than 200 feet   Interventions   Interventions Quick Adds Gait Training   Gait Training   Gait Training Minutes (00614) 10   Symptoms Noted During/After Treatment (Gait Training) none   Treatment Detail/Skilled Intervention With FWW. Reports feeling good with walker. Does not want to trial cane today. Recommended continued use of walker at this time.   Distance in Feet additional 100'   [place] (Gait Training) contact  guard   Physical Assistance Level (Gait Training) verbal cues;1 person assist   Assistive Device (Gait Training) rolling walker   Gait Analysis Deviations decreased lyla;decreased step length   Impairments (Gait Analysis/Training) balance impaired;strength decreased   PT Discharge Planning   PT Plan decreased strength, impaired balance   PT Discharge Recommendation (DC Rec) Transitional Care Facility   PT Rationale for DC Rec Patient requiring assist of 1 for transfers and ambulation. Lives alone at baseline. Recommend TCU to maximize safety and independence with functional mobility.   PT Brief overview of current status Sit <> stand, CGA. Ambulates 100' with FWW, CGA.   Total Session Time   Timed Code Treatment Minutes 10   Total Session Time (sum of timed and untimed services) 20

## 2024-09-16 NOTE — PROGRESS NOTES
VASCULAR SURGERY PROGRESS NOTE    Subjective:  Patient was seen and evaluated at the bedside for surgical follow-up.  Denies any significant pain.  Experiencing numbness along the jawline.  Continues on supplemental O2.  Denies any difficulty breathing or shortness of breath.  Hopeful to discharge home soon.  No other concerns.    Objective:  Intake/Output Summary (Last 24 hours) at 9/16/2024 0821  Last data filed at 9/16/2024 0350  Gross per 24 hour   Intake 920 ml   Output 2485 ml   Net -1565 ml     PHYSICAL EXAM:  /63   Pulse 89   Temp 98.2  F (36.8  C) (Oral)   Resp 14   Wt 37.7 kg (83 lb 1.6 oz)   SpO2 99%   BMI 15.70 kg/m    General: The patient is alert and oriented. Appropriate. No acute distress  Psych: Pleasant affect, answers questions appropriately  Skin: Color appropriate for race, warm, dry.  Respiratory: Nonlabored on 4L  Incision: Clean, dry, and intact with skin glue  Neuro: Chronic expressive aphasia, slight left tongue deviation, moving at extremities with baseline right sided weakness      Imaging:   Pertinent imaging reviewed.    ASSESSMENT:  63 yo female with h/o paroxysmal A-fib and mitral stenosis s/p MVR, MAZE, and [place]lip not on anticoagulation, prior L hemispheric stroke with chronic expressive aphasia and right sided weakness, asymptomatic high-grade left ICA stenosis now POD#3 s/p left carotid endarterectomy       PLAN:  Aspirin 325 mg (outpatient neuro recs) and prophylactic SQH  Systolic BP goal > 90 mmHg, MAP > 60 mmHg  Encourage incentive spirometry, wean O2 as tolerated  Appreciate cardiology consult, continue diureses  Possible discharge as early as tomorrow    Discussed pt history, exam, assessment and plan with Dr. Womack (resident) of the vascular surgery service, who is in agreement with the above.    Erendira Saenz PA-C  VASCULAR SURGERY

## 2024-09-16 NOTE — PROGRESS NOTES
PULMONARY / CRITICAL CARE PROGRESS NOTE    Date / Time of Admission:  9/13/2024  6:00 AM    Assessment:   Active Problems:    Carotid stenosis      Code Status:  Full Code    64yoF with COPD and pulmonary hypertension, elevated filling pressures s/p mitral valve repair who presented for elective carotid endarterectomy. This has been complicated by worsening pulmonary hypertension and new bioprosthetic valve mitral stenosis.     Plan:   Pulmonary:   1) Presumed COPD but has never had PFTs.   2) Pulmonary hypertension--worse now compared to 4/2024  3) Acute hypoxic respiratory failure  -Wean O2 as able.   -No evidence of hypercapnea  -Diuresed  -Nebs PRN. No indication for steroids currently as not wheezing and ventilating well.   -No CTA given improvement in oxygenation.   -Pulmonary follow up  has been arranged, clinic will call to schedule PFTs in 4 weeks.      C/V:  1) Severe pulmonary hypertension (WHO II and III)  2) Hypotension now resolved  3) New finding of Mitral valve stenosis of bioprosthetic valve  -Torsemide oral daily started to maintain Is/Os  -Cardiology consulted  -Still holding home metoprolol and cozaar due to hypotension.      Renal:  No issues     GI:  Regular diet.      Heme: No issues     Pulmonary will sign off. Please contact if change in status.       ICU DAILY CHECKLIST                           Can patient transfer out of MICU? Yes--paged Dr. Nury HOOK HUG:    Feeding:  Feeding: Yes.  Patient is receiving ORAL    Nuñez:No, removed 9/16, watch for retention  Analgesia/Sedation:No   Thromboembolic prophylaxis: Yes; Mode:  Heparin  HOB>30:  Yes  Stress Ulcer Protocol Active: No; Mode: Not Indicated  Glycemic Control: Any glucose > 180 no; Mode of Insulin Therapy: Not indicated    Clinically Significant Risk Factors        # Hypokalemia: Lowest K = 3.3 mmol/L in last 2 days, will replace as needed     # Hypomagnesemia: Lowest Mg = 1.6 mg/dL in last 2 days, will replace as needed       #  "Hypertension: Noted on problem list    # Acute heart failure with preserved ejection fraction: heart failure noted on problem list, last echo with EF >50%, and receiving IV diuretics          # Cachexia: Estimated body mass index is 15.7 kg/m  as calculated from the following:    Height as of 8/29/24: 1.549 m (5' 1\").    Weight as of this encounter: 37.7 kg (83 lb 1.6 oz)., PRESENT ON ADMISSION                PHYSICAL THERAPY AND MOBILITY:  Can patient have PT and mobility trial: yes  Activity: up as tolerated with assistance        Subjective:   HPI:  Ernestine Morales is a 64 year old female with history of presumed COPD, ongoing tobacco abuse, CVA with right-sided weakness, afib on anticoagulation who presented for elective carotid endarterectomy. Case was complicated by superior location of the lesion but otherwise hemodynamically went well. The patient was admitted to the ICU for BP management to keep Systolic BP >110.   Overnight, her oxygen requirement increased from 3L-->12L and SBP needed escalating vasopressor. Phenylephrine was 1.0 and vasopressin added. This was being administered peripherally and IV access was lost this morning. SBP transiently 80s, but rebounded quickly and neuro exam never changed.     She had improvement in blood pressures 9/14 but required vasopressor the night prior. . She has responded to lasix injections and is net negative 1L. Her oxygenation also improved from 13L-->2L. Orthostasis also improved.     Active Problems:    Carotid stenosis      Allergies: Patient has no known allergies.     MEDS:  Scheduled Meds:  Current Facility-Administered Medications   Medication Dose Route Frequency Provider Last Rate Last Admin    [Held by provider] amLODIPine (NORVASC) tablet 5 mg  5 mg Oral Daily Margarita Tobin MD        aspirin (ASA) EC tablet 325 mg  325 mg Oral Daily Margarita Tobin MD   325 mg at 09/16/24 0811    atorvastatin (LIPITOR) tablet 80 mg  80 mg " Oral QPM Margarita Tobin MD   80 mg at 09/15/24 2058    gabapentin (NEURONTIN) capsule 300 mg  300 mg Oral BID Margarita Tobin MD   300 mg at 09/16/24 0811    heparin ANTICOAGULANT injection 5,000 Units  5,000 Units Subcutaneous Q8H Margarita Tobin MD   5,000 Units at 09/16/24 0513    [Held by provider] losartan (COZAAR) tablet 100 mg  100 mg Oral Daily Margarita Tobin MD        [Held by provider] metoprolol tartrate (LOPRESSOR) tablet 25 mg  25 mg Oral BID Evonne Arrington MD   25 mg at 09/13/24 2116    polyethylene glycol (MIRALAX) Packet 17 g  17 g Oral Daily Margarita Tobin MD   17 g at 09/16/24 0811    senna-docusate (SENOKOT-S/PERICOLACE) 8.6-50 MG per tablet 1 tablet  1 tablet Oral BID Margarita Tobin MD   1 tablet at 09/16/24 0811    sodium chloride (PF) 0.9% PF flush 10-40 mL  10-40 mL Intracatheter Q7 Days Margarita Tobin MD   10 mL at 09/14/24 1351    sodium chloride (PF) 0.9% PF flush 3 mL  3 mL Intracatheter Q8H Margarita Tobin MD   3 mL at 09/16/24 0812    torsemide (DEMADEX) tablet 20 mg  20 mg Oral Daily Clotilde Ravi MD   20 mg at 09/16/24 0813     Continuous Infusions:  Current Facility-Administered Medications   Medication Dose Route Frequency Provider Last Rate Last Admin    nitroPRUsside (NIPRIDE) 50 mg in D5W 125 mL IV infusion (conc: 0.4 mg/mL)  0.1-5 mcg/kg/min Intravenous Continuous PRN Margarita Tobin MD         PRN Meds:.  Current Facility-Administered Medications   Medication Dose Route Frequency Provider Last Rate Last Admin    acetaminophen (TYLENOL) tablet 650 mg  650 mg Oral Q4H PRN Margarita Tobin MD        albuterol (PROVENTIL) neb solution 2.5 mg  2.5 mg Nebulization Q4H PRN Rosanne Gardner MD        benzocaine-menthol (CHLORASEPTIC) 6-10 MG lozenge 1-2 lozenge  1-2 lozenge Buccal Q1H PRN Margarita Tobin MD        bisacodyl (DULCOLAX)  suppository 10 mg  10 mg Rectal Daily PRN Margarita Tobin MD        dexAMETHasone (DECADRON) injection 4 mg  4 mg Intravenous Once PRN Neil Kyle DO        glucose gel 15-30 g  15-30 g Oral Q15 Min PRN Rosanne Gardner MD        Or    dextrose 50 % injection 25-50 mL  25-50 mL Intravenous Q15 Min PRN Rosanne Gardner MD        Or    glucagon injection 1 mg  1 mg Subcutaneous Q15 Min PRN Rosanne Gardner MD        labetalol (NORMODYNE/TRANDATE) injection 10 mg  10 mg Intravenous Q10 Min PRN Margarita Tobin MD        lidocaine (LMX4) cream   Topical Q1H PRN Margarita Tobin MD        lidocaine 1 % 0.1-1 mL  0.1-1 mL Other Q1H PRN Margarita Tobin MD        lidocaine 1 % 0.1-5 mL  0.1-5 mL Other Q1H PRN Aramis Womack MD   2 mL at 09/14/24 1129    magnesium hydroxide (MILK OF MAGNESIA) suspension 30 mL  30 mL Oral Daily PRN Margarita Tboin MD        naloxone (NARCAN) injection 0.1 mg  0.1 mg Intravenous Q2 Min PRN Neil Kyle DO        nitroPRUsside (NIPRIDE) 50 mg in D5W 125 mL IV infusion (conc: 0.4 mg/mL)  0.1-5 mcg/kg/min Intravenous Continuous PRN Margarita Tobin MD        ondansetron (ZOFRAN ODT) ODT tab 4 mg  4 mg Oral Q6H PRN Margarita Tobin MD        Or    ondansetron (ZOFRAN) injection 4 mg  4 mg Intravenous Q6H PRN Margarita Tobin MD        phenylephrine (DERRICK-SYNEPHRINE) injection 100 mcg  100 mcg Intravenous 5 x daily PRN Neil Kyle DO   100 mcg at 09/13/24 1253    prochlorperazine (COMPAZINE) injection 5 mg  5 mg Intravenous Q6H PRN Margarita Tobin MD        Or    prochlorperazine (COMPAZINE) tablet 5 mg  5 mg Oral Q6H PRN Margarita Tobin MD        sodium chloride (OCEAN) 0.65 % nasal spray 1 spray  1 spray Both Nostrils Q1H PRN Evonne Arrington MD        sodium chloride (PF) 0.9% PF flush 10-20 mL  10-20 mL Intracatheter q1 min prn Crista,  Margarita Trejo MD   10 mL at 09/14/24 1659    sodium chloride (PF) 0.9% PF flush 10-40 mL  10-40 mL Intracatheter Once PRN Aramis Womack MD        sodium chloride (PF) 0.9% PF flush 10-40 mL  10-40 mL Intracatheter Q1H PRN Margarita Tobin MD        sodium chloride (PF) 0.9% PF flush 3 mL  3 mL Intracatheter q1 min prn Margarita Tobin MD        traMADol (ULTRAM) half-tab 25 mg  25 mg Oral Once May Repeat x1 Marissa Finney MD   25 mg at 09/15/24 1950         Objective:   VITALS:  /63   Pulse 108   Temp 98.2  F (36.8  C) (Oral)   Resp 25   Wt 37.7 kg (83 lb 1.6 oz)   SpO2 97%   BMI 15.70 kg/m    EXAM:  General appearance: alert, appears older than stated age, and cooperative  Neck: no adenopathy, supple, symmetrical, trachea midline, and Scar healing well, clean and dry  Lungs:  rales at bases  Heart: irregularly irregular, S1 and S2  Abdomen: soft, non-tender; bowel sounds normal; no masses,  no organomegaly  Extremities: No edema  Skin: Skin color, texture, turgor normal. No rashes or lesions  Neurologic: Grossly normal, word finding difficulty noted.     I&O:       Intake/Output Summary (Last 24 hours) at 9/16/2024 1059  Last data filed at 9/16/2024 0350  Gross per 24 hour   Intake 440 ml   Output 2285 ml   Net -1845 ml             Data Review:  Chest Xray  Personally reviewed image/s and Personally reviewed impression/s  EXAM: XR CHEST PORT 1 VIEW  LOCATION: [facility]  DATE: 9/14/2024     INDICATION: RN placed PICC   verify tip placement  COMPARISON: 9/14/2024, 0011 hours                                                                      IMPRESSION: Left upper extremity PICC tip at the SVC-RA junction. No other significant interval change.    LABS      Latest Ref Rng & Units 8/26/2024    12:08 PM 9/4/2024     1:29 PM 9/13/2024     6:22 AM 9/14/2024     4:00 AM 9/15/2024     5:28 AM 9/16/2024     3:49 AM   Glucose Values   Glucose 70 -  99 mg/dL 91  86  81  121  94  94        Results for orders placed or performed during the hospital encounter of 09/13/24   Basic metabolic panel   Result Value Ref Range    Sodium 138 135 - 145 mmol/L    Potassium 3.3 (L) 3.4 - 5.3 mmol/L    Chloride 100 98 - 107 mmol/L    Carbon Dioxide (CO2) 26 22 - 29 mmol/L    Anion Gap 12 7 - 15 mmol/L    Urea Nitrogen 16.7 8.0 - 23.0 mg/dL    Creatinine 0.79 0.51 - 0.95 mg/dL    GFR Estimate 83 >60 mL/min/1.73m2    Calcium 8.4 (L) 8.8 - 10.4 mg/dL    Glucose 94 70 - 99 mg/dL     Lab Results   Component Value Date    WBC 7.3 09/16/2024    HGB 8.9 (L) 09/16/2024    HCT 26.6 (L) 09/16/2024    MCV 90 09/16/2024     09/16/2024       ABG:  Recent Labs   Lab 09/14/24  1252 09/14/24  0402 09/13/24  1006 09/13/24  0900   PH 7.41 7.38 7.28* 7.33*   PCO2 37 36 47* 42   PO2 55* 60* 112* 304*   HCO3 23 21 22 22   O2PER 68 75  --  100             By:  Rosanne Gardner MD  Pulmonary/Critical Care

## 2024-09-17 ENCOUNTER — APPOINTMENT (OUTPATIENT)
Dept: OCCUPATIONAL THERAPY | Facility: HOSPITAL | Age: 64
DRG: 037 | End: 2024-09-17
Attending: SURGERY
Payer: MEDICARE

## 2024-09-17 ENCOUNTER — APPOINTMENT (OUTPATIENT)
Dept: PHYSICAL THERAPY | Facility: HOSPITAL | Age: 64
DRG: 037 | End: 2024-09-17
Attending: SURGERY
Payer: MEDICARE

## 2024-09-17 LAB
ANION GAP SERPL CALCULATED.3IONS-SCNC: 9 MMOL/L (ref 7–15)
BUN SERPL-MCNC: 20.6 MG/DL (ref 8–23)
CALCIUM SERPL-MCNC: 8.8 MG/DL (ref 8.8–10.4)
CHLORIDE SERPL-SCNC: 97 MMOL/L (ref 98–107)
CREAT SERPL-MCNC: 0.79 MG/DL (ref 0.51–0.95)
EGFRCR SERPLBLD CKD-EPI 2021: 83 ML/MIN/1.73M2
ERYTHROCYTE [DISTWIDTH] IN BLOOD BY AUTOMATED COUNT: 14.1 % (ref 10–15)
GLUCOSE SERPL-MCNC: 95 MG/DL (ref 70–99)
HCO3 SERPL-SCNC: 29 MMOL/L (ref 22–29)
HCT VFR BLD AUTO: 25.5 % (ref 35–47)
HGB BLD-MCNC: 8.4 G/DL (ref 11.7–15.7)
MAGNESIUM SERPL-MCNC: 2.1 MG/DL (ref 1.7–2.3)
MCH RBC QN AUTO: 30.5 PG (ref 26.5–33)
MCHC RBC AUTO-ENTMCNC: 32.9 G/DL (ref 31.5–36.5)
MCV RBC AUTO: 93 FL (ref 78–100)
PLATELET # BLD AUTO: 233 10E3/UL (ref 150–450)
POTASSIUM SERPL-SCNC: 4 MMOL/L (ref 3.4–5.3)
RBC # BLD AUTO: 2.75 10E6/UL (ref 3.8–5.2)
SODIUM SERPL-SCNC: 135 MMOL/L (ref 135–145)
WBC # BLD AUTO: 7.6 10E3/UL (ref 4–11)

## 2024-09-17 PROCEDURE — 99233 SBSQ HOSP IP/OBS HIGH 50: CPT | Performed by: INTERNAL MEDICINE

## 2024-09-17 PROCEDURE — 97535 SELF CARE MNGMENT TRAINING: CPT | Mod: GO

## 2024-09-17 PROCEDURE — 80048 BASIC METABOLIC PNL TOTAL CA: CPT | Performed by: INTERNAL MEDICINE

## 2024-09-17 PROCEDURE — 99232 SBSQ HOSP IP/OBS MODERATE 35: CPT | Performed by: INTERNAL MEDICINE

## 2024-09-17 PROCEDURE — 97116 GAIT TRAINING THERAPY: CPT | Mod: GP | Performed by: PHYSICAL THERAPIST

## 2024-09-17 PROCEDURE — 999N000157 HC STATISTIC RCP TIME EA 10 MIN

## 2024-09-17 PROCEDURE — 250N000013 HC RX MED GY IP 250 OP 250 PS 637: Performed by: INTERNAL MEDICINE

## 2024-09-17 PROCEDURE — 250N000011 HC RX IP 250 OP 636: Performed by: INTERNAL MEDICINE

## 2024-09-17 PROCEDURE — 120N000004 HC R&B MS OVERFLOW

## 2024-09-17 PROCEDURE — 97530 THERAPEUTIC ACTIVITIES: CPT | Mod: GO

## 2024-09-17 PROCEDURE — 83735 ASSAY OF MAGNESIUM: CPT | Performed by: INTERNAL MEDICINE

## 2024-09-17 PROCEDURE — 85027 COMPLETE CBC AUTOMATED: CPT | Performed by: INTERNAL MEDICINE

## 2024-09-17 PROCEDURE — 97110 THERAPEUTIC EXERCISES: CPT | Mod: GP | Performed by: PHYSICAL THERAPIST

## 2024-09-17 RX ORDER — TORSEMIDE 20 MG/1
20 TABLET ORAL DAILY
Status: DISCONTINUED | OUTPATIENT
Start: 2024-09-18 | End: 2024-09-20 | Stop reason: HOSPADM

## 2024-09-17 RX ORDER — FUROSEMIDE 10 MG/ML
80 INJECTION INTRAMUSCULAR; INTRAVENOUS ONCE
Status: COMPLETED | OUTPATIENT
Start: 2024-09-17 | End: 2024-09-17

## 2024-09-17 RX ADMIN — FUROSEMIDE 80 MG: 10 INJECTION, SOLUTION INTRAVENOUS at 06:34

## 2024-09-17 RX ADMIN — HEPARIN SODIUM 5000 UNITS: 10000 INJECTION, SOLUTION INTRAVENOUS; SUBCUTANEOUS at 14:18

## 2024-09-17 RX ADMIN — GABAPENTIN 300 MG: 300 CAPSULE ORAL at 09:11

## 2024-09-17 RX ADMIN — ACETAMINOPHEN 650 MG: 325 TABLET ORAL at 14:17

## 2024-09-17 RX ADMIN — POLYETHYLENE GLYCOL 3350 17 G: 17 POWDER, FOR SOLUTION ORAL at 09:11

## 2024-09-17 RX ADMIN — SENNOSIDES AND DOCUSATE SODIUM 1 TABLET: 8.6; 5 TABLET ORAL at 21:16

## 2024-09-17 RX ADMIN — HEPARIN SODIUM 5000 UNITS: 10000 INJECTION, SOLUTION INTRAVENOUS; SUBCUTANEOUS at 21:18

## 2024-09-17 RX ADMIN — SENNOSIDES AND DOCUSATE SODIUM 1 TABLET: 8.6; 5 TABLET ORAL at 09:12

## 2024-09-17 RX ADMIN — HEPARIN SODIUM 5000 UNITS: 10000 INJECTION, SOLUTION INTRAVENOUS; SUBCUTANEOUS at 06:31

## 2024-09-17 RX ADMIN — ATORVASTATIN CALCIUM 80 MG: 40 TABLET, FILM COATED ORAL at 21:16

## 2024-09-17 RX ADMIN — ASPIRIN 325 MG: 325 TABLET, COATED ORAL at 09:11

## 2024-09-17 RX ADMIN — GABAPENTIN 300 MG: 300 CAPSULE ORAL at 21:16

## 2024-09-17 ASSESSMENT — ACTIVITIES OF DAILY LIVING (ADL)
ADLS_ACUITY_SCORE: 41

## 2024-09-17 NOTE — PLAN OF CARE
Problem: Adult Inpatient Plan of Care  Goal: Optimal Comfort and Wellbeing  Outcome: Progressing  Goal: Readiness for Transition of Care  Outcome: Progressing     Problem: Risk for Delirium  Goal: Improved Attention and Thought Clarity  Outcome: Progressing  Goal: Improved Sleep  Outcome: Progressing     Problem: Gas Exchange Impaired  Goal: Optimal Gas Exchange  Outcome: Progressing     Problem: Pain Acute  Goal: Optimal Pain Control and Function  Outcome: Progressing  Intervention: Prevent or Manage Pain  Recent Flowsheet Documentation  Taken 9/16/2024 2104 by Anne Christopher RN  Medication Review/Management: medications reviewed   Goal Outcome Evaluation:         Patient denied pain and was able to sleep for most of the night. Tele NSR to sinus tach (low 100's). Started on 2L via NC; was down to 86% with sleeping; had to change to oxymask and 10L temporarily, then did well on 4L for most of the night; this morning changed to 2L via NC. Patient asked for half a cup of coffee and was given that. Resident came by to talk to her about going to a TCU before home and walking goals today. Also need to wean from oxygen for discharge.       Vitals:    09/17/24 0355 09/17/24 0500 09/17/24 0629 09/17/24 0630   BP: 101/59  110/62    BP Location: Right arm  Right arm    Pulse: 101      Resp: 16      Temp: 98.4  F (36.9  C)      TempSrc: Oral      SpO2: 100%  100% 97%   Weight:  40.9 kg (90 lb 1.6 oz)           Heart Failure Care Map  GOALS TO BE MET BEFORE DISCHARGE:    1. Decrease congestion and/or edema with diuretic therapy to achieve near optimal volume status.     Dyspnea improved: No, further care required to meet this goal. Please explain Continue to wean as able    Edema improved: Yes, satisfactory for discharge.        Last 24 hour I/O:   Intake/Output Summary (Last 24 hours) at 9/17/2024 0701  Last data filed at 9/17/2024 0656  Gross per 24 hour   Intake 470 ml   Output 1450 ml   Net -980 ml           Net I/O  and Weights since admission:   08/18 1500 - 09/17 1459  In: 4445.66 [P.O.:1670; I.V.:2775.66]  Out: 6510 [Urine:6510]  Net: -2064.34     Vitals:    09/13/24 0608 09/13/24 1745 09/15/24 0600 09/16/24 0521   Weight: 41.3 kg (91 lb) 41.5 kg (91 lb 9.6 oz) 39.7 kg (87 lb 8 oz) 37.7 kg (83 lb 1.6 oz)    09/16/24 2102 09/17/24 0500   Weight: 40.4 kg (89 lb 1.6 oz) 40.9 kg (90 lb 1.6 oz)       2.  O2 sats > 90% on room air, or at prior home O2 therapy level.      Able to wean O2 this shift to keep sats above 90%?: Yes, satisfactory for discharge.   Does patient use Home O2? No          Current oxygenation status:   SpO2: 97 %     O2 Device: Nasal cannula, Oxygen Delivery: 2 LPM    3.  Tolerates ambulation and mobility near baseline.     Ambulation: No, further care required to meet this goal. Please explain needs to walk with PT and 4 times today   Times patient ambulated with staff this shift: 0    Please review the Heart Failure Care Map for additional HF goal outcomes.    Anne Christopher RN  9/17/2024

## 2024-09-17 NOTE — PROGRESS NOTES
[facility]    Medicine Progress Note - Hospitalist Service    Date of Admission:  9/13/2024    Assessment & Plan   64 year old female admitted on 9/13/2024. She is being admitted for left carotid endarterectomy. Now POD #1. She has significant post op acute hypoxic resp failure      1.History of CVA, resultant dysphagia, right-sided hemiparesis  -s/p Left carotid stenosis status post left CEA 9/13/24  -Postop dysphonia  -Postoperative cares per vascular surgery  -Systolic blood pressure goal over 90, had used low-dose phenylephrine  -Limiting narcotics per vascular surgery, offer NSAIDs, Tylenol, topicals  -Speech and language pathology consultation for dysphonia and dysphagia  -Continues on full dose aspirin/statin     2.Postop hypoxia, acute hypoxic resp failure  -not on O2 at home  -Noted to have some increased work of breathing and oxygen needs in recovery  -Offer DuoNebs as needed  -concern for pulm edema and severe pulm htn that likely was not appreciated pre-op  -echo checked and worse mitral stenosis over biovalve and severe pulm htn  -diuresed  -wean O2 was on higher amount, now weaned to 4 -->2, but overnight was on higher again , then weaned  -suspect all related to severe pulm htn and now concern mitral stenosis    3.Paroxysmal atrial fibrillation  -History of mitral stenosis status post partial mitral valve replacement  -Also had maze ablation procedure  -No anticoagulation prior to admission  -Resume usual metoprolol tartrate     4.Essential hypertension  -Holding bp meds til more stable    5.Mitral bio-valvea, now concern more stenosis  -new  echo  The left ventricle is normal in size.  Left ventricular function is normal.The ejection fraction is 60-65%.  The right ventricle is mildly dilated. Mildly decreased right ventricular  systolic function  The left atrium is severely dilated. The right atrium is moderately dilated.  There is severe (4+) tricuspid regurgitation.  Right  ventricular systolic pressure is elevated, consistent with severe  pulmonary hypertension.  The aortic valve is trileaflet with aortic valve sclerosis.  There is moderate (2+) aortic regurgitation.  There is a bioprosthetic mitral valve. Bioprosthetic mitral leaflets are not  well visualized. Mean gradient elevated at 17 mmHg suggesting prosthetic  stenosis however this is at an elevated heart rate of 91 mmHg on pressor  support. There is mild (1+) prosthetic mitral valve regurgitation.  Compared to the prior study dated 4/10/24, there are changes as noted.  Increased pulmonary hypertension and increased mean gradient across the  bioprosthetic mitral valve. Prosthesis is not well visualized and appears  thickened. Consider ANGE for further evaluation.    -cards seeing and want another repeat echo soon after complete diureses and more ideal volume status    6.Hypokalemia  -replaced  -and watch mag     HMS will follow     I did call her best friend Marian to update and explain situation, TCU rec and [facility] has declined, at min home care will be needed,but I do believe TCU would benefit her, she does live alone. Marian notes that Talya sister can come and watch her dog          Diet: Advance Diet as Tolerated: Regular Diet Adult  Snacks/Supplements Adult: Magic Cup; With Meals    DVT Prophylaxis: Heparin SQ  Nuñez Catheter: Not present  Lines: PRESENT      PICC 09/14/24 Triple Lumen Left Brachial vein medial vasopressors-Site Assessment: WDL      Cardiac Monitoring: ACTIVE order. Indication: cardiac tele, pulm htn, mitral valve stenosis, biovalve  Code Status: Full Code      Clinically Significant Risk Factors        # Hypokalemia: Lowest K = 3.3 mmol/L in last 2 days, will replace as needed           # Hypertension: Noted on problem list  # Acute heart failure with preserved ejection fraction: heart failure noted on problem list, last echo with EF >50%, and receiving IV diuretics          # Cachexia: Estimated body mass index  "is 17.02 kg/m  as calculated from the following:    Height as of 8/29/24: 1.549 m (5' 1\").    Weight as of this encounter: 40.9 kg (90 lb 1.6 oz).   # Severe Malnutrition: based on nutrition assessment                 Disposition Plan     Medically Ready for Discharge: Anticipated in 2-4 Days             Evonne Arrington MD  Hospitalist Service  [facility]  Securely message with [employer] (more info)  Text page via [employer] Paging/Directory   ______________________________________________________________________    Interval History   She had higher O2 need overnight then back to [place]  She notes some soreness neck at surg site  Eating ok  Asked me to call this am Marian to update and said ok to call and share info    Physical Exam   Vital Signs: Temp: 98.1  F (36.7  C) Temp src: Oral BP: 104/64 Pulse: 114   Resp: 18 SpO2: 100 % O2 Device: Nasal cannula Oxygen Delivery: 2 LPM  Weight: 90 lbs 1.6 oz    Constitutional: awake, alert, cooperative, and no apparent distress  Eyes: sclera clear  Respiratory: no increased work of breathing, moderate air exchange, no retractions, and diminished breath sounds throughout lungs  Cardiovascular: regular rate and rhythm and normal S1 and S2  GI: normal bowel sounds, soft, non-distended, and non-tender  Skin: neck left c/d/i  Musculoskeletal: no lower extremity pitting edema present  Neurologic: Mental Status Exam:  Level of Alertness:   awake, slight dysarthria(chronic)  Neuropsychiatric: General: normal, calm, and normal eye contact    Medical Decision Making       35 MINUTES SPENT BY ME on the date of service doing chart review, history, exam, documentation & further activities per the note.      Data     I have personally reviewed the following data over the past 24 hrs:    7.6  \   8.4 (L)   / 233     135 97 (L) 20.6 /  95   4.0 29 0.79 \       Imaging results reviewed over the past 24 hrs:   No results found for this or any previous visit (from the past 24 " hour(s)).

## 2024-09-17 NOTE — PROGRESS NOTES
Vascular surgery note    Briefly worse hypoxia for few minutes last night up to 10 L, down to 4 L this morning.  Denies any dyspnea or difficulty breathing.  Walked with physical therapy yesterday, although she denies having issues ambulating PT is recommending TCU at discharge.    /62 (BP Location: Right arm)   Pulse 101   Temp 98.4  F (36.9  C) (Oral)   Resp 16   Wt 40.9 kg (90 lb 1.6 oz)   SpO2 97%   BMI 17.02 kg/m      I/O reviewed. Net -880 ml yesterday, net -1.9 L for admission  If accurate, weight is down 0.4 kg from admission    On exam resting company bed  Nonlabored on 4 L OxyMask  Face symmetric, slight leftward tongue deviation, chronic expressive aphasia, otherwise no significant neurologic deficits  Left neck incision clean dry and intact, flat, no hematoma    Labs reviewed    A/P:   63 yo female with h/o paroxysmal A-fib and mitral stenosis s/p MVR, MAZE, and Atriclip not on anticoagulation, prior L hemispheric stroke with chronic expressive aphasia and right sided weakness, asymptomatic high-grade left ICA stenosis now POD#4 s/p left carotid endarterectomy.   Postop course notable for hypotension and hypoxia, related to worsening multi valvular disease including now severe tricuspid regurgitation, severe pulmonary artery hypertension, and new stenosis of her bioprosthetic mitral valve.  Hypotension is resolved, hypoxia is stable and she remains on supplemental O2.    Hospitalist and cardiology consults appreciated.  Approaching euvolemia.  We defer to cardiology as to the need for additional diuresis, timing of the repeat echocardiogram, and resumption of home betablockade or antihypertensives.  Discussed with [place] that she may require oxygen at discharge which Blepha be arranged, again will defer to cardiology on this.  Continue aspirin and high-dose statin.  Discussed the option of TCU this morning, she is not interested but I encouraged her to consider. SW consulted for TCU referrals.   She will work with PT again today.      Aramis Womack MD

## 2024-09-17 NOTE — PLAN OF CARE
Problem: Gas Exchange Impaired  Goal: Optimal Gas Exchange  Outcome: Progressing     Problem: Pain Acute  Goal: Optimal Pain Control and Function  Outcome: Progressing  Intervention: Prevent or Manage Pain  Recent Flowsheet Documentation  Taken 9/17/2024 0911 by Tiffnaie Mayo RN  Sensory Stimulation Regulation:   care clustered   lighting decreased  Medication Review/Management: medications reviewed   Goal Outcome Evaluation:       Pt is AOX4. Pt denies SOB and pain. Baseline expressive aphasia. PICC line intact, saline locked. Pt on 2L NC, sating well. VSS. Tele NSR/sinus tach, HR 's. Pt up in the chair. Pt ambulated the halls. AO1 w/ gaitbelt. Pt is able to make needs known.

## 2024-09-17 NOTE — CONSULTS
Care Management Follow Up    Length of Stay (days): 4    Expected Discharge Date: 09/18/2024     Concerns to be Addressed:   discharge planning   Patient plan of care discussed at interdisciplinary rounds: Yes    Anticipated Discharge Disposition:  TCU     Anticipated Discharge Services:  none  Anticipated Discharge DME:  none    Patient/family educated on Medicare website which has current facility and service quality ratings:  yes  Education Provided on the Discharge Plan:  yes  Patient/Family in Agreement with the Plan:  yes    Referrals Placed by CM/SW:  TCU  Private pay costs discussed: Not applicable    Discussed  Partnership in Safe Discharge Planning  document with patient/family: NA- patient and family are engaged appropriately in discharge planning     Handoff Completed: No, handoff not indicated or clinically appropriate    Additional Information:  SW met with patient at bedside. Patient states she is agreeable to TCU and asked SW to call her friend Marian to discuss where she should go.   SW called Marian; Marian asked for referrals to be sent to [facility] and [facility]. SW sent referrals.     Next Steps: f/up with referrals.     DUKE Patel

## 2024-09-17 NOTE — PLAN OF CARE
Problem: Adult Inpatient Plan of Care  Goal: Plan of Care Review  Description: The Plan of Care Review/Shift note should be completed every shift.  The Outcome Evaluation is a brief statement about your assessment that the patient is improving, declining, or no change.  This information will be displayed automatically on your shift  note.  Outcome: Progressing     Problem: Risk for Delirium  Goal: Improved Behavioral Control  Intervention: Minimize Safety Risk  Recent Flowsheet Documentation  Taken 9/16/2024 2143 by Andrea Cabral RN  Enhanced Safety Measures: patient/family teach back on injury risk  Trust Relationship/Rapport:   care explained   thoughts/feelings acknowledged  Goal: Improved Attention and Thought Clarity  Intervention: Maximize Cognitive Function  Recent Flowsheet Documentation  Taken 9/16/2024 2143 by Andrea Cabral RN  Reorientation Measures:   calendar in view   clock in view     Problem: Gas Exchange Impaired  Goal: Optimal Gas Exchange  Outcome: Progressing  Intervention: Optimize Oxygenation and Ventilation  Recent Flowsheet Documentation  Taken 9/16/2024 2143 by Andrea Cabral RN  Head of Bed (HOB) Positioning: HOB at 30 degrees     Problem: Pain Acute  Goal: Optimal Pain Control and Function  Outcome: Progressing  Intervention: Develop Pain Management Plan  Recent Flowsheet Documentation  Taken 9/16/2024 2143 by Andrea Cabral RN  Pain Management Interventions: medication (see MAR)  Intervention: Prevent or Manage Pain  Recent Flowsheet Documentation  Taken 9/16/2024 2143 by Andrea Cabral RN  Medication Review/Management: medications reviewed   Goal Outcome Evaluation:                    Patient was a transfer from ICU later tonight.    Alert. Oriented. Has hx of CVA. She mild expressive aphasia. Patient able to make her needs known.     On 2 lpm nc, O2 sat high 90s. Denied shortness of breath. Lungs sounds clear diminished.    Reported headache 7/10, prn acetaminophen 650 mg po given.  Patient able to swallow pills with no difficulty.    She is post op Left Endarterectomy, incision left neck, clean, dry and intact. Open to air.    Weak and unsteady. Standby to assist of 1, gait belt and walker.     Informed patient re unit routine. Falls education and interventions placed.

## 2024-09-17 NOTE — PROGRESS NOTES
HEART CARE NOTE          Assessment/Recommendations   1. RV dysfunction + valvular heart disease c/b cardiogenic shock - resolving   Assessment / Plan  Some respiratory distress overnight - will give IV furosemide bolus x1 and continue to monitor UOP and renal function closely; wean pressors as tolerated  Patient is high risk for adverse cardiac events 2/2 advanced age, frailty, RV dysfunction, pulm HTN  GDMT on hold given the above     2. Valvular heart disease  Assessment / Plan  Severe TR, moderate AI, and bioprosthetic mitral valve stenosis - will get repeat echo when near euvolemia to better assess dysfunction     3. Pulmonary HTN  Assessment / Plan  Estimated PASP of ~ 69 mmHg; repeat echo once hemodynamically stable and near euvolemia - likely tomorrow  Diuresis and supportive care as above     4. Acute hypoxic respiratory failure  Assessment / Plan  Cardiogenic pulmonary edema - resolving; diuresis and supportive care as above    Plan of care discussed on September 17, 2024 with patient at bedside, and primary team overseeing patient's care    History of Present Illness/Subjective    Ms. Ernestine Morales is a 64 year old female with a PMHx significant for (per Epic notation) COPD and pulmonary hypertension, elevated filling pressures s/p mitral valve repair who presented for elective carotid endarterectomy. This has been complicated by labile blood pressures and intermittent hypoxia.      Today, Mrs. Morales denies acute cardiac events or complaints; Management plan as detailed above     ECG: Personally reviewed. normal EKG, normal sinus rhythm.     ECHO (personnaly Reviewed on 9/15/24):   The left ventricle is normal in size.  Left ventricular function is normal.The ejection fraction is 60-65%.  The right ventricle is mildly dilated. Mildly decreased right ventricular  systolic function  The left atrium is severely dilated. The right atrium is moderately dilated.  There is severe (4+) tricuspid  regurgitation.  Right ventricular systolic pressure is elevated, consistent with severe  pulmonary hypertension.  The aortic valve is trileaflet with aortic valve sclerosis.  There is moderate (2+) aortic regurgitation.  There is a bioprosthetic mitral valve. Bioprosthetic mitral leaflets are not  well visualized. Mean gradient elevated at 17 mmHg suggesting prosthetic  stenosis however this is at an elevated heart rate of 91 mmHg on pressor  support. There is mild (1+) prosthetic mitral valve regurgitation.  Compared to the prior study dated 4/10/24, there are changes as noted.  Increased pulmonary hypertension and increased mean gradient across the  bioprosthetic mitral valve. Prosthesis is not well visualized and appears  thickened. Consider ANGE for further evaluation.       Telemetry: personally reviewed September 17, 2024; notable for sinus rhythm     Lab results: personally reviewed September 17, 2024; notable for renal function wnl    Medical history and pertinent documents reviewed in Care Everywhere please where applicable see details above        Physical Examination Review of Systems   /59 (BP Location: Right arm)   Pulse 101   Temp 98.4  F (36.9  C) (Oral)   Resp 16   Wt 40.9 kg (90 lb 1.6 oz)   SpO2 100%   BMI 17.02 kg/m    Body mass index is 17.02 kg/m .  Wt Readings from Last 3 Encounters:   09/17/24 40.9 kg (90 lb 1.6 oz)   08/29/24 41.7 kg (92 lb)   08/26/24 41.5 kg (91 lb 9.6 oz)     General Appearance:   no distress, normal body habitus   ENT/Mouth: membranes moist, no oral lesions or bleeding gums.      EYES:  no scleral icterus, normal conjunctivae   Neck: no carotid bruits or thyromegaly   Chest/Lungs:   lungs are clear to auscultation, no rales or wheezing, equal chest wall expansion    Cardiovascular:   Regular. Normal first and second heart sounds with no murmurs, rubs, or gallops; the carotid, radial and posterior tibial pulses are intact, no JVD or LE edema bilaterally     Abdomen:  no organomegaly, masses, bruits, or tenderness; bowel sounds are present   Extremities: no cyanosis or clubbing   Skin: no xanthelasma, warm.    Neurologic: NAD     Psychiatric: alert and oriented x3, calm     A complete 10 systems ROS was reviewed  And is negative except what is listed in the HPI.          Medical History  Surgical History Family History Social History   Past Medical History:   Diagnosis Date    Anemia     Aphasia     Atrial fibrillation (H)     Cancer (H) 11.17.2022    Squamous cell carcinoma nRight Cheek    Carotid artery stenosis     Cerebrovascular accident (H) 01/09/2017    Congestive heart failure (H)     HLD (hyperlipidemia)     HTN (hypertension)     Mitral regurgitation     Past Surgical History:   Procedure Laterality Date    BIOPSY  11.18.2022    Right Cheek    ENDARTERECTOMY CAROTID Left 9/13/2024    Procedure: ENDARTERECTOMY, CAROTID WITH NEURO MONITORING;  Surgeon: Margarita Tobin MD;  Location: [facility] - [place] OR    PICC TRIPLE LUMEN PLACEMENT  9/14/2024    REPLACE VALVE MITRAL  06/09/2017    bovine mitral valve with Maze and ARIANNA ligation    no family history of premature coronary artery disease Social History     Socioeconomic History    Marital status: Single     Spouse name: Not on file    Number of children: Not on file    Years of education: Not on file    Highest education level: Not on file   Occupational History    Not on file   Tobacco Use    Smoking status: Every Day     Current packs/day: 0.25     Average packs/day: 0.9 packs/day for 40.7 years (34.9 ttl pk-yrs)     Types: Cigarettes     Start date: 1/19/1984    Smokeless tobacco: Not on file    Tobacco comments:     I've cut down to just a few a day   Substance and Sexual Activity    Alcohol use: Not Currently    Drug use: Never    Sexual activity: Not Currently     Partners: Female     Birth control/protection: None   Other Topics Concern    Parent/sibling w/ CABG, MI or angioplasty before 65F 55M?  No   Social History Narrative    Not on file     Social Determinants of Health     Financial Resource Strain: Low Risk  (4/9/2024)    Financial Resource Strain     Within the past 12 months, have you or your family members you live with been unable to get utilities (heat, electricity) when it was really needed?: No   Food Insecurity: Low Risk  (4/9/2024)    Food Insecurity     Within the past 12 months, did you worry that your food would run out before you got money to buy more?: No     Within the past 12 months, did the food you bought just not last and you didn t have money to get more?: No   Transportation Needs: Low Risk  (4/9/2024)    Transportation Needs     Within the past 12 months, has lack of transportation kept you from medical appointments, getting your medicines, non-medical meetings or appointments, work, or from getting things that you need?: No   Physical Activity: Sufficiently Active (4/9/2024)    Exercise Vital Sign     Days of Exercise per Week: 7 days     Minutes of Exercise per Session: 30 min   Stress: No Stress Concern Present (4/9/2024)    [employer] - Occupational Stress Questionnaire     Feeling of Stress : Only a little   Social Connections: Unknown (4/9/2024)    Social Connection and Isolation Panel [NHANES]     Frequency of Communication with Friends and Family: Not on file     Frequency of Social Gatherings with Friends and Family: More than three times a week     Attends Rastafari Services: Not on file     Active Member of Clubs or Organizations: Not on file     Attends Club or Organization Meetings: Not on file     Marital Status: Not on file   Interpersonal Safety: Low Risk  (9/13/2024)    Interpersonal Safety     Do you feel physically and emotionally safe where you currently live?: Yes     Within the past 12 months, have you been hit, slapped, kicked or otherwise physically hurt by someone?: No     Within the past 12 months, have you been humiliated or  "emotionally abused in other ways by your partner or ex-partner?: No   Housing Stability: Low Risk  (4/9/2024)    Housing Stability     Do you have housing? : Yes     Are you worried about losing your housing?: No           Lab Results    Chemistry/lipid CBC Cardiac Enzymes/BNP/TSH/INR   Lab Results   Component Value Date    CHOL 166 08/26/2024    HDL 53 08/26/2024    TRIG 80 08/26/2024    BUN 16.7 09/16/2024     09/16/2024    CO2 26 09/16/2024    Lab Results   Component Value Date    WBC 7.3 09/16/2024    HGB 8.9 (L) 09/16/2024    HCT 26.6 (L) 09/16/2024    MCV 90 09/16/2024     09/16/2024    Lab Results   Component Value Date    TSH 1.94 04/16/2024     No results found for: \"CKTOTAL\", \"CKMB\", \"TROPONINI\"       Weight:    Wt Readings from Last 3 Encounters:   09/17/24 40.9 kg (90 lb 1.6 oz)   08/29/24 41.7 kg (92 lb)   08/26/24 41.5 kg (91 lb 9.6 oz)       Allergies  No Known Allergies      Surgical History  Past Surgical History:   Procedure Laterality Date    BIOPSY  11.18.2022    Right Cheek    ENDARTERECTOMY CAROTID Left 9/13/2024    Procedure: ENDARTERECTOMY, CAROTID WITH NEURO MONITORING;  Surgeon: Margarita Tobin MD;  Location: [facility] - [place]    [facility] TRIPLE LUMEN PLACEMENT  9/14/2024    REPLACE VALVE MITRAL  06/09/2017    bovine mitral valve with Maze and ARIANNA ligation       Social History  Tobacco:   History   Smoking Status    Every Day    Types: Cigarettes   Smokeless Tobacco    Not on file    Alcohol:   Social History    Substance and Sexual Activity      Alcohol use: Not Currently   Illicit Drugs:   History   Drug Use Unknown       Family History  Family History   Problem Relation Age of Onset    Hypertension Mother           Clotilde Ravi MD on 9/17/2024      cc: Christina Andrew    "

## 2024-09-17 NOTE — PLAN OF CARE
"  Problem: Adult Inpatient Plan of Care  Goal: Plan of Care Review  Description: The Plan of Care Review/Shift note should be completed every shift.  The Outcome Evaluation is a brief statement about your assessment that the patient is improving, declining, or no change.  This information will be displayed automatically on your shift  note.  Outcome: Progressing  Goal: Patient-Specific Goal (Individualized)  Description: You can add care plan individualizations to a care plan. Examples of Individualization might be:  \"Parent requests to be called daily at 9am for status\", \"I have a hard time hearing out of my right ear\", or \"Do not touch me to wake me up as it startles  me\".  Outcome: Progressing  Goal: Absence of Hospital-Acquired Illness or Injury  Outcome: Progressing  Intervention: Identify and Manage Fall Risk  Recent Flowsheet Documentation  Taken 9/16/2024 2000 by Duc Grey RN  Safety Promotion/Fall Prevention:   activity supervised   room door open   room near nurse's station   room organization consistent   safety round/check completed   supervised activity  Taken 9/16/2024 1600 by Duc Grey RN  Safety Promotion/Fall Prevention:   activity supervised   room door open   room near nurse's station   room organization consistent   safety round/check completed   supervised activity  Intervention: Prevent Skin Injury  Recent Flowsheet Documentation  Taken 9/16/2024 2000 by Duc Grey RN  Body Position: position changed independently  Taken 9/16/2024 1600 by Duc Grey RN  Body Position: position changed independently  Intervention: Prevent and Manage VTE (Venous Thromboembolism) Risk  Recent Flowsheet Documentation  Taken 9/16/2024 2000 by Duc Grey RN  VTE Prevention/Management: SCDs on (sequential compression devices)  Taken 9/16/2024 1600 by Duc Grey RN  VTE Prevention/Management: SCDs on (sequential compression devices)  Intervention: Prevent Infection  Recent Flowsheet Documentation  Taken " 9/16/2024 2000 by Duc Grey RN  Infection Prevention: hand hygiene promoted  Taken 9/16/2024 1600 by Duc Grey RN  Infection Prevention: hand hygiene promoted  Goal: Optimal Comfort and Wellbeing  Outcome: Progressing  Intervention: Provide Person-Centered Care  Recent Flowsheet Documentation  Taken 9/16/2024 2000 by Duc Grey RN  Trust Relationship/Rapport:   care explained   questions answered   reassurance provided  Taken 9/16/2024 1600 by Duc Grey RN  Trust Relationship/Rapport:   care explained   questions answered   reassurance provided  Goal: Readiness for Transition of Care  Outcome: Progressing     Problem: Gas Exchange Impaired  Goal: Optimal Gas Exchange  Outcome: Progressing     Problem: Pain Acute  Goal: Optimal Pain Control and Function  Outcome: Progressing  Intervention: Prevent or Manage Pain  Recent Flowsheet Documentation  Taken 9/16/2024 2000 by Duc Grey RN  Medication Review/Management: medications reviewed  Taken 9/16/2024 1600 by Duc Grey RN  Medication Review/Management: medications reviewed   Goal Outcome Evaluation:     Vitals stable. Alert and oriented. Pt was transferred P3 R304 at 2100. Report was given to Carol CARRANZA RN. All the belongs was send with patient.

## 2024-09-17 NOTE — PROGRESS NOTES
Was called to assess patient due to hypoxia. Patient was on 5LPM oxymask and sating 86%. Turned patient O2 up to 10LPM and had patient do deep breathing and cough. Was able to get O2 down to 4LPM sating 95%. RN to titrate as patient tolerates.

## 2024-09-18 ENCOUNTER — APPOINTMENT (OUTPATIENT)
Dept: OCCUPATIONAL THERAPY | Facility: HOSPITAL | Age: 64
DRG: 037 | End: 2024-09-18
Attending: SURGERY
Payer: MEDICARE

## 2024-09-18 ENCOUNTER — APPOINTMENT (OUTPATIENT)
Dept: CARDIOLOGY | Facility: HOSPITAL | Age: 64
DRG: 037 | End: 2024-09-18
Attending: INTERNAL MEDICINE
Payer: MEDICARE

## 2024-09-18 LAB
ANION GAP SERPL CALCULATED.3IONS-SCNC: 9 MMOL/L (ref 7–15)
BUN SERPL-MCNC: 21.8 MG/DL (ref 8–23)
CALCIUM SERPL-MCNC: 8.8 MG/DL (ref 8.8–10.4)
CHLORIDE SERPL-SCNC: 94 MMOL/L (ref 98–107)
CREAT SERPL-MCNC: 0.75 MG/DL (ref 0.51–0.95)
EGFRCR SERPLBLD CKD-EPI 2021: 88 ML/MIN/1.73M2
GLUCOSE SERPL-MCNC: 107 MG/DL (ref 70–99)
HCO3 SERPL-SCNC: 30 MMOL/L (ref 22–29)
HGB BLD-MCNC: 8.4 G/DL (ref 11.7–15.7)
LVEF ECHO: NORMAL
MAGNESIUM SERPL-MCNC: 2.2 MG/DL (ref 1.7–2.3)
POTASSIUM SERPL-SCNC: 3.8 MMOL/L (ref 3.4–5.3)
SODIUM SERPL-SCNC: 133 MMOL/L (ref 135–145)

## 2024-09-18 PROCEDURE — 99232 SBSQ HOSP IP/OBS MODERATE 35: CPT | Performed by: FAMILY MEDICINE

## 2024-09-18 PROCEDURE — 93306 TTE W/DOPPLER COMPLETE: CPT | Mod: 26 | Performed by: INTERNAL MEDICINE

## 2024-09-18 PROCEDURE — 99233 SBSQ HOSP IP/OBS HIGH 50: CPT | Performed by: INTERNAL MEDICINE

## 2024-09-18 PROCEDURE — 80048 BASIC METABOLIC PNL TOTAL CA: CPT | Performed by: INTERNAL MEDICINE

## 2024-09-18 PROCEDURE — 93306 TTE W/DOPPLER COMPLETE: CPT

## 2024-09-18 PROCEDURE — 85018 HEMOGLOBIN: CPT | Performed by: INTERNAL MEDICINE

## 2024-09-18 PROCEDURE — 250N000011 HC RX IP 250 OP 636: Performed by: INTERNAL MEDICINE

## 2024-09-18 PROCEDURE — 83735 ASSAY OF MAGNESIUM: CPT | Performed by: INTERNAL MEDICINE

## 2024-09-18 PROCEDURE — 120N000004 HC R&B MS OVERFLOW

## 2024-09-18 PROCEDURE — 250N000013 HC RX MED GY IP 250 OP 250 PS 637: Performed by: INTERNAL MEDICINE

## 2024-09-18 PROCEDURE — 97110 THERAPEUTIC EXERCISES: CPT | Mod: GO

## 2024-09-18 PROCEDURE — 97535 SELF CARE MNGMENT TRAINING: CPT | Mod: GO

## 2024-09-18 RX ADMIN — HEPARIN SODIUM 5000 UNITS: 10000 INJECTION, SOLUTION INTRAVENOUS; SUBCUTANEOUS at 05:33

## 2024-09-18 RX ADMIN — ATORVASTATIN CALCIUM 80 MG: 40 TABLET, FILM COATED ORAL at 21:07

## 2024-09-18 RX ADMIN — HEPARIN SODIUM 5000 UNITS: 10000 INJECTION, SOLUTION INTRAVENOUS; SUBCUTANEOUS at 13:19

## 2024-09-18 RX ADMIN — ASPIRIN 325 MG: 325 TABLET, COATED ORAL at 09:06

## 2024-09-18 RX ADMIN — GABAPENTIN 300 MG: 300 CAPSULE ORAL at 21:07

## 2024-09-18 RX ADMIN — ACETAMINOPHEN 650 MG: 325 TABLET ORAL at 12:38

## 2024-09-18 RX ADMIN — GABAPENTIN 300 MG: 300 CAPSULE ORAL at 09:06

## 2024-09-18 RX ADMIN — HEPARIN SODIUM 5000 UNITS: 10000 INJECTION, SOLUTION INTRAVENOUS; SUBCUTANEOUS at 21:10

## 2024-09-18 RX ADMIN — ACETAMINOPHEN 650 MG: 325 TABLET ORAL at 21:07

## 2024-09-18 RX ADMIN — ACETAMINOPHEN 650 MG: 325 TABLET ORAL at 06:58

## 2024-09-18 RX ADMIN — SENNOSIDES AND DOCUSATE SODIUM 1 TABLET: 8.6; 5 TABLET ORAL at 09:06

## 2024-09-18 RX ADMIN — TORSEMIDE 20 MG: 20 TABLET ORAL at 09:06

## 2024-09-18 RX ADMIN — POLYETHYLENE GLYCOL 3350 17 G: 17 POWDER, FOR SOLUTION ORAL at 09:06

## 2024-09-18 RX ADMIN — SENNOSIDES AND DOCUSATE SODIUM 1 TABLET: 8.6; 5 TABLET ORAL at 21:09

## 2024-09-18 ASSESSMENT — ACTIVITIES OF DAILY LIVING (ADL)
ADLS_ACUITY_SCORE: 43
ADLS_ACUITY_SCORE: 41
ADLS_ACUITY_SCORE: 43
ADLS_ACUITY_SCORE: 41
ADLS_ACUITY_SCORE: 43
ADLS_ACUITY_SCORE: 41
ADLS_ACUITY_SCORE: 43
ADLS_ACUITY_SCORE: 43
ADLS_ACUITY_SCORE: 41
ADLS_ACUITY_SCORE: 43

## 2024-09-18 NOTE — PROGRESS NOTES
[facility]    Medicine Progress Note - Hospitalist Service    Date of Admission:  9/13/2024    Assessment & Plan   64 year old female admitted s/p left carotid endarterectomy on 9/13/2024. She had significant post op acute hypoxic resp failure.     1.History of CVA, resultant dysphagia, right-sided hemiparesis  -s/p Left carotid stenosis status post left CEA 9/13/24  -Postop dysphonia  -Postoperative cares per vascular surgery  -Systolic blood pressure goal over 90, had used low-dose phenylephrine  -Limiting narcotics per vascular surgery, offer NSAIDs, Tylenol, topicals  -Speech and language pathology consultation for dysphonia and dysphagia recommended follow up eval at vascular clinic and if symptoms persist then outpatient speech.  -Continues on full dose aspirin/statin     2.Postop hypoxia, acute hypoxic resp failure  -not on O2 at home  -Noted to have some increased work of breathing and oxygen needs in recovery  -Offer DuoNebs as needed  -concern for pulm edema and severe pulm htn that likely was not appreciated pre-op  -echo checked and worse mitral stenosis over biovalve and severe pulm htn  -O2 needs appear to be higher overnight and improve during the day.  -suspect all related to severe pulm htn and now concern mitral stenosis  - Cardiology diuresing and will recheck echo when euvolemic.    3.Paroxysmal atrial fibrillation  -History of mitral stenosis status post partial mitral valve replacement  -Also had maze ablation procedure  -No anticoagulation prior to admission  -Resume usual metoprolol tartrate     4.Essential hypertension  -Holding bp meds til more stable    5.Mitral bio-valvea, now concern more stenosis  -new  echo  The left ventricle is normal in size.  Left ventricular function is normal.The ejection fraction is 60-65%.  The right ventricle is mildly dilated. Mildly decreased right ventricular  systolic function  The left atrium is severely dilated. The right atrium  is moderately dilated.  There is severe (4+) tricuspid regurgitation.  Right ventricular systolic pressure is elevated, consistent with severe  pulmonary hypertension.  The aortic valve is trileaflet with aortic valve sclerosis.  There is moderate (2+) aortic regurgitation.  There is a bioprosthetic mitral valve. Bioprosthetic mitral leaflets are not  well visualized. Mean gradient elevated at 17 mmHg suggesting prosthetic  stenosis however this is at an elevated heart rate of 91 mmHg on pressor  support. There is mild (1+) prosthetic mitral valve regurgitation.  Compared to the prior study dated 4/10/24, there are changes as noted.  Increased pulmonary hypertension and increased mean gradient across the  bioprosthetic mitral valve. Prosthesis is not well visualized and appears  thickened. Consider ANGE for further evaluation.    -cards seeing and want another repeat echo soon after complete diureses and more ideal volume status    6.Hypokalemia  -replaced  -and watch mag     HMS will follow     Discussed with her best friend Marian who is at bedside.  I strongly recommended TCU at the time of discharge. As Dr. Arrington has done.          Diet: Advance Diet as Tolerated: Regular Diet Adult  Snacks/Supplements Adult: Magic Cup; With Meals    DVT Prophylaxis: Heparin SQ  Nuñez Catheter: Not present  Lines: PRESENT      PICC 09/14/24 Triple Lumen Left Brachial vein medial vasopressors-Site Assessment: WDL      Cardiac Monitoring: ACTIVE order. Indication: Acute decompensated heart failure (48 hours)  Code Status: Full Code      Clinically Significant Risk Factors         # Hyponatremia: Lowest Na = 133 mmol/L in last 2 days, will monitor as appropriate          # Hypertension: Noted on problem list    # Acute heart failure with preserved ejection fraction: heart failure noted on problem list, last echo with EF >50%, and receiving IV diuretics          # Cachexia: Estimated body mass index is 16.61 kg/m  as calculated from  "the following:    Height as of 8/29/24: 1.549 m (5' 1\").    Weight as of this encounter: 39.9 kg (87 lb 14.4 oz).   # Severe Malnutrition: based on nutrition assessment                 Disposition Plan     Medically Ready for Discharge: Anticipated in 2-4 Days             Lonny Yung MD  Hospitalist Service  [facility]  Securely message with [employer] (more info)  Text page via [employer] Paging/Directory   ______________________________________________________________________    Interval History   Patient doing well today.  She feels stronger and is increasing activity. Friend at bedside.    Physical Exam   Vital Signs: Temp: 98.5  F (36.9  C) Temp src: Oral BP: 102/59 Pulse: 101   Resp: 20 SpO2: 98 % O2 Device: None (Room air) Oxygen Delivery: 2 LPM  Weight: 87 lbs 14.4 oz    Constitutional: awake, alert, cooperative, and no apparent distress  Eyes: sclera clear  Respiratory: no increased work of breathing, moderate air exchange, no retractions, and diminished breath sounds throughout lungs  Cardiovascular: regular rate and rhythm and normal S1 and S2  GI: normal bowel sounds, soft, non-distended, and non-tender  Skin: neck left c/d/i  Musculoskeletal: no lower extremity pitting edema present  Neurologic: Mental Status Exam:  Level of Alertness:   awake, slight dysarthria(chronic)  Neuropsychiatric: General: normal, calm, and normal eye contact    Medical Decision Making       35 MINUTES SPENT BY ME on the date of service doing chart review, history, exam, documentation & further activities per the note.      Data     I have personally reviewed the following data over the past 24 hrs:    N/A  \   8.4 (L)   / N/A     133 (L) 94 (L) 21.8 /  107 (H)   3.8 30 (H) 0.75 \       Imaging results reviewed over the past 24 hrs:   Recent Results (from the past 24 hour(s))   Echocardiogram Complete   Result Value    LVEF  60-65%    Narrative    " [id number]  [id number]  [id number]  [id number]^FLORIN^[place] [address]     Name: MAHSA GARVIN  MRN: [id number]  : 1960  Study Date: 2024 01:22 PM  Age: 64 yrs  Gender: Female  Patient Location: [facility]  Reason For Study: CHF, Pulmonary Hypertension  Ordering Physician: JAIR CATHERINE  Performed By: CARY     BSA: 1.3 m2  Height: 61 in  Weight: 87 lb  HR: 105  BP: 111/65 mmHg  ______________________________________________________________________________  Procedure  Complete Echo Adult.  ______________________________________________________________________________  Interpretation Summary     Left ventricular function is normal.The ejection fraction is 60-65%.  The right ventricle is mildly dilated.  The right ventricular systolic function is normal.  The left atrium is severely dilated.  The right atrium is moderately dilated.  There is a bioprosthetic mitral valve.  Prosthetic valve leaflets are thickened with decreased mobility.  Struts of the prosthetic mitral valve protrude into LVOT and make contact with  the basal septum causing LVOT obstruction.  Severe prosthetic mitral valve stenosis( mean gradient 20)  There is moderate (2+) tricuspid regurgitation.  Severe (>55mmHg) pulmonary hypertension is present.  There is mild trileaflet aortic sclerosis.  There is mild to moderate (1-2+) aortic regurgitation.  ______________________________________________________________________________  Left Ventricle  The left ventricle is normal in size. Left ventricular function is normal.The  ejection fraction is 60-65%. There is normal left ventricular wall thickness.  Diastolic function not assessed due to presence of prosthetic mitral valve.  Flattened septum is consistent with RV pressure/volume overload.     Right Ventricle  The right ventricle is mildly dilated. The right ventricular systolic function  is normal.     Atria  The left atrium is  severely dilated. The right atrium is moderately dilated.  There is no color Doppler evidence of an atrial shunt.     Mitral Valve  There is a bioprosthetic mitral valve. Severe prosthetic mitral valve  stenosis. Prosthetic valve leaflets are thickened with decreased mobility.  Struts of the prosthetic mitral valve protrude into LVOT and make contact with  the basal septum causing LVOT obstruction.     Tricuspid Valve  Tricuspid valve fails to coapt. There is moderate (2+) tricuspid  regurgitation. The right ventricular systolic pressure is approximated at 73.2  mmHg plus the right atrial pressure. Severe (>55mmHg) pulmonary hypertension  is present.     Aortic Valve  There is mild trileaflet aortic sclerosis. There is mild to moderate (1-2+)  aortic regurgitation.     Pulmonic Valve  The pulmonic valve is not well visualized.     Vessels  IVC diameter and respiratory changes fall into an intermediate range  suggesting an RA pressure of 8 mmHg.     Pericardium  There is no pericardial effusion.     ______________________________________________________________________________  MMode/2D Measurements & Calculations  IVSd: 0.99 cm  LVIDd: 2.7 cm  LVIDs: 1.7 cm  LVPWd: 1.8 cm  FS: 36.5 %     LV mass(C)d: 119.6 grams  LV mass(C)dI: 90.2 grams/m2  Ao root diam: 2.6 cm  asc Aorta Diam: 3.0 cm  LVOT diam: 1.5 cm  LVOT area: 1.9 cm2  Ao root diam index Ht(cm/m): 1.7  Ao root diam index BSA (cm/m2): 2.0  Asc Ao diam index BSA (cm/m2): 2.3  Asc Ao diam index Ht(cm/m): 1.9  EF Biplane: 67.1 %  LA Volume Indexed (AL/bp): 73.4 ml/m2     RV Base: 3.2 cm  RWT: 1.3     Time Measurements  MM HR: 118.0 BPM     Doppler Measurements & Calculations  MV max P.1 mmHg  MV mean P.6 mmHg  MV V2 VTI: 94.5 cm  MVA(VTI): 0.55 cm2  Ao V2 max: 208.8 cm/sec  Ao max P.0 mmHg  Ao V2 mean: 161.9 cm/sec  Ao mean P.6 mmHg  Ao V2 VTI: 43.9 cm  CHALO(I,D): 1.2 cm2  CHALO(V,D): 1.6 cm2  AI P1/2t: 503.9 msec  LV V1 max P.4 mmHg  LV V1  max: 176.2 cm/sec  LV V1 VTI: 27.6 cm  SV(LVOT): 51.8 ml  SI(LVOT): 39.1 ml/m2  PA V2 max: 122.0 cm/sec  PA max P.1 mmHg  PA acc time: 0.07 sec  TR max hugo: 427.7 cm/sec  TR max P.2 mmHg  AV Hugo Ratio (DI): 0.84  CHALO Index (cm2/m2): 0.89     Peak E' Hugo: 5.2 cm/sec  RV S Hugo: 11.5 cm/sec     ______________________________________________________________________________  Report approved by: Isela Gerber 2024 03:17 PM

## 2024-09-18 NOTE — PROGRESS NOTES
Vascular surgery note    No dyspnea, feels well. No new neuro changes. Tolerating reg diet. Agreed to TCU yesterday.  VS and labs reviewed. On exam NLB on 3 L NC. L neck incision c/d/I, flat, neuro intact aside from chronic expressive aphasia and slight leftward tongue deviation.  Plan to continue oral diuresis, repeat echo as per cardiology. Continue aspirin and ppx SQH. DC to TCU when cleared by cardiology and bed available.     Aramis Womack MD

## 2024-09-18 NOTE — PROGRESS NOTES
"CLINICAL NUTRITION SERVICES - BRIEF NOTE    EVALUATION OF THE PROGRESS TOWARD GOALS   Diet: Regular  Supplement: magic cup with lunch and dinner  Intake: % of meals per flowsheets, no record of intake yesterday       NEW FINDINGS   Met with pt, states appetite is, \"nothing,\" has not ordered anything to eat today. Has a container with a green smoothie someone brought in for her. Has had bites of the magic cup and liked ok. Mentions maybe ordering something for lunch and encouraged her to do so, then just keeps repeating, \"I don't know,\" noted issues with mild expressive aphasia. Obtained lunch order.    Date/Time Weight Weight Method   09/18/24 0358 39.9 kg (87 lb 14.4 oz) Standing scale   09/17/24 0500 40.9 kg (90 lb 1.6 oz) Standing scale   09/16/24 2102 40.4 kg (89 lb 1.6 oz) Standing scale   09/16/24 0521 37.7 kg (83 lb 1.6 oz) Bed scale   09/15/24 0600 39.7 kg (87 lb 8 oz) Bed scale   09/13/24 1745 41.5 kg (91 lb 9.6 oz) Bed scale   09/13/24 0608 41.3 kg (91 lb) Standing scale     Last BM 9/13, issues with constipation, on bowel meds    INTERVENTIONS  Implementation  Continue supplements as ordered  Ordered lunch      "

## 2024-09-18 NOTE — PLAN OF CARE
Problem: Adult Inpatient Plan of Care  Goal: Absence of Hospital-Acquired Illness or Injury  Outcome: Progressing  Intervention: Identify and Manage Fall Risk  Recent Flowsheet Documentation  Taken 9/18/2024 0025 by Anne Christopher RN  Safety Promotion/Fall Prevention:   assistive device/personal items within reach   clutter free environment maintained   lighting adjusted   nonskid shoes/slippers when out of bed   patient and family education   safety round/check completed  Intervention: Prevent Skin Injury  Recent Flowsheet Documentation  Taken 9/18/2024 0025 by Anne Christopher RN  Body Position: position changed independently  Intervention: Prevent Infection  Recent Flowsheet Documentation  Taken 9/18/2024 0025 by Anne Christopher RN  Infection Prevention: rest/sleep promoted  Goal: Optimal Comfort and Wellbeing  Outcome: Progressing     Problem: Risk for Delirium  Goal: Improved Attention and Thought Clarity  Outcome: Progressing  Goal: Improved Sleep  Outcome: Progressing     Problem: Gas Exchange Impaired  Goal: Optimal Gas Exchange  Outcome: Progressing     Problem: Pain Acute  Goal: Optimal Pain Control and Function  Outcome: Progressing  Intervention: Prevent or Manage Pain  Recent Flowsheet Documentation  Taken 9/18/2024 0025 by Anne Christopher RN  Medication Review/Management: medications reviewed   Goal Outcome Evaluation:         Patient denied pain and was able to sleep for most of the night with the help of ear plugs. Patient tired to go to the bathroom but was unable to void, hydration encouraged. Tele NSR. Labs collected via PICC line. Was on 2L oxygen but was desatting to 86-88% often, increased to 3L then 4L during the night, deep breathing encouraged. Patient reports getting 3/4 walks in today; hoping to work more with PT on this today. Possible discharge to TCU pending placement.       Vitals:    09/18/24 0010 09/18/24 0039 09/18/24 0215 09/18/24 0358   BP: 109/63   100/59   BP Location:  Right arm   Right arm   Pulse: 96   97   Resp: 18   18   Temp: 98.4  F (36.9  C)   98.4  F (36.9  C)   TempSrc: Oral   Oral   SpO2: 94% 91% 93% 97%   Weight:    39.9 kg (87 lb 14.4 oz)         Heart Failure Care Map  GOALS TO BE MET BEFORE DISCHARGE:    1. Decrease congestion and/or edema with diuretic therapy to achieve near optimal volume status.     Dyspnea improved: No, further care required to meet this goal. Please explain Had to increase oxygen to 4L again when sleeping   Edema improved: Yes, satisfactory for discharge.        Last 24 hour I/O:   Intake/Output Summary (Last 24 hours) at 9/18/2024 0616  Last data filed at 9/18/2024 0547  Gross per 24 hour   Intake 560 ml   Output 1225 ml   Net -665 ml           Net I/O and Weights since admission:   08/19 0700 - 09/18 0659  In: 5005.66 [P.O.:2230; I.V.:2775.66]  Out: 7435 [Urine:7435]  Net: -2429.34     Vitals:    09/13/24 0608 09/13/24 1745 09/15/24 0600 09/16/24 0521   Weight: 41.3 kg (91 lb) 41.5 kg (91 lb 9.6 oz) 39.7 kg (87 lb 8 oz) 37.7 kg (83 lb 1.6 oz)    09/16/24 2102 09/17/24 0500 09/18/24 0358   Weight: 40.4 kg (89 lb 1.6 oz) 40.9 kg (90 lb 1.6 oz) 39.9 kg (87 lb 14.4 oz)       2.  O2 sats > 90% on room air, or at prior home O2 therapy level.      Able to wean O2 this shift to keep sats above 90%?: No, further care required to meet this goal. Please explain Had to increase to 4L again while sleeping   Does patient use Home O2? No          Current oxygenation status:   SpO2: 97 %     O2 Device: Nasal cannula, Oxygen Delivery: 4 LPM    3.  Tolerates ambulation and mobility near baseline.     Ambulation: Yes, satisfactory for discharge.   Times patient ambulated with staff this shift: 1    Please review the Heart Failure Care Map for additional HF goal outcomes.    Anne Christopher, RN  9/18/2024

## 2024-09-18 NOTE — PROGRESS NOTES
HEART CARE NOTE          Assessment/Recommendations   1. RV dysfunction + valvular heart disease c/b cardiogenic shock - resolving   Assessment / Plan  Transition back to oral diuretic regimen and continue to monitor UOP and renal function closely; wean pressors as tolerated  Patient is high risk for adverse cardiac events 2/2 advanced age, frailty, RV dysfunction, pulm HTN  GDMT on hold given the above     2. Valvular heart disease  Assessment / Plan  Severe TR, moderate AI, and bioprosthetic mitral valve stenosis - will get repeat echo when near euvolemia to better assess dysfunction     3. Pulmonary HTN  Assessment / Plan  Estimated PASP of ~ 69 mmHg; repeat echo today   Diuresis and supportive care as above     4. Acute hypoxic respiratory failure  Assessment / Plan  Cardiogenic pulmonary edema - resolving; diuresis and supportive care as above     Plan of care discussed on September 18, 2024 with patient at bedside, and primary team overseeing patient's care    History of Present Illness/Subjective    Ms. Ernestine Morales is a 64 year old female with a PMHx significant for (per Epic notation) COPD and pulmonary hypertension, elevated filling pressures s/p mitral valve repair who presented for elective carotid endarterectomy. This has been complicated by labile blood pressures and intermittent hypoxia.      Today, Mrs. Morales denies acute cardiac events or complaints; Management plan as detailed above     ECG: Personally reviewed. normal EKG, normal sinus rhythm.     ECHO (personnaly Reviewed on 9/15/24):   The left ventricle is normal in size.  Left ventricular function is normal.The ejection fraction is 60-65%.  The right ventricle is mildly dilated. Mildly decreased right ventricular  systolic function  The left atrium is severely dilated. The right atrium is moderately dilated.  There is severe (4+) tricuspid regurgitation.  Right ventricular systolic pressure is elevated, consistent with  severe  pulmonary hypertension.  The aortic valve is trileaflet with aortic valve sclerosis.  There is moderate (2+) aortic regurgitation.  There is a bioprosthetic mitral valve. Bioprosthetic mitral leaflets are not  well visualized. Mean gradient elevated at 17 mmHg suggesting prosthetic  stenosis however this is at an elevated heart rate of 91 mmHg on pressor  support. There is mild (1+) prosthetic mitral valve regurgitation.  Compared to the prior study dated 4/10/24, there are changes as noted.  Increased pulmonary hypertension and increased mean gradient across the  bioprosthetic mitral valve. Prosthesis is not well visualized and appears  thickened. Consider ANGE for further evaluation.    Telemetry: personally reviewed September 18, 2024; notable for sinus rhythm     Lab results: personally reviewed September 18, 2024; notable for hyponatremia     Medical history and pertinent documents reviewed in Care Everywhere please where applicable see details above        Physical Examination Review of Systems   /59 (BP Location: Right arm)   Pulse 97   Temp 98.4  F (36.9  C) (Oral)   Resp 18   Wt 39.9 kg (87 lb 14.4 oz)   SpO2 97%   BMI 16.61 kg/m    Body mass index is 16.61 kg/m .  Wt Readings from Last 3 Encounters:   09/18/24 39.9 kg (87 lb 14.4 oz)   08/29/24 41.7 kg (92 lb)   08/26/24 41.5 kg (91 lb 9.6 oz)     General Appearance:   no distress, normal body habitus   ENT/Mouth: membranes moist, no oral lesions or bleeding gums.      EYES:  no scleral icterus, normal conjunctivae   Neck: no carotid bruits or thyromegaly   Chest/Lungs:   lungs are clear to auscultation, no rales or wheezing, equal chest wall expansion    Cardiovascular:   Regular. Normal first and second heart sounds with +SAMUEL; rubs, or gallops; the carotid, radial and posterior tibial pulses are intact, no JVD or LE edema bilaterally    Abdomen:  no organomegaly, masses, bruits, or tenderness; bowel sounds are present   Extremities: no  cyanosis or clubbing   Skin: no xanthelasma, warm.    Neurologic: NAD     Psychiatric: alert and oriented x3, calm     A complete 10 systems ROS was reviewed  And is negative except what is listed in the HPI.          Medical History  Surgical History Family History Social History   Past Medical History:   Diagnosis Date    Anemia     Aphasia     Atrial fibrillation (H)     Cancer (H) 11.17.2022    Squamous cell carcinoma nRight Cheek    Carotid artery stenosis     Cerebrovascular accident (H) 01/09/2017    Congestive heart failure (H)     HLD (hyperlipidemia)     HTN (hypertension)     Mitral regurgitation     Past Surgical History:   Procedure Laterality Date    BIOPSY  11.18.2022    Right Cheek    ENDARTERECTOMY CAROTID Left 9/13/2024    Procedure: ENDARTERECTOMY, CAROTID WITH NEURO MONITORING;  Surgeon: Margarita Tobin MD;  Location: [facility] [place] OR    [facility] TRIPLE LUMEN PLACEMENT  9/14/2024    REPLACE VALVE MITRAL  06/09/2017    bovine mitral valve with Maze and ARIANNA ligation    no family history of premature coronary artery disease Social History     Socioeconomic History    Marital status: Single     Spouse name: Not on file    Number of children: Not on file    Years of education: Not on file    Highest education level: Not on file   Occupational History    Not on file   Tobacco Use    Smoking status: Every Day     Current packs/day: 0.25     Average packs/day: 0.9 packs/day for 40.7 years (34.9 ttl pk-yrs)     Types: Cigarettes     Start date: 1/19/1984    Smokeless tobacco: Not on file    Tobacco comments:     I've cut down to just a few a day   Substance and Sexual Activity    Alcohol use: Not Currently    Drug use: Never    Sexual activity: Not Currently     Partners: Female     Birth control/protection: None   Other Topics Concern    Parent/sibling w/ CABG, MI or angioplasty before 65F 55M? No   Social History Narrative    Not on file     Social Determinants of Health     Financial Resource  Strain: Low Risk  (4/9/2024)    Financial Resource Strain     Within the past 12 months, have you or your family members you live with been unable to get utilities (heat, electricity) when it was really needed?: No   Food Insecurity: Low Risk  (4/9/2024)    Food Insecurity     Within the past 12 months, did you worry that your food would run out before you got money to buy more?: No     Within the past 12 months, did the food you bought just not last and you didn t have money to get more?: No   Transportation Needs: Low Risk  (4/9/2024)    Transportation Needs     Within the past 12 months, has lack of transportation kept you from medical appointments, getting your medicines, non-medical meetings or appointments, work, or from getting things that you need?: No   Physical Activity: Sufficiently Active (4/9/2024)    Exercise Vital Sign     Days of Exercise per Week: 7 days     Minutes of Exercise per Session: 30 min   Stress: No Stress Concern Present (4/9/2024)    [employer] [place] [employer] - Occupational Stress Questionnaire     Feeling of Stress : Only a little   Social Connections: Unknown (4/9/2024)    Social Connection and Isolation Panel [NHANES]     Frequency of Communication with Friends and Family: Not on file     Frequency of Social Gatherings with Friends and Family: More than three times a week     Attends Zoroastrianism Services: Not on file     Active Member of Clubs or Organizations: Not on file     Attends Club or Organization Meetings: Not on file     Marital Status: Not on file   Interpersonal Safety: Low Risk  (9/13/2024)    Interpersonal Safety     Do you feel physically and emotionally safe where you currently live?: Yes     Within the past 12 months, have you been hit, slapped, kicked or otherwise physically hurt by someone?: No     Within the past 12 months, have you been humiliated or emotionally abused in other ways by your partner or ex-partner?: No   Housing Stability: Low Risk   "(4/9/2024)    Housing Stability     Do you have housing? : Yes     Are you worried about losing your housing?: No           Lab Results    Chemistry/lipid CBC Cardiac Enzymes/BNP/TSH/INR   Lab Results   Component Value Date    CHOL 166 08/26/2024    HDL 53 08/26/2024    TRIG 80 08/26/2024    BUN 20.6 09/17/2024     09/17/2024    CO2 29 09/17/2024    Lab Results   Component Value Date    WBC 7.6 09/17/2024    HGB 8.4 (L) 09/17/2024    HCT 25.5 (L) 09/17/2024    MCV 93 09/17/2024     09/17/2024    Lab Results   Component Value Date    TSH 1.94 04/16/2024     No results found for: \"CKTOTAL\", \"CKMB\", \"TROPONINI\"       Weight:    Wt Readings from Last 3 Encounters:   09/18/24 39.9 kg (87 lb 14.4 oz)   08/29/24 41.7 kg (92 lb)   08/26/24 41.5 kg (91 lb 9.6 oz)       Allergies  No Known Allergies      Surgical History  Past Surgical History:   Procedure Laterality Date    BIOPSY  11.18.2022    Right Cheek    ENDARTERECTOMY CAROTID Left 9/13/2024    Procedure: ENDARTERECTOMY, CAROTID WITH NEURO MONITORING;  Surgeon: Margarita Tobin MD;  Location: [facility] OR    [facility] TRIPLE LUMEN PLACEMENT  9/14/2024    REPLACE VALVE MITRAL  06/09/2017    bovine mitral valve with Maze and ARIANNA ligation       Social History  Tobacco:   History   Smoking Status    Every Day    Types: Cigarettes   Smokeless Tobacco    Not on file    Alcohol:   Social History    Substance and Sexual Activity      Alcohol use: Not Currently   Illicit Drugs:   History   Drug Use Unknown       Family History  Family History   Problem Relation Age of Onset    Hypertension Mother           Clotilde Ravi MD on 9/18/2024      cc: Christina Andrew"

## 2024-09-18 NOTE — PROGRESS NOTES
SW assisted with completing preadmission screening. [id number]    THIERRY Brown on 9/18/2024 at 2:01 PM

## 2024-09-18 NOTE — PLAN OF CARE
Problem: Gas Exchange Impaired  Goal: Optimal Gas Exchange  Outcome: Progressing     Problem: Pain Acute  Goal: Optimal Pain Control and Function  Outcome: Progressing  Intervention: Develop Pain Management Plan  Recent Flowsheet Documentation  Taken 9/18/2024 0906 by Tiffanie Mayo RN  Pain Management Interventions:   distraction   emotional support  Intervention: Prevent or Manage Pain  Recent Flowsheet Documentation  Taken 9/18/2024 0906 by Tiffanie Mayo RN  Sensory Stimulation Regulation:   care clustered   lighting decreased  Medication Review/Management: medications reviewed   Goal Outcome Evaluation:       Pt AOX4 and c/o 5/10 headache, PRN administered by NOC RN w/ some relief. VSS. Pt weaned to RA for majority of the morning and early afternoon, Pt desated to mid 80s after laying down in bed and appeared to be resting. Pt placed back on 2L. ECHO completed in AM. Procedure site is [facility]. Pt is able to make needs known.

## 2024-09-18 NOTE — PROGRESS NOTES
Care Management Follow Up    Length of Stay (days): 5    Expected Discharge Date: 09/18/2024     Concerns to be Addressed: discharge planning     Patient plan of care discussed at interdisciplinary rounds: Yes    Anticipated Discharge Disposition: Transitional Care     Anticipated Discharge Services: None  Anticipated Discharge DME: None    Patient/family educated on Medicare website which has current facility and service quality ratings: yes  Education Provided on the Discharge Plan: Yes  Patient/Family in Agreement with the Plan: yes    Referrals Placed by CM/SW:    Private pay costs discussed: Not applicable    Discussed  Partnership in Safe Discharge Planning  document with patient/family: No     Handoff Completed: No, handoff not indicated or clinically appropriate    Additional Information:  TOSHIA spoke with Marian on the phone to discuss TCU. SW updated her that the original 2 facilities do not have any beds available. SW requested additional areas to send referrals; referrals sent to [facility], and [facility].     1:34 PM   SW discussed accepting facilities with family. They would like [facility]; SW updated accepting/selected facilities.     Next Steps: Awaiting medical stability.     DUKE Patel

## 2024-09-18 NOTE — PLAN OF CARE
Problem: Gas Exchange Impaired  Goal: Optimal Gas Exchange  Outcome: Progressing  Intervention: Optimize Oxygenation and Ventilation  Recent Flowsheet Documentation  Taken 9/17/2024 2010 by Eli Kyle RN  Head of Bed (HOB) Positioning: HOB at 20-30 degrees     Problem: Pain Acute  Goal: Optimal Pain Control and Function  Outcome: Progressing  Intervention: Prevent or Manage Pain  Recent Flowsheet Documentation  Taken 9/17/2024 2010 by Eli Kyle RN  Sensory Stimulation Regulation:   lighting decreased   quiet environment promoted  Sleep/Rest Enhancement:   regular sleep/rest pattern promoted   room darkened  Medication Review/Management: medications reviewed  Intervention: Optimize Psychosocial Wellbeing  Recent Flowsheet Documentation  Taken 9/17/2024 2010 by Eli Kyle RN  Supportive Measures:   active listening utilized   decision-making supported     Goal Outcome Evaluation:  Pt denies pain. O2 sats in the upper 90s on 2L NC. Pt does endorse a dry left nostril with NC in place. NSR. Left endart site is open to air and CDI. VSS. Will continue to monitor and notify MD of any changes.

## 2024-09-19 ENCOUNTER — APPOINTMENT (OUTPATIENT)
Dept: PHYSICAL THERAPY | Facility: HOSPITAL | Age: 64
DRG: 037 | End: 2024-09-19
Attending: SURGERY
Payer: MEDICARE

## 2024-09-19 PROBLEM — I27.20 PULMONARY HYPERTENSION (H): Status: ACTIVE | Noted: 2024-09-19

## 2024-09-19 LAB
ABO/RH(D): NORMAL
ANION GAP SERPL CALCULATED.3IONS-SCNC: 12 MMOL/L (ref 7–15)
ANTIBODY SCREEN: NEGATIVE
BASE EXCESS BLDV CALC-SCNC: 6 MMOL/L (ref -3–3)
BUN SERPL-MCNC: 24.3 MG/DL (ref 8–23)
CALCIUM SERPL-MCNC: 8.8 MG/DL (ref 8.8–10.4)
CHLORIDE SERPL-SCNC: 94 MMOL/L (ref 98–107)
CREAT SERPL-MCNC: 0.79 MG/DL (ref 0.51–0.95)
EGFRCR SERPLBLD CKD-EPI 2021: 83 ML/MIN/1.73M2
GLUCOSE SERPL-MCNC: 94 MG/DL (ref 70–99)
HCO3 BLDV-SCNC: 29 MMOL/L (ref 21–28)
HCO3 SERPL-SCNC: 28 MMOL/L (ref 22–29)
MAGNESIUM SERPL-MCNC: 2.2 MG/DL (ref 1.7–2.3)
OXYHGB MFR BLDV: 42 % (ref 70–75)
PCO2 BLDV: 42 MM HG (ref 40–50)
PH BLDV: 7.47 [PH] (ref 7.32–7.43)
PLATELET # BLD AUTO: 229 10E3/UL (ref 150–450)
PO2 BLDV: 26 MM HG (ref 25–47)
POTASSIUM SERPL-SCNC: 3.4 MMOL/L (ref 3.4–5.3)
POTASSIUM SERPL-SCNC: 4 MMOL/L (ref 3.4–5.3)
SAO2 % BLDV: 43 % (ref 70–75)
SODIUM SERPL-SCNC: 134 MMOL/L (ref 135–145)
SPECIMEN EXPIRATION DATE: NORMAL

## 2024-09-19 PROCEDURE — 120N000004 HC R&B MS OVERFLOW

## 2024-09-19 PROCEDURE — 80048 BASIC METABOLIC PNL TOTAL CA: CPT | Performed by: INTERNAL MEDICINE

## 2024-09-19 PROCEDURE — 250N000009 HC RX 250: Performed by: INTERNAL MEDICINE

## 2024-09-19 PROCEDURE — 85049 AUTOMATED PLATELET COUNT: CPT | Performed by: INTERNAL MEDICINE

## 2024-09-19 PROCEDURE — 97116 GAIT TRAINING THERAPY: CPT | Mod: GP

## 2024-09-19 PROCEDURE — 250N000011 HC RX IP 250 OP 636: Performed by: INTERNAL MEDICINE

## 2024-09-19 PROCEDURE — 250N000013 HC RX MED GY IP 250 OP 250 PS 637: Performed by: INTERNAL MEDICINE

## 2024-09-19 PROCEDURE — C1751 CATH, INF, PER/CENT/MIDLINE: HCPCS | Performed by: INTERNAL MEDICINE

## 2024-09-19 PROCEDURE — 83735 ASSAY OF MAGNESIUM: CPT | Performed by: INTERNAL MEDICINE

## 2024-09-19 PROCEDURE — 99232 SBSQ HOSP IP/OBS MODERATE 35: CPT | Performed by: INTERNAL MEDICINE

## 2024-09-19 PROCEDURE — 272N000001 HC OR GENERAL SUPPLY STERILE: Performed by: INTERNAL MEDICINE

## 2024-09-19 PROCEDURE — 4A023N6 MEASUREMENT OF CARDIAC SAMPLING AND PRESSURE, RIGHT HEART, PERCUTANEOUS APPROACH: ICD-10-PCS | Performed by: INTERNAL MEDICINE

## 2024-09-19 PROCEDURE — 82805 BLOOD GASES W/O2 SATURATION: CPT

## 2024-09-19 PROCEDURE — 93451 RIGHT HEART CATH: CPT | Performed by: INTERNAL MEDICINE

## 2024-09-19 PROCEDURE — 250N000013 HC RX MED GY IP 250 OP 250 PS 637: Performed by: SURGERY

## 2024-09-19 PROCEDURE — 99233 SBSQ HOSP IP/OBS HIGH 50: CPT | Performed by: INTERNAL MEDICINE

## 2024-09-19 PROCEDURE — 84132 ASSAY OF SERUM POTASSIUM: CPT | Performed by: SURGERY

## 2024-09-19 PROCEDURE — 86900 BLOOD TYPING SEROLOGIC ABO: CPT | Performed by: INTERNAL MEDICINE

## 2024-09-19 PROCEDURE — C1894 INTRO/SHEATH, NON-LASER: HCPCS | Performed by: INTERNAL MEDICINE

## 2024-09-19 PROCEDURE — 93451 RIGHT HEART CATH: CPT | Mod: 26 | Performed by: INTERNAL MEDICINE

## 2024-09-19 RX ORDER — HEPARIN SODIUM 200 [USP'U]/100ML
INJECTION, SOLUTION INTRAVENOUS
Status: DISCONTINUED | OUTPATIENT
Start: 2024-09-19 | End: 2024-09-19 | Stop reason: HOSPADM

## 2024-09-19 RX ORDER — ACETAMINOPHEN 500 MG
1000 TABLET ORAL EVERY 6 HOURS
COMMUNITY
Start: 2024-09-19 | End: 2024-09-24

## 2024-09-19 RX ORDER — POTASSIUM CHLORIDE 1500 MG/1
20 TABLET, EXTENDED RELEASE ORAL ONCE
Status: COMPLETED | OUTPATIENT
Start: 2024-09-19 | End: 2024-09-19

## 2024-09-19 RX ORDER — LANOLIN ALCOHOL/MO/W.PET/CERES
3 CREAM (GRAM) TOPICAL
Status: DISCONTINUED | OUTPATIENT
Start: 2024-09-19 | End: 2024-09-20 | Stop reason: HOSPADM

## 2024-09-19 RX ORDER — ASPIRIN 325 MG
325 TABLET ORAL ONCE
Status: DISCONTINUED | OUTPATIENT
Start: 2024-09-19 | End: 2024-09-19

## 2024-09-19 RX ADMIN — HEPARIN SODIUM 5000 UNITS: 10000 INJECTION, SOLUTION INTRAVENOUS; SUBCUTANEOUS at 05:24

## 2024-09-19 RX ADMIN — SENNOSIDES AND DOCUSATE SODIUM 1 TABLET: 8.6; 5 TABLET ORAL at 08:22

## 2024-09-19 RX ADMIN — SENNOSIDES AND DOCUSATE SODIUM 1 TABLET: 8.6; 5 TABLET ORAL at 21:14

## 2024-09-19 RX ADMIN — TORSEMIDE 20 MG: 20 TABLET ORAL at 08:22

## 2024-09-19 RX ADMIN — POLYETHYLENE GLYCOL 3350 17 G: 17 POWDER, FOR SOLUTION ORAL at 08:22

## 2024-09-19 RX ADMIN — POTASSIUM CHLORIDE 20 MEQ: 1500 TABLET, EXTENDED RELEASE ORAL at 06:15

## 2024-09-19 RX ADMIN — ASPIRIN 325 MG: 325 TABLET, COATED ORAL at 08:22

## 2024-09-19 RX ADMIN — ACETAMINOPHEN 650 MG: 325 TABLET ORAL at 19:24

## 2024-09-19 RX ADMIN — GABAPENTIN 300 MG: 300 CAPSULE ORAL at 21:14

## 2024-09-19 RX ADMIN — GABAPENTIN 300 MG: 300 CAPSULE ORAL at 08:22

## 2024-09-19 RX ADMIN — HEPARIN SODIUM 5000 UNITS: 10000 INJECTION, SOLUTION INTRAVENOUS; SUBCUTANEOUS at 21:14

## 2024-09-19 RX ADMIN — ATORVASTATIN CALCIUM 80 MG: 40 TABLET, FILM COATED ORAL at 21:14

## 2024-09-19 RX ADMIN — Medication 3 MG: at 23:51

## 2024-09-19 RX ADMIN — ACETAMINOPHEN 650 MG: 325 TABLET ORAL at 08:30

## 2024-09-19 ASSESSMENT — ACTIVITIES OF DAILY LIVING (ADL)
ADLS_ACUITY_SCORE: 41
DIFFICULTY_COMMUNICATING: YES
ADLS_ACUITY_SCORE: 43
ADLS_ACUITY_SCORE: 42
WEAR_GLASSES_OR_BLIND: YES
ADLS_ACUITY_SCORE: 42
ADLS_ACUITY_SCORE: 41
ADLS_ACUITY_SCORE: 42
COMMUNICATION: DIFFICULTY SPEAKING
CHANGE_IN_FUNCTIONAL_STATUS_SINCE_ONSET_OF_CURRENT_ILLNESS/INJURY: NO
ADLS_ACUITY_SCORE: 41
ADLS_ACUITY_SCORE: 42
ADLS_ACUITY_SCORE: 41
WALKING_OR_CLIMBING_STAIRS_DIFFICULTY: YES
VISION_MANAGEMENT: GLASSES
ADLS_ACUITY_SCORE: 41
ADLS_ACUITY_SCORE: 42
DIFFICULTY_EATING/SWALLOWING: NO
FALL_HISTORY_WITHIN_LAST_SIX_MONTHS: NO
CONCENTRATING,_REMEMBERING_OR_MAKING_DECISIONS_DIFFICULTY: NO
ADLS_ACUITY_SCORE: 42
WALKING_OR_CLIMBING_STAIRS: AMBULATION DIFFICULTY, REQUIRES EQUIPMENT
TOILETING_ISSUES: NO
ADLS_ACUITY_SCORE: 42
ADLS_ACUITY_SCORE: 41
DOING_ERRANDS_INDEPENDENTLY_DIFFICULTY: YES
ADLS_ACUITY_SCORE: 42
EQUIPMENT_CURRENTLY_USED_AT_HOME: CANE, STRAIGHT
ADLS_ACUITY_SCORE: 41
DRESSING/BATHING_DIFFICULTY: NO

## 2024-09-19 NOTE — PRE-PROCEDURE
GENERAL PRE-PROCEDURE:   Procedure:  Right heart catheterization, pulmonary vasodilator study  Date/Time:  9/19/2024 2:06 PM    Written consent obtained?: Yes    Risks and benefits: Risks, benefits and alternatives were discussed    Consent given by:  Patient  Patient states understanding of procedure being performed: Yes    Patient's understanding of procedure matches consent: Yes    Procedure consent matches procedure scheduled: Yes    Expected level of sedation:  Moderate  Appropriately NPO:  Yes  ASA Class:  4 (RV dysfunction, carotid artery disease; s/p recent CEA, mitral stenosis; s/p bioprosthetic MVR, HTN, COPD, pHTN, history of CVA, squamous cell carcinoma, acute hypoxic respiratory)  Mallampati  :  Grade 2- soft palate, base of uvula, tonsillar pillars, and portion of posterior pharyngeal wall visible  Lungs:  Lungs clear with good breath sounds bilaterally  Heart:  Normal heart sounds and rate  History & Physical reviewed:  History and physical reviewed and updates made (see comment)  H&P Comments:  Clinically Significant Risk Factors Present on Admission    Cardiovascular : RV dysfunction, mitral stenosis; s/p bioprosthetic MVR, HTN    Fluid & Electrolyte Disorders : Not present on admission    Gastroenterology : Not present on admission    Hematology/Oncology : squamous cell carcinoma    Nephrology : Not present on admission    Neurology : Hx of CVA with residual right sided hemiparesis and expressive aphasia, carotid artery disease; s/p recent CEA    Pulmonology : COPD, pHTN, acute hypoxic respiratory    Systemic : Not present on admission  Statement of review:  I have reviewed the lab findings, diagnostic data, medications, and the plan for sedation

## 2024-09-19 NOTE — PLAN OF CARE
Goal Outcome Evaluation:      Plan of Care Reviewed With: patient    Overall Patient Progress: no change      Problem: Adult Inpatient Plan of Care  Goal: Optimal Comfort and Wellbeing  Outcome: Progressing  Intervention: Provide Person-Centered Care  Recent Flowsheet Documentation  Taken 9/19/2024 0000 by Elvia Sweeney RN  Trust Relationship/Rapport:   care explained   choices provided   emotional support provided   empathic listening provided   questions answered   questions encouraged   reassurance provided   thoughts/feelings acknowledged     Problem: Risk for Delirium  Goal: Improved Attention and Thought Clarity  Outcome: Progressing  Intervention: Maximize Cognitive Function  Recent Flowsheet Documentation  Taken 9/19/2024 0000 by Elvia Sweeney RN  Reorientation Measures:   calendar in view   clock in view   glasses use encouraged     Problem: Risk for Delirium  Goal: Improved Sleep  Outcome: Progressing  Intervention: Promote Sleep  Recent Flowsheet Documentation  Taken 9/19/2024 0000 by Elvia Sweeney RN  Sleep/Rest Enhancement:   regular sleep/rest pattern promoted   room darkened   awakenings minimized     Problem: Pain Acute  Goal: Optimal Pain Control and Function  Outcome: Progressing  Intervention: Prevent or Manage Pain  Recent Flowsheet Documentation  Taken 9/19/2024 0000 by Elvia Sweeney RN  Sleep/Rest Enhancement:   regular sleep/rest pattern promoted   room darkened   awakenings minimized  Medication Review/Management:   medications reviewed   high-risk medications identified  Intervention: Optimize Psychosocial Wellbeing  Recent Flowsheet Documentation  Taken 9/19/2024 0000 by Elvia Sweeney RN  Supportive Measures:   active listening utilized   decision-making supported   relaxation techniques promoted   self-reflection promoted   verbalization of feelings encouraged    Heart Failure Care Map  GOALS TO BE MET BEFORE DISCHARGE:    1. Decrease congestion and/or edema with  diuretic therapy to achieve near optimal volume status.     Dyspnea improved: Yes, satisfactory for discharge.   Edema improved: Yes, satisfactory for discharge.        Last 24 hour I/O:   Intake/Output Summary (Last 24 hours) at 9/19/2024 0707  Last data filed at 9/19/2024 0625  Gross per 24 hour   Intake 880 ml   Output 900 ml   Net -20 ml           Net I/O and Weights since admission:   08/20 1500 - 09/19 1459  In: 5885.66 [P.O.:3110; I.V.:2775.66]  Out: 8335 [Urine:8335]  Net: -2449.34     Vitals:    09/13/24 0608 09/13/24 1745 09/15/24 0600 09/16/24 0521   Weight: 41.3 kg (91 lb) 41.5 kg (91 lb 9.6 oz) 39.7 kg (87 lb 8 oz) 37.7 kg (83 lb 1.6 oz)    09/16/24 2102 09/17/24 0500 09/18/24 0358 09/19/24 0625   Weight: 40.4 kg (89 lb 1.6 oz) 40.9 kg (90 lb 1.6 oz) 39.9 kg (87 lb 14.4 oz) 39.5 kg (87 lb 1.6 oz)       2.  O2 sats > 90% on room air, or at prior home O2 therapy level.      Able to wean O2 this shift to keep sats above 90%?: No 2L NC   Does patient use Home O2? No          Current oxygenation status:   SpO2: 98 %     O2 Device: Nasal cannula, Oxygen Delivery: 2 LPM    3.  Tolerates ambulation and mobility near baseline.     Ambulation: Yes 1x    Pt Aox4. NSR. 2L NC. Denies SOB, chest pain, or generalized pain. Incision site WDL. Pt able to make needs know, call light within reach. POCC.    Elvia Sweeney RN on 9/19/2024 at 7:33 AM

## 2024-09-19 NOTE — SIGNIFICANT EVENT
Significant Event Note    Time of event: 9:34 PM September 18, 2024    Description of event:  Called to pt bedside to evaluate discomfort of L side of patient's neck. Patient is s/p CEA, c/o discomfort/incisional site pain. Reports able to drink PO fluids, denies difficulty swallowing or difficulty breathing. Incisional site CDI, no surrounding erythema or drainage. Pt agreeable to trial topical pain medicine    Plan:  Trial voltaren gel    Discussed with: bedside nurse    Jesus Mackenzie MD

## 2024-09-19 NOTE — PROGRESS NOTES
Vascular surgery note.    No dyspnea. Remains on 2L nasal cannula. Some L neck pain last night, though she does not remember this now.  VS and labs reviewed. On exam L neck incision remains intact, healthy, resolving ecchymosis, no hematoma. Neuro intact aside from chronic expressive aphasia and mild L tongue deviation.  Plan is to continue PO diuresis. Cardiology planning on right heart cath today and requesting cardiac surgery consult for severe prosthetic mitral valve stenosis. Otherwise continue current plan of care.    Aramis Womack MD

## 2024-09-19 NOTE — PROGRESS NOTES
Report to RN, Brachial site without bleeding or swelling. Reported that PICC line infuses red only , white and gray unable to flush or blood return. Pt transferred back to [facility], baseline neuro status. No sedation received. Marian updated by Dr Zamudio.

## 2024-09-19 NOTE — PROGRESS NOTES
Care Management Follow Up    Length of Stay (days): 6    Expected Discharge Date: 09/19/2024     Concerns to be Addressed: discharge planning     Patient plan of care discussed at interdisciplinary rounds: Yes    Anticipated Discharge Disposition: Transitional Care     Anticipated Discharge Services: None  Anticipated Discharge DME: None    Patient/family educated on Medicare website which has current facility and service quality ratings: yes  Education Provided on the Discharge Plan: Yes  Patient/Family in Agreement with the Plan: yes    Referrals Placed by CM/SW:    Private pay costs discussed: Not applicable    Discussed  Partnership in Safe Discharge Planning  document with patient/family: No     Handoff Completed: No, handoff not indicated or clinically appropriate    Additional Information:  Per hospitalist, patient is not medically ready for discharge.     Next Steps: TOSHIA following for med readiness.     DUKE Patel

## 2024-09-19 NOTE — PLAN OF CARE
"Goal Outcome Evaluation:    Problem: Gas Exchange Impaired  Goal: Optimal Gas Exchange  Outcome: Progressing  Intervention: Optimize Oxygenation and Ventilation  Recent Flowsheet Documentation  Taken 9/18/2024 2107 by Shelley Nath, RN  Head of Bed (HOB) Positioning: HOB at 20-30 degrees     Problem: Pain Acute  Goal: Optimal Pain Control and Function  Outcome: Progressing  Intervention: Develop Pain Management Plan  Recent Flowsheet Documentation  Taken 9/18/2024 2107 by Shelley Nath, RN  Pain Management Interventions: (accepted tylenol) medication (see MAR)  Taken 9/18/2024 1945 by Shelley Nath, RN  Pain Management Interventions: (refused tylenol for neck discomfort) medication offered but refused  Intervention: Prevent or Manage Pain  Recent Flowsheet Documentation  Taken 9/18/2024 1945 by Shelley Nath, RN  Sensory Stimulation Regulation:   care clustered   lighting decreased  Medication Review/Management: medications reviewed          FriendMarian, called the nursing station saying patient was in tears on phone saying something felt wrong. RN went to assess patient. Patient c/o discomfort in neck, denied pain, but described sensation as feeling \"wrong.\" Incision WDL, no significant neuro changes compared to previous assessments, perfusion intact, SBP low, but WDL per vitals order, otherwise VSS stable on 2 LPM NC.     Marian and patient were still concerned, so RN escalated concern to Resident. Resident came to see patient and ordered Voltaren gel for surgical site.      /64 (BP Location: Right arm)   Pulse 90   Temp 98.6  F (37  C) (Oral)   Resp 20   Wt 39.9 kg (87 lb 14.4 oz)   SpO2 98%   BMI 16.61 kg/m                   "

## 2024-09-19 NOTE — PLAN OF CARE
Problem: Adult Inpatient Plan of Care  Goal: Optimal Comfort and Wellbeing  Outcome: Progressing     Problem: Risk for Delirium  Goal: Improved Attention and Thought Clarity  Outcome: Progressing   Goal Outcome Evaluation:      Plan of Care Reviewed With: patient    Overall Patient Progress: no changeOverall Patient Progress: no change       Pt is A&Ox4, NSR, and 2L NC. K and Mg protocol, recheck in the AM. Baseline right sided weakness present. PICC line in place.     Marian was at bedside. Pt left unit for right heart cath. Education provided.     Brionna Mayo RN

## 2024-09-19 NOTE — PROGRESS NOTES
HEART CARE NOTE          Assessment/Recommendations   1. RV dysfunction + valvular heart disease c/b cardiogenic shock - resolving   Assessment / Plan  Tolerating oral diuretic regimen - no changes at this time; continue to monitor UOP and renal function closely; wean pressors as tolerated  Patient is high risk for adverse cardiac events 2/2 advanced age, frailty, RV dysfunction, pulm HTN  GDMT on hold given the above     2. Valvular heart disease  Assessment / Plan  Mod TR, mild - moderate AI, and severe bioprosthetic mitral valve stenosis - CT surgery consult placed     3. Pulmonary HTN  Assessment / Plan  Persistently elevated RVSP - will proceed with RHC +/- vasodilator study   Diuresis and supportive care as above     4. Acute hypoxic respiratory failure  Assessment / Plan  Cardiogenic pulmonary edema - resolved       Plan of care discussed on September 19, 2024 with patient and family at bedside, and primary team overseeing patient's care      History of Present Illness/Subjective    Ms. Ernestine Morales is a 64 year old female with a PMHx significant for (per Epic notation) COPD and pulmonary hypertension, elevated filling pressures s/p mitral valve repair who presented for elective carotid endarterectomy. This has been complicated by labile blood pressures and intermittent hypoxia.      Today, Mrs. Morales denies acute cardiac events or complaints; Management plan as detailed above     ECG: Personally reviewed. normal EKG, normal sinus rhythm.     ECHO (personnaly Reviewed on 9/15/24):   The left ventricle is normal in size.  Left ventricular function is normal.The ejection fraction is 60-65%.  The right ventricle is mildly dilated. Mildly decreased right ventricular  systolic function  The left atrium is severely dilated. The right atrium is moderately dilated.  There is severe (4+) tricuspid regurgitation.  Right ventricular systolic pressure is elevated, consistent with severe  pulmonary  hypertension.  The aortic valve is trileaflet with aortic valve sclerosis.  There is moderate (2+) aortic regurgitation.  There is a bioprosthetic mitral valve. Bioprosthetic mitral leaflets are not  well visualized. Mean gradient elevated at 17 mmHg suggesting prosthetic  stenosis however this is at an elevated heart rate of 91 mmHg on pressor  support. There is mild (1+) prosthetic mitral valve regurgitation.  Compared to the prior study dated 4/10/24, there are changes as noted.  Increased pulmonary hypertension and increased mean gradient across the  bioprosthetic mitral valve. Prosthesis is not well visualized and appears  thickened. Consider ANGE for further evaluation.    Telemetry: personally reviewed September 19, 2024; notable for sinus rhythm     Lab results: personally reviewed September 19, 2024; notable for renal function wnl    Medical history and pertinent documents reviewed in Care Everywhere please where applicable see details above        Physical Examination Review of Systems   /56 (BP Location: Right arm)   Pulse 90   Temp 98.5  F (36.9  C) (Oral)   Resp 18   Wt 39.9 kg (87 lb 14.4 oz)   SpO2 98%   BMI 16.61 kg/m    Body mass index is 16.61 kg/m .  Wt Readings from Last 3 Encounters:   09/18/24 39.9 kg (87 lb 14.4 oz)   08/29/24 41.7 kg (92 lb)   08/26/24 41.5 kg (91 lb 9.6 oz)     General Appearance:   no distress, normal body habitus   ENT/Mouth: membranes moist, no oral lesions or bleeding gums.      EYES:  no scleral icterus, normal conjunctivae   Neck: no carotid bruits or thyromegaly   Chest/Lungs:   lungs are clear to auscultation, no rales or wheezing, equal chest wall expansion    Cardiovascular:   Regular. Normal first and second heart sounds with +SAMUEL; no rubs, or gallops; the carotid, radial and posterior tibial pulses are intact, no JVD or LE edema bilaterally    Abdomen:  no organomegaly, masses, bruits, or tenderness; bowel sounds are present   Extremities: no cyanosis  or clubbing   Skin: no xanthelasma, warm.    Neurologic: NAD     Psychiatric: alert and oriented x3, calm     A complete 10 systems ROS was reviewed  And is negative except what is listed in the HPI.          Medical History  Surgical History Family History Social History   Past Medical History:   Diagnosis Date    Anemia     Aphasia     Atrial fibrillation (H)     Cancer (H) 11.17.2022    Squamous cell carcinoma nRight Cheek    Carotid artery stenosis     Cerebrovascular accident (H) 01/09/2017    Congestive heart failure (H)     HLD (hyperlipidemia)     HTN (hypertension)     Mitral regurgitation     Past Surgical History:   Procedure Laterality Date    BIOPSY  11.18.2022    Right Cheek    ENDARTERECTOMY CAROTID Left 9/13/2024    Procedure: ENDARTERECTOMY, CAROTID WITH NEURO MONITORING;  Surgeon: Margarita Tobin MD;  Location: [facility] [place] OR    PIC TRIPLE LUMEN PLACEMENT  9/14/2024    REPLACE VALVE MITRAL  06/09/2017    bovine mitral valve with Maze and ARIANNA ligation    no family history of premature coronary artery disease Social History     Socioeconomic History    Marital status: Single     Spouse name: Not on file    Number of children: Not on file    Years of education: Not on file    Highest education level: Not on file   Occupational History    Not on file   Tobacco Use    Smoking status: Every Day     Current packs/day: 0.25     Average packs/day: 0.9 packs/day for 40.7 years (34.9 ttl pk-yrs)     Types: Cigarettes     Start date: 1/19/1984    Smokeless tobacco: Not on file    Tobacco comments:     I've cut down to just a few a day   Substance and Sexual Activity    Alcohol use: Not Currently    Drug use: Never    Sexual activity: Not Currently     Partners: Female     Birth control/protection: None   Other Topics Concern    Parent/sibling w/ CABG, MI or angioplasty before 65F 55M? No   Social History Narrative    Not on file     Social Determinants of Health     Financial Resource Strain:  Low Risk  (9/19/2024)    Financial Resource Strain     Within the past 12 months, have you or your family members you live with been unable to get utilities (heat, electricity) when it was really needed?: No   Food Insecurity: Low Risk  (9/19/2024)    Food Insecurity     Within the past 12 months, did you worry that your food would run out before you got money to buy more?: No     Within the past 12 months, did the food you bought just not last and you didn t have money to get more?: No   Transportation Needs: Low Risk  (9/19/2024)    Transportation Needs     Within the past 12 months, has lack of transportation kept you from medical appointments, getting your medicines, non-medical meetings or appointments, work, or from getting things that you need?: No   Physical Activity: Sufficiently Active (4/9/2024)    Exercise Vital Sign     Days of Exercise per Week: 7 days     Minutes of Exercise per Session: 30 min   Stress: No Stress Concern Present (4/9/2024)    [place] [place] [employer] - Occupational Stress Questionnaire     Feeling of Stress : Only a little   Social Connections: Unknown (4/9/2024)    Social Connection and Isolation Panel [NHANES]     Frequency of Communication with Friends and Family: Not on file     Frequency of Social Gatherings with Friends and Family: More than three times a week     Attends Orthodoxy Services: Not on file     Active Member of Clubs or Organizations: Not on file     Attends Club or Organization Meetings: Not on file     Marital Status: Not on file   Interpersonal Safety: Low Risk  (9/13/2024)    Interpersonal Safety     Do you feel physically and emotionally safe where you currently live?: Yes     Within the past 12 months, have you been hit, slapped, kicked or otherwise physically hurt by someone?: No     Within the past 12 months, have you been humiliated or emotionally abused in other ways by your partner or ex-partner?: No   Housing Stability: High Risk  "(9/19/2024)    Housing Stability     Do you have housing? : No     Are you worried about losing your housing?: No           Lab Results    Chemistry/lipid CBC Cardiac Enzymes/BNP/TSH/INR   Lab Results   Component Value Date    CHOL 166 08/26/2024    HDL 53 08/26/2024    TRIG 80 08/26/2024    BUN 24.3 (H) 09/19/2024     (L) 09/19/2024    CO2 28 09/19/2024    Lab Results   Component Value Date    WBC 7.6 09/17/2024    HGB 8.4 (L) 09/18/2024    HCT 25.5 (L) 09/17/2024    MCV 93 09/17/2024     09/19/2024    Lab Results   Component Value Date    TSH 1.94 04/16/2024     No results found for: \"CKTOTAL\", \"CKMB\", \"TROPONINI\"       Weight:    Wt Readings from Last 3 Encounters:   09/18/24 39.9 kg (87 lb 14.4 oz)   08/29/24 41.7 kg (92 lb)   08/26/24 41.5 kg (91 lb 9.6 oz)       Allergies  No Known Allergies      Surgical History  Past Surgical History:   Procedure Laterality Date    BIOPSY  11.18.2022    Right Cheek    ENDARTERECTOMY CAROTID Left 9/13/2024    Procedure: ENDARTERECTOMY, CAROTID WITH NEURO MONITORING;  Surgeon: Margarita Tobin MD;  Location: [facility] [place]    PIC TRIPLE LUMEN PLACEMENT  9/14/2024    REPLACE VALVE MITRAL  06/09/2017    bovine mitral valve with Maze and ARIANNA ligation       Social History  Tobacco:   History   Smoking Status    Every Day    Types: Cigarettes   Smokeless Tobacco    Not on file    Alcohol:   Social History    Substance and Sexual Activity      Alcohol use: Not Currently   Illicit Drugs:   History   Drug Use Unknown       Family History  Family History   Problem Relation Age of Onset    Hypertension Mother           Clotilde Ravi MD on 9/19/2024      cc: Christina Andrew"

## 2024-09-20 ENCOUNTER — APPOINTMENT (OUTPATIENT)
Dept: PHYSICAL THERAPY | Facility: HOSPITAL | Age: 64
DRG: 037 | End: 2024-09-20
Attending: SURGERY
Payer: MEDICARE

## 2024-09-20 ENCOUNTER — PRE VISIT (OUTPATIENT)
Dept: CARDIOLOGY | Facility: CLINIC | Age: 64
End: 2024-09-20

## 2024-09-20 VITALS
OXYGEN SATURATION: 95 % | SYSTOLIC BLOOD PRESSURE: 114 MMHG | BODY MASS INDEX: 16.17 KG/M2 | HEART RATE: 94 BPM | TEMPERATURE: 98 F | WEIGHT: 85.6 LBS | DIASTOLIC BLOOD PRESSURE: 61 MMHG | RESPIRATION RATE: 18 BRPM

## 2024-09-20 LAB
ANION GAP SERPL CALCULATED.3IONS-SCNC: 12 MMOL/L (ref 7–15)
BUN SERPL-MCNC: 26.5 MG/DL (ref 8–23)
CALCIUM SERPL-MCNC: 9 MG/DL (ref 8.8–10.4)
CHLORIDE SERPL-SCNC: 98 MMOL/L (ref 98–107)
CREAT SERPL-MCNC: 0.79 MG/DL (ref 0.51–0.95)
EGFRCR SERPLBLD CKD-EPI 2021: 83 ML/MIN/1.73M2
GLUCOSE SERPL-MCNC: 94 MG/DL (ref 70–99)
HCO3 SERPL-SCNC: 27 MMOL/L (ref 22–29)
MAGNESIUM SERPL-MCNC: 2.2 MG/DL (ref 1.7–2.3)
POTASSIUM SERPL-SCNC: 3.9 MMOL/L (ref 3.4–5.3)
POTASSIUM SERPL-SCNC: 3.9 MMOL/L (ref 3.4–5.3)
SODIUM SERPL-SCNC: 137 MMOL/L (ref 135–145)

## 2024-09-20 PROCEDURE — G0008 ADMIN INFLUENZA VIRUS VAC: HCPCS | Performed by: INTERNAL MEDICINE

## 2024-09-20 PROCEDURE — 250N000011 HC RX IP 250 OP 636: Performed by: INTERNAL MEDICINE

## 2024-09-20 PROCEDURE — 90662 IIV NO PRSV INCREASED AG IM: CPT | Performed by: INTERNAL MEDICINE

## 2024-09-20 PROCEDURE — 80048 BASIC METABOLIC PNL TOTAL CA: CPT | Performed by: INTERNAL MEDICINE

## 2024-09-20 PROCEDURE — 97116 GAIT TRAINING THERAPY: CPT | Mod: GP

## 2024-09-20 PROCEDURE — 250N000013 HC RX MED GY IP 250 OP 250 PS 637: Performed by: INTERNAL MEDICINE

## 2024-09-20 PROCEDURE — 84132 ASSAY OF SERUM POTASSIUM: CPT | Performed by: INTERNAL MEDICINE

## 2024-09-20 PROCEDURE — 99207 PR NO BILLABLE SERVICE THIS VISIT: CPT | Mod: FS | Performed by: THORACIC SURGERY (CARDIOTHORACIC VASCULAR SURGERY)

## 2024-09-20 PROCEDURE — 99233 SBSQ HOSP IP/OBS HIGH 50: CPT | Performed by: INTERNAL MEDICINE

## 2024-09-20 PROCEDURE — 83735 ASSAY OF MAGNESIUM: CPT | Performed by: INTERNAL MEDICINE

## 2024-09-20 PROCEDURE — 97110 THERAPEUTIC EXERCISES: CPT | Mod: GP

## 2024-09-20 RX ORDER — AMOXICILLIN 250 MG
1 CAPSULE ORAL 2 TIMES DAILY PRN
DISCHARGE
Start: 2024-09-20 | End: 2024-10-01

## 2024-09-20 RX ORDER — TORSEMIDE 20 MG/1
20 TABLET ORAL DAILY
DISCHARGE
Start: 2024-09-21 | End: 2024-09-21

## 2024-09-20 RX ADMIN — TORSEMIDE 20 MG: 20 TABLET ORAL at 08:19

## 2024-09-20 RX ADMIN — INFLUENZA A VIRUS A/VICTORIA/4897/2022 IVR-238 (H1N1) ANTIGEN (FORMALDEHYDE INACTIVATED), INFLUENZA A VIRUS A/CALIFORNIA/122/2022 SAN-022 (H3N2) ANTIGEN (FORMALDEHYDE INACTIVATED), AND INFLUENZA B VIRUS B/MICHIGAN/01/2021 ANTIGEN (FORMALDEHYDE INACTIVATED) 0.5 ML: 60; 60; 60 INJECTION, SUSPENSION INTRAMUSCULAR at 12:46

## 2024-09-20 RX ADMIN — ASPIRIN 325 MG: 325 TABLET, COATED ORAL at 08:19

## 2024-09-20 RX ADMIN — POLYETHYLENE GLYCOL 3350 17 G: 17 POWDER, FOR SOLUTION ORAL at 08:19

## 2024-09-20 RX ADMIN — ACETAMINOPHEN 650 MG: 325 TABLET ORAL at 08:19

## 2024-09-20 RX ADMIN — GABAPENTIN 300 MG: 300 CAPSULE ORAL at 08:19

## 2024-09-20 RX ADMIN — SENNOSIDES AND DOCUSATE SODIUM 1 TABLET: 8.6; 5 TABLET ORAL at 08:19

## 2024-09-20 RX ADMIN — HEPARIN SODIUM 5000 UNITS: 10000 INJECTION, SOLUTION INTRAVENOUS; SUBCUTANEOUS at 05:31

## 2024-09-20 ASSESSMENT — ACTIVITIES OF DAILY LIVING (ADL)
ADLS_ACUITY_SCORE: 42
ADLS_ACUITY_SCORE: 41
ADLS_ACUITY_SCORE: 42
ADLS_ACUITY_SCORE: 41
ADLS_ACUITY_SCORE: 42
ADLS_ACUITY_SCORE: 41
ADLS_ACUITY_SCORE: 41
ADLS_ACUITY_SCORE: 42
ADLS_ACUITY_SCORE: 41
ADLS_ACUITY_SCORE: 42
ADLS_ACUITY_SCORE: 41

## 2024-09-20 NOTE — PLAN OF CARE
Goal Outcome Evaluation:      Plan of Care Reviewed With: patient, other (see comments)    Overall Patient Progress: no changeOverall Patient Progress: no change    Outcome Evaluation: Pt returned from right heart cath this afternoon. Brachial access site shows no bleeding or hematoma. CMS intact.    Pt headache improved with PRN tylenol. Pt denies chest pain or SOB. Denied pain at left jugular incision. Up with SBA to bathroom.     On RA when awake and 1LPM NC when asleep to keep SpO2 >90%.     K and Mg Protocol. Recheck in AM.     Plan: possible discharge to TCU tomorrow

## 2024-09-20 NOTE — DISCHARGE SUMMARY
[facility]  VASCULAR SURGERY  DISCHARGE SUMMARY    Ernestine Morales MRN# [id number]   YOB: 1960 Age: 64 year old     Date of Admission:  9/13/2024  Date of Discharge::  9/20/2024  Admitting Physician:  Margarita Tobin MD  Discharge Physician:  Margarita Tobin MD  Primary Care Physician:        Christina Andrew          Admission Diagnoses:   Asymptomatic high-grade left internal carotid artery stenosis           Discharge Diagnosis:   Asymptomatic high-grade left internal carotid artery stenosis s/p carotid endarterecotmy  Severe pulmonary hypertension  Bioprosthetic mitral valve stenosis         Procedures:     Left carotid endarterectomy with bovine pericardial patch angioplasty  Right Heart Catheterization          Consultations:   SPEECH LANGUAGE PATH ADULT IP CONSULT  HOSPITALIST IP CONSULT  PULMONARY IP CONSULT  VASCULAR ACCESS ADULT IP CONSULT  CARDIOLOGY IP CONSULT  PHYSICAL THERAPY ADULT IP CONSULT  OCCUPATIONAL THERAPY ADULT IP CONSULT  CARE MANAGEMENT / SOCIAL WORK IP CONSULT  CARE MANAGEMENT / SOCIAL WORK IP CONSULT  CARDIOTHORACIC SURGERY IP CONSULT  PHYSICAL THERAPY ADULT IP CONSULT  OCCUPATIONAL THERAPY ADULT IP CONSULT  SMOKING CESSATION PROGRAM IP CONSULT          Brief History of Illness:   Ms. Morales is a 64 year old female with distant history left sided cardioembolic stroke, chronic expressive aphasia, and mild right sided weakness. Found to have asymptomatic high-grade left internal carotid artery stenosis.  Left carotid endarterectomy was recommended for stroke prevention.  After discussion of risks and benefits, the patient elected to proceed.           Hospital Course:   She underwent left carotid endarterectomy with bovine pericardial patch angioplasty.  Postoperatively she was noted to have mild left sided tongue deviation secondary to intraoperative retraction of the hypoglossal nerve required for carotid exposure.  She developed  hypotension and hypoxia postoperatively as a result of her newly diagnosed severe pulmonary artery hypertension and new bioprosthetic mitral valve stenosis.  Cardiology was consulted.  She was gently diuresed over the course of her hospital stay and ultimately underwent right heart catheterization before discharge which confirmed her pulmonary artery hypertension.   Her O2 requirement decreased over the hospital stay, however she remained on oxygen at discharge.  She will follow-up with cardiology regarding her severe pulmonary artery hypertension and bioprosthetic mitral valve stenosis.  She will also follow-up with vascular surgery clinic for routine left neck wound check scheduled for September 30, 2024 and will be referred to cardiac surgery clinic for discussion of possible mitral valve repair.         Imaging Studies:     Results for orders placed or performed during the hospital encounter of 09/13/24   XR Chest Port 1 View    Narrative    EXAM: CHEST SINGLE VIEW PORTABLE  LOCATION: [facility]  DATE: 9/13/2024    INDICATION: Postprocedure hypoxia.  COMPARISON: 7/31/2024.    FINDINGS: Note that the evaluation is mildly limited due to rotation of the patient to the right. Mildly increased interstitial opacities in both lungs. An apparent 1.3 cm patchy nodular opacity projects over the central aspect of the upper right lung,   over the posterior aspect of the fifth rib, not visualized on 7/31/2024. No other convincing pulmonary opacities. Unchanged cardiac silhouette. Atherosclerotic calcification in the thoracic aorta. A cardiac device projects over left chest wall. A left   atrial appendage clip and artificial cardiac valve in place. A small amount of subcutaneous gas is present in the inferior left neck, where there are a few surgical clips that were not present previously.      Impression    IMPRESSION:   1. Mildly increased intersitial opacities in the lungs, likely relating to  interstitial pulmonary edema.  2. An apparent small nonspecific 1.3 cm patchy nodular opacity projects over the upper right lung. This could be artifactual or represent a pulmonary parenchymal abnormality. A follow-up two-view chest radiograph is recommended in a few days to evaluate   for the persistence of this finding.  3. No other findings suspicious for active cardiopulmonary disease.  4. Subcutaneous gas in the soft tissues of the inferior left neck in the region of a few new surgical clips. This could could represent the site of recent surgery. Recommend clinical correlation.   XR Chest Port 1 View    Narrative    EXAM: XR CHEST PORT 1 VIEW  LOCATION: [facility]  DATE: 2024    INDICATION: Hypoxia.  COMPARISON: 2024.      Impression    IMPRESSION: Mild pulmonary vascular congestion. No pleural effusion or pneumothorax.    Unchanged cardiomegaly. Mitral valve prosthesis. Left atrial appendage clip. Enlarged main pulmonary artery, suggesting underlying pulmonary hypertension. Atherosclerotic calcifications of the thoracic aorta.    Left chest wall implantable loop recorder device. Clips at the inferior left neck soft tissues.    Persistent, though improving subcutaneous emphysema within the inferior left neck soft tissues.   XR Chest Port 1 View    Narrative    EXAM: XR CHEST PORT 1 VIEW  LOCATION: [facility]  DATE: 2024    INDICATION: RN placed PICC   verify tip placement  COMPARISON: 2024, 0011 hours      Impression    IMPRESSION: Left upper extremity PICC tip at the SVC-RA junction. No other significant interval change.   Echocardiogram Complete     Value    LVEF  60-65%    Narrative    [id number]  [id number]  [id number]  [id number]^JOVANNI^SIGIFREDO^[place] [address]     Name: MAHSA GARVIN  MRN: [id number]  : 1960  Study Date: 2024 09:20 AM  Age: 64 yrs  Gender: Female  Patient  Location: [facility]  Reason For Study: Other, Please Specify in Comments  Ordering Physician: SIGIFREDO BAIG  Performed By: BP     BSA: 1.4 m2  Height: 61 in  Weight: 91 lb  HR: 91  BP: 102/56 mmHg  ______________________________________________________________________________  Procedure  Complete Portable Echo Adult. Definity (NDC #31610-634) given intravenously.  Technically difficult study.Extremely difficult acoustic windows despite the  use of contrast for endcardial border definition. Compared to the prior study  dated 4/10/24, there are changes as noted.  ______________________________________________________________________________  Interpretation Summary     The left ventricle is normal in size.  Left ventricular function is normal.The ejection fraction is 60-65%.  The right ventricle is mildly dilated. Mildly decreased right ventricular  systolic function  The left atrium is severely dilated. The right atrium is moderately dilated.  There is severe (4+) tricuspid regurgitation.  Right ventricular systolic pressure is elevated, consistent with severe  pulmonary hypertension.  The aortic valve is trileaflet with aortic valve sclerosis.  There is moderate (2+) aortic regurgitation.  There is a bioprosthetic mitral valve. Bioprosthetic mitral leaflets are not  well visualized. Mean gradient elevated at 17 mmHg suggesting prosthetic  stenosis however this is at an elevated heart rate of 91 mmHg on pressor  support. There is mild (1+) prosthetic mitral valve regurgitation.  Compared to the prior study dated 4/10/24, there are changes as noted.  Increased pulmonary hypertension and increased mean gradient across the  bioprosthetic mitral valve. Prosthesis is not well visualized and appears  thickened. Consider ANGE for further evaluation.  ______________________________________________________________________________  Left Ventricle  The left ventricle is normal in size. Left ventricular function is  normal.The  ejection fraction is 60-65%. There is normal left ventricular wall thickness.  Diastolic function not assessed due to presence of prosthetic mitral valve. No  regional wall motion abnormalities noted.     Right Ventricle  The right ventricle is mildly dilated. Mildly decreased right ventricular  systolic function.     Atria  The left atrium is severely dilated. The right atrium is moderately dilated.  There is no color Doppler evidence of an atrial shunt.     Mitral Valve  There is mild (1+) prosthetic mitral valve regurgitation. There is a  bioprosthetic mitral valve. Bioprosthetic mitral leaflets are not well  visualized. Mean gradient elevated at 17 mmHg suggesting prosthetic stenosis  however this is at an elevated heart rate of 91 mmHg on pressor support.     Tricuspid Valve  Tricuspid valve leaflets appear normal. There is severe (4+) tricuspid  regurgitation. Right ventricular systolic pressure is elevated, consistent  with severe pulmonary hypertension.     Aortic Valve  The aortic valve is trileaflet with aortic valve sclerosis. There is moderate  (2+) aortic regurgitation.     Pulmonic Valve  The pulmonic valve is not well visualized. There is mild (1+) pulmonic  valvular regurgitation.     Vessels  The aorta root is normal. Normal size ascending aorta.     Pericardium  There is no pericardial effusion.     ______________________________________________________________________________  MMode/2D Measurements & Calculations  IVSd: 0.78 cm  LVIDd: 3.1 cm  LVIDs: 2.5 cm  LVPWd: 0.64 cm  FS: 19.3 %     LV mass(C)d: 53.3 grams  LV mass(C)dI: 39.4 grams/m2  Ao root diam: 3.0 cm  LA dimension: 4.2 cm  LA/Ao: 1.4  LVOT diam: 1.4 cm  LVOT area: 1.5 cm2  Ao root diam index Ht(cm/m): 1.9  Ao root diam index BSA (cm/m2): 2.2  EF Biplane: 60.8 %  RWT: 0.41  TAPSE: 1.3 cm     Time Measurements  Aortic HR: 76.0 BPM  MM HR: 91.0 BPM     Doppler Measurements & Calculations  MV max P.6 mmHg  MV mean P.0  mmHg  MV V2 VTI: 87.8 cm  MVA(VTI): 0.54 cm2  MV P1/2t max hugo: 284.0 cm/sec  MV P1/2t: 167.7 msec  MVA(P1/2t): 1.3 cm2  MV dec slope: 496.0 cm/sec2     Ao V2 max: 157.0 cm/sec  Ao max PG: 10.0 mmHg  Ao V2 mean: 112.0 cm/sec  Ao mean P.0 mmHg  Ao V2 VTI: 32.4 cm  CHALO(I,D): 1.5 cm2  CHALO(V,D): 1.5 cm2  AI P1/2t: 203.1 msec  LV V1 max P.5 mmHg  LV V1 max: 154.0 cm/sec  LV V1 VTI: 31.0 cm  CO(LVOT): 3.6 l/min  CI(LVOT): 2.7 l/min/m2  SV(LVOT): 47.7 ml  SI(LVOT): 35.3 ml/m2  PA acc time: 0.06 sec  PI end-d hugo: 203.0 cm/sec  TR max hugo: 417.0 cm/sec  TR max P.6 mmHg  AV Hugo Ratio (DI): 0.98  CHALO Index (cm2/m2): 1.1  Peak E' Hugo: 4.7 cm/sec  RV S Hugo: 9.0 cm/sec     ______________________________________________________________________________  Report approved by: Isela Lyons 2024 04:25 PM         Echocardiogram Complete     Value    LVEF  60-65%    Narrative    [id number]  [id number]  [id number]  [id number]^FLORIN^[place] [address]     Name: MAHSA GARVIN  MRN: [id number]  : 1960  Study Date: 2024 01:22 PM  Age: 64 yrs  Gender: Female  Patient Location: [facility]  Reason For Study: CHF, Pulmonary Hypertension  Ordering Physician: JAIR CATHERINE  Performed By: CARY     BSA: 1.3 m2  Height: 61 in  Weight: 87 lb  HR: 105  BP: 111/65 mmHg  ______________________________________________________________________________  Procedure  Complete Echo Adult.  ______________________________________________________________________________  Interpretation Summary     Left ventricular function is normal.The ejection fraction is 60-65%.  The right ventricle is mildly dilated.  The right ventricular systolic function is normal.  The left atrium is severely dilated.  The right atrium is moderately dilated.  There is a bioprosthetic mitral valve.  Prosthetic valve leaflets are thickened with decreased mobility.  Struts of the prosthetic  mitral valve protrude into LVOT and make contact with  the basal septum causing LVOT obstruction.  Severe prosthetic mitral valve stenosis( mean gradient 20)  There is moderate (2+) tricuspid regurgitation.  Severe (>55mmHg) pulmonary hypertension is present.  There is mild trileaflet aortic sclerosis.  There is mild to moderate (1-2+) aortic regurgitation.  ______________________________________________________________________________  Left Ventricle  The left ventricle is normal in size. Left ventricular function is normal.The  ejection fraction is 60-65%. There is normal left ventricular wall thickness.  Diastolic function not assessed due to presence of prosthetic mitral valve.  Flattened septum is consistent with RV pressure/volume overload.     Right Ventricle  The right ventricle is mildly dilated. The right ventricular systolic function  is normal.     Atria  The left atrium is severely dilated. The right atrium is moderately dilated.  There is no color Doppler evidence of an atrial shunt.     Mitral Valve  There is a bioprosthetic mitral valve. Severe prosthetic mitral valve  stenosis. Prosthetic valve leaflets are thickened with decreased mobility.  Struts of the prosthetic mitral valve protrude into LVOT and make contact with  the basal septum causing LVOT obstruction.     Tricuspid Valve  Tricuspid valve fails to coapt. There is moderate (2+) tricuspid  regurgitation. The right ventricular systolic pressure is approximated at 73.2  mmHg plus the right atrial pressure. Severe (>55mmHg) pulmonary hypertension  is present.     Aortic Valve  There is mild trileaflet aortic sclerosis. There is mild to moderate (1-2+)  aortic regurgitation.     Pulmonic Valve  The pulmonic valve is not well visualized.     Vessels  IVC diameter and respiratory changes fall into an intermediate range  suggesting an RA pressure of 8 mmHg.     Pericardium  There is no pericardial effusion.      ______________________________________________________________________________  MMode/2D Measurements & Calculations  IVSd: 0.99 cm  LVIDd: 2.7 cm  LVIDs: 1.7 cm  LVPWd: 1.8 cm  FS: 36.5 %     LV mass(C)d: 119.6 grams  LV mass(C)dI: 90.2 grams/m2  Ao root diam: 2.6 cm  asc Aorta Diam: 3.0 cm  LVOT diam: 1.5 cm  LVOT area: 1.9 cm2  Ao root diam index Ht(cm/m): 1.7  Ao root diam index BSA (cm/m2): 2.0  Asc Ao diam index BSA (cm/m2): 2.3  Asc Ao diam index Ht(cm/m): 1.9  EF Biplane: 67.1 %  LA Volume Indexed (AL/bp): 73.4 ml/m2     RV Base: 3.2 cm  RWT: 1.3     Time Measurements  MM HR: 118.0 BPM     Doppler Measurements & Calculations  MV max P.1 mmHg  MV mean P.6 mmHg  MV V2 VTI: 94.5 cm  MVA(VTI): 0.55 cm2  Ao V2 max: 208.8 cm/sec  Ao max P.0 mmHg  Ao V2 mean: 161.9 cm/sec  Ao mean P.6 mmHg  Ao V2 VTI: 43.9 cm  CHALO(I,D): 1.2 cm2  CHALO(V,D): 1.6 cm2  AI P1/2t: 503.9 msec  LV V1 max P.4 mmHg  LV V1 max: 176.2 cm/sec  LV V1 VTI: 27.6 cm  SV(LVOT): 51.8 ml  SI(LVOT): 39.1 ml/m2  PA V2 max: 122.0 cm/sec  PA max P.1 mmHg  PA acc time: 0.07 sec  TR max hugo: 427.7 cm/sec  TR max P.2 mmHg  AV Hugo Ratio (DI): 0.84  CHALO Index (cm2/m2): 0.89     Peak E' Hugo: 5.2 cm/sec  RV S Hugo: 11.5 cm/sec     ______________________________________________________________________________  Report approved by: Isela Gerber 2024 03:17 PM         Cardiac Catheterization    Narrative    CARDIAC CATHETERIZATION AND INTERVENTION REPORT    PATIENT NAME:  Ernestine Morales          MEDICAL RECORD NUMBER: [id number]  : 1960       PROCEDURE DATE: 2024        CONCLUSIONS:   Normal right and severely elevated left-sided filling pressures.  Severe pulmonary hypertension, primarily postcapillary  Normal cardiac output and index.      RECOMMENDATIONS:   Usual postprocedure cares, please see orders.  Defer further management to inpatient cardiology service.    PROCEDURE PERFORMED:   Right  heart catheterization    PRE-OP DIAGNOSIS:  Bioprosthetic mitral valve stenosis    POST-OP DIAGNOSIS:   Bioprosthetic mitral valve stenosis    PROCEDURALISTS: Amilcar Pierre MD      DIAGNOSTIC PROCEDURAL DETAILS:   Signed, informed consent was obtained. The patient was placed on the table   and prepped and draped in the usual fashion. Time out and site   verification performed.   Access: Right brachial vein using ultrasound guidance and micropuncture   technique.  Catheters: Welling-Ze  Hemostasis: Manual compression    PROCEDURAL MEDICATIONS:  Conscious sedation - none  Local anesthesia - 1% lidocaine   Procedural anticoagulation - none    CONTRAST VOLUME: none  BLOOD LOSS: minimal   FLUIDS: None   SPECIMENS: None  COMPLICATIONS: None    FINDINGS:    Right Heart Catheterization         RA: 2 mmHg         RV: 65/0/3 mmHg (s/d/ed)         PA: 62/26/40 mmHg (s/d/m)         PCWP: 25 mmHg          CO 3 L/min (Taye)  CI:  2.26 L/min/m2                     Please do not hesitate to contact me if you have any questions or   concerns. I was present for the procedure in its entirety.   Amilcar Pierre MD  Interventional Cardiology               Medications Prior to Admission:     Medications Prior to Admission   Medication Sig Dispense Refill Last Dose    amLODIPine (NORVASC) 5 MG tablet Take 5 mg by mouth daily.   9/13/2024 at am    aspirin (ASA) 325 MG EC tablet Take 325 mg by mouth daily   9/13/2024 at am    atorvastatin (LIPITOR) 80 MG tablet Take 1 tablet (80 mg) by mouth daily. 90 tablet 3 9/13/2024    gabapentin (NEURONTIN) 300 MG capsule Take 1 capsule (300 mg) by mouth 2 times daily 180 capsule 1 9/13/2024 at am    losartan (COZAAR) 100 MG tablet Take 1 tablet (100 mg) by mouth daily 90 tablet 3 9/13/2024 at am    metoprolol tartrate (LOPRESSOR) 25 MG tablet Take 1 tablet (25 mg) by mouth 2 times daily 180 tablet 3 9/13/2024 at am              Discharge Medications:     Current Discharge Medication List         START taking these medications    Details   acetaminophen (TYLENOL) 500 MG tablet Take 2 tablets (1,000 mg) by mouth every 6 hours for 5 days.    Associated Diagnoses: Stenosis of left carotid artery           CONTINUE these medications which have NOT CHANGED    Details   amLODIPine (NORVASC) 5 MG tablet Take 5 mg by mouth daily.      aspirin (ASA) 325 MG EC tablet Take 325 mg by mouth daily      atorvastatin (LIPITOR) 80 MG tablet Take 1 tablet (80 mg) by mouth daily.  Qty: 90 tablet, Refills: 3    Associated Diagnoses: Cerebrovascular accident (CVA), unspecified mechanism      gabapentin (NEURONTIN) 300 MG capsule Take 1 capsule (300 mg) by mouth 2 times daily  Qty: 180 capsule, Refills: 1    Associated Diagnoses: Cerebrovascular accident (CVA), unspecified mechanism      losartan (COZAAR) 100 MG tablet Take 1 tablet (100 mg) by mouth daily  Qty: 90 tablet, Refills: 3    Associated Diagnoses: Essential hypertension      metoprolol tartrate (LOPRESSOR) 25 MG tablet Take 1 tablet (25 mg) by mouth 2 times daily  Qty: 180 tablet, Refills: 3    Associated Diagnoses: Essential hypertension                     Antibiotics Prescribed at Discharge:   none           Day of Discharge Physical Exam:   Temp:  [97.3  F (36.3  C)-98.5  F (36.9  C)] 98.1  F (36.7  C)  Pulse:  [] 95  Resp:  [14-20] 18  BP: ()/(50-73) 119/73  SpO2:  [92 %-100 %] 93 %    Adult female sitting comfortably in bed  Nonlabored breathing on 3 L nasal cannula  Face symmetric, slight leftward tongue deviation, chronic expressive aphasia  Normal and symmetric strength in bilateral , plantar and dorsiflexion.  Normal and symmetric sensation to light touch in all 4 extremities.  Left neck incision is clean, dry, intact, soft, flat, no hematoma, no erythema, resolving ecchymosis.                 Discharge Instructions and Follow-Up:     Discharge Procedure Orders   Primary Care - Care Coordination Referral   Standing Status: Future    Referral Priority: Routine: Next available opening Referral Type: Care Coordination   Number of Visits Requested: 1     General info for SNF   Order Comments: Length of Stay Estimate: Short Term Care: Estimated # of Days <30  Condition at Discharge: Improving  Level of care:skilled   Rehabilitation Potential: Good  Admission H&P remains valid and up-to-date: Yes  Recent Chemotherapy: N/A  Use Nursing Home Standing Orders: Yes     Mantoux instructions   Order Comments: Give two-step Mantoux (PPD) Per Facility Policy Yes     Follow Up and recommended labs and tests   Order Comments: Follow-up in vascular surgery clinic for wound check as scheduled on 9/30/2024    With general vascular surgery questions, concerns, or to request/change an appointment, please call the [facility]:    [facility]  Phone: [phone]    [address] [place], [place], [address]     Reason for your hospital stay   Order Comments: Left carotid endarterectomy     Activity - Up ad [place]     Order Specific Question Answer Comments   Is discharge order? Yes      Wound care   Order Comments: Site:   left neck  Instructions:  leave incision open to air. Okay to show and pat dry.     Full Code     Order Specific Question Answer Comments   Code status determined by: Unable to discuss and no AD/POLST on file; continue PREVIOUSLY ORDERED code status      Physical Therapy Adult Consult   Order Comments: Evaluate and treat as clinically indicated.    Reason:  post-operative deconditioning     Occupational Therapy Adult Consult   Order Comments: Evaluate and treat as clinically indicated.    Reason:  post-operative deconditioning     Oxygen (SNF/TCU) Discharge     Order Specific Question Answer Comments   Oxygen device Nasal cannula    Oxygen Administration Continuous    Keep SaO2 above 92%    Liters per minute 4    FIO2 Wean as tolerated      Fall precautions     Walker Order      Order Specific Question Answer Comments   Medical Equipment (DME) Supplier: [facility]    PATIENT INSTRUCTIONS: If you did not receive this ordered item today, please contact [facility] for availability ([place] Locations: [phone], [place]: [phone]).    Start Date: 2024    Walker Type: 4-Wheel Walker (with seat)    Diagnosis: R26.89 - Impaired Gait and Mobility      Diet   Order Comments: Follow this diet upon discharge: Current Diet:Orders Placed This Encounter      Snacks/Supplements Adult: Magic Cup; With Meals      Regular Diet Adult     Order Specific Question Answer Comments   Is discharge order? Yes              Discharge Disposition:     Discharged to transitional care unit      Condition at discharge: Stable    Patient was discussed with staff, Dr. Tobin, on the day of discharge.    Aramis Womack MD

## 2024-09-20 NOTE — PROGRESS NOTES
[facility]    Medicine Progress Note - Hospitalist Service    Date of Admission:  9/13/2024    Assessment & Plan   64 year old female admitted s/p left carotid endarterectomy on 9/13/2024. She had significant post op acute hypoxic resp failure.      9/19 :     Still on oxygen 2 liters - not on home oxygen  Will try to wean off   No significant sob  Continue torsemide    Echo : Persistently elevated RVSP - plan for  RHC +/- vasodilator study   Cardiology following    Vascular surgery following  Neuro intact aside from chronic expressive aphasia and mild L tongue deviation.       Possible discharge in am to tcu        A/p :        1.History of CVA, resultant dysphagia, right-sided hemiparesis  -s/p Left carotid stenosis status post left CEA 9/13/24  -Postop dysphonia  -Postoperative cares per vascular surgery  -Systolic blood pressure goal over 90, had used low-dose phenylephrine  -Limiting narcotics per vascular surgery, offer NSAIDs, Tylenol, topicals  -Speech and language pathology consultation for dysphonia and dysphagia recommended follow up eval at vascular clinic and if symptoms persist then outpatient speech.  -Continues on full dose aspirin/statin     2.Postop hypoxia, acute hypoxic resp failure  -not on O2 at home  -Noted to have some increased work of breathing and oxygen needs in recovery  -Offer DuoNebs as needed  -concern for pulm edema and severe pulm htn that likely was not appreciated pre-op  -echo checked and worse mitral stenosis over biovalve and severe pulm htn  -O2 needs appear to be higher overnight and improve during the day.  -suspect all related to severe pulm htn and now concern mitral stenosis  - Cardiology diuresing and will recheck echo when euvolemic.    3.Paroxysmal atrial fibrillation  -History of mitral stenosis status post partial mitral valve replacement  -Also had maze ablation procedure  -No anticoagulation prior to admission  -Resume usual metoprolol  tartrate     4.Essential hypertension  -Holding bp meds til more stable    5.Mitral bio-valvea, now concern more stenosis  -new  echo  The left ventricle is normal in size.  Left ventricular function is normal.The ejection fraction is 60-65%.  The right ventricle is mildly dilated. Mildly decreased right ventricular  systolic function  The left atrium is severely dilated. The right atrium is moderately dilated.  There is severe (4+) tricuspid regurgitation.  Right ventricular systolic pressure is elevated, consistent with severe  pulmonary hypertension.  The aortic valve is trileaflet with aortic valve sclerosis.  There is moderate (2+) aortic regurgitation.  There is a bioprosthetic mitral valve. Bioprosthetic mitral leaflets are not  well visualized. Mean gradient elevated at 17 mmHg suggesting prosthetic  stenosis however this is at an elevated heart rate of 91 mmHg on pressor  support. There is mild (1+) prosthetic mitral valve regurgitation.  Compared to the prior study dated 4/10/24, there are changes as noted.  Increased pulmonary hypertension and increased mean gradient across the  bioprosthetic mitral valve. Prosthesis is not well visualized and appears  thickened. Consider ANGE for further evaluation.    -cards seeing and want another repeat echo soon after complete diureses and more ideal volume status    6.Hypokalemia  -replaced  -and watch mag     HMS will follow     Discussed with her best friend Marian who is at bedside.  I strongly recommended TCU at the time of discharge. As Dr. Arrington has done.          Diet: Snacks/Supplements Adult: Magic Cup; With Meals  Regular Diet Adult    DVT Prophylaxis: Heparin SQ  Nuñez Catheter: Not present  Lines: PRESENT      PICC 09/14/24 Triple Lumen Left Brachial vein medial vasopressors-Site Assessment: WDL      Cardiac Monitoring: ACTIVE order. Indication: Post- PCI/Angiogram (24 hours)  Code Status: Full Code      Clinically Significant Risk Factors         #  "Hyponatremia: Lowest Na = 133 mmol/L in last 2 days, will monitor as appropriate          # Hypertension: Noted on problem list    # Acute heart failure with preserved ejection fraction: heart failure noted on problem list, last echo with EF >50%, and receiving IV diuretics          # Cachexia: Estimated body mass index is 16.46 kg/m  as calculated from the following:    Height as of 8/29/24: 1.549 m (5' 1\").    Weight as of this encounter: 39.5 kg (87 lb 1.6 oz).   # Severe Malnutrition: based on nutrition assessment                 Disposition Plan     Medically Ready for Discharge: Anticipated in 2-4 Days             Jeremiah Knox MD  Hospitalist Service  [facility]  Securely message with [employer] (more info)  Text page via [employer] Paging/Directory   ______________________________________________________________________    Physical Exam   Vital Signs: Temp: 98.1  F (36.7  C) Temp src: Oral BP: 119/73 Pulse: 95   Resp: 18 SpO2: 93 % O2 Device: None (Room air) Oxygen Delivery: 2 LPM  Weight: 87 lbs 1.6 oz    Constitutional: awake, alert, cooperative, and no apparent distress  Eyes: sclera clear  Respiratory: no increased work of breathing, moderate air exchange, no retractions, and diminished breath sounds throughout lungs  Cardiovascular: regular rate and rhythm and normal S1 and S2  GI: normal bowel sounds, soft, non-distended, and non-tender  Skin: neck left c/d/i  Musculoskeletal: no lower extremity pitting edema present  Neurologic: Mental Status Exam:  Level of Alertness:   awake, slight dysarthria(chronic)  Neuropsychiatric: General: normal, calm, and normal eye contact    Medical Decision Making       35 MINUTES SPENT BY ME on the date of service doing chart review, history, exam, documentation & further activities per the note.      Data     I have personally reviewed the following data over the past 24 hrs:    N/A  \   N/A   / 229     134 (L) 94 (L) 24.3 (H) /  94   4.0 28 0.79 \     "   Imaging results reviewed over the past 24 hrs:   Recent Results (from the past 24 hour(s))   Cardiac Catheterization    Narrative    CARDIAC CATHETERIZATION AND INTERVENTION REPORT    PATIENT NAME:  Ernestine Morales          MEDICAL RECORD NUMBER: [id number]  : 1960       PROCEDURE DATE: 2024        CONCLUSIONS:   Normal right and severely elevated left-sided filling pressures.  Severe pulmonary hypertension, primarily postcapillary  Normal cardiac output and index.      RECOMMENDATIONS:   Usual postprocedure cares, please see orders.  Defer further management to inpatient cardiology service.    PROCEDURE PERFORMED:   Right heart catheterization    PRE-OP DIAGNOSIS:  Bioprosthetic mitral valve stenosis    POST-OP DIAGNOSIS:   Bioprosthetic mitral valve stenosis    PROCEDURALISTS: Amilcar Pierre MD      DIAGNOSTIC PROCEDURAL DETAILS:   Signed, informed consent was obtained. The patient was placed on the table   and prepped and draped in the usual fashion. Time out and site   verification performed.   Access: Right brachial vein using ultrasound guidance and micropuncture   technique.  Catheters: Elliott-Ze  Hemostasis: Manual compression    PROCEDURAL MEDICATIONS:  Conscious sedation - none  Local anesthesia - 1% lidocaine   Procedural anticoagulation - none    CONTRAST VOLUME: none  BLOOD LOSS: minimal   FLUIDS: None   SPECIMENS: None  COMPLICATIONS: None    FINDINGS:    Right Heart Catheterization         RA: 2 mmHg         RV: 65/0/3 mmHg (s/d/ed)         PA: 62/26/40 mmHg (s/d/m)         PCWP: 25 mmHg          CO 3 L/min (Taye)  CI:  2.26 L/min/m2                     Please do not hesitate to contact me if you have any questions or   concerns. I was present for the procedure in its entirety.   Amilcar Pierre MD  Interventional Cardiology

## 2024-09-20 NOTE — PLAN OF CARE
Physical Therapy Discharge Summary    Reason for therapy discharge:    Discharged to transitional care facility.    Progress towards therapy goal(s). See goals on Care Plan in [facility] electronic health record for goal details.  Goals partially met.  Barriers to achieving goals:   discharge from facility.    Therapy recommendation(s):    Continued therapy is recommended.  Rationale/Recommendations:  Continue PT at TCU as pt is progressing towards goals.

## 2024-09-20 NOTE — PLAN OF CARE
Occupational Therapy Discharge Summary    Reason for therapy discharge:    Discharged to transitional care facility.    Progress towards therapy goal(s). See goals on Care Plan in [facility] electronic health record for goal details.  Goals partially met.  Barriers to achieving goals:   discharge from facility.    Therapy recommendation(s):    Recommend continued OT at TCU

## 2024-09-20 NOTE — PROGRESS NOTES
HEART CARE NOTE          Assessment/Recommendations     1. RV dysfunction + valvular heart disease c/b cardiogenic shock - resolving   Assessment / Plan  Tolerating oral diuretic regimen - no changes at this time; continue to monitor UOP and renal function closely; wean pressors as tolerated  Patient is high risk for adverse cardiac events 2/2 advanced age, frailty, RV dysfunction, pulm HTN  GDMT on hold given the above     2. Valvular heart disease  Assessment / Plan  Mod TR, mild - moderate AI, and severe bioprosthetic mitral valve stenosis - CT surgery consult placed; awaiting evaluation     3. Pulmonary HTN  Assessment / Plan  Persistently elevated RVSP - RHC revealed severely elevated PA pressures and PCWP - vasodilator study not performed in the setting of elevated PCWP (likely 2/2 to mitral valve disease) - will refer to outpatient pulmonary clinic  Diuresis and supportive care as above     4. Acute hypoxic respiratory failure  Assessment / Plan  Cardiogenic pulmonary edema - resolved    Plan of care discussed on September 20, 2024 with patient and family at bedside, and primary team overseeing patient's care    Cardiology team will sign-off for now. Please do not hesitate to consult us again if new questions or concerns arise.       History of Present Illness/Subjective    Ms. Ernestine Morales is a 64 year old female with a PMHx significant for (per Epic notation) COPD and pulmonary hypertension, elevated filling pressures s/p mitral valve repair who presented for elective carotid endarterectomy. This has been complicated by labile blood pressures and intermittent hypoxia.      Today, Mrs. Morales denies acute cardiac events or complaints; Management plan as detailed above     ECG: Personally reviewed. normal EKG, normal sinus rhythm.     ECHO (personnaly Reviewed on 9/15/24):   The left ventricle is normal in size.  Left ventricular function is normal.The ejection fraction is 60-65%.  The right  ventricle is mildly dilated. Mildly decreased right ventricular  systolic function  The left atrium is severely dilated. The right atrium is moderately dilated.  There is severe (4+) tricuspid regurgitation.  Right ventricular systolic pressure is elevated, consistent with severe  pulmonary hypertension.  The aortic valve is trileaflet with aortic valve sclerosis.  There is moderate (2+) aortic regurgitation.  There is a bioprosthetic mitral valve. Bioprosthetic mitral leaflets are not  well visualized. Mean gradient elevated at 17 mmHg suggesting prosthetic  stenosis however this is at an elevated heart rate of 91 mmHg on pressor  support. There is mild (1+) prosthetic mitral valve regurgitation.  Compared to the prior study dated 4/10/24, there are changes as noted.  Increased pulmonary hypertension and increased mean gradient across the  bioprosthetic mitral valve. Prosthesis is not well visualized and appears  thickened. Consider ANGE for further evaluation.    Telemetry: personally reviewed September 20, 2024; notable for sinus rhythm     Lab results: personally reviewed September 20, 2024; notable for resolving hyponatremia    Medical history and pertinent documents reviewed in Care Everywhere please where applicable see details above        Physical Examination Review of Systems   BP 90/70 (BP Location: Left leg)   Pulse 94   Temp 97.7  F (36.5  C) (Oral)   Resp 18   Wt 38.8 kg (85 lb 9.6 oz)   SpO2 97%   BMI 16.17 kg/m    Body mass index is 16.17 kg/m .  Wt Readings from Last 3 Encounters:   09/20/24 38.8 kg (85 lb 9.6 oz)   08/29/24 41.7 kg (92 lb)   08/26/24 41.5 kg (91 lb 9.6 oz)     General Appearance:   no distress, normal body habitus   ENT/Mouth: membranes moist, no oral lesions or bleeding gums.      EYES:  no scleral icterus, normal conjunctivae   Neck: no carotid bruits or thyromegaly   Chest/Lungs:   lungs are clear to auscultation, no rales or wheezing, equal chest wall expansion     Cardiovascular:   Regular. Normal first and second heart sounds with +SAMUEL; no rubs, or gallops; the carotid, radial and posterior tibial pulses are intact, no JVD or LE edema bilaterally    Abdomen:  no organomegaly, masses, bruits, or tenderness; bowel sounds are present   Extremities: no cyanosis or clubbing   Skin: no xanthelasma, warm.    Neurologic: NAD     Psychiatric: alert and oriented x3, calm     A complete 10 systems ROS was reviewed  And is negative except what is listed in the HPI.          Medical History  Surgical History Family History Social History   Past Medical History:   Diagnosis Date    Anemia     Aphasia     Atrial fibrillation (H)     Cancer (H) 11.17.2022    Squamous cell carcinoma nRight Cheek    Carotid artery stenosis     Cerebrovascular accident (H) 01/09/2017    Congestive heart failure (H)     HLD (hyperlipidemia)     HTN (hypertension)     Mitral regurgitation     Past Surgical History:   Procedure Laterality Date    BIOPSY  11.18.2022    Right Cheek    ENDARTERECTOMY CAROTID Left 9/13/2024    Procedure: ENDARTERECTOMY, CAROTID WITH NEURO MONITORING;  Surgeon: Margarita Tobin MD;  Location: [facility] [place] OR    [facility] TRIPLE LUMEN PLACEMENT  9/14/2024    REPLACE VALVE MITRAL  06/09/2017    bovine mitral valve with Maze and ARIANNA ligation    no family history of premature coronary artery disease Social History     Socioeconomic History    Marital status: Single     Spouse name: Not on file    Number of children: Not on file    Years of education: Not on file    Highest education level: Not on file   Occupational History    Not on file   Tobacco Use    Smoking status: Every Day     Current packs/day: 0.25     Average packs/day: 0.9 packs/day for 40.7 years (34.9 ttl pk-yrs)     Types: Cigarettes     Start date: 1/19/1984    Smokeless tobacco: Not on file    Tobacco comments:     I've cut down to just a few a day   Substance and Sexual Activity    Alcohol use: Not Currently     Drug use: Never    Sexual activity: Not Currently     Partners: Female     Birth control/protection: None   Other Topics Concern    Parent/sibling w/ CABG, MI or angioplasty before 65F 55M? No   Social History Narrative    Not on file     Social Determinants of Health     Financial Resource Strain: Low Risk  (9/19/2024)    Financial Resource Strain     Within the past 12 months, have you or your family members you live with been unable to get utilities (heat, electricity) when it was really needed?: No   Food Insecurity: Low Risk  (9/19/2024)    Food Insecurity     Within the past 12 months, did you worry that your food would run out before you got money to buy more?: No     Within the past 12 months, did the food you bought just not last and you didn t have money to get more?: No   Transportation Needs: Low Risk  (9/19/2024)    Transportation Needs     Within the past 12 months, has lack of transportation kept you from medical appointments, getting your medicines, non-medical meetings or appointments, work, or from getting things that you need?: No   Physical Activity: Sufficiently Active (4/9/2024)    Exercise Vital Sign     Days of Exercise per Week: 7 days     Minutes of Exercise per Session: 30 min   Stress: No Stress Concern Present (4/9/2024)    [employer] [place] [employer] - Occupational Stress Questionnaire     Feeling of Stress : Only a little   Social Connections: Unknown (4/9/2024)    Social Connection and Isolation Panel [NHANES]     Frequency of Communication with Friends and Family: Not on file     Frequency of Social Gatherings with Friends and Family: More than three times a week     Attends Sabianism Services: Not on file     Active Member of Clubs or Organizations: Not on file     Attends Club or Organization Meetings: Not on file     Marital Status: Not on file   Interpersonal Safety: Low Risk  (9/13/2024)    Interpersonal Safety     Do you feel physically and emotionally safe where  "you currently live?: Yes     Within the past 12 months, have you been hit, slapped, kicked or otherwise physically hurt by someone?: No     Within the past 12 months, have you been humiliated or emotionally abused in other ways by your partner or ex-partner?: No   Housing Stability: High Risk (9/19/2024)    Housing Stability     Do you have housing? : No     Are you worried about losing your housing?: No           Lab Results    Chemistry/lipid CBC Cardiac Enzymes/BNP/TSH/INR   Lab Results   Component Value Date    CHOL 166 08/26/2024    HDL 53 08/26/2024    TRIG 80 08/26/2024    BUN 24.3 (H) 09/19/2024     (L) 09/19/2024    CO2 28 09/19/2024    Lab Results   Component Value Date    WBC 7.6 09/17/2024    HGB 8.4 (L) 09/18/2024    HCT 25.5 (L) 09/17/2024    MCV 93 09/17/2024     09/19/2024    Lab Results   Component Value Date    TSH 1.94 04/16/2024     No results found for: \"CKTOTAL\", \"CKMB\", \"TROPONINI\"       Weight:    Wt Readings from Last 3 Encounters:   09/20/24 38.8 kg (85 lb 9.6 oz)   08/29/24 41.7 kg (92 lb)   08/26/24 41.5 kg (91 lb 9.6 oz)       Allergies  No Known Allergies      Surgical History  Past Surgical History:   Procedure Laterality Date    BIOPSY  11.18.2022    Right Cheek    ENDARTERECTOMY CAROTID Left 9/13/2024    Procedure: ENDARTERECTOMY, CAROTID WITH NEURO MONITORING;  Surgeon: Margarita Tobin MD;  Location: [facility] [place] OR    [facility]IC TRIPLE LUMEN PLACEMENT  9/14/2024    REPLACE VALVE MITRAL  06/09/2017    bovine mitral valve with Maze and ARIANNA ligation       Social History  Tobacco:   History   Smoking Status    Every Day    Types: Cigarettes   Smokeless Tobacco    Not on file    Alcohol:   Social History    Substance and Sexual Activity      Alcohol use: Not Currently   Illicit Drugs:   History   Drug Use Unknown       Family History  Family History   Problem Relation Age of Onset    Hypertension Mother           Clotilde Ravi MD on 9/20/2024      cc: " Christina Andrew

## 2024-09-20 NOTE — PLAN OF CARE
Goal Outcome Evaluation:       A&O x 4 pleasant and cooperative with cares. Normal sinus rhythm denies chest pain. On 1LNC overnight while sleeping. Up with assist of one in the room. Denies pain. Brachial access site is WDL.   Problem: Risk for Delirium  Goal: Improved Sleep  Outcome: Progressing     Problem: Gas Exchange Impaired  Goal: Optimal Gas Exchange  Outcome: Progressing  Intervention: Optimize Oxygenation and Ventilation  Recent Flowsheet Documentation  Taken 9/19/2024 2345 by Jacky Verma, RN  Head of Bed (HOB) Positioning: HOB at 20-30 degrees     Problem: Pain Acute  Goal: Optimal Pain Control and Function  Outcome: Progressing  Intervention: Prevent or Manage Pain  Recent Flowsheet Documentation  Taken 9/19/2024 2345 by Jacky Verma, RN  Medication Review/Management: medications reviewed

## 2024-09-20 NOTE — PROGRESS NOTES
Care Management Discharge Note    Discharge Date: 09/20/2024       Discharge Disposition: Transitional Care- [facility]    Discharge Services: None    Discharge DME: None    Discharge Transportation:  friend - Marian    Private pay costs discussed: transportation costs    Does the patient's insurance plan have a 3 day qualifying hospital stay waiver?  No    PAS Confirmation Code: [id number]  Patient/family educated on Medicare website which has current facility and service quality ratings: yes    Education Provided on the Discharge Plan: Yes  Persons Notified of Discharge Plans: patient, patient's friend Marian, nursing, hospitalist  Patient/Family in Agreement with the Plan: yes    Handoff Referral Completed: No, handoff not indicated or clinically appropriate    Additional Information:  Patient discharging to TCU via  [facility] . Discharge discussed and confirmed with patient, nursing, hospitalist, patient family member, Haydee, and accepting facility, [facility].        DUKE Patel

## 2024-09-20 NOTE — CONSULTS
Cardiac Surgery Consult    Note     Ernestine Morales  Code Status: Full Code  Primary Care Physician: Christina Andrew   : 1960   Age: 64 year old     Date of Service: 2024     Subjective     Chief Complaint: Follow-upProsthetic Mitral Stenosis    History of present illness:     Ms. Karina Johnson is a pleasant 64-year-old female.  She has a history of MVR and underwent mini right thoracotomy with MVR repair in 2017 in [place] ( [facility] with a 29 mm Katerina-Mohan Magna Bioprosthetic Mitral Valve (?)) She has a history of a left-sided MCA stroke in 2017, embolic in etiology. She has some residual aphasia from this. she presented to [facility] for an elective left CEA.  Postoperatively had some labile blood pressures which were treated medically.  Her prior procedure was done through a right minithoracotomy for the MVR.  She reports that her recovery went well and she was discharged home without issues.  When asked about repeat surgery she states that she would not want a sternotomy.  Will consult  currently she feels well.  She denies chest pain or shortness of breath.       Subjective   Review of Systems:   12-point ROS negative except where noted above      Patient Active Problem List   Diagnosis    Acute pulmonary insufficiency    Acute blood loss anemia    Acute systolic heart failure (H)    Aortic valve sclerosis    Chronic diastolic CHF (congestive heart failure) (H)    Persistent atrial fibrillation (H)    S/P mitral valve replacement    Essential hypertension    Mixed hyperlipidemia    Dyslipidemia    LAE (left atrial enlargement)    MDD (major depressive disorder)    Lt ICA 70-99% stenosis    Carotid stenosis    Pulmonary hypertension (H)     Past Medical History:   Diagnosis Date    Anemia     Aphasia     Atrial fibrillation (H)     Cancer (H) 2022    Squamous cell carcinoma nRight Cheek    Carotid artery stenosis     Cerebrovascular accident (H) 2017     Congestive heart failure (H)     HLD (hyperlipidemia)     HTN (hypertension)     Mitral regurgitation      Past Surgical History:   Procedure Laterality Date    BIOPSY  11.18.2022    Right Cheek    CV RIGHT HEART CATH MEASUREMENTS RECORDED N/A 9/19/2024    Procedure: Right Heart Catheterization;  Surgeon: Amilcar Pierre MD;  Location: [facility] CATH LAB CV    ENDARTERECTOMY CAROTID Left 9/13/2024    Procedure: ENDARTERECTOMY, CAROTID WITH NEURO MONITORING;  Surgeon: Margarita Tobin MD;  Location: [facility] Main OR    PICC TRIPLE LUMEN PLACEMENT  9/14/2024    REPLACE VALVE MITRAL  06/09/2017    bovine mitral valve with Maze and ARIANNA ligation     Social History     Socioeconomic History    Marital status: Single     Spouse name: Not on file    Number of children: Not on file    Years of education: Not on file    Highest education level: Not on file   Occupational History    Not on file   Tobacco Use    Smoking status: Every Day     Current packs/day: 0.25     Average packs/day: 0.9 packs/day for 40.7 years (34.9 ttl pk-yrs)     Types: Cigarettes     Start date: 1/19/1984    Smokeless tobacco: Not on file    Tobacco comments:     I've cut down to just a few a day   Substance and Sexual Activity    Alcohol use: Not Currently    Drug use: Never    Sexual activity: Not Currently     Partners: Female     Birth control/protection: None   Other Topics Concern    Parent/sibling w/ CABG, MI or angioplasty before 65F 55M? No   Social History Narrative    Not on file     Social Determinants of Health     Financial Resource Strain: Low Risk  (9/19/2024)    Financial Resource Strain     Within the past 12 months, have you or your family members you live with been unable to get utilities (heat, electricity) when it was really needed?: No   Food Insecurity: Low Risk  (9/19/2024)    Food Insecurity     Within the past 12 months, did you worry that your food would run out before you got money to buy more?: No     Within  the past 12 months, did the food you bought just not last and you didn t have money to get more?: No   Transportation Needs: Low Risk  (9/19/2024)    Transportation Needs     Within the past 12 months, has lack of transportation kept you from medical appointments, getting your medicines, non-medical meetings or appointments, work, or from getting things that you need?: No   Physical Activity: Sufficiently Active (4/9/2024)    Exercise Vital Sign     Days of Exercise per Week: 7 days     Minutes of Exercise per Session: 30 min   Stress: No Stress Concern Present (4/9/2024)    [employer] [place] [employer] - Occupational Stress Questionnaire     Feeling of Stress : Only a little   Social Connections: Unknown (4/9/2024)    Social Connection and Isolation Panel [NHANES]     Frequency of Communication with Friends and Family: Not on file     Frequency of Social Gatherings with Friends and Family: More than three times a week     Attends Mandaen Services: Not on file     Active Member of Clubs or Organizations: Not on file     Attends Club or Organization Meetings: Not on file     Marital Status: Not on file   Interpersonal Safety: Low Risk  (9/13/2024)    Interpersonal Safety     Do you feel physically and emotionally safe where you currently live?: Yes     Within the past 12 months, have you been hit, slapped, kicked or otherwise physically hurt by someone?: No     Within the past 12 months, have you been humiliated or emotionally abused in other ways by your partner or ex-partner?: No   Housing Stability: High Risk (9/19/2024)    Housing Stability     Do you have housing? : No     Are you worried about losing your housing?: No     Family History   Problem Relation Age of Onset    Hypertension Mother      Medications Prior to Admission   Medication Sig Dispense Refill Last Dose    amLODIPine (NORVASC) 5 MG tablet Take 5 mg by mouth daily.   9/13/2024 at am    aspirin (ASA) 325 MG EC tablet Take 325 mg by  mouth daily   9/13/2024 at am    atorvastatin (LIPITOR) 80 MG tablet Take 1 tablet (80 mg) by mouth daily. 90 tablet 3 9/13/2024    gabapentin (NEURONTIN) 300 MG capsule Take 1 capsule (300 mg) by mouth 2 times daily 180 capsule 1 9/13/2024 at am    losartan (COZAAR) 100 MG tablet Take 1 tablet (100 mg) by mouth daily 90 tablet 3 9/13/2024 at am    metoprolol tartrate (LOPRESSOR) 25 MG tablet Take 1 tablet (25 mg) by mouth 2 times daily 180 tablet 3 9/13/2024 at am     No Known Allergies       Objective   Objective   /57 (BP Location: Right leg)   Pulse 95   Temp 98.3  F (36.8  C) (Oral)   Resp 20   Wt 38.8 kg (85 lb 9.6 oz)   SpO2 94%   BMI 16.17 kg/m      Temp  Min: 97.3  F (36.3  C)  Max: 98.3  F (36.8  C)  BP  Min: 90/70  Max: 126/58     Intake/Output Summary (Last 24 hours) at 9/20/2024 0807  Last data filed at 9/20/2024 0410  Gross per 24 hour   Intake 0 ml   Output 900 ml   Net -900 ml     I/O last 3 completed shifts:  In: 0   Out: 900 [Urine:900]     Physical Exam:   Gen- alert and oriented x3, NAD  HEENT- NC/AT, EOMI  Cardiac- RRR  Pulm- normal respiratory effort  Abd- soft, NT/ND, no rebound or guarding  - deferred  Extremities- no peripheral edema  Neuro- gross motor intact  Skin- no obvious rashes, bruises, or cuts     Pertinent Labs: Reviewed.     Arterial Blood Gases   Recent Labs   Lab 09/14/24  1252 09/14/24  0402 09/13/24  1006 09/13/24  0900   PH 7.41 7.38 7.28* 7.33*   PCO2 37 36 47* 42   PO2 55* 60* 112* 304*   HCO3 23 21 22 22       Complete Blood Count   Recent Labs   Lab 09/19/24  0515 09/18/24  0538 09/17/24  0618 09/16/24  0349 09/15/24  0528 09/14/24  0400   WBC  --   --  7.6 7.3 9.7 12.0*   HGB  --  8.4* 8.4* 8.9* 8.4* 9.0*     --  233 203 199 247       Basic Metabolic Panel  Recent Labs   Lab 09/20/24  0539 09/19/24  1122 09/19/24  0515 09/18/24  0538 09/17/24  0613 09/16/24  1742 09/16/24  0349   NA  --   --  134* 133* 135  --  138   POTASSIUM 3.9 4.0 3.4 3.8 4.0   <  > 3.3*   CHLORIDE  --   --  94* 94* 97*  --  100   CO2  --   --  28 30* 29  --  26   BUN  --   --  24.3* 21.8 20.6  --  16.7   CR  --   --  0.79 0.75 0.79  --  0.79    < > = values in this interval not displayed.       Liver Function Tests  Recent Labs   Lab 24  0400   ALBUMIN 3.7       Coagulation Profile  No lab results found in last 7 days.       Pertinent Imaging (Last 24 hours):  Recent Results (from the past 24 hour(s))   Cardiac Catheterization    Narrative    CARDIAC CATHETERIZATION AND INTERVENTION REPORT    PATIENT NAME:  Ernestine Morales          MEDICAL RECORD NUMBER: [id number]  : 1960       PROCEDURE DATE: 2024        CONCLUSIONS:   Normal right and severely elevated left-sided filling pressures.  Severe pulmonary hypertension, primarily postcapillary  Normal cardiac output and index.      RECOMMENDATIONS:   Usual postprocedure cares, please see orders.  Defer further management to inpatient cardiology service.    PROCEDURE PERFORMED:   Right heart catheterization    PRE-OP DIAGNOSIS:  Bioprosthetic mitral valve stenosis    POST-OP DIAGNOSIS:   Bioprosthetic mitral valve stenosis    PROCEDURALISTS: Amilcar Pierre MD      DIAGNOSTIC PROCEDURAL DETAILS:   Signed, informed consent was obtained. The patient was placed on the table   and prepped and draped in the usual fashion. Time out and site   verification performed.   Access: Right brachial vein using ultrasound guidance and micropuncture   technique.  Catheters: [place]-Ze  Hemostasis: Manual compression    PROCEDURAL MEDICATIONS:  Conscious sedation - none  Local anesthesia - 1% lidocaine   Procedural anticoagulation - none    CONTRAST VOLUME: none  BLOOD LOSS: minimal   FLUIDS: None   SPECIMENS: None  COMPLICATIONS: None    FINDINGS:    Right Heart Catheterization         RA: 2 mmHg         RV: 65/0/3 mmHg (s/d/ed)         PA: 62/26/40 mmHg (s/d/m)         PCWP: 25 mmHg          CO 3 L/min (Taye)  CI:  2.26 L/min/m2                      Please do not hesitate to contact me if you have any questions or   concerns. I was present for the procedure in its entirety.   Amilcar Pierre MD  Interventional Cardiology        Last Echo:  Echo result w/o MOPS: Interpretation Summary Left ventricular function is normal.The ejection fraction is 60-65%.The right ventricle is mildly dilated.The right ventricular systolic function is normal.The left atrium is severely dilated.The right atrium is moderately dilated.There is a bioprosthetic mitral valve.Prosthetic valve leaflets are thickened with decreased mobility.Struts of the prosthetic mitral valve protrude into LVOT and make contact withthe basal septum causing LVOT obstruction.Severe prosthetic mitral valve stenosis( mean gradient 20)There is moderate (2+) tricuspid regurgitation.Severe (>55mmHg) pulmonary hypertension is present.There is mild trileaflet aortic sclerosis.There is mild to moderate (1-2+) aortic regurgitation.               Current Medications     S  C  H  E  D Current Facility-Administered Medications   Medication Dose Route Frequency Provider Last Rate Last Admin    [Held by provider] amLODIPine (NORVASC) tablet 5 mg  5 mg Oral Daily Clotilde Ravi MD        aspirin (ASA) EC tablet 325 mg  325 mg Oral Daily Clotilde Ravi MD   325 mg at 09/19/24 0822    atorvastatin (LIPITOR) tablet 80 mg  80 mg Oral QPM Clotilde Ravi MD   80 mg at 09/19/24 2114    gabapentin (NEURONTIN) capsule 300 mg  300 mg Oral BID Clotilde Ravi MD   300 mg at 09/19/24 2114    heparin ANTICOAGULANT injection 5,000 Units  5,000 Units Subcutaneous Q8H Clotilde Ravi MD   5,000 Units at 09/20/24 0531    [Held by provider] losartan (COZAAR) tablet 100 mg  100 mg Oral Daily Clotilde Ravi MD        [Held by provider] metoprolol tartrate (LOPRESSOR) tablet 25 mg  25 mg Oral BID Clotilde Ravi MD   25 mg at 09/13/24 2116    polyethylene glycol (MIRALAX) Packet  17 g  17 g Oral Daily Clotilde Ravi MD   17 g at 09/19/24 0822    senna-docusate (SENOKOT-S/PERICOLACE) 8.6-50 MG per tablet 1 tablet  1 tablet Oral BID Clotilde Ravi MD   1 tablet at 09/19/24 2114    sodium chloride (PF) 0.9% PF flush 10-40 mL  10-40 mL Intracatheter Q7 Days Clotilde Ravi MD   10 mL at 09/14/24 1351    sodium chloride (PF) 0.9% PF flush 3 mL  3 mL Intracatheter Q8H Clotilde Ravi MD   3 mL at 09/19/24 2351    torsemide (DEMADEX) tablet 20 mg  20 mg Oral Daily Clotilde Ravi MD   20 mg at 09/19/24 0822      G  T  T Current Facility-Administered Medications   Medication Dose Route Frequency Provider Last Rate Last Admin      P  R  N Current Facility-Administered Medications   Medication Dose Route Frequency Provider Last Rate Last Admin    acetaminophen (TYLENOL) tablet 650 mg  650 mg Oral Q4H PRN Clotilde Ravi MD   650 mg at 09/19/24 1924    albuterol (PROVENTIL) neb solution 2.5 mg  2.5 mg Nebulization Q4H PRN Clotilde Ravi MD        benzocaine-menthol (CHLORASEPTIC) 6-10 MG lozenge 1-2 lozenge  1-2 lozenge Buccal Q1H PRN Clotilde Ravi MD        bisacodyl (DULCOLAX) suppository 10 mg  10 mg Rectal Daily PRN Clotilde Ravi MD        glucose gel 15-30 g  15-30 g Oral Q15 Min PRN Clotilde Ravi MD        Or    dextrose 50 % injection 25-50 mL  25-50 mL Intravenous Q15 Min PRN Clotilde Ravi MD        Or    glucagon injection 1 mg  1 mg Subcutaneous Q15 Min PRN Clotilde Ravi MD        HOLD: metformin and metformin containing medications   Does not apply HOLD Clotilde Ravi MD        labetalol (NORMODYNE/TRANDATE) injection 10 mg  10 mg Intravenous Q10 Min PRN Clotilde Ravi MD        magnesium hydroxide (MILK OF MAGNESIA) suspension 30 mL  30 mL Oral Daily PRN Clotilde Ravi MD        melatonin tablet 3 mg  3 mg Oral At Bedtime PRN Agnes Conley MD   3 mg at 09/19/24 4999    ondansetron  (ZOFRAN ODT) ODT tab 4 mg  4 mg Oral Q6H PRN Clotilde Ravi MD        Or    ondansetron (ZOFRAN) injection 4 mg  4 mg Intravenous Q6H PRN Clotilde Ravi MD        prochlorperazine (COMPAZINE) injection 5 mg  5 mg Intravenous Q6H PRN Clotilde Ravi MD        Or    prochlorperazine (COMPAZINE) tablet 5 mg  5 mg Oral Q6H PRN Clotilde Ravi MD        sodium chloride (OCEAN) 0.65 % nasal spray 1 spray  1 spray Both Nostrils Q1H PRN Clotilde Ravi MD        sodium chloride (PF) 0.9% PF flush 10-20 mL  10-20 mL Intracatheter q1 min prn Clotilde Ravi MD   10 mL at 09/14/24 1659    sodium chloride (PF) 0.9% PF flush 10-40 mL  10-40 mL Intracatheter Once PRN Clotilde Ravi MD        sodium chloride (PF) 0.9% PF flush 10-40 mL  10-40 mL Intracatheter Q1H PRN Clotilde Ravi MD        sodium chloride (PF) 0.9% PF flush 3 mL  3 mL Intracatheter q1 min prn Clotilde Ravi MD                Assessment     64 year old female with PMH ox MRV 2017 (Dr. Suraj stokes) and recent CEA  on 9/13/2024. Now with prosthetic Mitral valve stenosis and PHTN, Mod TR     Plan     The patient was seen and chart reviewed.  She does have prosthetic valve stenosis with severe pulmonary hypertension and moderate TR.  She seems to be functioning okay at baseline.  We discussed treatment options including repeat surgery.  In [place] she had a mini right thoracotomy for her mitral valve repair.  She  says she is not interested in a sternotomy.  From cardiac surgery standpoint she is okay to discharge to her TCU and recover from recent CEA.  We will plan to have her follow-up in clinic, likely with Dr. Haynes our mini mitral specialist to further discuss treatment options.    Plan Discussed with Dr Cabrera        Signed:    Liang Goins MD 9/20/2024 at 8:07 AM  Cardiothoracic Surgery Fellow

## 2024-09-20 NOTE — PROGRESS NOTES
Vascular surgery note    No issues overnight. Pain controlled. Tolerating diet. Weaned to room air.    BP 90/70 (BP Location: Left leg)   Pulse 94   Temp 97.7  F (36.5  C) (Oral)   Resp 18   Wt 38.8 kg (85 lb 9.6 oz)   SpO2 97%   BMI 16.17 kg/m      Exam  Resting comfortably  NLB on room air, SpO2 95%  L neck incision c/d/I, flat  Mild leftward tongue deviation, face is otherwise symmetric, speech fluent aside from chronic expressive aphasia, symmetric and normal bilateral  strength, plantar and dorsiflexion strength, symmetric and intact sensation to light touch in all 4 extremities    K3.9  Mg 2.2    Right heart cath reviewed from yesterday demonstrating normal right-sided filling pressures, severely increased left-sided filling pressures, severe pulmonary hypertension, normal cardiac output (3.0 L/min) and CI (2.26).  Severe pulmonary artery hypertension.    Assessment: 64-year-old female with previous left hemispheric cardioembolic stroke, chronic expressive aphasia, chronic mild right-sided weakness, now POD#7 s/p left neurectomy performed for asymptomatic high-grade left internal carotid artery stenosis.  Postop course notable for hypotension (now resolved), and hypoxia with new findings of severe pulmonary artery hypertension, severe bioprosthetic mitral valve stenosis with LVOT obstruction, moderate TR.  Hypoxia is now resolved after several days of diuresis, now on room air.    Plan:  -Discharge to TCU today pending bed availability, cardiac surgery consultation, and clearance by cardiology.  -Many of her home antihypertensives have been held this admission and will defer to cardiology on when to resume in the outpatient setting.  -Continue aspirin 325 mg as previously recommended by stroke neurology  -Prophylactic SQH  -Reviewed discharge instructions with Talya, including signs/symptoms of stroke, ok to shower and pat neck incision dry.  Vascular surgery clinic follow-up scheduled for  9/30/2024.    Aramis Womack MD

## 2024-09-20 NOTE — PLAN OF CARE
Pt denies pain or SOB. VSS. Up in chair for meals. Ambulated in vargas with therapy. No BM. Pt refused suppository, wanting to wait until arrival to TCU    Discharge instructions reviewed with pt. Packet sent with pt at discharge.    Pt discharging to TCU with friend transport

## 2024-09-20 NOTE — TELEPHONE ENCOUNTER
RECORDS RECEIVED FROM: Mari Valle on 9/20/2024     GENERAL RECORDS STATUS DETAILS   OFFICE NOTE from cardiologists Internal 5-8-24 [place]   EKG (STRIPS & REPORTS) Internal 7-31-24   ECHOS (IMAGES AND REPORTS) Internal 9-13-24   PULMONARY HYPERTENSION      6 MINUTE WALK TEST N/A    PULMONARY FUNCTION TESTS N/A    RIGHT HEART CATH (IMAGES) Internal 9-13-24   SLEEP STUDY / OVERNIGHT OXIMETRY N/A    XR CHEST   (IMAGES AND REPORTS) Internal 9-14-24   CHEST CT  (IMAGES AND REPORTS) N/A    V/Q SCAN (IMAGES) N/A    LIVER US  (IMAGES AND REPORTS) N/A    ANGIOGRAMS (IMAGES) Internal CTA Head/ Neck 7-31-24  CT Angio Pulm 4-28-24   STRESS TEST   (IMAGES AND REPORTS) N/A      Action Note:   Action Taken US Carotid: 8-19-24   ED Admission 9-13-24 Margarita Tobin MD     Asymptomatic high-grade left internal carotid artery stenosis s/p carotid endarterecotmy  Severe pulmonary hypertension  Bioprosthetic mitral valve stenosis  Left carotid endarterectomy with bovine pericardial patch angioplasty  Right Heart Catheterization

## 2024-09-21 ENCOUNTER — NURSE TRIAGE (OUTPATIENT)
Dept: NURSING | Facility: CLINIC | Age: 64
End: 2024-09-21

## 2024-09-21 RX ORDER — TORSEMIDE 20 MG/1
20 TABLET ORAL DAILY
Qty: 60 TABLET | Refills: 1 | Status: SHIPPED | OUTPATIENT
Start: 2024-09-21

## 2024-09-21 NOTE — TELEPHONE ENCOUNTER
"Nurse Triage SBAR    Is this a 2nd Level Triage? YES, LICENSED PRACTITIONER REVIEW IS REQUIRED    Situation: Medication Question    Background: Pts POA (With consent on file) calls stating Pt had recent admission and was sent to TCU. Caller took Pt out of TCU due to what she called \"Unsafe conditions\". Pt is now at home and unable to get medication given in hospital. Needing new RX    Assessment: Triager able to verify 20mg Torsemide daily, however no direction on RX of how many days to take.    Protocol Recommended Disposition:   Call PCP Now    Recommendation: Will Attempt to page hospitalist for recommendation      [phone]Jeremiah Knox[facility]     Will give short term RX to pharmacy of choice. Will need to follow up with PCP / Cards for further.     Provider Recommendation Follow Up:   Reached patient/caregiver. Informed of provider's recommendations. Patient verbalized understanding and agrees with the plan.           Does the patient meet one of the following criteria for ADS visit consideration? 16+ years old, with an [facility] PCP     TIP  Providers, please consider if this condition is appropriate for management at one of our Acute and Diagnostic Services sites.     If patient is a good candidate, please use dotphrase <dot>triageresponse and select Refer to ADS to document.      Reason for Disposition   [1] Caller has URGENT medicine question about med that PCP or specialist prescribed AND [2] triager unable to answer question    Protocols used: Medication Question Call-A-AH    "

## 2024-09-23 ENCOUNTER — PATIENT OUTREACH (OUTPATIENT)
Dept: FAMILY MEDICINE | Facility: CLINIC | Age: 64
End: 2024-09-23
Payer: MEDICARE

## 2024-09-23 ENCOUNTER — TELEPHONE (OUTPATIENT)
Dept: CARDIOLOGY | Facility: CLINIC | Age: 64
End: 2024-09-23
Payer: MEDICARE

## 2024-09-23 NOTE — TELEPHONE ENCOUNTER
Return call to Marian (patient's PCA) who explained that patient's prosthetic mitral valve is failing and she was looking into providers who performs transcatheter approach to replacement - patient sched for CV surgery 10-7-24.    Explained to Marian that Dr. Khan does not do procedures anymore and that Dr. Haynes would discuss whether patient candidate for transcatheter approach at visit, then consult could be arranged with that provider - Marian verbalized understanding and offered no further questions / concerns at this time.  mg

## 2024-09-23 NOTE — PATIENT INSTRUCTIONS
Dalila Sinha,    Thank you for entrusting your care with us today. After your visit today with ERIC Saenz this is the plan that was discussed at your appointment.    Follow up in 6 months with Erendira REYES and repeat carotid ultrasound        I am including additional information on these things and our contact information if you have any questions or concerns.   Please do not hesitate to reach out to us if you felt we did not answer your questions or you are unsure of the treatment plan after your visit today. Our number is [phone].Thank you for trusting us with your care.         Again thank you for your time.     Carotid Duplex    Steps for the test are:  You will be asked to lie on a stretcher. It will be okay for you to wear your street clothes. In some situations, you may be asked to change into a gown.    Ultrasound gel, usually warmed for your comfort, will be placed on either side of your neck.    Through the gel, the technician will apply to your neck a small hand-held device that emits sound waves.   When the test is completed, the technician will remove excess gel from your neck. The gel is water-soluble and will not stain your skin or clothes.    How to Prepare:  Eat and take medications as usual.   Wear minimal or no jewelry to ease application and removal of gel.    Risks  There are typically no side effects or complications associated with a carotid duplex ultrasound examination.    What Can I Expect After the Test?  The technician will send the ultrasound images to your vascular surgeon for evaluation. Typically, a report is available in 2-3 days. If anything critical is found, it is standard practice to notify the vascular surgeon immediately.  Reference: https://vascular.org/patient-resources/vascular-tests          Carotid Artery Disease      What is carotid artery disease?  A carotid artery on each side of the neck supplies blood to the brain. Carotid artery disease occurs  "when a substance called plaque builds up in either or both arteries. The buildup may narrow the artery and limit blood flow to the brain. If this plaque breaks open, it may form a blood clot. Or pieces of the plaque may break off. A piece of plaque or a blood clot could move to the brain and cause a stroke or transient ischemic attack (TIA).    The narrowing in an artery is called stenosis. The more narrow an artery becomes, the greater the risk of stroke or TIA.        What causes it?  Carotid artery disease is caused by a process called hardening of the arteries, or atherosclerosis. Plaque builds up inside the carotid arteries. Things that can lead to this buildup include:    Smoking.  High blood pressure.  High cholesterol.  Diabetes.  A family history of hardening of the arteries.    What are the symptoms?  Many people have no symptoms. In some people, a doctor can hear a sound in their neck called a bruit (pronounced \"broo-EE\"). This is a whooshing sound that happens when a carotid artery is narrowed.    For some people, a transient ischemic attack (TIA) or stroke is the first sign of the disease.    Know the warning signs and symptoms of stroke: BE FAST     B = Balance loss   E = Eyesight changes   F = Facial droop or numbness   A = Arm or leg weakness   S = Speech difficulty, slurred speech   T = Time to call 911 for help    How is carotid artery disease treated?  The goal of treatment is to lower your risk of a stroke. Treatment depends on whether you have symptoms and how narrow your arteries are. You probably will take medicine. You also will be encouraged to have a heart-healthy lifestyle. Some people also have a procedure to lower their risk.    Medicines  You may take aspirin or another medicine to prevent blood clots. You will likely also take medicine to lower cholesterol. You may also take medicine to help manage blood pressure.    Heart-healthy lifestyle  A heart-healthy lifestyle can help lower " your risk of stroke.    Don't smoke. Avoid secondhand smoke too.  Eat heart-healthy foods.  Be active. Ask your doctor what type of exercise is safe for you.  Stay at a healthy weight. Lose weight if you need to.  Manage other health problems, such as diabetes. If you think you may have a problem with alcohol or drug use, talk to your doctor.  Get vaccinated against COVID-19, the flu, and pneumonia.    Surgery or stenting  Surgery in the arteries is called carotid endarterectomy. The doctor makes a cut in the neck and takes the plaque out of the artery.    Some people have a stent placed inside a carotid artery. A stent may be inserted during a catheter procedure. In this procedure, a doctor puts a thin tube, called a catheter, into a blood vessel in your groin or arm. The doctor threads the tube up to the carotid artery in the neck. The catheter is used to place the stent. In another type of procedure, a stent is placed in the artery through a cut in the neck.    Surgery and stenting may help lower your risk of a stroke. But they also have a risk of serious problems. You and your doctor can decide together if you want to have surgery or stenting.    Current as of: June 24, 2023  Author: [employer] StaffYou are leaving this website for information purposes only  Clinical Review BoardYou are leaving this website for information purposes only  All [employer] education is reviewed by a team that includes physicians, nurses, advanced practitioners, registered dieticians, and other healthcare professionals.    Your risk factors for stroke or TIA (transient ischemic attack):     Your Risk Factors Your Results Goals Additional education   Please scan QR code   [] High blood pressure /83   Pulse 76   Temp 97.3  F (36.3  C)   Resp 16    Less than 120/80    [] Cholesterol            Total  Cholesterol   Date Value Ref Range Status   08/26/2024 166 <200 mg/dL Final    Less than 150     Triglycerides   Triglycerides  "  Date Value Ref Range Status   08/26/2024 80 <150 mg/dL Final    Less than 150     LDL LDL Cholesterol Calculated   Date Value Ref Range Status   08/26/2024 97 <=100 mg/dL Final      Less than 70     HDL Direct Measure HDL   Date Value Ref Range Status   08/26/2024 53 >=50 mg/dL Final    Greater than 40 (men)  Greater than 50 (women)    [] Diabetes                A1C No results found for: \"A1C\" Less than 5.7    [] Smoking/tobacco use   Tobacco Use      Smoking status: Former        Packs/day: 0.25        Years: 0.9 packs/day for 40.7 years (34.9 ttl pk-yrs)        Types: Cigarettes        Start date: 1/19/1984      Smokeless tobacco: None      Tobacco comments: I've cut down to just a few a day   Quit smoking and tobacco    [] Overweight Estimated body mass index is 16.17 kg/m  as calculated from the following:    Height as of 8/29/24: 5' 1\" (1.549 m).    Weight as of 9/20/24: 85 lb 9.6 oz (38.8 kg).  Less than 25                 "

## 2024-09-23 NOTE — TELEPHONE ENCOUNTER
[facility] Call Center    Phone Message    May a detailed message be left on voicemail: yes     Reason for Call: Other: When Talya was in the hospital recently, she was treated by Dr. Ravi who had recommended to her the transcatheter procedure and that Dr. Khan was the doctor she should go to.  I checked the schedule on Marian's request and Dr. Khan was booked until the beginning of January, which Marian thinks would be too long.  Please call Marian back to discuss Talya's options.     Action Taken: Other: Cardiology    Travel Screening: Not Applicable     Thank you!  [facility]

## 2024-09-23 NOTE — PROGRESS NOTES
[facility]        Patient is here for a 2 week post op to discuss left CEA. Incision check. Hasn't smoked since the surgery    Pt is currently taking Aspirin and Statin.      The provider has been notified that the patient has no concerns.     Questions patient would like addressed today are: N/A.    Refills are needed: No    Has homecare services and agency name:  [facility]

## 2024-09-24 ENCOUNTER — PATIENT OUTREACH (OUTPATIENT)
Dept: CARE COORDINATION | Facility: CLINIC | Age: 64
End: 2024-09-24
Payer: MEDICARE

## 2024-09-24 NOTE — PROGRESS NOTES
Clinic Care Coordination Contact  Transitions of Care Outreach  Chief Complaint   Patient presents with    Clinic Care Coordination - Initial    Clinic Care Coordination - Post Hospital       Most Recent Admission Date: 9/13/2024   Most Recent Admission Diagnosis: Stenosis of left carotid artery - I65.22     Most Recent Discharge Date: 9/20/2024   Most Recent Discharge Diagnosis: Stenosis of left carotid artery - I65.22  Pulmonary hypertension - I27.20  Pulmonary hypertension - I27.20  Stenosis of carotid artery, unspecified laterality - I65.29     Transitions of Care Assessment    Discharge Assessment  How are you doing now that you are home?: good, blood pressure still low  How are your symptoms? (Red Flag symptoms escalate to triage hotline per guidelines): Improved  Do you know how to contact your clinic care team if you have future questions or changes to your health status? : Yes  Does the patient have their discharge instructions? : Yes  Does the patient have questions regarding their discharge instructions? : No  Were you started on any new medications or were there changes to any of your previous medications? : Yes  Does the patient have all of their medications?: Yes  Do you have questions regarding any of your medications? : No  Do you have all of your needed medical supplies or equipment (DME)?  (i.e. oxygen tank, CPAP, cane, etc.): Yes         Post-op (Clinicians Only)  Fever: No  Chills: No  Eating & Drinking: eating and drinking without complaints/concerns  Bowel Function: normal  Urinary Status: voiding without complaint/concerns    Call back from friend Marian. Patient went to TCU and did not get her meds or meals on Friday so they took her home. Her sister from [place] is here for a week and is helping. Patient is grateful to be home and is doing ok.  Her blood pressure is still low. Her oxygen levels seem good and she is not on oxygen at this time.  They will be planning a mitral valve surgery in the  future.  She is walking around using Marian's old walker and may  the walker that was ordered at discharge. They can go to the [facility] location.  Has many follow up appts scheduled.  Marian is looking for a . Would like to get home care and will need a pcp appt to order. Will route request to scheduling to see if anything sooner with PCP than 10-16 is available. She would be open to seeing Rosamaria Armstrong as well.  Will have clinic call.  Reviewed [facility] as a possible option for meals.  They will discuss her needs and will call care coordination if they would like assistance.  Will send letter.     Follow up Plan     Discharge Follow-Up  Discharge follow up appointment scheduled in alignment with recommended follow up timeframe or Transitions of Risk Category? (Low = within 30 days; Moderate= within 14 days; High= within 7 days): Yes  Discharge Follow Up Appointment Date: 09/30/24  Discharge Follow Up Appointment Scheduled with?: Specialty Care Provider    Future Appointments   Date Time Provider Department Center   9/30/2024  1:30 PM Erendira Saenz PA-C MDKAMI [facility]   10/7/2024  2:00 PM Beverly Haynes MD [facility]   10/14/2024  9:15 AM Андрей Burden MD NUNEU [facility]   10/16/2024  9:00 AM Christina Andrew DO MDOB [facility]   10/22/2024  9:30 AM  LAB [facility]   10/22/2024 10:00 AM UC PFL 6 MINUTE WALK 2 [facility]   10/22/2024 10:30 AM Reid Hinson MD [facility]   10/22/2024 12:00 PM UC PFL B [facility]   10/30/2024  7:00 AM [facility]   5/13/2025 10:30 AM [facility]       Outpatient Plan as outlined on AVS reviewed with patient.    For any urgent concerns, please contact our 24 hour nurse triage line: [phone] ([phone])       YVES Day

## 2024-09-24 NOTE — LETTER
[facility] CARE COORDINATION  [facility]    September 24, 2024    Ernestine Morales  [address]  [place] [address]      Dear Talya,    I am a clinic care coordinator who works with ANDREE ALBRIGHT DO with the [facility]. I wanted to thank you for spending the time to talk with me.  Below is a description of clinic care coordination and how I can further assist you.       The clinic care coordination team is made up of a registered nurse, , financial resource worker and community health worker who understand the health care system. The goal of clinic care coordination is to help you manage your health and improve access to the health care system. Our team works alongside your provider to assist you in determining your health and social needs. We can help you obtain health care and community resources, providing you with necessary information and education. We can work with you through any barriers and develop a care plan that helps coordinate and strengthen the communication between you and your care team.  Our services are voluntary and are offered without charge to you personally.    Please feel free to contact me with any questions or concerns regarding care coordination and what we can offer.      We are focused on providing you with the highest-quality healthcare experience possible.    Sincerely,     Irasema Caballero,   [facility]  [phone]

## 2024-09-30 ENCOUNTER — OFFICE VISIT (OUTPATIENT)
Dept: VASCULAR SURGERY | Facility: CLINIC | Age: 64
End: 2024-09-30
Attending: PHYSICIAN ASSISTANT
Payer: MEDICARE

## 2024-09-30 VITALS
TEMPERATURE: 97.3 F | DIASTOLIC BLOOD PRESSURE: 83 MMHG | HEART RATE: 76 BPM | RESPIRATION RATE: 16 BRPM | SYSTOLIC BLOOD PRESSURE: 131 MMHG

## 2024-09-30 DIAGNOSIS — I65.22 STENOSIS OF LEFT CAROTID ARTERY: Primary | ICD-10-CM

## 2024-09-30 PROCEDURE — G0463 HOSPITAL OUTPT CLINIC VISIT: HCPCS | Performed by: PHYSICIAN ASSISTANT

## 2024-09-30 ASSESSMENT — PAIN SCALES - GENERAL: PAINLEVEL: MILD PAIN (3)

## 2024-09-30 ASSESSMENT — PATIENT HEALTH QUESTIONNAIRE - PHQ9
SUM OF ALL RESPONSES TO PHQ QUESTIONS 1-9: 14
SUM OF ALL RESPONSES TO PHQ QUESTIONS 1-9: 14
10. IF YOU CHECKED OFF ANY PROBLEMS, HOW DIFFICULT HAVE THESE PROBLEMS MADE IT FOR YOU TO DO YOUR WORK, TAKE CARE OF THINGS AT HOME, OR GET ALONG WITH OTHER PEOPLE: VERY DIFFICULT

## 2024-09-30 NOTE — PROGRESS NOTES
VASCULAR SURGERY PROGRESS NOTE    LOCATION:  [facility]     Ernestine Morales  Medical Record #: [id number]  YOB: 1960  Age: 64 year old     Date of Service: 9/30/2024    PRIMARY CARE PROVIDER: Christina Andrew    Reason for visit: Follow up s/p left CEA    IMPRESSION: 64-year-old female presenting for follow-up status post recent left carotid endarterectomy for high-grade asymptomatic disease.  Incision site is healing nicely without signs of infection.  Patient continues to experience some numbness to the left side of the tongue that affects her speech.  Otherwise asymptomatic and is medically optimized.    RECOMMENDATION/RISKS: Continue best medical management with aspirin and statin therapy.  Discussed that patient should call us if she continues to have issues with her tongue/speech and we can send a referral for speech therapy.  Follow-up in 6 months with carotid duplex.    HPI:  Ernestine Morales is a 64 year old female with past medical history significant for hypertension, hyperlipidemia, atrial fibrillation, severe pulmonary artery hypertension, history of CVA with chronic expressive aphasia, and carotid artery stenosis.  Patient underwent recent left carotid endarterectomy for high-grade asymptomatic disease.  She presents today for follow-up and is accompanied by her friend/POA.  Incision site is healing nicely without surrounding redness or drainage.  She denies any fevers or chills.  Patient is frustrated that the incision is significantly larger than what she thought it would be.  Mrs. Morales continues to experience some numbness surrounding the area extending to the jawline and earlobe.  This is slowly improving with time.  She also notes numbness to the left side of her tongue that has been affecting her speech.  Her friend believes this is also slowly improving with time.  She denies any difficulty swallowing.  Patient is compliant with her aspirin and  statin medications.  She has upcoming follow-up scheduled with cardiology next week.  No other concerns today.    REVIEW OF SYSTEMS:    A 12 point ROS was reviewed and is negative except for what is listed above in HPI.    PHH:    Past Medical History:   Diagnosis Date    Anemia     Aphasia     Atrial fibrillation (H)     Cancer (H) 11.17.2022    Squamous cell carcinoma nRight Cheek    Carotid artery stenosis     Cerebrovascular accident (H) 01/09/2017    Congestive heart failure (H)     HLD (hyperlipidemia)     HTN (hypertension)     Mitral regurgitation      Past Surgical History:   Procedure Laterality Date    BIOPSY  11.18.2022    Right Cheek    CV RIGHT HEART CATH MEASUREMENTS RECORDED N/A 9/19/2024    Procedure: Right Heart Catheterization;  Surgeon: Amilcar Pierre MD;  Location: [facility] CATH LAB CV    ENDARTERECTOMY CAROTID Left 9/13/2024    Procedure: ENDARTERECTOMY, CAROTID WITH NEURO MONITORING;  Surgeon: Margarita Tobin MD;  Location: [facility] Main OR    PICC TRIPLE LUMEN PLACEMENT  9/14/2024    REPLACE VALVE MITRAL  06/09/2017    bovine mitral valve with Maze and ARIANNA ligation     ALLERGIES:  Patient has no known allergies.    MEDS:    Current Outpatient Medications:     aspirin (ASA) 325 MG EC tablet, Take 325 mg by mouth daily, Disp: , Rfl:     atorvastatin (LIPITOR) 80 MG tablet, Take 1 tablet (80 mg) by mouth daily., Disp: 90 tablet, Rfl: 3    gabapentin (NEURONTIN) 300 MG capsule, Take 1 capsule (300 mg) by mouth 2 times daily, Disp: 180 capsule, Rfl: 1    torsemide (DEMADEX) 20 MG tablet, Take 1 tablet (20 mg) by mouth daily., Disp: 60 tablet, Rfl: 1    senna-docusate (SENOKOT-S/PERICOLACE) 8.6-50 MG tablet, Take 1 tablet by mouth 2 times daily as needed for constipation. (Patient not taking: Reported on 9/30/2024), Disp: , Rfl:     SOCIAL HABITS:    History   Smoking Status    Former    Types: Cigarettes   Smokeless Tobacco    Not on file     Social History    Substance and Sexual  Activity      Alcohol use: Not Currently      History   Drug Use Unknown     FAMILY HISTORY:    Family History   Problem Relation Age of Onset    Hypertension Mother      PE:  /83   Pulse 76   Temp 97.3  F (36.3  C)   Resp 16   Wt Readings from Last 1 Encounters:   09/20/24 38.8 kg (85 lb 9.6 oz)     There is no height or weight on file to calculate BMI.    EXAM:  GENERAL: well-developed 64 year old female who appears her stated age  CARDIAC: normal   CHEST/LUNG: normal respiratory effort   MUSCULOSKELETAL: grossly normal and both lower extremities are intact, no lower extremity edema  NEUROLOGIC: focally intact, alert and oriented x 3  PSYCH: appropriate affect  VASCULAR: neck incision healing nicely with overlying skin glue, no surrounding erythema, drainage, or hematoma    DIAGNOSTIC STUDIES:     Images:  Pertinent imaging reviewed.     LABS:      Sodium   Date Value Ref Range Status   09/20/2024 137 135 - 145 mmol/L Final   09/19/2024 134 (L) 135 - 145 mmol/L Final   09/18/2024 133 (L) 135 - 145 mmol/L Final     Urea Nitrogen   Date Value Ref Range Status   09/20/2024 26.5 (H) 8.0 - 23.0 mg/dL Final   09/19/2024 24.3 (H) 8.0 - 23.0 mg/dL Final   09/18/2024 21.8 8.0 - 23.0 mg/dL Final     Hemoglobin   Date Value Ref Range Status   09/18/2024 8.4 (L) 11.7 - 15.7 g/dL Final   09/17/2024 8.4 (L) 11.7 - 15.7 g/dL Final   09/16/2024 8.9 (L) 11.7 - 15.7 g/dL Final     Platelet Count   Date Value Ref Range Status   09/19/2024 229 150 - 450 10e3/uL Final   09/17/2024 233 150 - 450 10e3/uL Final   09/16/2024 203 150 - 450 10e3/uL Final     25 minutes spent on the day of encounter doing chart review, history and exam, documentation, and further activities as noted.     Erendira Saenz PA-C  VASCULAR SURGERY

## 2024-10-01 ENCOUNTER — OFFICE VISIT (OUTPATIENT)
Dept: FAMILY MEDICINE | Facility: CLINIC | Age: 64
End: 2024-10-01
Payer: MEDICARE

## 2024-10-01 VITALS
RESPIRATION RATE: 15 BRPM | WEIGHT: 90.6 LBS | BODY MASS INDEX: 17.11 KG/M2 | OXYGEN SATURATION: 98 % | SYSTOLIC BLOOD PRESSURE: 120 MMHG | DIASTOLIC BLOOD PRESSURE: 68 MMHG | TEMPERATURE: 98.1 F | HEIGHT: 61 IN | HEART RATE: 87 BPM

## 2024-10-01 DIAGNOSIS — Z12.31 ENCOUNTER FOR SCREENING MAMMOGRAM FOR BREAST CANCER: ICD-10-CM

## 2024-10-01 DIAGNOSIS — D62 ACUTE BLOOD LOSS ANEMIA: Primary | ICD-10-CM

## 2024-10-01 DIAGNOSIS — R26.89 IMPAIRED GAIT AND MOBILITY: ICD-10-CM

## 2024-10-01 DIAGNOSIS — I63.9 CEREBROVASCULAR ACCIDENT (CVA), UNSPECIFIED MECHANISM (H): ICD-10-CM

## 2024-10-01 PROCEDURE — G2211 COMPLEX E/M VISIT ADD ON: HCPCS | Performed by: FAMILY MEDICINE

## 2024-10-01 PROCEDURE — 99214 OFFICE O/P EST MOD 30 MIN: CPT | Performed by: FAMILY MEDICINE

## 2024-10-01 NOTE — PROGRESS NOTES
Assessment & Plan     Acute blood loss anemia  Acute, likely secondary to volume loss from cardiac procedures. Recommend increasing dietary iron intake. Patient agreeable. Also will assess iron and hemoglobin at next lab draw, if decreased, consider iron infusion.   - Iron and iron binding capacity  - Ferritin    Impaired gait and mobility  Chronic, worsened. Patient with residual deficits from prior stroke, also experiencing some worsening sensory deficits from prior surgery. Recommend PT eval for power scooter.   - Wheelchair Scooter Order for DME with OT or PT Referral  - Home Care Referral    Cerebrovascular accident (CVA), unspecified mechanism (H)  Chronic, stable. See above.   - Wheelchair Scooter Order for DME with OT or PT Referral  - Home Care Referral    Encounter for screening mammogram for breast cancer  See above.   - MA Screening Bilateral w/ Eugene    The longitudinal plan of care for the diagnosis(es)/condition(s) as documented were addressed during this visit. Due to the added complexity in care, I will continue to support Talya in the subsequent management and with ongoing continuity of care.      MED REC REQUIRED  Post Medication Reconciliation Status:  Discharge medications reconciled and changed, see notes/orders    See Patient Instructions    Subjective   Talya is a 64 year old, presenting for the following health issues:  Follow Up        10/1/2024     9:44 AM   Additional Questions   Roomed by Xenia   Accompanied by Delmy gagnon     History of Present Illness       Vascular Disease:  She presents for follow up of vascular disease.     She never takes nitroglycerin. She takes daily aspirin.    Reason for visit:  Low blood pressure check meds Released from Hosp 9.20.24    She eats 0-1 servings of fruits and vegetables daily.She consumes 1 sweetened beverage(s) daily.She exercises with enough effort to increase her heart rate 9 or less minutes per day.  She exercises with enough effort to  "increase her heart rate 3 or less days per week.   She is taking medications regularly.          Hospital Follow-up Visit:    Hospital/Nursing Home/IP Rehab Facility: [facility]  Date of Admission: 9/13/24  Date of Discharge: 9/20/24  Reason(s) for Admission: stroke  Was the patient in the ICU or did the patient experience delirium during hospitalization?  Yes         4/16/2024     9:10 AM   Mini-Cog Total Score   Mini Cog Total Score 5     Do you have any other stressors you would like to discuss with your provider? No    Problems taking medications regularly:  None  Medication changes since discharge: None  Problems adhering to non-medication therapy:  None    Summary of hospitalization:  [facility] discharge summary reviewed  Diagnostic Tests/Treatments reviewed.  Follow up needed: none  Other Healthcare Providers Involved in Patient s Care:         None  Update since discharge: improved.     Since discharge having tongue numbness and drooling.    Today her blood pressure has been stable. At home it has gone as low as 90/60 mmHg.        Plan of care communicated with patient and caregiver          Review of Systems  Constitutional, neuro, ENT, endocrine, pulmonary, cardiac, gastrointestinal, genitourinary, musculoskeletal, integument and psychiatric systems are negative, except as otherwise noted.      Objective    /68   Pulse 87   Temp 98.1  F (36.7  C) (Oral)   Resp 15   Ht 1.549 m (5' 1\")   Wt 41.1 kg (90 lb 9.6 oz)   SpO2 98%   BMI 17.12 kg/m    Body mass index is 17.12 kg/m .  Physical Exam   GENERAL: alert and no distress  EYES: Eyes grossly normal to inspection, PERRL and conjunctivae and sclerae normal  NECK: no adenopathy and right sided 7-8 cm healing linear incision with visible sutures, c/d/i  MS: no gross musculoskeletal defects noted, no edema  NEURO: Expressive aphasia, right sided tongue deviation     Lab Results   Component Value Date    WBC " "7.6 09/17/2024     Lab Results   Component Value Date    RBC 2.75 09/17/2024     Lab Results   Component Value Date    HGB 8.4 09/18/2024     Lab Results   Component Value Date    HCT 25.5 09/17/2024     No components found for: \"MCT\"  Lab Results   Component Value Date    MCV 93 09/17/2024     Lab Results   Component Value Date    MCH 30.5 09/17/2024     Lab Results   Component Value Date    MCHC 32.9 09/17/2024     Lab Results   Component Value Date    RDW 14.1 09/17/2024     Lab Results   Component Value Date     09/19/2024             Signed Electronically by: ANDREE ALBRIGHT DO    "

## 2024-10-01 NOTE — PATIENT INSTRUCTIONS
If Talya's blood pressure gets low, have her drink some water and lay down. Wait for me to talk to the Cardiologist to see if they want you to HOLD the torsemide (water pill) as it MAY affect her blood pressure. I should reach out in 2-3 days.   Oxygen lower than 90% on a person without lung disease gets concerning. 92-95% can be normal!  The only medication change MIGHT be the torsemide. Let me talk to the heart doctor.   I ordered home health. I believe nursing, speech, and physical therapy are appropriate. They may recommend other services.  For the KALEIGH, she will need to do an OUTPATIENT evaluation with physical therapy (not in home, at the clinic). I do think she would benefit.   You can cancel the visits! I have no other concerns.   Of note- for her fatigue, I think it's related to the blood loss. If we give her some iron (dietary needs, not supplements), it may help. I also ordered blood work for her to get when she is next due for labs.

## 2024-10-04 ENCOUNTER — TELEPHONE (OUTPATIENT)
Dept: FAMILY MEDICINE | Facility: CLINIC | Age: 64
End: 2024-10-04
Payer: MEDICARE

## 2024-10-04 NOTE — TELEPHONE ENCOUNTER
Order/Referral Request    Who is requesting: [facility]    Orders being requested:       Verbal Orders    cardiac reason went to hospital     Skilled Nursing     -1x a week for 4 weeks    -every other week for 5     PT, OT Eval    When are orders needed by: ASAP    Does patient have a preference on a Group/Provider/Facility? [facility]    Does patient have an appointment scheduled?: Yes: 5/13/2025    Where to send orders:  Verbal Orders    [facility]  [phone]

## 2024-10-04 NOTE — TELEPHONE ENCOUNTER
Home Care is calling regarding an established patient with [facility].       Requesting orders from: Christina Andrew  Provider is following patient: Yes  Is this a 60-day recertification request?  No    Orders Requested    Skilled Nursing  Request for initial certification (first set of orders)   Frequency: 1x/wk for 4 wks  then 1x every other wk for 5 wks    Physical Therapy  Request for initial evaluation and treatment (one time)     Occupational Therapy  Request for initial evaluation and treatment (one time)     Provider review needed.  RN will contact Home Care with information after provider review.  Confirmed ok to leave a detailed message with call back.  Contact information confirmed and updated as needed.    Stacie Rodríguez RN

## 2024-10-07 ENCOUNTER — OFFICE VISIT (OUTPATIENT)
Dept: CARDIOLOGY | Facility: CLINIC | Age: 64
End: 2024-10-07
Payer: MEDICARE

## 2024-10-07 VITALS
WEIGHT: 85.9 LBS | HEART RATE: 91 BPM | BODY MASS INDEX: 16.22 KG/M2 | DIASTOLIC BLOOD PRESSURE: 85 MMHG | SYSTOLIC BLOOD PRESSURE: 136 MMHG | OXYGEN SATURATION: 99 % | HEIGHT: 61 IN

## 2024-10-07 DIAGNOSIS — I34.0 MITRAL VALVE INSUFFICIENCY, UNSPECIFIED ETIOLOGY: ICD-10-CM

## 2024-10-07 DIAGNOSIS — Z95.2 S/P MITRAL VALVE REPLACEMENT: Primary | ICD-10-CM

## 2024-10-07 DIAGNOSIS — Z95.2 S/P MVR (MITRAL VALVE REPLACEMENT): Primary | ICD-10-CM

## 2024-10-07 PROCEDURE — 99204 OFFICE O/P NEW MOD 45 MIN: CPT | Performed by: SURGERY

## 2024-10-09 ENCOUNTER — TELEPHONE (OUTPATIENT)
Dept: FAMILY MEDICINE | Facility: CLINIC | Age: 64
End: 2024-10-09
Payer: MEDICARE

## 2024-10-09 NOTE — TELEPHONE ENCOUNTER
Order/Referral Request    Who is requesting: [facility]     Orders being requested:     Verbal Orders    Occupational Therapy   1 visit every other week for 3 visits    When are orders needed by: ASAP    Does patient have a preference on a Group/Provider/Facility? [facility]    Does patient have an appointment scheduled?: Yes: 5/13/2025    Where to send orders:  Verbal Orders    [facility]  [phone]

## 2024-10-10 ENCOUNTER — TELEPHONE (OUTPATIENT)
Dept: FAMILY MEDICINE | Facility: CLINIC | Age: 64
End: 2024-10-10
Payer: MEDICARE

## 2024-10-10 ENCOUNTER — TELEPHONE (OUTPATIENT)
Dept: CARDIOLOGY | Facility: CLINIC | Age: 64
End: 2024-10-10
Payer: MEDICARE

## 2024-10-10 DIAGNOSIS — R79.9 ABNORMAL FINDING OF BLOOD CHEMISTRY, UNSPECIFIED: ICD-10-CM

## 2024-10-10 DIAGNOSIS — Z95.2 S/P MVR (MITRAL VALVE REPLACEMENT): Primary | ICD-10-CM

## 2024-10-10 NOTE — TELEPHONE ENCOUNTER
Marian had reached out wondering what the plan was and how to get surgery set up.  RN explained that once we get her imaging set up we can know a time frame of when she can have surgery and that the  should be reaching out to her today; Marian verbalized understanding.

## 2024-10-10 NOTE — TELEPHONE ENCOUNTER
Home Care is calling regarding an established patient with [facility].       Requesting orders from: Christina Andrew  Provider is following patient: Yes  Is this a 60-day recertification request?  No    Orders Requested    Physical Therapy  Request for initial certification (first set of orders)   Frequency:  1 time every other wk for 6 wks      Information was gathered and will be sent to provider for review.  RN will contact Home Care with information after provider review.  Confirmed ok to leave a detailed message with call back.  Contact information confirmed and updated as needed.    Celi Kwon, RN

## 2024-10-10 NOTE — PROGRESS NOTES
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  Since her last visit she underwent left endarterectomy but unfortunately was complicated by left hypoglossal injury and has deviation of the tongue to the left.  She has noticed gradual improvement in her symptoms and does not experience as much dizziness.  I have recommended:    1.  Continue Lipitor 80 mg daily.  This was increased on 2024.  She will get a repeat lipid panel checked in 3 months most recent LDL was 97.  2.  Continue enteric-coated aspirin 325 mg daily  3.  Continue physical therapy  4.  She was also seen by cardiothoracic surgery for severe bioprosthetic mitral valve stenosis and is scheduled for surgery on 2024  5.  Follow-up on as needed basis    Thank you again for this referral, please feel free to contact me if you have any questions.    Андрей Burden MD  [facility]     HISTORY OF PRESENT ILLNESS     Patient is a 64-year-old female with A-fib, HTN, HLD s/p MVR with a bioprosthetic mitral valve, s/p direct-current cardioversion, Maze procedure and left atrial appendage ligation in , 70 to 99% left ICA stenosis who was initially evaluated on 2024  for ongoing care for CVA, sense of imbalance and dizziness.  Since her last visit she had left carotid endarterectomy but unfortunately she developed left tongue deviation due to intraoperative injury to hypoglossal nerve.  Noticed increased worsening of her speech that is gradually improving.    Patient had a left MCA embolic infarction resulting in expressive aphasia in 2017.  Subsequently she had direct-current cardioversion and maze procedure on 4/17/2017 and was switched to aspirin and continue on atorvastatin.  Most recent LDL checked on 3/26/2024 is 97.  She was seen in the ER for sense of dizziness and had a CTA that showed 70 to 99% left ICA stenosis and saw vascular surgery and had left carotid endarterectomy with bovine pericardial patch angioplasty.  Postoperatively she developed left-sided tongue deviation due to intraoperative retraction of the hypoglossal nerve and developed hypotension and hypoxia postoperatively and was found to have mitral stenosis..  She was told to continue on aspirin and Lipitor and physical therapy.  She was also seen by cardiothoracic surgery for severe bioprosthetic mitral valve stenosis.     PROBLEM LIST   Patient Active Problem List   Diagnosis    Acute pulmonary insufficiency    Acute blood loss anemia    Acute systolic heart failure (H)    Aortic valve sclerosis    Chronic diastolic CHF (congestive heart failure) (H)    Persistent atrial fibrillation (H)    S/P mitral valve replacement    Essential hypertension    Mixed hyperlipidemia    Dyslipidemia    LAE (left atrial enlargement)    MDD (major depressive disorder)    Lt ICA 70-99% stenosis    Carotid stenosis    Pulmonary hypertension (H)         PAST MEDICAL & SURGICAL HISTORY     Past Medical History:   Patient  has a past medical history of Anemia, Aphasia, Atrial fibrillation (H), Cancer (H) (11.17.2022), Carotid artery stenosis, Cerebrovascular accident (H) (01/09/2017), Congestive heart failure (H), HLD  "(hyperlipidemia), HTN (hypertension), and Mitral regurgitation.    Surgical History:  She  has a past surgical history that includes Replace valve mitral (06/09/2017); biopsy (11.18.2022); PICC/Midline Placement (9/14/2024); Endarterectomy carotid (Left, 9/13/2024); and Right Heart Catheterization (N/A, 9/19/2024).     SOCIAL HISTORY     Reviewed, and she  reports that she has quit smoking. Her smoking use included cigarettes. She started smoking about 40 years ago. She has a 34.9 pack-year smoking history. She does not have any smokeless tobacco history on file. She reports that she does not currently use alcohol. She reports that she does not use drugs.     FAMILY HISTORY     Reviewed, and family history includes Hypertension in her mother.     ALLERGIES     No Known Allergies      REVIEW OF SYSTEMS     A 12 point review of system was performed and was negative except as outlined in the history of present illness.     HOME MEDICATIONS     Current Outpatient Rx   Medication Sig Dispense Refill    aspirin (ASA) 325 MG EC tablet Take 325 mg by mouth daily      atorvastatin (LIPITOR) 80 MG tablet Take 1 tablet (80 mg) by mouth daily. 90 tablet 3    gabapentin (NEURONTIN) 300 MG capsule Take 1 capsule (300 mg) by mouth 2 times daily 180 capsule 1    torsemide (DEMADEX) 20 MG tablet Take 1 tablet (20 mg) by mouth daily. 60 tablet 1         PHYSICAL EXAM     Vital signs  BP 99/64   Pulse 86   Ht 1.549 m (5' 1\")   Wt 40 kg (88 lb 3.2 oz)   BMI 16.67 kg/m      Weight:   88 lbs 3.2 oz    Middle-age female who is alert and oriented vital signs are reviewed and documented in electronic medical record. Neck supple. She has expressive aphasia.  Patient's tongue deviates to the left.  Palate raises symmetrically gag present strength on the right 4+/5 on the left 5/5 reflexes are 3+ with upgoing toe. She has minimal dysmetria on finger-nose testing. She has a wide-based gait and is quite unstable      PERTINENT DIAGNOSTIC " STUDIES     Following studies were reviewed:     CTA HEAD AND NECK 7/31/2024  HEAD CT:  1.  Chronic left MCA territory frontal, temporal and insular encephalomalacia.     2.  No evidence for acute infarct.     3.  No mass or acute hemorrhage     HEAD CTA:   1.  Attenuated distal left MCA branches correlating with known large territory chronic infarct     2.  No evidence for acute large vessel occlusion.     3.  Negative for aneurysm or vascular malformation.     4.  Mild cavernous segment stenoses bilaterally.     NECK CTA:  1.  Extremely high-grade stenosis of the left internal carotid artery origin. 95% narrowing.     2.  Aortic arch great vessel origins stenoses as above.     3.  No evidence for carotid or vertebral dissection.     MRI BRAIN 7/31/2024  1. Extensive remote ischemic findings particularly in the left MCA distribution. There is no evidence of restricted diffusion to suggest acute ischemia on the current examination.      CAROTID ULTRASOUND 8/19/2024  1. Normal diameter of the right ICA relative to the proximal ICA diameter.  2. 70-99% diameter stenosis of the left ICA relative to the proximal ICA diameter.  Evaluation based on SRU Consensus Conference Criteria for Internal Carotid Artery Stenosis for Implementation in [facility]-Accredited [facility] Laboratories    ECHOCARDIOGRAM 9/13/2024  Left ventricular function is normal.The ejection fraction is 60-65%.  The right ventricle is mildly dilated.  The right ventricular systolic function is normal.  The left atrium is severely dilated.  The right atrium is moderately dilated.  There is a bioprosthetic mitral valve.  Prosthetic valve leaflets are thickened with decreased mobility.  Struts of the prosthetic mitral valve protrude into LVOT and make contact with  the basal septum causing LVOT obstruction.  Severe prosthetic mitral valve stenosis( mean gradient 20)  There is moderate (2+) tricuspid regurgitation.  Severe (>55mmHg) pulmonary hypertension is  present.  There is mild trileaflet aortic sclerosis.  There is mild to moderate (1-2+) aortic regurgitation.     PERTINENT LABS  Following labs were reviewed:  No results displayed because visit has over 200 results.      Lab on 09/04/2024   Component Date Value Ref Range Status    WBC Count 09/04/2024 7.5  4.0 - 11.0 10e3/uL Final    RBC Count 09/04/2024 3.59 (L)  3.80 - 5.20 10e6/uL Final    Hemoglobin 09/04/2024 11.2 (L)  11.7 - 15.7 g/dL Final    Hematocrit 09/04/2024 33.0 (L)  35.0 - 47.0 % Final    MCV 09/04/2024 92  78 - 100 fL Final    MCH 09/04/2024 31.2  26.5 - 33.0 pg Final    MCHC 09/04/2024 33.9  31.5 - 36.5 g/dL Final    RDW 09/04/2024 13.3  10.0 - 15.0 % Final    Platelet Count 09/04/2024 210  150 - 450 10e3/uL Final    Sodium 09/04/2024 134 (L)  135 - 145 mmol/L Final    Potassium 09/04/2024 5.4 (H)  3.4 - 5.3 mmol/L Final    Carbon Dioxide (CO2) 09/04/2024 20 (L)  22 - 29 mmol/L Final    Anion Gap 09/04/2024 11  7 - 15 mmol/L Final    Urea Nitrogen 09/04/2024 24.6 (H)  8.0 - 23.0 mg/dL Final    Creatinine 09/04/2024 0.91  0.51 - 0.95 mg/dL Final    GFR Estimate 09/04/2024 70  >60 mL/min/1.73m2 Final    Calcium 09/04/2024 9.7  8.8 - 10.4 mg/dL Final    Chloride 09/04/2024 103  98 - 107 mmol/L Final    Glucose 09/04/2024 86  70 - 99 mg/dL Final    Alkaline Phosphatase 09/04/2024 74  40 - 150 U/L Final    AST 09/04/2024 45  0 - 45 U/L Final    ALT 09/04/2024 45  0 - 50 U/L Final    Protein Total 09/04/2024 7.6  6.4 - 8.3 g/dL Final    Albumin 09/04/2024 4.3  3.5 - 5.2 g/dL Final    Bilirubin Total 09/04/2024 0.6  <=1.2 mg/dL Final   Office Visit on 08/29/2024   Component Date Value Ref Range Status    Occult Blood Hgb FIT 08/27/2024 Negative  Negative Final   Office Visit on 08/26/2024   Component Date Value Ref Range Status    Sodium 08/26/2024 132 (L)  135 - 145 mmol/L Final    Potassium 08/26/2024 4.5  3.4 - 5.3 mmol/L Final    Chloride 08/26/2024 99  98 - 107 mmol/L Final    Carbon Dioxide (CO2)  08/26/2024 23  22 - 29 mmol/L Final    Anion Gap 08/26/2024 10  7 - 15 mmol/L Final    Urea Nitrogen 08/26/2024 30.4 (H)  8.0 - 23.0 mg/dL Final    Creatinine 08/26/2024 1.51 (H)  0.51 - 0.95 mg/dL Final    GFR Estimate 08/26/2024 38 (L)  >60 mL/min/1.73m2 Final    Calcium 08/26/2024 9.4  8.8 - 10.4 mg/dL Final    Glucose 08/26/2024 91  70 - 99 mg/dL Final    Patient Fasting > 8hrs? 08/26/2024 Unknown   Final    WBC Count 08/26/2024 8.3  4.0 - 11.0 10e3/uL Final    RBC Count 08/26/2024 3.59 (L)  3.80 - 5.20 10e6/uL Final    Hemoglobin 08/26/2024 11.0 (L)  11.7 - 15.7 g/dL Final    Hematocrit 08/26/2024 33.1 (L)  35.0 - 47.0 % Final    MCV 08/26/2024 92  78 - 100 fL Final    MCH 08/26/2024 30.6  26.5 - 33.0 pg Final    MCHC 08/26/2024 33.2  31.5 - 36.5 g/dL Final    RDW 08/26/2024 13.4  10.0 - 15.0 % Final    Platelet Count 08/26/2024 258  150 - 450 10e3/uL Final    Cholesterol 08/26/2024 166  <200 mg/dL Final    Triglycerides 08/26/2024 80  <150 mg/dL Final    Direct Measure HDL 08/26/2024 53  >=50 mg/dL Final    LDL Cholesterol Calculated 08/26/2024 97  <=100 mg/dL Final    Non HDL Cholesterol 08/26/2024 113  <130 mg/dL Final    Patient Fasting > 8hrs? 08/26/2024 Unknown   Final    Ferritin 08/26/2024 215  11 - 328 ng/mL Final    Iron 08/26/2024 67  37 - 145 ug/dL Final    Iron Binding Capacity 08/26/2024 339  240 - 430 ug/dL Final    Iron Sat Index 08/26/2024 20  15 - 46 % Final   Admission on 07/31/2024, Discharged on 07/31/2024   Component Date Value Ref Range Status    Sodium 07/31/2024 136  135 - 145 mmol/L Final    Potassium 07/31/2024 4.6  3.4 - 5.3 mmol/L Final    Chloride 07/31/2024 102  98 - 107 mmol/L Final    Carbon Dioxide (CO2) 07/31/2024 23  22 - 29 mmol/L Final    Anion Gap 07/31/2024 11  7 - 15 mmol/L Final    Urea Nitrogen 07/31/2024 23.8 (H)  8.0 - 23.0 mg/dL Final    Creatinine 07/31/2024 0.91  0.51 - 0.95 mg/dL Final    GFR Estimate 07/31/2024 70  >60 mL/min/1.73m2 Final    Calcium 07/31/2024  9.8  8.8 - 10.4 mg/dL Final    Glucose 07/31/2024 86  70 - 99 mg/dL Final    Ventricular Rate 07/31/2024 60  BPM Final    Atrial Rate 07/31/2024 60  BPM Final    ND Interval 07/31/2024 146  ms Final    QRS Duration 07/31/2024 84  ms Final    QT 07/31/2024 458  ms Final    QTc 07/31/2024 458  ms Final    P Axis 07/31/2024 53  degrees Final    R AXIS 07/31/2024 50  degrees Final    T Axis 07/31/2024 77  degrees Final    Interpretation ECG 07/31/2024    Final                    Value:Sinus rhythm  Normal ECG  No previous ECGs available  Confirmed by SEE ED PROVIDER NOTE FOR, ECG INTERPRETATION ([id number]),  Salvador Franco ([id number]) on 8/12/2024 11:56:09 AM      Magnesium 07/31/2024 1.9  1.7 - 2.3 mg/dL Final    WBC Count 07/31/2024 7.1  4.0 - 11.0 10e3/uL Final    RBC Count 07/31/2024 4.62  3.80 - 5.20 10e6/uL Final    Hemoglobin 07/31/2024 14.0  11.7 - 15.7 g/dL Final    Hematocrit 07/31/2024 41.6  35.0 - 47.0 % Final    MCV 07/31/2024 90  78 - 100 fL Final    MCH 07/31/2024 30.3  26.5 - 33.0 pg Final    MCHC 07/31/2024 33.7  31.5 - 36.5 g/dL Final    RDW 07/31/2024 12.7  10.0 - 15.0 % Final    Platelet Count 07/31/2024 223  150 - 450 10e3/uL Final    % Neutrophils 07/31/2024 70  % Final    % Lymphocytes 07/31/2024 23  % Final    % Monocytes 07/31/2024 5  % Final    % Eosinophils 07/31/2024 0  % Final    % Basophils 07/31/2024 1  % Final    % Immature Granulocytes 07/31/2024 0  % Final    NRBCs per 100 WBC 07/31/2024 0  <1 /100 Final    Absolute Neutrophils 07/31/2024 5.0  1.6 - 8.3 10e3/uL Final    Absolute Lymphocytes 07/31/2024 1.6  0.8 - 5.3 10e3/uL Final    Absolute Monocytes 07/31/2024 0.4  0.0 - 1.3 10e3/uL Final    Absolute Eosinophils 07/31/2024 0.0  0.0 - 0.7 10e3/uL Final    Absolute Basophils 07/31/2024 0.1  0.0 - 0.2 10e3/uL Final    Absolute Immature Granulocytes 07/31/2024 0.0  <=0.4 10e3/uL Final    Absolute NRBCs 07/31/2024 0.0  10e3/uL Final    Hold Specimen 07/31/2024 [facility]   Final    Hold  Specimen 07/31/2024 [facility]   Final    Color Urine 07/31/2024 Light Yellow  Colorless, Straw, Light Yellow, Yellow Final    Appearance Urine 07/31/2024 Clear  Clear Final    Glucose Urine 07/31/2024 Negative  Negative mg/dL Final    Bilirubin Urine 07/31/2024 Negative  Negative Final    Ketones Urine 07/31/2024 20 (A)  Negative mg/dL Final    Specific Gravity Urine 07/31/2024 1.010  1.001 - 1.030 Final    Blood Urine 07/31/2024 0.06 mg/dL (A)  Negative Final    pH Urine 07/31/2024 5.5  5.0 - 7.0 Final    Protein Albumin Urine 07/31/2024 10 (A)  Negative mg/dL Final    Urobilinogen Urine 07/31/2024 <2.0  <2.0 mg/dL Final    Nitrite Urine 07/31/2024 Negative  Negative Final    Leukocyte Esterase Urine 07/31/2024 75 Mickey/uL (A)  Negative Final    Bacteria Urine 07/31/2024 Few (A)  None Seen /HPF Final    Mucus Urine 07/31/2024 Present (A)  None Seen /LPF Final    RBC Urine 07/31/2024 4 (H)  <=2 /HPF Final    WBC Urine 07/31/2024 5  <=5 /HPF Final    Squamous Epithelials Urine 07/31/2024 5 (H)  <=1 /HPF Final    Hyaline Casts Urine 07/31/2024 4 (H)  <=2 /LPF Final         Total time spent for face to face visit, reviewing labs/imaging studies, counseling and coordination of care was: 30 Minutes spent on the date of the encounter doing chart review, review of outside records, review of test results, interpretation of tests, patient visit, and documentation     The longitudinal plan of care for the diagnosis(es)/condition(s) as documented were addressed during this visit. Due to the added complexity in care, I will continue to support Talya in the subsequent management and with ongoing continuity of care.    This note was dictated using voice recognition software.  Any grammatical or context distortions are unintentional and inherent to the software.    No orders of the defined types were placed in this encounter.     New Prescriptions    No medications on file     Modified Medications    No medications on file

## 2024-10-10 NOTE — TELEPHONE ENCOUNTER
Order/Referral Request    Who is requesting: [facility]     Orders being requested: PT 1 time every other wk for 6 wks     Reason service is needed/diagnosis: n/a     When are orders needed by: asap     Has this been discussed with Provider: No    Does patient have a preference on a Group/Provider/Facility? [facility]     Does patient have an appointment scheduled?: No    Where to send orders:  Verbal Order    Could we send this information to you in [facility] or would you prefer to receive a phone call?:   No preference   Okay to leave a detailed message?: Yes at Other phone number:  [phone],  is secure

## 2024-10-11 ENCOUNTER — HOSPITAL ENCOUNTER (INPATIENT)
Facility: CLINIC | Age: 64
Setting detail: SURGERY ADMIT
End: 2024-10-11
Attending: SURGERY | Admitting: SURGERY
Payer: MEDICARE

## 2024-10-11 ENCOUNTER — PREP FOR PROCEDURE (OUTPATIENT)
Dept: CARDIOLOGY | Facility: CLINIC | Age: 64
End: 2024-10-11

## 2024-10-11 ENCOUNTER — MYC MEDICAL ADVICE (OUTPATIENT)
Dept: CARDIOLOGY | Facility: CLINIC | Age: 64
End: 2024-10-11

## 2024-10-11 ENCOUNTER — TELEPHONE (OUTPATIENT)
Dept: CARDIOLOGY | Facility: CLINIC | Age: 64
End: 2024-10-11

## 2024-10-11 DIAGNOSIS — I05.0 MITRAL VALVE STENOSIS: Primary | ICD-10-CM

## 2024-10-11 NOTE — TELEPHONE ENCOUNTER
Patient states that he is still having indigestion and nausea, states that he had an EGD done to check the banding-states that he also had a Paracentesis and they were testing the fluid-he has not been called with results yet.  States that he is driving right now and will call us back with the information of who he saw at [place].     Voicemail left with ok for verbal orders below

## 2024-10-11 NOTE — TELEPHONE ENCOUNTER
Per task, pt needs to scheduled surgery with Dr. Haynes after 11/19. Talked with Marian and scheduled surgery for 11/29. Will call if anything changes

## 2024-10-14 ENCOUNTER — DOCUMENTATION ONLY (OUTPATIENT)
Dept: CARDIOLOGY | Facility: CLINIC | Age: 64
End: 2024-10-14

## 2024-10-14 ENCOUNTER — OFFICE VISIT (OUTPATIENT)
Dept: NEUROLOGY | Facility: CLINIC | Age: 64
End: 2024-10-14
Payer: MEDICARE

## 2024-10-14 ENCOUNTER — PREP FOR PROCEDURE (OUTPATIENT)
Dept: CARDIOLOGY | Facility: CLINIC | Age: 64
End: 2024-10-14

## 2024-10-14 ENCOUNTER — OFFICE VISIT (OUTPATIENT)
Dept: CARDIOLOGY | Facility: CLINIC | Age: 64
End: 2024-10-14
Payer: MEDICARE

## 2024-10-14 ENCOUNTER — TELEPHONE (OUTPATIENT)
Dept: CARDIOLOGY | Facility: CLINIC | Age: 64
End: 2024-10-14

## 2024-10-14 VITALS
HEIGHT: 61 IN | BODY MASS INDEX: 16.65 KG/M2 | SYSTOLIC BLOOD PRESSURE: 99 MMHG | WEIGHT: 88.2 LBS | HEART RATE: 86 BPM | DIASTOLIC BLOOD PRESSURE: 64 MMHG

## 2024-10-14 VITALS
WEIGHT: 87 LBS | HEART RATE: 91 BPM | RESPIRATION RATE: 17 BRPM | BODY MASS INDEX: 16.42 KG/M2 | DIASTOLIC BLOOD PRESSURE: 57 MMHG | SYSTOLIC BLOOD PRESSURE: 91 MMHG | HEIGHT: 61 IN

## 2024-10-14 DIAGNOSIS — I48.19 PERSISTENT ATRIAL FIBRILLATION (H): ICD-10-CM

## 2024-10-14 DIAGNOSIS — T82.09XD PROSTHETIC VALVE DYSFUNCTION, SUBSEQUENT ENCOUNTER: ICD-10-CM

## 2024-10-14 DIAGNOSIS — S04.892S: ICD-10-CM

## 2024-10-14 DIAGNOSIS — Z95.2 S/P MVR (MITRAL VALVE REPLACEMENT): Primary | ICD-10-CM

## 2024-10-14 DIAGNOSIS — Z86.73 HISTORY OF CEREBRAL EMBOLIC INFARCTION: Primary | ICD-10-CM

## 2024-10-14 DIAGNOSIS — I50.32 CHRONIC HEART FAILURE WITH PRESERVED EJECTION FRACTION (H): ICD-10-CM

## 2024-10-14 DIAGNOSIS — Z95.2 S/P MVR (MITRAL VALVE REPLACEMENT): ICD-10-CM

## 2024-10-14 DIAGNOSIS — T82.09XD PROSTHETIC VALVE DYSFUNCTION, SUBSEQUENT ENCOUNTER: Primary | ICD-10-CM

## 2024-10-14 DIAGNOSIS — Z01.810 PRE-OPERATIVE CARDIOVASCULAR EXAMINATION: ICD-10-CM

## 2024-10-14 DIAGNOSIS — E78.5 HYPERLIPIDEMIA, UNSPECIFIED HYPERLIPIDEMIA TYPE: ICD-10-CM

## 2024-10-14 DIAGNOSIS — I27.20 PULMONARY HYPERTENSION (H): ICD-10-CM

## 2024-10-14 DIAGNOSIS — I65.22 STENOSIS OF LEFT CAROTID ARTERY: ICD-10-CM

## 2024-10-14 DIAGNOSIS — I10 ESSENTIAL HYPERTENSION: ICD-10-CM

## 2024-10-14 PROCEDURE — 99417 PROLNG OP E/M EACH 15 MIN: CPT | Performed by: GENERAL ACUTE CARE HOSPITAL

## 2024-10-14 PROCEDURE — G2211 COMPLEX E/M VISIT ADD ON: HCPCS | Performed by: GENERAL ACUTE CARE HOSPITAL

## 2024-10-14 PROCEDURE — 99207 PR NO CHARGE LOS: CPT | Performed by: GENERAL ACUTE CARE HOSPITAL

## 2024-10-14 PROCEDURE — 99215 OFFICE O/P EST HI 40 MIN: CPT | Performed by: GENERAL ACUTE CARE HOSPITAL

## 2024-10-14 PROCEDURE — 99214 OFFICE O/P EST MOD 30 MIN: CPT | Performed by: PSYCHIATRY & NEUROLOGY

## 2024-10-14 RX ORDER — TORSEMIDE 20 MG/1
20 TABLET ORAL DAILY
Qty: 90 TABLET | Refills: 3 | Status: SHIPPED | OUTPATIENT
Start: 2024-10-14

## 2024-10-14 RX ORDER — ASPIRIN 81 MG/1
243 TABLET, CHEWABLE ORAL ONCE
Status: CANCELLED | OUTPATIENT
Start: 2024-10-14

## 2024-10-14 RX ORDER — SODIUM CHLORIDE 9 MG/ML
INJECTION, SOLUTION INTRAVENOUS CONTINUOUS
Status: CANCELLED | OUTPATIENT
Start: 2024-10-14

## 2024-10-14 RX ORDER — LIDOCAINE 40 MG/G
CREAM TOPICAL
Status: CANCELLED | OUTPATIENT
Start: 2024-10-14

## 2024-10-14 RX ORDER — ASPIRIN 325 MG
325 TABLET ORAL ONCE
Status: CANCELLED | OUTPATIENT
Start: 2024-10-14 | End: 2024-10-14

## 2024-10-14 RX ORDER — FENTANYL CITRATE 50 UG/ML
25 INJECTION, SOLUTION INTRAMUSCULAR; INTRAVENOUS
Status: CANCELLED | OUTPATIENT
Start: 2024-10-14

## 2024-10-14 NOTE — TELEPHONE ENCOUNTER
===View-only below this line===  ----- Message -----  From: Karyn Roberto  Sent: 10/14/2024   3:09 PM CDT  To: Ann V[place]; [facility] [place] [facility]      ----- Message -----  From: Jorge Douglass MD  Sent: 10/14/2024   3:07 PM CDT  To: [facility] Scheduling Registration [place] [facility]    Can we get Talya appointments in valve clinic and with Dr. Plummer next available?    Thanks,  Jorge

## 2024-10-14 NOTE — LETTER
10/14/2024      Ernestine Morales  [address]  [facility] [address]      Dear Colleague,    Thank you for referring your patient, Ernestine Morales, to the [facility] [place]. Please see a copy of my visit note below.    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  Since her last visit she underwent left endarterectomy but unfortunately was complicated by left hypoglossal injury and has deviation of the tongue to the left.  She has noticed gradual improvement in her symptoms and does not experience as much dizziness.  I have recommended:    1.  Continue Lipitor 80 mg daily.  This was increased on 2024.  She will get a repeat lipid panel checked in 3 months most recent LDL was 97.  2.  Continue enteric-coated aspirin 325 mg daily  3.  Continue physical therapy  4.  She was also seen by cardiothoracic surgery for severe bioprosthetic mitral valve stenosis and is scheduled for surgery on 2024  5.  Follow-up on as needed basis    Thank you again for this referral, please feel free to contact me if you have any questions.    Андрей Burden MD  [facility], [place]     HISTORY OF PRESENT ILLNESS      Patient is a 64-year-old female with A-fib, HTN, HLD s/p MVR with a bioprosthetic mitral valve, s/p direct-current cardioversion, Maze procedure and left atrial appendage ligation in 2017, 70 to 99% left ICA stenosis who was initially evaluated on 8/29/2024 for ongoing care for CVA, sense of imbalance and dizziness.  Since her last visit she had left carotid endarterectomy but unfortunately she developed left tongue deviation due to intraoperative injury to hypoglossal nerve.  Noticed increased worsening of her speech that is gradually improving.    Patient had a left MCA embolic infarction resulting in expressive aphasia in 2017.  Subsequently she had direct-current cardioversion and maze procedure on 4/17/2017 and was switched to aspirin and continue on atorvastatin.  Most recent LDL checked on 3/26/2024 is 97.  She was seen in the ER for sense of dizziness and had a CTA that showed 70 to 99% left ICA stenosis and saw vascular surgery and had left carotid endarterectomy with bovine pericardial patch angioplasty.  Postoperatively she developed left-sided tongue deviation due to intraoperative retraction of the hypoglossal nerve and developed hypotension and hypoxia postoperatively and was found to have mitral stenosis..  She was told to continue on aspirin and Lipitor and physical therapy.  She was also seen by cardiothoracic surgery for severe bioprosthetic mitral valve stenosis.     PROBLEM LIST   Patient Active Problem List   Diagnosis     Acute pulmonary insufficiency     Acute blood loss anemia     Acute systolic heart failure (H)     Aortic valve sclerosis     Chronic diastolic CHF (congestive heart failure) (H)     Persistent atrial fibrillation (H)     S/P mitral valve replacement     Essential hypertension     Mixed hyperlipidemia     Dyslipidemia     LAE (left atrial enlargement)     MDD (major depressive disorder)     Lt ICA 70-99% stenosis     Carotid stenosis     Pulmonary hypertension (H)      "    PAST MEDICAL & SURGICAL HISTORY     Past Medical History:   Patient  has a past medical history of Anemia, Aphasia, Atrial fibrillation (H), Cancer (H) (11.17.2022), Carotid artery stenosis, Cerebrovascular accident (H) (01/09/2017), Congestive heart failure (H), HLD (hyperlipidemia), HTN (hypertension), and Mitral regurgitation.    Surgical History:  She  has a past surgical history that includes Replace valve mitral (06/09/2017); biopsy (11.18.2022); PICC/Midline Placement (9/14/2024); Endarterectomy carotid (Left, 9/13/2024); and Right Heart Catheterization (N/A, 9/19/2024).     SOCIAL HISTORY     Reviewed, and she  reports that she has quit smoking. Her smoking use included cigarettes. She started smoking about 40 years ago. She has a 34.9 pack-year smoking history. She does not have any smokeless tobacco history on file. She reports that she does not currently use alcohol. She reports that she does not use drugs.     FAMILY HISTORY     Reviewed, and family history includes Hypertension in her mother.     ALLERGIES     No Known Allergies      REVIEW OF SYSTEMS     A 12 point review of system was performed and was negative except as outlined in the history of present illness.     HOME MEDICATIONS     Current Outpatient Rx   Medication Sig Dispense Refill     aspirin (ASA) 325 MG EC tablet Take 325 mg by mouth daily       atorvastatin (LIPITOR) 80 MG tablet Take 1 tablet (80 mg) by mouth daily. 90 tablet 3     gabapentin (NEURONTIN) 300 MG capsule Take 1 capsule (300 mg) by mouth 2 times daily 180 capsule 1     torsemide (DEMADEX) 20 MG tablet Take 1 tablet (20 mg) by mouth daily. 60 tablet 1         PHYSICAL EXAM     Vital signs  BP 99/64   Pulse 86   Ht 1.549 m (5' 1\")   Wt 40 kg (88 lb 3.2 oz)   BMI 16.67 kg/m      Weight:   88 lbs 3.2 oz    Middle-age female who is alert and oriented vital signs are reviewed and documented in electronic medical record. Neck supple. She has expressive aphasia.  " Patient's tongue deviates to the left.  Palate raises symmetrically gag present strength on the right 4+/5 on the left 5/5 reflexes are 3+ with upgoing toe. She has minimal dysmetria on finger-nose testing. She has a wide-based gait and is quite unstable      PERTINENT DIAGNOSTIC STUDIES     Following studies were reviewed:     CTA HEAD AND NECK 7/31/2024  HEAD CT:  1.  Chronic left MCA territory frontal, temporal and insular encephalomalacia.     2.  No evidence for acute infarct.     3.  No mass or acute hemorrhage     HEAD CTA:   1.  Attenuated distal left MCA branches correlating with known large territory chronic infarct     2.  No evidence for acute large vessel occlusion.     3.  Negative for aneurysm or vascular malformation.     4.  Mild cavernous segment stenoses bilaterally.     NECK CTA:  1.  Extremely high-grade stenosis of the left internal carotid artery origin. 95% narrowing.     2.  Aortic arch great vessel origins stenoses as above.     3.  No evidence for carotid or vertebral dissection.     MRI BRAIN 7/31/2024  1. Extensive remote ischemic findings particularly in the left MCA distribution. There is no evidence of restricted diffusion to suggest acute ischemia on the current examination.      CAROTID ULTRASOUND 8/19/2024  1. Normal diameter of the right ICA relative to the proximal ICA diameter.  2. 70-99% diameter stenosis of the left ICA relative to the proximal ICA diameter.  Evaluation based on SRU Consensus Conference Criteria for Internal Carotid Artery Stenosis for Implementation in [facility]-Accredited [facility] Laboratories    ECHOCARDIOGRAM 9/13/2024  Left ventricular function is normal.The ejection fraction is 60-65%.  The right ventricle is mildly dilated.  The right ventricular systolic function is normal.  The left atrium is severely dilated.  The right atrium is moderately dilated.  There is a bioprosthetic mitral valve.  Prosthetic valve leaflets are thickened with decreased  mobility.  Struts of the prosthetic mitral valve protrude into LVOT and make contact with  the basal septum causing LVOT obstruction.  Severe prosthetic mitral valve stenosis( mean gradient 20)  There is moderate (2+) tricuspid regurgitation.  Severe (>55mmHg) pulmonary hypertension is present.  There is mild trileaflet aortic sclerosis.  There is mild to moderate (1-2+) aortic regurgitation.     PERTINENT LABS  Following labs were reviewed:  No results displayed because visit has over 200 results.      Lab on 09/04/2024   Component Date Value Ref Range Status     WBC Count 09/04/2024 7.5  4.0 - 11.0 10e3/uL Final     RBC Count 09/04/2024 3.59 (L)  3.80 - 5.20 10e6/uL Final     Hemoglobin 09/04/2024 11.2 (L)  11.7 - 15.7 g/dL Final     Hematocrit 09/04/2024 33.0 (L)  35.0 - 47.0 % Final     MCV 09/04/2024 92  78 - 100 fL Final     MCH 09/04/2024 31.2  26.5 - 33.0 pg Final     MCHC 09/04/2024 33.9  31.5 - 36.5 g/dL Final     RDW 09/04/2024 13.3  10.0 - 15.0 % Final     Platelet Count 09/04/2024 210  150 - 450 10e3/uL Final     Sodium 09/04/2024 134 (L)  135 - 145 mmol/L Final     Potassium 09/04/2024 5.4 (H)  3.4 - 5.3 mmol/L Final     Carbon Dioxide (CO2) 09/04/2024 20 (L)  22 - 29 mmol/L Final     Anion Gap 09/04/2024 11  7 - 15 mmol/L Final     Urea Nitrogen 09/04/2024 24.6 (H)  8.0 - 23.0 mg/dL Final     Creatinine 09/04/2024 0.91  0.51 - 0.95 mg/dL Final     GFR Estimate 09/04/2024 70  >60 mL/min/1.73m2 Final     Calcium 09/04/2024 9.7  8.8 - 10.4 mg/dL Final     Chloride 09/04/2024 103  98 - 107 mmol/L Final     Glucose 09/04/2024 86  70 - 99 mg/dL Final     Alkaline Phosphatase 09/04/2024 74  40 - 150 U/L Final     AST 09/04/2024 45  0 - 45 U/L Final     ALT 09/04/2024 45  0 - 50 U/L Final     Protein Total 09/04/2024 7.6  6.4 - 8.3 g/dL Final     Albumin 09/04/2024 4.3  3.5 - 5.2 g/dL Final     Bilirubin Total 09/04/2024 0.6  <=1.2 mg/dL Final   Office Visit on 08/29/2024   Component Date Value Ref Range  Status     Occult Blood Hgb FIT 08/27/2024 Negative  Negative Final   Office Visit on 08/26/2024   Component Date Value Ref Range Status     Sodium 08/26/2024 132 (L)  135 - 145 mmol/L Final     Potassium 08/26/2024 4.5  3.4 - 5.3 mmol/L Final     Chloride 08/26/2024 99  98 - 107 mmol/L Final     Carbon Dioxide (CO2) 08/26/2024 23  22 - 29 mmol/L Final     Anion Gap 08/26/2024 10  7 - 15 mmol/L Final     Urea Nitrogen 08/26/2024 30.4 (H)  8.0 - 23.0 mg/dL Final     Creatinine 08/26/2024 1.51 (H)  0.51 - 0.95 mg/dL Final     GFR Estimate 08/26/2024 38 (L)  >60 mL/min/1.73m2 Final     Calcium 08/26/2024 9.4  8.8 - 10.4 mg/dL Final     Glucose 08/26/2024 91  70 - 99 mg/dL Final     Patient Fasting > 8hrs? 08/26/2024 Unknown   Final     WBC Count 08/26/2024 8.3  4.0 - 11.0 10e3/uL Final     RBC Count 08/26/2024 3.59 (L)  3.80 - 5.20 10e6/uL Final     Hemoglobin 08/26/2024 11.0 (L)  11.7 - 15.7 g/dL Final     Hematocrit 08/26/2024 33.1 (L)  35.0 - 47.0 % Final     MCV 08/26/2024 92  78 - 100 fL Final     MCH 08/26/2024 30.6  26.5 - 33.0 pg Final     MCHC 08/26/2024 33.2  31.5 - 36.5 g/dL Final     RDW 08/26/2024 13.4  10.0 - 15.0 % Final     Platelet Count 08/26/2024 258  150 - 450 10e3/uL Final     Cholesterol 08/26/2024 166  <200 mg/dL Final     Triglycerides 08/26/2024 80  <150 mg/dL Final     Direct Measure HDL 08/26/2024 53  >=50 mg/dL Final     LDL Cholesterol Calculated 08/26/2024 97  <=100 mg/dL Final     Non HDL Cholesterol 08/26/2024 113  <130 mg/dL Final     Patient Fasting > 8hrs? 08/26/2024 Unknown   Final     Ferritin 08/26/2024 215  11 - 328 ng/mL Final     Iron 08/26/2024 67  37 - 145 ug/dL Final     Iron Binding Capacity 08/26/2024 339  240 - 430 ug/dL Final     Iron Sat Index 08/26/2024 20  15 - 46 % Final   Admission on 07/31/2024, Discharged on 07/31/2024   Component Date Value Ref Range Status     Sodium 07/31/2024 136  135 - 145 mmol/L Final     Potassium 07/31/2024 4.6  3.4 - 5.3 mmol/L Final      Chloride 07/31/2024 102  98 - 107 mmol/L Final     Carbon Dioxide (CO2) 07/31/2024 23  22 - 29 mmol/L Final     Anion Gap 07/31/2024 11  7 - 15 mmol/L Final     Urea Nitrogen 07/31/2024 23.8 (H)  8.0 - 23.0 mg/dL Final     Creatinine 07/31/2024 0.91  0.51 - 0.95 mg/dL Final     GFR Estimate 07/31/2024 70  >60 mL/min/1.73m2 Final     Calcium 07/31/2024 9.8  8.8 - 10.4 mg/dL Final     Glucose 07/31/2024 86  70 - 99 mg/dL Final     Ventricular Rate 07/31/2024 60  BPM Final     Atrial Rate 07/31/2024 60  BPM Final     RI Interval 07/31/2024 146  ms Final     QRS Duration 07/31/2024 84  ms Final     QT 07/31/2024 458  ms Final     QTc 07/31/2024 458  ms Final     P Axis 07/31/2024 53  degrees Final     R AXIS 07/31/2024 50  degrees Final     T Axis 07/31/2024 77  degrees Final     Interpretation ECG 07/31/2024    Final                    Value:Sinus rhythm  Normal ECG  No previous ECGs available  Confirmed by SEE ED PROVIDER NOTE FOR, ECG INTERPRETATION ([id number]),  Salvador Franco ([id number]) on 8/12/2024 11:56:09 AM       Magnesium 07/31/2024 1.9  1.7 - 2.3 mg/dL Final     WBC Count 07/31/2024 7.1  4.0 - 11.0 10e3/uL Final     RBC Count 07/31/2024 4.62  3.80 - 5.20 10e6/uL Final     Hemoglobin 07/31/2024 14.0  11.7 - 15.7 g/dL Final     Hematocrit 07/31/2024 41.6  35.0 - 47.0 % Final     MCV 07/31/2024 90  78 - 100 fL Final     MCH 07/31/2024 30.3  26.5 - 33.0 pg Final     MCHC 07/31/2024 33.7  31.5 - 36.5 g/dL Final     RDW 07/31/2024 12.7  10.0 - 15.0 % Final     Platelet Count 07/31/2024 223  150 - 450 10e3/uL Final     % Neutrophils 07/31/2024 70  % Final     % Lymphocytes 07/31/2024 23  % Final     % Monocytes 07/31/2024 5  % Final     % Eosinophils 07/31/2024 0  % Final     % Basophils 07/31/2024 1  % Final     % Immature Granulocytes 07/31/2024 0  % Final     NRBCs per 100 WBC 07/31/2024 0  <1 /100 Final     Absolute Neutrophils 07/31/2024 5.0  1.6 - 8.3 10e3/uL Final     Absolute Lymphocytes 07/31/2024 1.6   0.8 - 5.3 10e3/uL Final     Absolute Monocytes 07/31/2024 0.4  0.0 - 1.3 10e3/uL Final     Absolute Eosinophils 07/31/2024 0.0  0.0 - 0.7 10e3/uL Final     Absolute Basophils 07/31/2024 0.1  0.0 - 0.2 10e3/uL Final     Absolute Immature Granulocytes 07/31/2024 0.0  <=0.4 10e3/uL Final     Absolute NRBCs 07/31/2024 0.0  10e3/uL Final     Hold Specimen 07/31/2024 [facility]   Final     Hold Specimen 07/31/2024 [facility]   Final     Color Urine 07/31/2024 Light Yellow  Colorless, Straw, Light Yellow, Yellow Final     Appearance Urine 07/31/2024 Clear  Clear Final     Glucose Urine 07/31/2024 Negative  Negative mg/dL Final     Bilirubin Urine 07/31/2024 Negative  Negative Final     Ketones Urine 07/31/2024 20 (A)  Negative mg/dL Final     Specific Gravity Urine 07/31/2024 1.010  1.001 - 1.030 Final     Blood Urine 07/31/2024 0.06 mg/dL (A)  Negative Final     pH Urine 07/31/2024 5.5  5.0 - 7.0 Final     Protein Albumin Urine 07/31/2024 10 (A)  Negative mg/dL Final     Urobilinogen Urine 07/31/2024 <2.0  <2.0 mg/dL Final     Nitrite Urine 07/31/2024 Negative  Negative Final     Leukocyte Esterase Urine 07/31/2024 75 Mickey/uL (A)  Negative Final     Bacteria Urine 07/31/2024 Few (A)  None Seen /HPF Final     Mucus Urine 07/31/2024 Present (A)  None Seen /LPF Final     RBC Urine 07/31/2024 4 (H)  <=2 /HPF Final     WBC Urine 07/31/2024 5  <=5 /HPF Final     Squamous Epithelials Urine 07/31/2024 5 (H)  <=1 /HPF Final     Hyaline Casts Urine 07/31/2024 4 (H)  <=2 /LPF Final         Total time spent for face to face visit, reviewing labs/imaging studies, counseling and coordination of care was: 30 Minutes spent on the date of the encounter doing chart review, review of outside records, review of test results, interpretation of tests, patient visit, and documentation     The longitudinal plan of care for the diagnosis(es)/condition(s) as documented were addressed during this visit. Due to the added complexity in care, I will continue to  support Talya in the subsequent management and with ongoing continuity of care.    This note was dictated using voice recognition software.  Any grammatical or context distortions are unintentional and inherent to the software.    No orders of the defined types were placed in this encounter.     New Prescriptions    No medications on file     Modified Medications    No medications on file                 Again, thank you for allowing me to participate in the care of your patient.        Sincerely,        Андрей Burden MD

## 2024-10-14 NOTE — PROGRESS NOTES
Ernestine Morales  [address]  [place] [address]  [phone] (home)     Procedure cardiologist:  Dr. Pierre  PCP:  Christina Andrew  H&P completed by:  Dr. Duoglass 10-14-24  Admit date Thurs. 10-17-24  Arrival time:  1100  Anticoagulation:  NA  CPAP: No  Previous PCI: No  Bypass Grafts: No  Renal Issues: No  Diabetic?: No  Device?: Yes  Type:  Watchman - non-functional per pt.  Ambulation status: independent    Reason for Visit:  Patient seen for pre-procedure education in preparation for: Left Heart Catheterization and coronary angiogram.    Procedure Prep:  Primary Cardiologist note dated: 10-14-24  EKG results obtained, dated: To be completed on day of cath lab procedure  Hemogram results obtained: To be completed on day of cath lab procedure  Basic Metabolic Panel results obtained: To be completed on day of cath lab procedure  Lipid Profile results obtained: 8-26-24  Pertinent cardiac test results: 9-18-24 echo - 9-19-24 Rt heart cath. ANGE sched same day prior to procedure.  Orders placed per cosign required protocol 10-14-24.    COVID-19 test: NA    Patient Education  Your arrival time is 1100.  Location is [facility] - [address] [place] [address] - Main Entrance of the Hospital  Please plan on being at the hospital all day.  At any time, emergencies and/or urgent cases may come up which could delay the start of your procedure.    COVID Testing Instructions  *Mandatory COVID Testing: ALL Patients will need to complete a COVID test no sooner than 4 days prior to their procedure (regardless of vaccination status).    To schedule COVID testing Please call [phone]  If you want to complete this at an outside facility please call them to find out if they will have the results within the appropriate time frame and their fax number.  You will need to provide us with that information so we can send the order.  The facility completing the test will need  to fax the results to [phone]  If you are running into and issues please call us.     Pre-procedure instructions  Patient instructed to not Eat or Drink after midnight.  Patient instructed to shower the evening before or the morning of the procedure.  Patient instructed to arrange for transportation home following procedure from a responsible family member of friend (friend / PCA Marian will be ).  No driving for at least 24 hours.  Patient instructed to have a responsible adult with them for 24 hours post-procedure.  Post-procedure follow up process.  Conscious sedation discussed.  Received procedure education handouts in person.     Pre-procedure medication instructions.  Continue medications as scheduled, with a small amount of water on the day of the procedure unless indicated. (NO Diabetic Medications or Blood Thinners)  Pt instructed not to consume Alcohol, Tobacco, Caffeine 12 hours prior to procedure.  Patient instructed to take usual am dose of ASA, Atorvastatin, Gabapentin  morning of procedure - HOLD Torsemide.    Patient states understanding of procedure and agrees to proceed.    *PATIENTS RECORDS AVAILABLE IN [facility] UNLESS OTHERWISE INDICATED*      Patient Active Problem List   Diagnosis    Acute pulmonary insufficiency    Acute blood loss anemia    Acute systolic heart failure (H)    Aortic valve sclerosis    Chronic diastolic CHF (congestive heart failure) (H)    Persistent atrial fibrillation (H)    S/P mitral valve replacement    Essential hypertension    Mixed hyperlipidemia    Dyslipidemia    LAE (left atrial enlargement)    MDD (major depressive disorder)    Lt ICA 70-99% stenosis    Carotid stenosis    Pulmonary hypertension (H)    Injury of left hypoglossal nerve, sequela       Current Outpatient Medications   Medication Sig Dispense Refill    aspirin (ASA) 325 MG EC tablet Take 325 mg by mouth daily      atorvastatin (LIPITOR) 80 MG tablet Take 1 tablet (80 mg) by mouth daily. 90 tablet  3    gabapentin (NEURONTIN) 300 MG capsule Take 1 capsule (300 mg) by mouth 2 times daily 180 capsule 1    torsemide (DEMADEX) 20 MG tablet Take 1 tablet (20 mg) by mouth daily. 90 tablet 3       No Known Allergies    Jerri Gamble RN on 10/14/2024 at 2:52 PM

## 2024-10-14 NOTE — TELEPHONE ENCOUNTER
Msg sent to  to see if she can help arrange a tandem visit with one of the structural providers and Dr. Plummer.     Gayle Denney RN on 10/14/2024 at 3:36 PM

## 2024-10-14 NOTE — PROGRESS NOTES
HEART CARE ENCOUNTER NOTE          Assessment/Recommendations   Assessment:    Severe mitral mitral stenosis suspected to be due to rheumatic heart disease status post surgical valve replacement with a 29 mm Katerina-Mohan Magna bioprosthetic mitral valve via right mini thoracotomy approach 6/9/2017. She subsequently has developed fairly rapid degeneration of her prosthetic valve now with severe prosthetic stenosis. No suggestion of leaflet thrombosis or prosthetic endocarditis. 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 Prosthetic valve position looks unchanged after reviewing all her echocardiograms but the valve does appear to at least partially protrude into her left ventricular outflow tract cavity possibly as a result of her having a relatively small left ventricular cavity.  Chronic heart failure with preserved left ventricular ejection fraction secondary to severe prosthetic mitral stenosis. NYHA class II, appears euvolemic.  Severe postcapillary pulmonary hypertension noted on right heart catheterization likely WHO group II (secondary to severe mitral valve disease).  Mild-to-moderate aortic regurgitation which may be due to rheumatic heart disease.  Persistent atrial fibrillation status post direct current cardioversion 4/17/2017 then reportedly Maze procedure and 45 mm AtriClip placement at the time of mitral valve surgery 6/9/2017. No evidence of recurrent atrial fibrillation. CT cardiac angiography 4/28/2024 showed complete exclusion of the left atrial appendage.  Left hemispheric cerebrovascular accident 1/9/2017 with residual dysarthria and right hemiparesis. I do not have further details on this but I suspect this may have been embolic from atrial fibrillation.  Symptomatic left internal carotid artery stenosis status post left carotid endarterectomy with bovine patch angioplasty 9/13/2024 complicated by injury of the left hypoglossal  nerve.  Nonobstructive coronary artery disease noted on preoperative coronary angiography 4/27/2017.  Essential hypertension. Controlled.  Hyperlipidemia.    Plan:  After a long discussion with the patient and reviewing her medical records, despite her preference to have all her care at [facility] and for consideration of a transcatheter valve option, given her preference to have mini thoracotomy instead of traditional median sternotomy and the fact the the position of her prosthetic mitral valve in the left ventricular outflow tract makes a transcatheter valve-in-valve approach less-than-ideal, it makes most sense to continue the plan for scheduled redo mitral replacement surgery with Dr. Haynes at [facility] on 11/29/2024.  We can perform her preoperative coronary angiogram and transesophageal echocardiogram at [facility], which will allow us to better characterize the nature of her prosthetic mitral stenosis as well as for concurrent significant aortic and tricuspid regurgitation which may require intervention during mitral valve surgery.  Continue torsemide 20 mg daily for now.  Continue aspirin 325 mg daily.  Atorvastatin 80 mg daily.  She will be seeing Dr. Reid Hinson in the pulmonary hypertension clinic on 10/22/2024.  She can follow-up with me in 6 months.       A total time of 90 minutes was spent on the date of this encounter.    The longitudinal plan of care for the diagnosis(es)/condition(s) as documented were addressed during this visit. Due to the added complexity in care, I will continue to support Talya in the subsequent management and with ongoing continuity of care.    History of Present Illness   Ms. 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    I have seen her in clinic once on 3/26/2024 at which point she was establishing care with me. She was taken off anticoagulation at some point after her surgery and was not taking any anticoagulation when she met me. I did not have all her medical records at that time. Give her high stroke risk, we decided to schedule a CT to ensure her ARIANNA was completely excised. This was done 4/28/2024 which showed her appendage was completely excluded. However, by that time we had obtained her outside medical records which revealed her initial valve pathology was in fact mitral stenosis and not mitral regurgitation.     As her history was concerning for rheumatic heart disease, I referred to see my electrophysiology colleague Dr. Elizabet Mukherjee on 5/28/2024 to evaluate if it would still be safe to be off anticoagulation given the history of rheumatic mitral stenosis. It was concluded at that time that it would be fine to remain off anticoagulation. She does not recall any issues when she was taking warfarin following her cardiac surgery.    On 7/31/2024 she presented to the ED with new dizziness with her baseline right-sided deficits following her stroke in 2017. She was noted to have severe left ICA stenosis on CTA thought to be in part to be contributing to her symptoms. She then underwent left CEA 9/13/2024 complicated by left hypoglossal nerve injury. Postoperatively, she was noted to have intermittent hypoxia although she was not complaining of shortness of breath. TTE showed interval development of severe prosthetic mitral stenosis, possible LVOT obstruction from her mitral valve, severe pulmonary HTN and severe TR. Right heart cath 9/19/2024 confirmed the presence of severe postcapillary pulmonary hypertension. She was diuresed and discharged home on oral torsemide.    She was referred to see   Ivy with cardiothoracic surgery through the [facility] group. She and her friend Marian are quite confused as to why they were not referred to see a [place]. However, from reading the hospital encounter notes it appears she was in fact seen by Dr. Cabrera for inpatient cardiothoracic consultation 9/20/2024. She was then referred to see Dr. Haynes as he specializes in doing mini thoracotomy approach for valve surgery, which the patient would much prefer to an open sternotomy. It appears her surgery has been scheduled for 11/29/2024 at [facility]. She is also scheduled to see Dr. Reid Hinson for pulmonary hypertension on 10/22/2024. She has yet to complete a ANGE or preop angiogram.    She currently has no complaints and denies chest pain/pressure/tightness, shortness of breath at rest or with exertion, light headedness/dizziness, pre-syncope, syncope, lower extremity swelling, palpitations, paroxysmal nocturnal dyspnea (PND), or orthopnea.     Cardiac Problems and Cardiac Diagnostics     Most Recent Cardiac testing:  ECG dated 7/31/2024 (personaly reviewed and interpreted): SR, normal ECG    ECHO 9/18/2024 (images personally reviewed and interpreted):   Left ventricular function is normal.The ejection fraction is 60-65%.  The right ventricle is mildly dilated.  The right ventricular systolic function is normal.  The left atrium is severely dilated.  The right atrium is moderately dilated.  There is a bioprosthetic mitral valve.  Prosthetic valve leaflets are thickened with decreased mobility.  Struts of the prosthetic mitral valve protrude into LVOT and make contact with the basal septum causing LVOT obstruction.  Severe prosthetic mitral valve stenosis( mean gradient 20)  There is moderate (2+) tricuspid regurgitation.  Severe (>55mmHg) pulmonary hypertension is present.  There is mild trileaflet aortic sclerosis. Rheumatic appearance to  the aortic leaflets with thickening primarily involving the leaflet tips.  There is mild to moderate (1-2+) aortic regurgitation.    CTA head/neck 7/31/2024 (report reviewed):  HEAD CT:  1.  Chronic left MCA territory frontal, temporal and insular encephalomalacia.  2.  No evidence for acute infarct.  3.  No mass or acute hemorrhage     HEAD CTA:   1.  Attenuated distal left MCA branches correlating with known large territory chronic infarct  2.  No evidence for acute large vessel occlusion.  3.  Negative for aneurysm or vascular malformation.  4.  Mild cavernous segment stenoses bilaterally.     NECK CTA:  1.  Extremely high-grade stenosis of the left internal carotid artery origin. 95% narrowing.  2.  Aortic arch great vessel origins stenoses as above.  3.  No evidence for carotid or vertebral dissection.    MRI head 7/31/2024 (report reviewed):  Extensive remote ischemic findings particularly in the left MCA distribution. There is no evidence of restricted diffusion to suggest acute ischemia on the current examination.     Right heart catheterization 9/19/2024 (report reviewed):  Normal right and severely elevated left-sided filling pressures.  Severe pulmonary hypertension, primarily postcapillary  Normal cardiac output and index.    CT cardiac angiogram 4/28/2024 (report reviewed):     Status post surgical excision of the left atrial appendage with a 45 mm AtriClip. The appendage is completely excised.    A well-seated 29 mm Katerina-Mohan Magna bioprosthetic mitral valve is present. Normal appearance of the prosthetic leaflets. Protocol was not tailored to assess prosthetic valve function.    Trileaflet aortic valve with thickening of the leaflet tips suggestive of rheumatic valve disease. No leaflet calcification is present. Protocol was not tailored to assess for valvular stenosis.    Mild biatrial enlargement.    Minimal nonobstructive atherosclerotic coronary artery disease.    Radiology review for  "incidental non cardiac findings will be under separate report by the radiologist.     Medications  Allergies   Current Outpatient Medications   Medication Sig Dispense Refill    aspirin (ASA) 325 MG EC tablet Take 325 mg by mouth daily      atorvastatin (LIPITOR) 80 MG tablet Take 1 tablet (80 mg) by mouth daily. 90 tablet 3    gabapentin (NEURONTIN) 300 MG capsule Take 1 capsule (300 mg) by mouth 2 times daily 180 capsule 1    torsemide (DEMADEX) 20 MG tablet Take 1 tablet (20 mg) by mouth daily. 60 tablet 1      No Known Allergies     Physical Examination Review of Systems   BP 91/57 (BP Location: Left arm, Patient Position: Sitting, Cuff Size: Adult Small)   Pulse 91   Resp 17   Ht 1.549 m (5' 1\")   Wt 39.5 kg (87 lb)   BMI 16.44 kg/m    Body mass index is 16.44 kg/m .  Wt Readings from Last 3 Encounters:   10/14/24 39.5 kg (87 lb)   10/14/24 40 kg (88 lb 3.2 oz)   10/07/24 39 kg (85 lb 14.4 oz)       General Appearance:   Pleasant  female, appears  stated age. no acute distress, thin body habitus   ENT/Mouth: membranes moist, no apparent gingival bleeding.      EYES:  no scleral icterus, normal conjunctivae   Neck: no carotid bruits. No anterior cervical lymphadenopaty   Respiratory:   lungs are clear to auscultation, no rales or wheezing, equal chest wall expansion    Cardiovascular:   Regular rhythm, normal rate. Normal first and second heart sounds with no murmurs, rubs, or gallops; the carotid, radial and posterior tibial pulses are intact, Jugular venous pressure not elevated, no edema bilaterally    Abdomen/GI:  no organomegaly, masses, bruits, or tenderness; bowel sounds are present   Extremities: no cyanosis or clubbing   Skin: no xanthelasma, warm.    Heme/lymph/ Immunology No apparent bleeding noted.   Neurologic: Alert and oriented. normal gait, no tremors     Psychiatric: Pleasant, calm, appropriate affect.    A complete 10 system review of systems was performed and is negative except as " mentioned in the HPI/subjective.         Past History   Past Medical History:   Past Medical History:   Diagnosis Date    Anemia     Aphasia     Atrial fibrillation (H)     Cancer (H) 11.17.2022    Squamous cell carcinoma nRight Cheek    Carotid artery stenosis     Cerebrovascular accident (H) 01/09/2017    Congestive heart failure (H)     HLD (hyperlipidemia)     HTN (hypertension)     Mitral regurgitation        Past Surgical History:   Past Surgical History:   Procedure Laterality Date    BIOPSY  11.18.2022    Right Cheek    CV RIGHT HEART CATH MEASUREMENTS RECORDED N/A 9/19/2024    Procedure: Right Heart Catheterization;  Surgeon: Amilcar Pierre MD;  Location: [facility] CATH LAB CV    ENDARTERECTOMY CAROTID Left 9/13/2024    Procedure: ENDARTERECTOMY, CAROTID WITH NEURO MONITORING;  Surgeon: Margarita Tobin MD;  Location: [facility] Main OR    PICC TRIPLE LUMEN PLACEMENT  9/14/2024    REPLACE VALVE MITRAL  06/09/2017    bovine mitral valve with Maze and ARIANNA ligation       Family History:   Family History   Problem Relation Age of Onset    Hypertension Mother         Social History:   Social History     Socioeconomic History    Marital status: Single     Spouse name: Not on file    Number of children: Not on file    Years of education: Not on file    Highest education level: Not on file   Occupational History    Not on file   Tobacco Use    Smoking status: Former     Current packs/day: 0.25     Average packs/day: 0.9 packs/day for 40.7 years (34.9 ttl pk-yrs)     Types: Cigarettes     Start date: 1/19/1984    Smokeless tobacco: Not on file    Tobacco comments:     I've cut down to just a few a day   Substance and Sexual Activity    Alcohol use: Not Currently    Drug use: Never    Sexual activity: Not Currently     Partners: Female     Birth control/protection: None   Other Topics Concern    Parent/sibling w/ CABG, MI or angioplasty before 65F 55M? No   Social History Narrative    Not on file      Social Determinants of Health     Financial Resource Strain: Low Risk  (9/19/2024)    Financial Resource Strain     Within the past 12 months, have you or your family members you live with been unable to get utilities (heat, electricity) when it was really needed?: No   Food Insecurity: Low Risk  (9/19/2024)    Food Insecurity     Within the past 12 months, did you worry that your food would run out before you got money to buy more?: No     Within the past 12 months, did the food you bought just not last and you didn t have money to get more?: No   Transportation Needs: Low Risk  (9/19/2024)    Transportation Needs     Within the past 12 months, has lack of transportation kept you from medical appointments, getting your medicines, non-medical meetings or appointments, work, or from getting things that you need?: No   Physical Activity: Sufficiently Active (4/9/2024)    Exercise Vital Sign     Days of Exercise per Week: 7 days     Minutes of Exercise per Session: 30 min   Stress: No Stress Concern Present (4/9/2024)    [employer] - Occupational Stress Questionnaire     Feeling of Stress : Only a little   Social Connections: Unknown (4/9/2024)    Social Connection and Isolation Panel [NHANES]     Frequency of Communication with Friends and Family: Not on file     Frequency of Social Gatherings with Friends and Family: More than three times a week     Attends Hinduism Services: Not on file     Active Member of Clubs or Organizations: Not on file     Attends Club or Organization Meetings: Not on file     Marital Status: Not on file   Interpersonal Safety: Low Risk  (9/13/2024)    Interpersonal Safety     Do you feel physically and emotionally safe where you currently live?: Yes     Within the past 12 months, have you been hit, slapped, kicked or otherwise physically hurt by someone?: No     Within the past 12 months, have you been humiliated or emotionally abused in other ways by your  partner or ex-partner?: No   Housing Stability: High Risk (9/19/2024)    Housing Stability     Do you have housing? : No     Are you worried about losing your housing?: No              Lab Results    Chemistry/lipid CBC Cardiac Enzymes/BNP/TSH/INR   Lab Results   Component Value Date    CHOL 166 08/26/2024    HDL 53 08/26/2024    LDL 97 08/26/2024    TRIG 80 08/26/2024    CR 0.79 09/20/2024    BUN 26.5 (H) 09/20/2024    POTASSIUM 3.9 09/20/2024    POTASSIUM 3.9 09/20/2024     09/20/2024    CO2 27 09/20/2024      Lab Results   Component Value Date    WBC 7.6 09/17/2024    HGB 8.4 (L) 09/18/2024    HCT 25.5 (L) 09/17/2024    MCV 93 09/17/2024     09/19/2024      Lab Results   Component Value Date    TSH 1.94 04/16/2024          Jorge Douglass MD [facility]  Non-Invasive Cardiologist  [facility]  Pager [phone]

## 2024-10-14 NOTE — PATIENT INSTRUCTIONS
We will schedule you for a ANGE and coronary angiogram at [facility].  We will have you see one our surgeons here at [facility] and our valve clinic.  See me back in 6 months.

## 2024-10-14 NOTE — PROGRESS NOTES
Msg rec'd 10-14-24 @ 1415:  Priscilla Hurt Maureen, RN  Case type: CA Poss PCI  Procedure Physician(s): KENZIE  Procedure Date and Patient Arrival Time: Thursday 10/17, with arrival time of 11AM arrival  H&P: Completed on 10/14  Pre-Procedure Lab Appt: on admission  - Please place lab orders if you haven't already!  Alerts/Important Info: None, pt scheduled for ANGE 0900 with BEW to do  Interpretor Requested: n/a    Thank you,  Priscilla

## 2024-10-14 NOTE — TELEPHONE ENCOUNTER
Msg rec'd 10-11-24 @ 1640:  Jorge Douglass MD  P [facility] Cv Rn Team X  Can you see exactly what is needed and what we can accommodate?    Msg sent to sched with request to arrange RAC visit with Dr. Douglass on 11-12-24.  mg

## 2024-10-14 NOTE — LETTER
10/14/2024    ANDREE T HODATHUY, DO  [address] [address]    RE: Ernestine Morales       Dear Colleague,     I had the pleasure of seeing Ernestine Morales in the [facility].  HEART CARE ENCOUNTER NOTE          Assessment/Recommendations   Assessment:    Severe mitral mitral stenosis suspected to be due to rheumatic heart disease status post surgical valve replacement with a 29 mm Katerina-Mohan Magna bioprosthetic mitral valve via right mini thoracotomy approach 6/9/2017. She subsequently has developed fairly rapid degeneration of her prosthetic valve now with severe prosthetic stenosis. No suggestion of leaflet thrombosis or prosthetic endocarditis. 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 Prosthetic valve position looks unchanged after reviewing all her echocardiograms but the valve does appear to at least partially protrude into her left ventricular outflow tract cavity possibly as a result of her having a relatively small left ventricular cavity.  Chronic heart failure with preserved left ventricular ejection fraction secondary to severe prosthetic mitral stenosis. NYHA class II, appears euvolemic.  Severe postcapillary pulmonary hypertension noted on right heart catheterization likely WHO group II (secondary to severe mitral valve disease).  Mild-to-moderate aortic regurgitation which may be due to rheumatic heart disease.  Persistent atrial fibrillation status post direct current cardioversion 4/17/2017 then reportedly Maze procedure and 45 mm AtriClip placement at the time of mitral valve surgery 6/9/2017. No evidence of recurrent atrial fibrillation. CT cardiac angiography 4/28/2024 showed complete exclusion of the left atrial appendage.  Left hemispheric cerebrovascular accident 1/9/2017 with residual dysarthria and right hemiparesis. I do not have further details on this but I suspect this may have been  embolic from atrial fibrillation.  Symptomatic left internal carotid artery stenosis status post left carotid endarterectomy with bovine patch angioplasty 9/13/2024 complicated by injury of the left hypoglossal nerve.  Nonobstructive coronary artery disease noted on preoperative coronary angiography 4/27/2017.  Essential hypertension. Controlled.  Hyperlipidemia.    Plan:  After a long discussion with the patient and reviewing her medical records, despite her preference to have all her care at [facility] and for consideration of a transcatheter valve option, given her preference to have mini thoracotomy instead of traditional median sternotomy and the fact the the position of her prosthetic mitral valve in the left ventricular outflow tract makes a transcatheter valve-in-valve approach less-than-ideal, it makes most sense to continue the plan for scheduled redo mitral replacement surgery with Dr. Haynes at [facility] on 11/29/2024.  We can perform her preoperative coronary angiogram and transesophageal echocardiogram at [facility], which will allow us to better characterize the nature of her prosthetic mitral stenosis as well as for concurrent significant aortic and tricuspid regurgitation which may require intervention during mitral valve surgery.  Continue torsemide 20 mg daily for now.  Continue aspirin 325 mg daily.  Atorvastatin 80 mg daily.  She will be seeing Dr. Reid Hinson in the pulmonary hypertension clinic on 10/22/2024.  She can follow-up with me in 6 months.       A total time of 90 minutes was spent on the date of this encounter.    The longitudinal plan of care for the diagnosis(es)/condition(s) as documented were addressed during this visit. Due to the added complexity in care, I will continue to support Talya in the subsequent management and with ongoing continuity of care.    History of Present Illness   Ms. 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    I have seen her in clinic once on 3/26/2024 at which point she was establishing care with me. She was taken off anticoagulation at some point after her surgery and was not taking any anticoagulation when she met me. I did not have all her medical records at that time. Give her high stroke risk, we decided to schedule a CT to ensure her ARIANNA was completely excised. This was done 4/28/2024 which showed her appendage was completely excluded. However, by that time we had obtained her outside medical records which revealed her initial valve pathology was in fact mitral stenosis and not mitral regurgitation.     As her history was concerning for rheumatic heart disease, I referred to see my electrophysiology colleague Dr. Elizabet Mukherjee on 5/28/2024 to evaluate if it would still be safe to be off anticoagulation given the history of rheumatic mitral stenosis. It was concluded at that time that it would be fine to remain off anticoagulation. She does not recall any issues when she was taking warfarin following her cardiac surgery.    On 7/31/2024 she presented to the ED with new dizziness with her baseline right-sided deficits following her stroke in 2017. She was noted to have severe left ICA stenosis on CTA thought to be in part to be contributing to her symptoms. She then underwent left CEA 9/13/2024 complicated by left hypoglossal nerve injury. Postoperatively, she was noted to have intermittent hypoxia although she was not complaining of shortness of breath. TTE showed interval development of severe prosthetic mitral stenosis, possible LVOT obstruction from her mitral valve, severe pulmonary  HTN and severe TR. Right heart cath 9/19/2024 confirmed the presence of severe postcapillary pulmonary hypertension. She was diuresed and discharged home on oral torsemide.    She was referred to see Dr. Haynes with cardiothoracic surgery through the [facility]. She and her friend Marian are quite confused as to why they were not referred to see a cardiothoracic surgeon on the [facility]. However, from reading the hospital encounter notes it appears she was in fact seen by Dr. Cabrera for inpatient cardiothoracic consultation 9/20/2024. She was then referred to see Dr. Haynes as he specializes in doing mini thoracotomy approach for valve surgery, which the patient would much prefer to an open sternotomy. It appears her surgery has been scheduled for 11/29/2024 at [facility]. She is also scheduled to see Dr. Reid Hinson for pulmonary hypertension on 10/22/2024. She has yet to complete a ANGE or preop angiogram.    She currently has no complaints and denies chest pain/pressure/tightness, shortness of breath at rest or with exertion, light headedness/dizziness, pre-syncope, syncope, lower extremity swelling, palpitations, paroxysmal nocturnal dyspnea (PND), or orthopnea.     Cardiac Problems and Cardiac Diagnostics     Most Recent Cardiac testing:  ECG dated 7/31/2024 (personaly reviewed and interpreted): SR, normal ECG    ECHO 9/18/2024 (images personally reviewed and interpreted):   Left ventricular function is normal.The ejection fraction is 60-65%.  The right ventricle is mildly dilated.  The right ventricular systolic function is normal.  The left atrium is severely dilated.  The right atrium is moderately dilated.  There is a bioprosthetic mitral valve.  Prosthetic valve leaflets are thickened with decreased mobility.  Struts of the prosthetic mitral valve protrude into LVOT and make contact with the basal septum causing LVOT obstruction.  Severe prosthetic mitral  valve stenosis( mean gradient 20)  There is moderate (2+) tricuspid regurgitation.  Severe (>55mmHg) pulmonary hypertension is present.  There is mild trileaflet aortic sclerosis. Rheumatic appearance to the aortic leaflets with thickening primarily involving the leaflet tips.  There is mild to moderate (1-2+) aortic regurgitation.    CTA head/neck 7/31/2024 (report reviewed):  HEAD CT:  1.  Chronic left MCA territory frontal, temporal and insular encephalomalacia.  2.  No evidence for acute infarct.  3.  No mass or acute hemorrhage     HEAD CTA:   1.  Attenuated distal left MCA branches correlating with known large territory chronic infarct  2.  No evidence for acute large vessel occlusion.  3.  Negative for aneurysm or vascular malformation.  4.  Mild cavernous segment stenoses bilaterally.     NECK CTA:  1.  Extremely high-grade stenosis of the left internal carotid artery origin. 95% narrowing.  2.  Aortic arch great vessel origins stenoses as above.  3.  No evidence for carotid or vertebral dissection.    MRI head 7/31/2024 (report reviewed):  Extensive remote ischemic findings particularly in the left MCA distribution. There is no evidence of restricted diffusion to suggest acute ischemia on the current examination.     Right heart catheterization 9/19/2024 (report reviewed):  Normal right and severely elevated left-sided filling pressures.  Severe pulmonary hypertension, primarily postcapillary  Normal cardiac output and index.    CT cardiac angiogram 4/28/2024 (report reviewed):      Status post surgical excision of the left atrial appendage with a 45 mm AtriClip. The appendage is completely excised.     A well-seated 29 mm Katerina-Mohan Magna bioprosthetic mitral valve is present. Normal appearance of the prosthetic leaflets. Protocol was not tailored to assess prosthetic valve function.     Trileaflet aortic valve with thickening of the leaflet tips suggestive of rheumatic valve disease. No leaflet  "calcification is present. Protocol was not tailored to assess for valvular stenosis.     Mild biatrial enlargement.     Minimal nonobstructive atherosclerotic coronary artery disease.     Radiology review for incidental non cardiac findings will be under separate report by the radiologist.     Medications  Allergies   Current Outpatient Medications   Medication Sig Dispense Refill     aspirin (ASA) 325 MG EC tablet Take 325 mg by mouth daily       atorvastatin (LIPITOR) 80 MG tablet Take 1 tablet (80 mg) by mouth daily. 90 tablet 3     gabapentin (NEURONTIN) 300 MG capsule Take 1 capsule (300 mg) by mouth 2 times daily 180 capsule 1     torsemide (DEMADEX) 20 MG tablet Take 1 tablet (20 mg) by mouth daily. 60 tablet 1      No Known Allergies     Physical Examination Review of Systems   BP 91/57 (BP Location: Left arm, Patient Position: Sitting, Cuff Size: Adult Small)   Pulse 91   Resp 17   Ht 1.549 m (5' 1\")   Wt 39.5 kg (87 lb)   BMI 16.44 kg/m    Body mass index is 16.44 kg/m .  Wt Readings from Last 3 Encounters:   10/14/24 39.5 kg (87 lb)   10/14/24 40 kg (88 lb 3.2 oz)   10/07/24 39 kg (85 lb 14.4 oz)       General Appearance:   Pleasant  female, appears  stated age. no acute distress, thin body habitus   ENT/Mouth: membranes moist, no apparent gingival bleeding.      EYES:  no scleral icterus, normal conjunctivae   Neck: no carotid bruits. No anterior cervical lymphadenopaty   Respiratory:   lungs are clear to auscultation, no rales or wheezing, equal chest wall expansion    Cardiovascular:   Regular rhythm, normal rate. Normal first and second heart sounds with no murmurs, rubs, or gallops; the carotid, radial and posterior tibial pulses are intact, Jugular venous pressure not elevated, no edema bilaterally    Abdomen/GI:  no organomegaly, masses, bruits, or tenderness; bowel sounds are present   Extremities: no cyanosis or clubbing   Skin: no xanthelasma, warm.    Heme/lymph/ Immunology No apparent " bleeding noted.   Neurologic: Alert and oriented. normal gait, no tremors     Psychiatric: Pleasant, calm, appropriate affect.    A complete 10 system review of systems was performed and is negative except as mentioned in the HPI/subjective.         Past History   Past Medical History:   Past Medical History:   Diagnosis Date     Anemia      Aphasia      Atrial fibrillation (H)      Cancer (H) 11.17.2022    Squamous cell carcinoma nRight Cheek     Carotid artery stenosis      Cerebrovascular accident (H) 01/09/2017     Congestive heart failure (H)      HLD (hyperlipidemia)      HTN (hypertension)      Mitral regurgitation        Past Surgical History:   Past Surgical History:   Procedure Laterality Date     BIOPSY  11.18.2022    Right Cheek     CV RIGHT HEART CATH MEASUREMENTS RECORDED N/A 9/19/2024    Procedure: Right Heart Catheterization;  Surgeon: Amilcar Pierre MD;  Location: [facility] CATH LAB CV     ENDARTERECTOMY CAROTID Left 9/13/2024    Procedure: ENDARTERECTOMY, CAROTID WITH NEURO MONITORING;  Surgeon: Margarita Tobin MD;  Location: [facility] Main OR     PICC TRIPLE LUMEN PLACEMENT  9/14/2024     REPLACE VALVE MITRAL  06/09/2017    bovine mitral valve with Maze and ARIANNA ligation       Family History:   Family History   Problem Relation Age of Onset     Hypertension Mother         Social History:   Social History     Socioeconomic History     Marital status: Single     Spouse name: Not on file     Number of children: Not on file     Years of education: Not on file     Highest education level: Not on file   Occupational History     Not on file   Tobacco Use     Smoking status: Former     Current packs/day: 0.25     Average packs/day: 0.9 packs/day for 40.7 years (34.9 ttl pk-yrs)     Types: Cigarettes     Start date: 1/19/1984     Smokeless tobacco: Not on file     Tobacco comments:     I've cut down to just a few a day   Substance and Sexual Activity     Alcohol use: Not Currently     Drug use:  Never     Sexual activity: Not Currently     Partners: Female     Birth control/protection: None   Other Topics Concern     Parent/sibling w/ CABG, MI or angioplasty before 65F 55M? No   Social History Narrative     Not on file     Social Determinants of Health     Financial Resource Strain: Low Risk  (9/19/2024)    Financial Resource Strain      Within the past 12 months, have you or your family members you live with been unable to get utilities (heat, electricity) when it was really needed?: No   Food Insecurity: Low Risk  (9/19/2024)    Food Insecurity      Within the past 12 months, did you worry that your food would run out before you got money to buy more?: No      Within the past 12 months, did the food you bought just not last and you didn t have money to get more?: No   Transportation Needs: Low Risk  (9/19/2024)    Transportation Needs      Within the past 12 months, has lack of transportation kept you from medical appointments, getting your medicines, non-medical meetings or appointments, work, or from getting things that you need?: No   Physical Activity: Sufficiently Active (4/9/2024)    Exercise Vital Sign      Days of Exercise per Week: 7 days      Minutes of Exercise per Session: 30 min   Stress: No Stress Concern Present (4/9/2024)    [employer] [place] [employer] - Occupational Stress Questionnaire      Feeling of Stress : Only a little   Social Connections: Unknown (4/9/2024)    Social Connection and Isolation Panel [NHANES]      Frequency of Communication with Friends and Family: Not on file      Frequency of Social Gatherings with Friends and Family: More than three times a week      Attends Alevism Services: Not on file      Active Member of Clubs or Organizations: Not on file      Attends Club or Organization Meetings: Not on file      Marital Status: Not on file   Interpersonal Safety: Low Risk  (9/13/2024)    Interpersonal Safety      Do you feel physically and emotionally safe  where you currently live?: Yes      Within the past 12 months, have you been hit, slapped, kicked or otherwise physically hurt by someone?: No      Within the past 12 months, have you been humiliated or emotionally abused in other ways by your partner or ex-partner?: No   Housing Stability: High Risk (9/19/2024)    Housing Stability      Do you have housing? : No      Are you worried about losing your housing?: No              Lab Results    Chemistry/lipid CBC Cardiac Enzymes/BNP/TSH/INR   Lab Results   Component Value Date    CHOL 166 08/26/2024    HDL 53 08/26/2024    LDL 97 08/26/2024    TRIG 80 08/26/2024    CR 0.79 09/20/2024    BUN 26.5 (H) 09/20/2024    POTASSIUM 3.9 09/20/2024    POTASSIUM 3.9 09/20/2024     09/20/2024    CO2 27 09/20/2024      Lab Results   Component Value Date    WBC 7.6 09/17/2024    HGB 8.4 (L) 09/18/2024    HCT 25.5 (L) 09/17/2024    MCV 93 09/17/2024     09/19/2024      Lab Results   Component Value Date    TSH 1.94 04/16/2024          Jorge Douglass MD [facility]  Non-Invasive Cardiologist  [facility]  Pager [phone]      Thank you for allowing me to participate in the care of your patient.      Sincerely,     Jorge Douglass MD     [facility]  cc:   Christina Andrew,   [address] [place] [address]

## 2024-10-14 NOTE — H&P (VIEW-ONLY)
HEART CARE ENCOUNTER NOTE          Assessment/Recommendations   Assessment:    Severe mitral mitral stenosis suspected to be due to rheumatic heart disease status post surgical valve replacement with a 29 mm Katerina-Mohan Magna bioprosthetic mitral valve via right mini thoracotomy approach 6/9/2017. She subsequently has developed fairly rapid degeneration of her prosthetic valve now with severe prosthetic stenosis. No suggestion of leaflet thrombosis or prosthetic endocarditis. 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 Prosthetic valve position looks unchanged after reviewing all her echocardiograms but the valve does appear to at least partially protrude into her left ventricular outflow tract cavity possibly as a result of her having a relatively small left ventricular cavity.  Chronic heart failure with preserved left ventricular ejection fraction secondary to severe prosthetic mitral stenosis. NYHA class II, appears euvolemic.  Severe postcapillary pulmonary hypertension noted on right heart catheterization likely WHO group II (secondary to severe mitral valve disease).  Mild-to-moderate aortic regurgitation which may be due to rheumatic heart disease.  Persistent atrial fibrillation status post direct current cardioversion 4/17/2017 then reportedly Maze procedure and 45 mm AtriClip placement at the time of mitral valve surgery 6/9/2017. No evidence of recurrent atrial fibrillation. CT cardiac angiography 4/28/2024 showed complete exclusion of the left atrial appendage.  Left hemispheric cerebrovascular accident 1/9/2017 with residual dysarthria and right hemiparesis. I do not have further details on this but I suspect this may have been embolic from atrial fibrillation.  Symptomatic left internal carotid artery stenosis status post left carotid endarterectomy with bovine patch angioplasty 9/13/2024 complicated by injury of the left hypoglossal  nerve.  Nonobstructive coronary artery disease noted on preoperative coronary angiography 4/27/2017.  Essential hypertension. Controlled.  Hyperlipidemia.    Plan:  After a long discussion with the patient and reviewing her medical records, despite her preference to have all her care at [facility] and for consideration of a transcatheter valve option, given her preference to have mini thoracotomy instead of traditional median sternotomy and the fact the the position of her prosthetic mitral valve in the left ventricular outflow tract makes a transcatheter valve-in-valve approach less-than-ideal, it makes most sense to continue the plan for scheduled redo mitral replacement surgery with Dr. Haynes at [facility] on 11/29/2024.  We can perform her preoperative coronary angiogram and transesophageal echocardiogram at [facility], which will allow us to better characterize the nature of her prosthetic mitral stenosis as well as for concurrent significant aortic and tricuspid regurgitation which may require intervention during mitral valve surgery.  Continue torsemide 20 mg daily for now.  Continue aspirin 325 mg daily.  Atorvastatin 80 mg daily.  She will be seeing Dr. Reid Hinson in the pulmonary hypertension clinic on 10/22/2024.  She can follow-up with me in 6 months.       A total time of 90 minutes was spent on the date of this encounter.    The longitudinal plan of care for the diagnosis(es)/condition(s) as documented were addressed during this visit. Due to the added complexity in care, I will continue to support Talya in the subsequent management and with ongoing continuity of care.    History of Present Illness   Ms. 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    I have seen her in clinic once on 3/26/2024 at which point she was establishing care with me. She was taken off anticoagulation at some point after her surgery and was not taking any anticoagulation when she met me. I did not have all her medical records at that time. Give her high stroke risk, we decided to schedule a CT to ensure her ARIANNA was completely excised. This was done 4/28/2024 which showed her appendage was completely excluded. However, by that time we had obtained her outside medical records which revealed her initial valve pathology was in fact mitral stenosis and not mitral regurgitation.     As her history was concerning for rheumatic heart disease, I referred to see my electrophysiology colleague Dr. Elizabet Mukherjee on 5/28/2024 to evaluate if it would still be safe to be off anticoagulation given the history of rheumatic mitral stenosis. It was concluded at that time that it would be fine to remain off anticoagulation. She does not recall any issues when she was taking warfarin following her cardiac surgery.    On 7/31/2024 she presented to the ED with new dizziness with her baseline right-sided deficits following her stroke in 2017. She was noted to have severe left ICA stenosis on CTA thought to be in part to be contributing to her symptoms. She then underwent left CEA 9/13/2024 complicated by left hypoglossal nerve injury. Postoperatively, she was noted to have intermittent hypoxia although she was not complaining of shortness of breath. TTE showed interval development of severe prosthetic mitral stenosis, possible LVOT obstruction from her mitral valve, severe pulmonary HTN and severe TR. Right heart cath 9/19/2024 confirmed the presence of severe postcapillary pulmonary hypertension. She was diuresed and discharged home on oral torsemide.    She was referred to see   Ivy with cardiothoracic surgery through the [facility] group. She and her friend Marian are quite confused as to why they were not referred to see a cardiothoracic [place]. However, from reading the hospital encounter notes it appears she was in fact seen by Dr. Cabrera for inpatient cardiothoracic consultation 9/20/2024. She was then referred to see Dr. Haynes as he specializes in doing mini thoracotomy approach for valve surgery, which the patient would much prefer to an open sternotomy. It appears her surgery has been scheduled for 11/29/2024 at [facility]. She is also scheduled to see Dr. Reid Hinson for pulmonary hypertension on 10/22/2024. She has yet to complete a ANGE or preop angiogram.    She currently has no complaints and denies chest pain/pressure/tightness, shortness of breath at rest or with exertion, light headedness/dizziness, pre-syncope, syncope, lower extremity swelling, palpitations, paroxysmal nocturnal dyspnea (PND), or orthopnea.     Cardiac Problems and Cardiac Diagnostics     Most Recent Cardiac testing:  ECG dated 7/31/2024 (personaly reviewed and interpreted): SR, normal ECG    ECHO 9/18/2024 (images personally reviewed and interpreted):   Left ventricular function is normal.The ejection fraction is 60-65%.  The right ventricle is mildly dilated.  The right ventricular systolic function is normal.  The left atrium is severely dilated.  The right atrium is moderately dilated.  There is a bioprosthetic mitral valve.  Prosthetic valve leaflets are thickened with decreased mobility.  Struts of the prosthetic mitral valve protrude into LVOT and make contact with the basal septum causing LVOT obstruction.  Severe prosthetic mitral valve stenosis( mean gradient 20)  There is moderate (2+) tricuspid regurgitation.  Severe (>55mmHg) pulmonary hypertension is present.  There is mild trileaflet aortic sclerosis. Rheumatic appearance to  the aortic leaflets with thickening primarily involving the leaflet tips.  There is mild to moderate (1-2+) aortic regurgitation.    CTA head/neck 7/31/2024 (report reviewed):  HEAD CT:  1.  Chronic left MCA territory frontal, temporal and insular encephalomalacia.  2.  No evidence for acute infarct.  3.  No mass or acute hemorrhage     HEAD CTA:   1.  Attenuated distal left MCA branches correlating with known large territory chronic infarct  2.  No evidence for acute large vessel occlusion.  3.  Negative for aneurysm or vascular malformation.  4.  Mild cavernous segment stenoses bilaterally.     NECK CTA:  1.  Extremely high-grade stenosis of the left internal carotid artery origin. 95% narrowing.  2.  Aortic arch great vessel origins stenoses as above.  3.  No evidence for carotid or vertebral dissection.    MRI head 7/31/2024 (report reviewed):  Extensive remote ischemic findings particularly in the left MCA distribution. There is no evidence of restricted diffusion to suggest acute ischemia on the current examination.     Right heart catheterization 9/19/2024 (report reviewed):  Normal right and severely elevated left-sided filling pressures.  Severe pulmonary hypertension, primarily postcapillary  Normal cardiac output and index.    CT cardiac angiogram 4/28/2024 (report reviewed):     Status post surgical excision of the left atrial appendage with a 45 mm AtriClip. The appendage is completely excised.    A well-seated 29 mm Katerina-Mohan Magna bioprosthetic mitral valve is present. Normal appearance of the prosthetic leaflets. Protocol was not tailored to assess prosthetic valve function.    Trileaflet aortic valve with thickening of the leaflet tips suggestive of rheumatic valve disease. No leaflet calcification is present. Protocol was not tailored to assess for valvular stenosis.    Mild biatrial enlargement.    Minimal nonobstructive atherosclerotic coronary artery disease.    Radiology review for  "incidental non cardiac findings will be under separate report by the radiologist.     Medications  Allergies   Current Outpatient Medications   Medication Sig Dispense Refill    aspirin (ASA) 325 MG EC tablet Take 325 mg by mouth daily      atorvastatin (LIPITOR) 80 MG tablet Take 1 tablet (80 mg) by mouth daily. 90 tablet 3    gabapentin (NEURONTIN) 300 MG capsule Take 1 capsule (300 mg) by mouth 2 times daily 180 capsule 1    torsemide (DEMADEX) 20 MG tablet Take 1 tablet (20 mg) by mouth daily. 60 tablet 1      No Known Allergies     Physical Examination Review of Systems   BP 91/57 (BP Location: Left arm, Patient Position: Sitting, Cuff Size: Adult Small)   Pulse 91   Resp 17   Ht 1.549 m (5' 1\")   Wt 39.5 kg (87 lb)   BMI 16.44 kg/m    Body mass index is 16.44 kg/m .  Wt Readings from Last 3 Encounters:   10/14/24 39.5 kg (87 lb)   10/14/24 40 kg (88 lb 3.2 oz)   10/07/24 39 kg (85 lb 14.4 oz)       General Appearance:   Pleasant  female, appears  stated age. no acute distress, thin body habitus   ENT/Mouth: membranes moist, no apparent gingival bleeding.      EYES:  no scleral icterus, normal conjunctivae   Neck: no carotid bruits. No anterior cervical lymphadenopaty   Respiratory:   lungs are clear to auscultation, no rales or wheezing, equal chest wall expansion    Cardiovascular:   Regular rhythm, normal rate. Normal first and second heart sounds with no murmurs, rubs, or gallops; the carotid, radial and posterior tibial pulses are intact, Jugular venous pressure not elevated, no edema bilaterally    Abdomen/GI:  no organomegaly, masses, bruits, or tenderness; bowel sounds are present   Extremities: no cyanosis or clubbing   Skin: no xanthelasma, warm.    Heme/lymph/ Immunology No apparent bleeding noted.   Neurologic: Alert and oriented. normal gait, no tremors     Psychiatric: Pleasant, calm, appropriate affect.    A complete 10 system review of systems was performed and is negative except as " mentioned in the HPI/subjective.         Past History   Past Medical History:   Past Medical History:   Diagnosis Date    Anemia     Aphasia     Atrial fibrillation (H)     Cancer (H) 11.17.2022    Squamous cell carcinoma nRight Cheek    Carotid artery stenosis     Cerebrovascular accident (H) 01/09/2017    Congestive heart failure (H)     HLD (hyperlipidemia)     HTN (hypertension)     Mitral regurgitation        Past Surgical History:   Past Surgical History:   Procedure Laterality Date    BIOPSY  11.18.2022    Right Cheek    CV RIGHT HEART CATH MEASUREMENTS RECORDED N/A 9/19/2024    Procedure: Right Heart Catheterization;  Surgeon: Amilcar Pierre MD;  Location: [facility] CATH LAB CV    ENDARTERECTOMY CAROTID Left 9/13/2024    Procedure: ENDARTERECTOMY, CAROTID WITH NEURO MONITORING;  Surgeon: Margarita Tobin MD;  Location: [facility] Main OR    PICC TRIPLE LUMEN PLACEMENT  9/14/2024    REPLACE VALVE MITRAL  06/09/2017    bovine mitral valve with Maze and ARIANNA ligation       Family History:   Family History   Problem Relation Age of Onset    Hypertension Mother         Social History:   Social History     Socioeconomic History    Marital status: Single     Spouse name: Not on file    Number of children: Not on file    Years of education: Not on file    Highest education level: Not on file   Occupational History    Not on file   Tobacco Use    Smoking status: Former     Current packs/day: 0.25     Average packs/day: 0.9 packs/day for 40.7 years (34.9 ttl pk-yrs)     Types: Cigarettes     Start date: 1/19/1984    Smokeless tobacco: Not on file    Tobacco comments:     I've cut down to just a few a day   Substance and Sexual Activity    Alcohol use: Not Currently    Drug use: Never    Sexual activity: Not Currently     Partners: Female     Birth control/protection: None   Other Topics Concern    Parent/sibling w/ CABG, MI or angioplasty before 65F 55M? No   Social History Narrative    Not on file      Social Determinants of Health     Financial Resource Strain: Low Risk  (9/19/2024)    Financial Resource Strain     Within the past 12 months, have you or your family members you live with been unable to get utilities (heat, electricity) when it was really needed?: No   Food Insecurity: Low Risk  (9/19/2024)    Food Insecurity     Within the past 12 months, did you worry that your food would run out before you got money to buy more?: No     Within the past 12 months, did the food you bought just not last and you didn t have money to get more?: No   Transportation Needs: Low Risk  (9/19/2024)    Transportation Needs     Within the past 12 months, has lack of transportation kept you from medical appointments, getting your medicines, non-medical meetings or appointments, work, or from getting things that you need?: No   Physical Activity: Sufficiently Active (4/9/2024)    Exercise Vital Sign     Days of Exercise per Week: 7 days     Minutes of Exercise per Session: 30 min   Stress: No Stress Concern Present (4/9/2024)    [employer] [place] [employer] - Occupational Stress Questionnaire     Feeling of Stress : Only a little   Social Connections: Unknown (4/9/2024)    Social Connection and Isolation Panel [NHANES]     Frequency of Communication with Friends and Family: Not on file     Frequency of Social Gatherings with Friends and Family: More than three times a week     Attends Oriental orthodox Services: Not on file     Active Member of Clubs or Organizations: Not on file     Attends Club or Organization Meetings: Not on file     Marital Status: Not on file   Interpersonal Safety: Low Risk  (9/13/2024)    Interpersonal Safety     Do you feel physically and emotionally safe where you currently live?: Yes     Within the past 12 months, have you been hit, slapped, kicked or otherwise physically hurt by someone?: No     Within the past 12 months, have you been humiliated or emotionally abused in other ways by your  partner or ex-partner?: No   Housing Stability: High Risk (9/19/2024)    Housing Stability     Do you have housing? : No     Are you worried about losing your housing?: No              Lab Results    Chemistry/lipid CBC Cardiac Enzymes/BNP/TSH/INR   Lab Results   Component Value Date    CHOL 166 08/26/2024    HDL 53 08/26/2024    LDL 97 08/26/2024    TRIG 80 08/26/2024    CR 0.79 09/20/2024    BUN 26.5 (H) 09/20/2024    POTASSIUM 3.9 09/20/2024    POTASSIUM 3.9 09/20/2024     09/20/2024    CO2 27 09/20/2024      Lab Results   Component Value Date    WBC 7.6 09/17/2024    HGB 8.4 (L) 09/18/2024    HCT 25.5 (L) 09/17/2024    MCV 93 09/17/2024     09/19/2024      Lab Results   Component Value Date    TSH 1.94 04/16/2024          Jorge Douglass MD [facility]  Non-Invasive Cardiologist  [facility]  Pager [phone]

## 2024-10-15 ENCOUNTER — DOCUMENTATION ONLY (OUTPATIENT)
Dept: CARDIOLOGY | Facility: CLINIC | Age: 64
End: 2024-10-15
Payer: MEDICARE

## 2024-10-15 ENCOUNTER — TELEPHONE (OUTPATIENT)
Dept: CARDIOLOGY | Facility: CLINIC | Age: 64
End: 2024-10-15
Payer: MEDICARE

## 2024-10-15 DIAGNOSIS — D62 ACUTE BLOOD LOSS ANEMIA: ICD-10-CM

## 2024-10-15 DIAGNOSIS — I27.20 PULMONARY HYPERTENSION (H): Primary | ICD-10-CM

## 2024-10-15 DIAGNOSIS — E78.5 HYPERLIPIDEMIA, UNSPECIFIED HYPERLIPIDEMIA TYPE: ICD-10-CM

## 2024-10-15 DIAGNOSIS — Z11.59 NEED FOR HEPATITIS B SCREENING TEST: ICD-10-CM

## 2024-10-15 DIAGNOSIS — Z11.4 ENCOUNTER FOR SCREENING FOR HIV: ICD-10-CM

## 2024-10-15 DIAGNOSIS — R06.02 SOB (SHORTNESS OF BREATH): ICD-10-CM

## 2024-10-15 DIAGNOSIS — I50.32 CHRONIC DIASTOLIC CHF (CONGESTIVE HEART FAILURE) (H): Chronic | ICD-10-CM

## 2024-10-15 NOTE — TELEPHONE ENCOUNTER
----- Message from Jorge Douglass sent at 10/14/2024  4:20 PM CDT -----  It appears that Dr. Haynes is considering doing a mini thoracotomy approach which is what the patient prefers. I think we can let the patient know that it probably makes sense to keep her scheduled surgery at the  and we can hold on having her see our valve clinic or Dr. Plummer.    We can still complete the preop workup at [facility] and have her get postop care here as well as long as they are fine with making one trip for surgery to the .    Jorge  ----- Message -----  From: Ann Vail  Sent: 10/14/2024   3:26 PM CDT  To: MD Dr Rafat Barreto I will forward this to Catrachita, Dr Plummer's nurse and have her call the patient to verify with pt that they want to continue with surgery or if they were looking to move surgery to [facility].  Thank you,  Ann  ----- Message -----  From: Jorge Douglass MD  Sent: 10/14/2024   3:21 PM CDT  To: Ann V[place]; Karyn Roberto    After seeing the patient today, they gave me the impression that they did not want to continue care throughout [facility] given the long distance for traveling but if surgery is already scheduled at the [place] then it makes sense to continue with that plan. We will be doing the preop angio and ANGE this week at [facility] per the patient's request.     Thanks,  Jorge  ----- Message -----  From: Karyn Roberto  Sent: 10/14/2024   3:14 PM CDT  To: Ann V[place]; Jorge Douglass MD    Thank you Ann.  ----- Message -----  From: Ann Vail  Sent: 10/14/2024   3:13 PM CDT  To: Karyn Roberto    Per Catrachita this pt is already scheduled for surgery with Dr Haynes at the , So we will not be getting this patient scheduled with Dr Plummer.  Thank you,  Ann  ----- Message -----  From: Karyn Roberto  Sent: 10/14/2024   3:09 PM CDT  To: Ann V[place]; [facility]      ----- Message -----  From: Jorge Douglass MD  Sent: 10/14/2024   3:07 PM CDT  To: Sergo  Scheduling Registration Pool - e    Can we get Talya appointments in valve clinic and with Dr. Plummer next available?    Thanks,  Jorge

## 2024-10-15 NOTE — TELEPHONE ENCOUNTER
Patient is scheduled with Dr. Haynes for minimally invasive surgery, patient will not need appt at [facility] or with structural team at this time.     Gayle Denney RN on 10/15/2024 at 9:58 AM

## 2024-10-15 NOTE — TELEPHONE ENCOUNTER
Left msg for patient's friend (CABRERA) Marian requesting call back for Dr. Douglass's recommendations.  mg

## 2024-10-16 ENCOUNTER — TELEPHONE (OUTPATIENT)
Dept: CARDIOLOGY | Facility: HOSPITAL | Age: 64
End: 2024-10-16
Payer: MEDICARE

## 2024-10-17 ENCOUNTER — HOSPITAL ENCOUNTER (OUTPATIENT)
Facility: HOSPITAL | Age: 64
Discharge: HOME | End: 2024-10-17
Attending: INTERNAL MEDICINE | Admitting: INTERNAL MEDICINE
Payer: MEDICARE

## 2024-10-17 ENCOUNTER — HOSPITAL ENCOUNTER (OUTPATIENT)
Dept: CARDIOLOGY | Facility: HOSPITAL | Age: 64
Discharge: HOME | End: 2024-10-17
Attending: GENERAL ACUTE CARE HOSPITAL | Admitting: INTERNAL MEDICINE
Payer: MEDICARE

## 2024-10-17 VITALS
DIASTOLIC BLOOD PRESSURE: 67 MMHG | RESPIRATION RATE: 30 BRPM | HEIGHT: 61 IN | HEART RATE: 73 BPM | OXYGEN SATURATION: 92 % | WEIGHT: 87 LBS | SYSTOLIC BLOOD PRESSURE: 120 MMHG | BODY MASS INDEX: 16.42 KG/M2 | TEMPERATURE: 98.2 F

## 2024-10-17 DIAGNOSIS — T82.09XD PROSTHETIC VALVE DYSFUNCTION, SUBSEQUENT ENCOUNTER: ICD-10-CM

## 2024-10-17 DIAGNOSIS — Z95.2 S/P MVR (MITRAL VALVE REPLACEMENT): ICD-10-CM

## 2024-10-17 DIAGNOSIS — Z01.810 PRE-OPERATIVE CARDIOVASCULAR EXAMINATION: ICD-10-CM

## 2024-10-17 PROBLEM — Z98.890 STATUS POST CORONARY ANGIOGRAM: Status: ACTIVE | Noted: 2024-10-17

## 2024-10-17 LAB
ABO/RH(D): NORMAL
ANION GAP SERPL CALCULATED.3IONS-SCNC: 13 MMOL/L (ref 7–15)
ANTIBODY SCREEN: NEGATIVE
ATRIAL RATE - MUSE: 80 BPM
BUN SERPL-MCNC: 19.8 MG/DL (ref 8–23)
CALCIUM SERPL-MCNC: 9 MG/DL (ref 8.8–10.4)
CHLORIDE SERPL-SCNC: 92 MMOL/L (ref 98–107)
CREAT SERPL-MCNC: 0.97 MG/DL (ref 0.51–0.95)
DIASTOLIC BLOOD PRESSURE - MUSE: NORMAL MMHG
EGFRCR SERPLBLD CKD-EPI 2021: 65 ML/MIN/1.73M2
ERYTHROCYTE [DISTWIDTH] IN BLOOD BY AUTOMATED COUNT: 13.7 % (ref 10–15)
GLUCOSE SERPL-MCNC: 91 MG/DL (ref 70–99)
HCO3 SERPL-SCNC: 30 MMOL/L (ref 22–29)
HCT VFR BLD AUTO: 33.6 % (ref 35–47)
HGB BLD-MCNC: 10.7 G/DL (ref 11.7–15.7)
INTERPRETATION ECG - MUSE: NORMAL
LVEF ECHO: NORMAL
MCH RBC QN AUTO: 30 PG (ref 26.5–33)
MCHC RBC AUTO-ENTMCNC: 31.8 G/DL (ref 31.5–36.5)
MCV RBC AUTO: 94 FL (ref 78–100)
P AXIS - MUSE: 74 DEGREES
PLATELET # BLD AUTO: 254 10E3/UL (ref 150–450)
POTASSIUM SERPL-SCNC: 3.5 MMOL/L (ref 3.4–5.3)
PR INTERVAL - MUSE: 158 MS
QRS DURATION - MUSE: 84 MS
QT - MUSE: 424 MS
QTC - MUSE: 489 MS
R AXIS - MUSE: 62 DEGREES
RBC # BLD AUTO: 3.57 10E6/UL (ref 3.8–5.2)
SODIUM SERPL-SCNC: 135 MMOL/L (ref 135–145)
SPECIMEN EXPIRATION DATE: NORMAL
SYSTOLIC BLOOD PRESSURE - MUSE: NORMAL MMHG
T AXIS - MUSE: 73 DEGREES
VENTRICULAR RATE- MUSE: 80 BPM
WBC # BLD AUTO: 7.8 10E3/UL (ref 4–11)

## 2024-10-17 PROCEDURE — 999N000054 HC STATISTIC EKG NON-CHARGEABLE

## 2024-10-17 PROCEDURE — 93458 L HRT ARTERY/VENTRICLE ANGIO: CPT | Mod: 26 | Performed by: INTERNAL MEDICINE

## 2024-10-17 PROCEDURE — 250N000011 HC RX IP 250 OP 636: Performed by: GENERAL ACUTE CARE HOSPITAL

## 2024-10-17 PROCEDURE — 36415 COLL VENOUS BLD VENIPUNCTURE: CPT | Performed by: GENERAL ACUTE CARE HOSPITAL

## 2024-10-17 PROCEDURE — 255N000002 HC RX 255 OP 636: Performed by: INTERNAL MEDICINE

## 2024-10-17 PROCEDURE — 250N000009 HC RX 250: Performed by: GENERAL ACUTE CARE HOSPITAL

## 2024-10-17 PROCEDURE — 93010 ELECTROCARDIOGRAM REPORT: CPT | Mod: HOP | Performed by: INTERNAL MEDICINE

## 2024-10-17 PROCEDURE — 93312 ECHO TRANSESOPHAGEAL: CPT | Mod: 26 | Performed by: GENERAL ACUTE CARE HOSPITAL

## 2024-10-17 PROCEDURE — 86901 BLOOD TYPING SEROLOGIC RH(D): CPT | Performed by: GENERAL ACUTE CARE HOSPITAL

## 2024-10-17 PROCEDURE — 250N000011 HC RX IP 250 OP 636: Performed by: INTERNAL MEDICINE

## 2024-10-17 PROCEDURE — 86900 BLOOD TYPING SEROLOGIC ABO: CPT | Performed by: GENERAL ACUTE CARE HOSPITAL

## 2024-10-17 PROCEDURE — 999N000099 HC STATISTIC MODERATE SEDATION < 10 MIN: Performed by: INTERNAL MEDICINE

## 2024-10-17 PROCEDURE — 93320 DOPPLER ECHO COMPLETE: CPT | Mod: 26 | Performed by: GENERAL ACUTE CARE HOSPITAL

## 2024-10-17 PROCEDURE — 80048 BASIC METABOLIC PNL TOTAL CA: CPT | Performed by: GENERAL ACUTE CARE HOSPITAL

## 2024-10-17 PROCEDURE — 85018 HEMOGLOBIN: CPT | Performed by: GENERAL ACUTE CARE HOSPITAL

## 2024-10-17 PROCEDURE — C1894 INTRO/SHEATH, NON-LASER: HCPCS | Performed by: INTERNAL MEDICINE

## 2024-10-17 PROCEDURE — 93325 DOPPLER ECHO COLOR FLOW MAPG: CPT | Mod: 26 | Performed by: GENERAL ACUTE CARE HOSPITAL

## 2024-10-17 PROCEDURE — 250N000009 HC RX 250: Performed by: INTERNAL MEDICINE

## 2024-10-17 PROCEDURE — 93005 ELECTROCARDIOGRAM TRACING: CPT

## 2024-10-17 PROCEDURE — 272N000001 HC OR GENERAL SUPPLY STERILE: Performed by: INTERNAL MEDICINE

## 2024-10-17 PROCEDURE — 93458 L HRT ARTERY/VENTRICLE ANGIO: CPT | Performed by: INTERNAL MEDICINE

## 2024-10-17 PROCEDURE — 258N000003 HC RX IP 258 OP 636: Performed by: GENERAL ACUTE CARE HOSPITAL

## 2024-10-17 PROCEDURE — 99152 MOD SED SAME PHYS/QHP 5/>YRS: CPT | Performed by: GENERAL ACUTE CARE HOSPITAL

## 2024-10-17 PROCEDURE — 93325 DOPPLER ECHO COLOR FLOW MAPG: CPT

## 2024-10-17 PROCEDURE — C1887 CATHETER, GUIDING: HCPCS | Performed by: INTERNAL MEDICINE

## 2024-10-17 RX ORDER — IODIXANOL 320 MG/ML
INJECTION, SOLUTION INTRAVASCULAR
Status: DISCONTINUED | OUTPATIENT
Start: 2024-10-17 | End: 2024-10-17 | Stop reason: HOSPADM

## 2024-10-17 RX ORDER — ACETAMINOPHEN 325 MG/1
650 TABLET ORAL EVERY 4 HOURS PRN
Status: DISCONTINUED | OUTPATIENT
Start: 2024-10-17 | End: 2024-10-17 | Stop reason: HOSPADM

## 2024-10-17 RX ORDER — OXYCODONE HYDROCHLORIDE 5 MG/1
5 TABLET ORAL EVERY 4 HOURS PRN
Status: DISCONTINUED | OUTPATIENT
Start: 2024-10-17 | End: 2024-10-17 | Stop reason: HOSPADM

## 2024-10-17 RX ORDER — ASPIRIN 325 MG
325 TABLET ORAL ONCE
Status: COMPLETED | OUTPATIENT
Start: 2024-10-17 | End: 2024-10-17

## 2024-10-17 RX ORDER — HEPARIN SODIUM 1000 [USP'U]/ML
INJECTION, SOLUTION INTRAVENOUS; SUBCUTANEOUS
Status: DISCONTINUED | OUTPATIENT
Start: 2024-10-17 | End: 2024-10-17 | Stop reason: HOSPADM

## 2024-10-17 RX ORDER — LIDOCAINE 40 MG/G
CREAM TOPICAL
Status: DISCONTINUED | OUTPATIENT
Start: 2024-10-17 | End: 2024-10-17 | Stop reason: HOSPADM

## 2024-10-17 RX ORDER — NALOXONE HYDROCHLORIDE 0.4 MG/ML
0.2 INJECTION, SOLUTION INTRAMUSCULAR; INTRAVENOUS; SUBCUTANEOUS
Status: DISCONTINUED | OUTPATIENT
Start: 2024-10-17 | End: 2024-10-17 | Stop reason: HOSPADM

## 2024-10-17 RX ORDER — LIDOCAINE HYDROCHLORIDE 20 MG/ML
SOLUTION OROPHARYNGEAL
Status: COMPLETED | OUTPATIENT
Start: 2024-10-17 | End: 2024-10-17

## 2024-10-17 RX ORDER — ASPIRIN 81 MG/1
243 TABLET, CHEWABLE ORAL ONCE
Status: COMPLETED | OUTPATIENT
Start: 2024-10-17 | End: 2024-10-17

## 2024-10-17 RX ORDER — FENTANYL CITRATE 50 UG/ML
INJECTION, SOLUTION INTRAMUSCULAR; INTRAVENOUS
Status: DISCONTINUED | OUTPATIENT
Start: 2024-10-17 | End: 2024-10-17 | Stop reason: HOSPADM

## 2024-10-17 RX ORDER — DIAZEPAM 5 MG/1
5 TABLET ORAL ONCE
Status: DISCONTINUED | OUTPATIENT
Start: 2024-10-17 | End: 2024-10-17 | Stop reason: HOSPADM

## 2024-10-17 RX ORDER — FLUMAZENIL 0.1 MG/ML
0.2 INJECTION, SOLUTION INTRAVENOUS
Status: DISCONTINUED | OUTPATIENT
Start: 2024-10-17 | End: 2024-10-17 | Stop reason: HOSPADM

## 2024-10-17 RX ORDER — OXYCODONE HYDROCHLORIDE 5 MG/1
10 TABLET ORAL EVERY 4 HOURS PRN
Status: DISCONTINUED | OUTPATIENT
Start: 2024-10-17 | End: 2024-10-17 | Stop reason: HOSPADM

## 2024-10-17 RX ORDER — NALOXONE HYDROCHLORIDE 0.4 MG/ML
0.4 INJECTION, SOLUTION INTRAMUSCULAR; INTRAVENOUS; SUBCUTANEOUS
Status: DISCONTINUED | OUTPATIENT
Start: 2024-10-17 | End: 2024-10-17 | Stop reason: HOSPADM

## 2024-10-17 RX ORDER — FENTANYL CITRATE 50 UG/ML
25 INJECTION, SOLUTION INTRAMUSCULAR; INTRAVENOUS
Status: DISCONTINUED | OUTPATIENT
Start: 2024-10-17 | End: 2024-10-17 | Stop reason: HOSPADM

## 2024-10-17 RX ORDER — FENTANYL CITRATE 50 UG/ML
INJECTION, SOLUTION INTRAMUSCULAR; INTRAVENOUS
Status: COMPLETED | OUTPATIENT
Start: 2024-10-17 | End: 2024-10-17

## 2024-10-17 RX ORDER — ATROPINE SULFATE 0.1 MG/ML
0.5 INJECTION INTRAVENOUS
Status: DISCONTINUED | OUTPATIENT
Start: 2024-10-17 | End: 2024-10-17 | Stop reason: HOSPADM

## 2024-10-17 RX ORDER — SODIUM CHLORIDE 9 MG/ML
INJECTION, SOLUTION INTRAVENOUS CONTINUOUS
Status: DISCONTINUED | OUTPATIENT
Start: 2024-10-17 | End: 2024-10-17 | Stop reason: HOSPADM

## 2024-10-17 RX ORDER — HEPARIN SODIUM 200 [USP'U]/100ML
INJECTION, SOLUTION INTRAVENOUS
Status: DISCONTINUED | OUTPATIENT
Start: 2024-10-17 | End: 2024-10-17 | Stop reason: HOSPADM

## 2024-10-17 RX ORDER — SODIUM CHLORIDE 9 MG/ML
INJECTION, SOLUTION INTRAVENOUS CONTINUOUS PRN
Status: COMPLETED | OUTPATIENT
Start: 2024-10-17 | End: 2024-10-17

## 2024-10-17 RX ADMIN — MIDAZOLAM 1 MG: 1 INJECTION INTRAMUSCULAR; INTRAVENOUS at 10:49

## 2024-10-17 RX ADMIN — FENTANYL CITRATE 50 MCG: 50 INJECTION INTRAMUSCULAR; INTRAVENOUS at 10:45

## 2024-10-17 RX ADMIN — MIDAZOLAM 2 MG: 1 INJECTION INTRAMUSCULAR; INTRAVENOUS at 10:46

## 2024-10-17 RX ADMIN — SODIUM CHLORIDE 30 ML/HR: 9 INJECTION, SOLUTION INTRAVENOUS at 11:23

## 2024-10-17 RX ADMIN — FENTANYL CITRATE 25 MCG: 50 INJECTION INTRAMUSCULAR; INTRAVENOUS at 10:49

## 2024-10-17 RX ADMIN — TOPICAL ANESTHETIC 0.5 ML: 200 SPRAY DENTAL; PERIODONTAL at 10:44

## 2024-10-17 RX ADMIN — LIDOCAINE HYDROCHLORIDE 15 ML: 20 SOLUTION ORAL; TOPICAL at 10:42

## 2024-10-17 ASSESSMENT — ACTIVITIES OF DAILY LIVING (ADL)
ADLS_ACUITY_SCORE: 38

## 2024-10-17 ASSESSMENT — EJECTION FRACTION: EF_VALUE: .27

## 2024-10-17 NOTE — DISCHARGE INSTRUCTIONS

## 2024-10-17 NOTE — INTERVAL H&P NOTE
"I have reviewed the surgical (or preoperative) H&P that is linked to this encounter, and examined the patient. There are no significant changes    Clinical Conditions Present on Arrival:  Clinically Significant Risk Factors Present on Admission         # Hyponatremia: Lowest Na = 133 mmol/L in last 30 days, will monitor as appropriate  # Hypochloremia: Lowest Cl = 92 mmol/L in last 2 days, will monitor as appropriate         # Drug Induced Platelet Defect: home medication list includes an antiplatelet medication      # Cachexia: Estimated body mass index is 16.44 kg/m  as calculated from the following:    Height as of this encounter: 1.549 m (5' 1\").    Weight as of this encounter: 39.5 kg (87 lb).       "

## 2024-10-17 NOTE — PRE-PROCEDURE
GENERAL PRE-PROCEDURE:   Procedure:  Coronary angiogram, left heart catheterization  Date/Time:  10/17/2024 9:51 AM    Written consent obtained?: Yes    Risks and benefits: Risks, benefits and alternatives were discussed    Consent given by:  Patient  Patient states understanding of procedure being performed: Yes    Patient's understanding of procedure matches consent: Yes    Procedure consent matches procedure scheduled: Yes    Expected level of sedation:  Moderate  Appropriately NPO:  Yes  ASA Class:  4 (severe biprosthetic mitral valve stenosis, hx of mini MVR in 2017, HFpEF; NYHA Class II, severe pHTN, hx of embolic CVA, anemia)  Mallampati  :  Grade 2- soft palate, base of uvula, tonsillar pillars, and portion of posterior pharyngeal wall visible  Lungs:  Lungs clear with good breath sounds bilaterally  Heart:  Normal heart sounds and rate  History & Physical reviewed:  History and physical reviewed and updates made (see comment)  H&P Comments:  Clinically Significant Risk Factors Present on Admission    Cardiovascular : severe biprosthetic mitral valve stenosis, LVOT obstruction, hx of mini MVR in 2017, HFpEF; NYHA Class II    Fluid & Electrolyte Disorders : Not present on admission    Gastroenterology : Not present on admission    Hematology/Oncology : anemia; chronic, stable    Nephrology : Not present on admission    Neurology :  Hx of Lt sided embolic MCA stroke in 2017 with residual aphasia; s/p Lt CEA 9/2024    Pulmonology : severe pHTN    Systemic : Frailty    Statement of review:  I have reviewed the lab findings, diagnostic data, medications, and the plan for sedation

## 2024-10-17 NOTE — PROGRESS NOTES
Patient is kept comfortable during post-procedure stay. VSS. Denies pain. Right radial access site remains dry & free from signs of bleeding. Tolerated fluids. Ambulated without issues. Appointments made & included in AVS. Dr. Pierre was able to speak with patient post procedure. Post-op instructions reviewed and packet given to patient & friend. Able to ask questions. Verbalized no concerns. Belongings returned. Discharged in stable condition.

## 2024-10-17 NOTE — PROGRESS NOTES
ANGE completed.  3mg Versed and 75 mcgs Fentanyl given during ANGE.  Pt tolerated well.  After procedure was done, BP 70-80's systolic, pt still sleepy from sedation to difficult to assess whether symptomatic.  Dr Douglass informed and 250mL normal saline fluid bolus hung per verbal order.  Nida given report.  No hot foods or liquids until after 530pm today.  Pt to remain NPO for angiogram.  Anne Correa RN

## 2024-10-17 NOTE — INTERVAL H&P NOTE
"I have reviewed the surgical (or preoperative) H&P that is linked to this encounter, and examined the patient. There are no significant changes    Clinical Conditions Present on Arrival:  Clinically Significant Risk Factors Present on Admission         # Hyponatremia: Lowest Na = 133 mmol/L in last 30 days, will monitor as appropriate  # Hypochloremia: Lowest Cl = 94 mmol/L in last 30 days, will monitor as appropriate         # Drug Induced Platelet Defect: home medication list includes an antiplatelet medication      # Cachexia: Estimated body mass index is 16.44 kg/m  as calculated from the following:    Height as of 10/14/24: 1.549 m (5' 1\").    Weight as of 10/14/24: 39.5 kg (87 lb).       "

## 2024-10-18 ENCOUNTER — TELEPHONE (OUTPATIENT)
Dept: CARDIOLOGY | Facility: CLINIC | Age: 64
End: 2024-10-18
Payer: MEDICARE

## 2024-10-18 ENCOUNTER — MYC MEDICAL ADVICE (OUTPATIENT)
Dept: FAMILY MEDICINE | Facility: CLINIC | Age: 64
End: 2024-10-18
Payer: MEDICARE

## 2024-10-18 NOTE — TELEPHONE ENCOUNTER
Marian and Talya had questions about recovery time of sternotomy vs minimally invasive approach - advised that time in hospital is the same but there are lifting/pulling/pushing restrictions for 12 weeks.  They were concerned because they were told yesterday at her angiogram that she would need a CABG which would require a sternotomy, but RN advised them that Dr. Haynes is looking into whether it can be stented and they would be notified once we know.

## 2024-10-18 NOTE — TELEPHONE ENCOUNTER
Per task, Marian would like to move pts surgery to the [facility]. Talked with Marian and offered them 11/21. Marian would like first week in dec. Scheduled surgery for 12/4. Will call if anything changes

## 2024-10-21 LAB
ABO/RH(D): NORMAL
ANTIBODY SCREEN: NEGATIVE
SPECIMEN EXPIRATION DATE: NORMAL

## 2024-10-21 NOTE — PROGRESS NOTES
Reid Hinson M.D.  Cardiovascular Medicine    I personally saw and examined this patient, discussed care with housestaff and other consultants, reviewed current laboratories and imaging studies, and conveyed impression and diagnostic/therapeutic plan to patient.  1.5 hours spent reviewing outside records, inside records, coordinating care with primary care and cardiovascular surgery.    64 year old female with longstanding valvular heart disease and now with C-E biologic mitral valve with degenerative stenosis, further complicated by protrusion of part of valve into aortic outflow from the ventricle.    We thoroughly discussed risks and benefits of therapeutic options including:  Re-do surgery: stroke, mi prolong ventilator time, tracheostomy, bleeding, vasoplegia, lung injury  My estimated surgical risk would be in the range of 20% given her frailty and sarcopenia  Percutaneous valve within valve: may not be candidate in view of strut location, stroke, bleeding, but less ventilator time, embolization  Frailty, sarcopenia and risk, necessity of prolong rehabilitation course including nursing home, infection    Problem List  Pulmonary hypertension, likely WHO group II  Prosthetic mitral valvular stenosis  Hypertension  Hyperlipidemia  Cerebrovascular disease  Cachexia and frailty    Assessment/Plan:  Patient presents to establish care. She has known mitral valve disease and is s/p prosthetic valve replacement in 2017. She has subsequently developed severe valvular stenosis and is undergoing evaluation for surgical vs percutaneous intervention. She presents due to pulmonary hypertension that was discovered as part of workup.    Her pulmonary hypertension is likely primarily post-capillary/WHO group II. PA diastolic pressure equivalent to wedge pressure on recent cath. Given the duration of her pulmonary hypertension (she reports this has been present since 2017), this may improve but may not fully normalize after  valve replacement. We discussed the options of surgical vs percutaneous intervention. Given her cachexia and frailty, she is at increased risk for morbidity and mortality with either procedure but moreso with a surgical one. Given this, it is reasonable to further explore her candidacy for percutaneous intervention.    - Continue diuretics without change  - CT TMVR to explore candidacy for percutaneous intervention  - Will discuss case with Dr. Haynes    History  Patient presents to [facility] care. 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 She has subsequently developed severe prosthetic valve stenosis and is undergoing evaluation for surgical vs percutaneous intervention. She was noted to have pulmonary hypertension for which she is referred.    She reports that she has had significant reduction in functional status. A year ago, walked up to an hour at a time. Today, can walk 1-2 blocks before she has to stop due to fatigue. No exertional chest pain, dyspnea, orthopnea, or edema. She has not had syncope. She has lost about 20 lbs, reports she has no appetite and early satiety.    She has a history of hypertension though was recently taken off all antihypertensives. Remains on diuretic. She has hyperlipidemia with improved control recently (though still above goal). She had a stroke in 2017, some question if this was an embolic event related to valve. She had a recent carotid endarterectomy. She smoked 1 ppd for decades, quit earlier this year. Father  of MI at 60 yo, sister had MI at 64 yo.    She denies a history of lung disease, obstructive sleep apnea, autoimmune disease, or venous thromboembolism.    Objective  /70 (BP Location: Left arm, Patient Position: Chair, Cuff Size: Adult Small)   Pulse 69   Wt 39.2 kg (86 lb 6.4 oz)   SpO2 97%   BMI 16.33 kg/m      Wt Readings from Last 5 Encounters:   10/22/24 39.2 kg (86 lb 6.4 oz)    10/17/24 39.5 kg (87 lb)   10/14/24 39.5 kg (87 lb)   10/14/24 40 kg (88 lb 3.2 oz)   10/07/24 39 kg (85 lb 14.4 oz)     Gen: Cachectic, NAD.  CV: RRR, diastolic murmur. No edema.  Pulm: CTAB, normal WOB.  Abd: Nondistended.    Labs  Office Visit on 10/22/2024   Component Date Value Ref Range Status    FIO2-Pre 10/22/2024 21.00  % Preliminary    6 min walk (FT) 10/22/2024 850  1,392 ft Final    6 Min Walk (M) 10/22/2024 259  424 m Final     Imaging     Single-vessel obstructive coronary disease involving the mid left circumflex with mild nonobstructive disease elsewhere.  Normal left-sided filling pressures.  No aortic valve stenosis  eft Heart Catheterization    LVEDP 9 mmHg  No significant gradient across the aortic valve     Coronary Angiography:  LEFT MAIN: Very short vessel with mild disease (effectively separate ostia).   LEFT ANTERIOR DESCENDING: Normal caliber vessel that gives rise to multiple small to medium size diagonal branches and septal perforators.  The LAD wraps around the apex distally.  The LAD and its branches have mild diffuse disease.  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  LEFT CIRCUMFLEX: Dominant vessel that gives rise to a large OM1 branch, a large bifurcating OM 2 branch, terminates into a small vessel distally.  The proximal left circumflex has mild disease followed by 70% stenosis in the midportion.  Distally the left circumflex and its branches have mild disease.  RIGHT CORONARY ARTERY: Normal caliber vessel that gives rise to right posterior descending artery and posterolateral system.  The right coronary artery and its branches have mild disease.     chuy: MAHSA GARVIN  MRN: [id number]  : 1960  Study Date: 10/17/2024 10:31 AM  Age: 64 yrs  Gender: Female  Patient [facility]: [facility]  Reason For Study: S/P MVR (mitral valve replacement), Prosthetic valve  dysfunction  Ordering Physician: ROSETTA HUGGINS  Referring Physician:  ROSETTA HUGGINS  Performed By: BW-BP     BSA: 1.3 m2  Height: 61 in  Weight: 87 lb  HR: 86  BP: 107/59 mmHg  ______________________________________________________________________________  Procedure  Complete Portable ANGE Adult. 3D image acquisition, reconstruction, and real-  time interpretation was performed. Good quality two-dimensional was performed  and interpreted. Good quality color and spectral Doppler were performed and  interpreted.  ______________________________________________________________________________  Interpretation Summary     1. Left ventricular chamber size and systolic function are normal. The  visually estimated left ventricular ejection fraction is 60-65%.  2. Right ventricular chamber size is normal. Systolic function is mildly  reduced.  3. Severe left atrial enlargement. Moderate right atrial enlargement.  4. The left atrial appendage has been surgically ligated with no significant  residual stump.  5. Trileaflet aortic valve with thickening of the leaflet tips which has a  rheumatic appearance. There is moderate central regurgitation. No  hemodynamically significant stenosis.  6. A 29 mm Katerina-Mohan Magna bioprosthetic mitral valve is present. The  valve struts partially extend into the left ventricular outflow tract causing  outflow tract flow accleration although there is no obstruction. The  prosthetic leaflets are severely thickened with restricted opening. There is  severe prosthetic stenosis with a peak inflow velocity of 3.2 m/s, mean inflow  gradient 23 mmHg at a heart rate of 84 bpm and mitral valve area of 0.8 cm2 by  planimetry. Trace prosthetic regurgitation is present.  7. Mild-to-moderate functional tricuspid regurgitation.  8. Severe pulmonary hypertension is present with an estimated pulmonary artery  systolic pressure of 90 mmHg plus the right atrial pressure.  9. Compared to the prior study dated 9/18/2024, the intracardiac structures  are better visualized  otherwise the findings are comparable.  ______________________________________________________________________________  ANGE  3D image acquisition, reconstruction, and real-time interpretation was  performed. Patient was sedated using Versed 3 mg. The heart rate, respiratory  rate, oxygen saturations, blood pressure, and response to care were monitored  throughout the procedure with the assistance of the nurse. Patient was sedated  using Fentanyl 75 mcg. 15 ml viscous Lidocaine, 0.5 ml Benzocaine spray. I  determined this patient to be an appropriate candidate for the planned  sedation and procedure and have reassessed the patient immediately prior to  sedation and procedure. Total sedation time: 22 minutes of continuous bedside  1:1 monitoring. The procedure was performed in the Heart Catherization Lab.  Informed consent for Transesophegeal echo obtained. The Transducer was  inserted without difficulty . Complications None. The patient tolerated the  procedure well.     Left Ventricle  The left ventricle is normal in size. Left ventricular systolic function is  normal. The visual ejection fraction is 60-65%. No regional wall motion  abnormalities noted.     Right Ventricle  The right ventricle is normal size. Mildly decreased right ventricular  systolic function.     Atria  The left atrium is severely dilated. The right atrium is moderately dilated.  Intact atrial septum. A contrast injection (Bubble Study) was performed that  was negative for flow across the interatrial septum. The left atrial appendage  has been surgically ligated with no significant residual stump.     Mitral Valve  A 29 mm Katerina-Mohan Magna bioprosthetic mitral valve is present. The  valve struts partially extend into the left ventricular outflow tract causing  outflow tract flow accleration although there is no obstruction. The  prosthetic lealfets are severely thickened with restricted opening. There is  severe prosthetic stenosis with  a peak inflow velocity of 3.2 m/s, mean inflow  gradient 23 mmHg at a heart rate of 84 bpm and mitral valve area of 0.8 cm2 by  planimetry. Trace prosthetic regurgitation is present.     Tricuspid Valve  Normal tricuspid valve. There is mild to moderate (1-2+) tricuspid  regurgitation. The right ventricular systolic pressure is elevated at 90.3  mmHg. Severe (>55mmHg) pulmonary hypertension is present. There is no  tricuspid stenosis.     Aortic Valve  Thickened aortic valve leaflets. There is moderate (2+) aortic regurgitation.  No aortic stenosis is present.     Pulmonic Valve  The pulmonic valve is not well seen, but is grossly normal. There is mild (1+)  pulmonic valvular regurgitation. There is no pulmonic valvular stenosis.     Vessels  Normal size aorta. Mild atherosclerotic plaque(s) in the ascending aorta.  Normal pulmonary venous drainage.     Pericardial/Pleural  There is no pericardial effusion.     Rhythm  Sinus rhythm was noted.  ______________________________________________________________________________  MMode/2D Measurements & Calculations  Ao root diam: 3.3 cm  asc Aorta Diam: 3.2 cm  Ao root diam index Ht(cm/m): 2.1  Ao root diam index BSA (cm/m2): 2.5  Asc Ao diam index BSA (cm/m2): 2.4  Asc Ao diam index Ht(cm/m): 2.1     Doppler Measurements & Calculations  MV max P.0 mmHg  MV mean P.0 mmHg  MV V2 VTI: 95.4 cm  MV P1/2t max gio: 318.0 cm/sec  MV P1/2t: 214.6 msec  MVA(P1/2t): 1.0 cm2  MV dec slope: 434.0 cm/sec2  Ao V2 max: 197.0 cm/sec  Ao max P.0 mmHg  Ao V2 mean: 125.0 cm/sec  Ao mean P.0 mmHg  Ao V2 VTI: 32.3 cm  AI P1/2t: 227.6 msec  LV V1 max P.5 mmHg  TR max gio: 475.0 cm/sec  TR max P.3 mmHg

## 2024-10-22 ENCOUNTER — OFFICE VISIT (OUTPATIENT)
Dept: PULMONOLOGY | Facility: CLINIC | Age: 64
End: 2024-10-22
Attending: INTERNAL MEDICINE
Payer: MEDICARE

## 2024-10-22 ENCOUNTER — OFFICE VISIT (OUTPATIENT)
Dept: CARDIOLOGY | Facility: CLINIC | Age: 64
End: 2024-10-22
Attending: INTERNAL MEDICINE
Payer: MEDICARE

## 2024-10-22 ENCOUNTER — LAB (OUTPATIENT)
Dept: LAB | Facility: CLINIC | Age: 64
End: 2024-10-22
Attending: INTERNAL MEDICINE
Payer: MEDICARE

## 2024-10-22 VITALS
HEART RATE: 69 BPM | BODY MASS INDEX: 16.33 KG/M2 | DIASTOLIC BLOOD PRESSURE: 70 MMHG | SYSTOLIC BLOOD PRESSURE: 107 MMHG | WEIGHT: 86.4 LBS | OXYGEN SATURATION: 97 %

## 2024-10-22 DIAGNOSIS — R79.9 ABNORMAL FINDING OF BLOOD CHEMISTRY, UNSPECIFIED: ICD-10-CM

## 2024-10-22 DIAGNOSIS — D62 ACUTE BLOOD LOSS ANEMIA: ICD-10-CM

## 2024-10-22 DIAGNOSIS — I27.20 PULMONARY HYPERTENSION (H): Primary | ICD-10-CM

## 2024-10-22 DIAGNOSIS — Q23.2 CONGENITAL STENOSIS OF MITRAL VALVE: Primary | ICD-10-CM

## 2024-10-22 DIAGNOSIS — I50.32 CHRONIC DIASTOLIC CHF (CONGESTIVE HEART FAILURE) (H): ICD-10-CM

## 2024-10-22 DIAGNOSIS — Z11.59 NEED FOR HEPATITIS B SCREENING TEST: ICD-10-CM

## 2024-10-22 DIAGNOSIS — E78.5 HYPERLIPIDEMIA, UNSPECIFIED HYPERLIPIDEMIA TYPE: ICD-10-CM

## 2024-10-22 DIAGNOSIS — I65.22 STENOSIS OF LEFT CAROTID ARTERY: ICD-10-CM

## 2024-10-22 DIAGNOSIS — Z11.4 ENCOUNTER FOR SCREENING FOR HIV: ICD-10-CM

## 2024-10-22 DIAGNOSIS — I27.20 PULMONARY HYPERTENSION (H): ICD-10-CM

## 2024-10-22 DIAGNOSIS — Z95.2 S/P MVR (MITRAL VALVE REPLACEMENT): ICD-10-CM

## 2024-10-22 DIAGNOSIS — R06.02 SOB (SHORTNESS OF BREATH): ICD-10-CM

## 2024-10-22 LAB
6 MIN WALK (FT): 850 FT
6 MIN WALK (M): 259 M
ALBUMIN SERPL BCG-MCNC: 4.3 G/DL (ref 3.5–5.2)
ALBUMIN UR-MCNC: NEGATIVE MG/DL
ALP SERPL-CCNC: 71 U/L (ref 40–150)
ALT SERPL W P-5'-P-CCNC: 21 U/L (ref 0–50)
ANION GAP SERPL CALCULATED.3IONS-SCNC: 13 MMOL/L (ref 7–15)
APPEARANCE UR: CLEAR
AST SERPL W P-5'-P-CCNC: 31 U/L (ref 0–45)
BACTERIA #/AREA URNS HPF: ABNORMAL /HPF
BASOPHILS # BLD AUTO: 0.1 10E3/UL (ref 0–0.2)
BASOPHILS NFR BLD AUTO: 1 %
BILIRUB DIRECT SERPL-MCNC: 0.25 MG/DL (ref 0–0.3)
BILIRUB SERPL-MCNC: 0.8 MG/DL
BILIRUB UR QL STRIP: NEGATIVE
BUN SERPL-MCNC: 17 MG/DL (ref 8–23)
CALCIUM SERPL-MCNC: 9.9 MG/DL (ref 8.8–10.4)
CHLORIDE SERPL-SCNC: 96 MMOL/L (ref 98–107)
CHOLEST SERPL-MCNC: 166 MG/DL
COLOR UR AUTO: ABNORMAL
CREAT SERPL-MCNC: 1.05 MG/DL (ref 0.51–0.95)
CRP SERPL-MCNC: <3 MG/L
EGFRCR SERPLBLD CKD-EPI 2021: 59 ML/MIN/1.73M2
EOSINOPHIL # BLD AUTO: 0.1 10E3/UL (ref 0–0.7)
EOSINOPHIL NFR BLD AUTO: 1 %
ERYTHROCYTE [DISTWIDTH] IN BLOOD BY AUTOMATED COUNT: 14 % (ref 10–15)
EST. AVERAGE GLUCOSE BLD GHB EST-MCNC: 94 MG/DL
FASTING STATUS PATIENT QL REPORTED: ABNORMAL
FASTING STATUS PATIENT QL REPORTED: NORMAL
FERRITIN SERPL-MCNC: 138 NG/ML (ref 11–328)
GLUCOSE SERPL-MCNC: 87 MG/DL (ref 70–99)
GLUCOSE UR STRIP-MCNC: NEGATIVE MG/DL
HBA1C MFR BLD: 4.9 %
HBV CORE AB SERPL QL IA: NONREACTIVE
HBV SURFACE AB SERPL IA-ACNC: <3.5 M[IU]/ML
HBV SURFACE AB SERPL IA-ACNC: NONREACTIVE M[IU]/ML
HBV SURFACE AG SERPL QL IA: NONREACTIVE
HCO3 SERPL-SCNC: 31 MMOL/L (ref 22–29)
HCT VFR BLD AUTO: 32.6 % (ref 35–47)
HCV AB SERPL QL IA: NONREACTIVE
HDLC SERPL-MCNC: 61 MG/DL
HGB BLD-MCNC: 10.3 G/DL (ref 11.7–15.7)
HGB UR QL STRIP: ABNORMAL
HIV 1+2 AB+HIV1 P24 AG SERPL QL IA: NONREACTIVE
HYALINE CASTS: 15 /LPF
IMM GRANULOCYTES # BLD: 0 10E3/UL
IMM GRANULOCYTES NFR BLD: 0 %
INR PPP: 0.98 (ref 0.85–1.15)
IRON BINDING CAPACITY (ROCHE): 369 UG/DL (ref 240–430)
IRON SATN MFR SERPL: 13 % (ref 15–46)
IRON SERPL-MCNC: 47 UG/DL (ref 37–145)
KETONES UR STRIP-MCNC: NEGATIVE MG/DL
LDLC SERPL CALC-MCNC: 87 MG/DL
LEUKOCYTE ESTERASE UR QL STRIP: ABNORMAL
LYMPHOCYTES # BLD AUTO: 1.6 10E3/UL (ref 0.8–5.3)
LYMPHOCYTES NFR BLD AUTO: 25 %
MCH RBC QN AUTO: 29.8 PG (ref 26.5–33)
MCHC RBC AUTO-ENTMCNC: 31.6 G/DL (ref 31.5–36.5)
MCV RBC AUTO: 94 FL (ref 78–100)
MONOCYTES # BLD AUTO: 0.3 10E3/UL (ref 0–1.3)
MONOCYTES NFR BLD AUTO: 5 %
NEUTROPHILS # BLD AUTO: 4.3 10E3/UL (ref 1.6–8.3)
NEUTROPHILS NFR BLD AUTO: 68 %
NITRATE UR QL: NEGATIVE
NONHDLC SERPL-MCNC: 105 MG/DL
NRBC # BLD AUTO: 0 10E3/UL
NRBC BLD AUTO-RTO: 0 /100
NT-PROBNP SERPL-MCNC: 1392 PG/ML (ref 0–900)
PH UR STRIP: 6.5 [PH] (ref 5–7)
PLATELET # BLD AUTO: 210 10E3/UL (ref 150–450)
POTASSIUM SERPL-SCNC: 3.1 MMOL/L (ref 3.4–5.3)
PROT SERPL-MCNC: 7.4 G/DL (ref 6.4–8.3)
RBC # BLD AUTO: 3.46 10E6/UL (ref 3.8–5.2)
RBC URINE: 6 /HPF
RHEUMATOID FACT SERPL-ACNC: <10 IU/ML
SODIUM SERPL-SCNC: 140 MMOL/L (ref 135–145)
SP GR UR STRIP: 1.01 (ref 1–1.03)
SQUAMOUS EPITHELIAL: 11 /HPF
TRANSITIONAL EPI: <1 /HPF
TRIGL SERPL-MCNC: 92 MG/DL
TSH SERPL DL<=0.005 MIU/L-ACNC: 2.9 UIU/ML (ref 0.3–4.2)
UROBILINOGEN UR STRIP-MCNC: NORMAL MG/DL
WBC # BLD AUTO: 6.3 10E3/UL (ref 4–11)
WBC URINE: 6 /HPF

## 2024-10-22 PROCEDURE — 86038 ANTINUCLEAR ANTIBODIES: CPT | Performed by: INTERNAL MEDICINE

## 2024-10-22 PROCEDURE — 85610 PROTHROMBIN TIME: CPT | Performed by: PATHOLOGY

## 2024-10-22 PROCEDURE — 86704 HEP B CORE ANTIBODY TOTAL: CPT | Performed by: INTERNAL MEDICINE

## 2024-10-22 PROCEDURE — 99000 SPECIMEN HANDLING OFFICE-LAB: CPT | Performed by: PATHOLOGY

## 2024-10-22 PROCEDURE — 94150 VITAL CAPACITY TEST: CPT | Performed by: INTERNAL MEDICINE

## 2024-10-22 PROCEDURE — 94729 DIFFUSING CAPACITY: CPT | Performed by: INTERNAL MEDICINE

## 2024-10-22 PROCEDURE — 84238 ASSAY NONENDOCRINE RECEPTOR: CPT | Mod: 90 | Performed by: PATHOLOGY

## 2024-10-22 PROCEDURE — 83529 ASAY OF INTERLEUKIN-6 (IL-6): CPT | Performed by: PATHOLOGY

## 2024-10-22 PROCEDURE — 83880 ASSAY OF NATRIURETIC PEPTIDE: CPT | Performed by: PATHOLOGY

## 2024-10-22 PROCEDURE — G0463 HOSPITAL OUTPT CLINIC VISIT: HCPCS | Performed by: INTERNAL MEDICINE

## 2024-10-22 PROCEDURE — 83036 HEMOGLOBIN GLYCOSYLATED A1C: CPT | Performed by: SURGERY

## 2024-10-22 PROCEDURE — 80053 COMPREHEN METABOLIC PANEL: CPT | Performed by: PATHOLOGY

## 2024-10-22 PROCEDURE — 85025 COMPLETE CBC W/AUTO DIFF WBC: CPT | Performed by: PATHOLOGY

## 2024-10-22 PROCEDURE — 99417 PROLNG OP E/M EACH 15 MIN: CPT | Mod: 24 | Performed by: INTERNAL MEDICINE

## 2024-10-22 PROCEDURE — 80061 LIPID PANEL: CPT | Performed by: PATHOLOGY

## 2024-10-22 PROCEDURE — 85390 FIBRINOLYSINS SCREEN I&R: CPT | Mod: 26 | Performed by: PATHOLOGY

## 2024-10-22 PROCEDURE — 83540 ASSAY OF IRON: CPT | Performed by: PATHOLOGY

## 2024-10-22 PROCEDURE — 94375 RESPIRATORY FLOW VOLUME LOOP: CPT | Performed by: INTERNAL MEDICINE

## 2024-10-22 PROCEDURE — 36415 COLL VENOUS BLD VENIPUNCTURE: CPT | Performed by: PATHOLOGY

## 2024-10-22 PROCEDURE — 84443 ASSAY THYROID STIM HORMONE: CPT | Performed by: PATHOLOGY

## 2024-10-22 PROCEDURE — 94618 PULMONARY STRESS TESTING: CPT | Performed by: INTERNAL MEDICINE

## 2024-10-22 PROCEDURE — 87340 HEPATITIS B SURFACE AG IA: CPT | Performed by: INTERNAL MEDICINE

## 2024-10-22 PROCEDURE — 86431 RHEUMATOID FACTOR QUANT: CPT | Performed by: INTERNAL MEDICINE

## 2024-10-22 PROCEDURE — 99215 OFFICE O/P EST HI 40 MIN: CPT | Mod: 24 | Performed by: INTERNAL MEDICINE

## 2024-10-22 PROCEDURE — 85730 THROMBOPLASTIN TIME PARTIAL: CPT | Performed by: INTERNAL MEDICINE

## 2024-10-22 PROCEDURE — 81001 URINALYSIS AUTO W/SCOPE: CPT | Performed by: PATHOLOGY

## 2024-10-22 PROCEDURE — 86706 HEP B SURFACE ANTIBODY: CPT | Performed by: INTERNAL MEDICINE

## 2024-10-22 PROCEDURE — 87086 URINE CULTURE/COLONY COUNT: CPT | Performed by: SURGERY

## 2024-10-22 PROCEDURE — 86140 C-REACTIVE PROTEIN: CPT | Performed by: PATHOLOGY

## 2024-10-22 PROCEDURE — 87389 HIV-1 AG W/HIV-1&-2 AB AG IA: CPT | Performed by: INTERNAL MEDICINE

## 2024-10-22 PROCEDURE — 86900 BLOOD TYPING SEROLOGIC ABO: CPT

## 2024-10-22 PROCEDURE — 82728 ASSAY OF FERRITIN: CPT | Performed by: PATHOLOGY

## 2024-10-22 PROCEDURE — 86901 BLOOD TYPING SEROLOGIC RH(D): CPT

## 2024-10-22 PROCEDURE — 83550 IRON BINDING TEST: CPT | Performed by: PATHOLOGY

## 2024-10-22 PROCEDURE — 86803 HEPATITIS C AB TEST: CPT | Performed by: INTERNAL MEDICINE

## 2024-10-22 PROCEDURE — 82248 BILIRUBIN DIRECT: CPT | Performed by: PATHOLOGY

## 2024-10-22 PROCEDURE — 94726 PLETHYSMOGRAPHY LUNG VOLUMES: CPT | Performed by: INTERNAL MEDICINE

## 2024-10-22 ASSESSMENT — PAIN SCALES - GENERAL: PAINLEVEL: NO PAIN (0)

## 2024-10-22 NOTE — LETTER
10/22/2024      RE: Ernestine Morales  [address]  [facility] [address]       Dear Colleague,    Thank you for the opportunity to participate in the care of your patient, Ernestine Morales, at the [facility] [place] [facility]. Please see a copy of my visit note below.    Reid Hinson M.D.  Cardiovascular Medicine    I personally saw and examined this patient, discussed care with housestaff and other consultants, reviewed current laboratories and imaging studies, and conveyed impression and diagnostic/therapeutic plan to patient.  1.5 hours spent reviewing outside records, inside records, coordinating care with primary care and cardiovascular surgery.    64 year old female with longstanding valvular heart disease and now with C-E biologic mitral valve with degenerative stenosis, further complicated by protrusion of part of valve into aortic outflow from the ventricle.    We thoroughly discussed risks and benefits of therapeutic options including:  Re-do surgery: stroke, mi prolong ventilator time, tracheostomy, bleeding, vasoplegia, lung injury  My estimated surgical risk would be in the range of 20% given her frailty and sarcopenia  Percutaneous valve within valve: may not be candidate in view of strut location, stroke, bleeding, but less ventilator time, embolization  Frailty, sarcopenia and risk, necessity of prolong rehabilitation course including nursing home, infection    Problem List  Pulmonary hypertension, likely WHO group II  Prosthetic mitral valvular stenosis  Hypertension  Hyperlipidemia  Cerebrovascular disease  Cachexia and frailty    Assessment/Plan:  Patient presents to establish care. She has known mitral valve disease and is s/p prosthetic valve replacement in 2017. She has subsequently developed severe valvular stenosis and is undergoing evaluation for surgical vs percutaneous intervention. She presents due to  pulmonary hypertension that was discovered as part of workup.    Her pulmonary hypertension is likely primarily post-capillary/WHO group II. PA diastolic pressure equivalent to wedge pressure on recent cath. Given the duration of her pulmonary hypertension (she reports this has been present since 2017), this may improve but may not fully normalize after valve replacement. We discussed the options of surgical vs percutaneous intervention. Given her cachexia and frailty, she is at increased risk for morbidity and mortality with either procedure but moreso with a surgical one. Given this, it is reasonable to further explore her candidacy for percutaneous intervention.    - Continue diuretics without change  - CT TMVR to explore candidacy for percutaneous intervention  - Will discuss case with Dr. Haynes    History  Patient presents to [facility] care. 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 She has subsequently developed severe prosthetic valve stenosis and is undergoing evaluation for surgical vs percutaneous intervention. She was noted to have pulmonary hypertension for which she is referred.    She reports that she has had significant reduction in functional status. A year ago, walked up to an hour at a time. Today, can walk 1-2 blocks before she has to stop due to fatigue. No exertional chest pain, dyspnea, orthopnea, or edema. She has not had syncope. She has lost about 20 lbs, reports she has no appetite and early satiety.    She has a history of hypertension though was recently taken off all antihypertensives. Remains on diuretic. She has hyperlipidemia with improved control recently (though still above goal). She had a stroke in 2017, some question if this was an embolic event related to valve. She had a recent carotid endarterectomy. She smoked 1 ppd for decades, quit earlier this year. Father  of MI at 60 yo, sister had MI at 65  yo.    She denies a history of lung disease, obstructive sleep apnea, autoimmune disease, or venous thromboembolism.    Objective  /70 (BP Location: Left arm, Patient Position: Chair, Cuff Size: Adult Small)   Pulse 69   Wt 39.2 kg (86 lb 6.4 oz)   SpO2 97%   BMI 16.33 kg/m      Wt Readings from Last 5 Encounters:   10/22/24 39.2 kg (86 lb 6.4 oz)   10/17/24 39.5 kg (87 lb)   10/14/24 39.5 kg (87 lb)   10/14/24 40 kg (88 lb 3.2 oz)   10/07/24 39 kg (85 lb 14.4 oz)     Gen: Cachectic, NAD.  CV: RRR, diastolic murmur. No edema.  Pulm: CTAB, normal WOB.  Abd: Nondistended.    Labs  Office Visit on 10/22/2024   Component Date Value Ref Range Status     FIO2-Pre 10/22/2024 21.00  % Preliminary     6 min walk (FT) 10/22/2024 850  1,392 ft Final     6 Min Walk (M) 10/22/2024 259  424 m Final     Imaging     Single-vessel obstructive coronary disease involving the mid left circumflex with mild nonobstructive disease elsewhere.  Normal left-sided filling pressures.  No aortic valve stenosis  eft Heart Catheterization    LVEDP 9 mmHg  No significant gradient across the aortic valve     Coronary Angiography:  LEFT MAIN: Very short vessel with mild disease (effectively separate ostia).   LEFT ANTERIOR DESCENDING: Normal caliber vessel that gives rise to multiple small to medium size diagonal branches and septal perforators.  The LAD wraps around the apex distally.  The LAD and its branches have mild diffuse disease.  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  LEFT CIRCUMFLEX: Dominant vessel that gives rise to a large OM1 branch, a large bifurcating OM 2 branch, terminates into a small vessel distally.  The proximal left circumflex has mild disease followed by 70% stenosis in the midportion.  Distally the left circumflex and its branches have mild disease.  RIGHT CORONARY ARTERY: Normal caliber vessel that gives rise to right posterior descending artery and posterolateral  system.  The right coronary artery and its branches have mild disease.     chuy: MAHSA GARVIN  MRN: [id number]  : 1960  Study Date: 10/17/2024 10:31 AM  Age: 64 yrs  Gender: Female  Patient Location: [facility]  Reason For Study: S/P MVR (mitral valve replacement), Prosthetic valve  dysfunction  Ordering Physician: ROSETTA HUGGINS  Referring Physician: ROSETTA HUGGINS  Performed By: BW-BP     BSA: 1.3 m2  Height: 61 in  Weight: 87 lb  HR: 86  BP: 107/59 mmHg  ______________________________________________________________________________  Procedure  Complete Portable ANGE Adult. 3D image acquisition, reconstruction, and real-  time interpretation was performed. Good quality two-dimensional was performed  and interpreted. Good quality color and spectral Doppler were performed and  interpreted.  ______________________________________________________________________________  Interpretation Summary     1. Left ventricular chamber size and systolic function are normal. The  visually estimated left ventricular ejection fraction is 60-65%.  2. Right ventricular chamber size is normal. Systolic function is mildly  reduced.  3. Severe left atrial enlargement. Moderate right atrial enlargement.  4. The left atrial appendage has been surgically ligated with no significant  residual stump.  5. Trileaflet aortic valve with thickening of the leaflet tips which has a  rheumatic appearance. There is moderate central regurgitation. No  hemodynamically significant stenosis.  6. A 29 mm Katerina-Mohan Magna bioprosthetic mitral valve is present. The  valve struts partially extend into the left ventricular outflow tract causing  outflow tract flow accleration although there is no obstruction. The  prosthetic leaflets are severely thickened with restricted opening. There is  severe prosthetic stenosis with a peak inflow velocity of 3.2 m/s, mean inflow  gradient 23 mmHg at a heart rate of 84 bpm and mitral valve area of 0.8 cm2  by  planimetry. Trace prosthetic regurgitation is present.  7. Mild-to-moderate functional tricuspid regurgitation.  8. Severe pulmonary hypertension is present with an estimated pulmonary artery  systolic pressure of 90 mmHg plus the right atrial pressure.  9. Compared to the prior study dated 9/18/2024, the intracardiac structures  are better visualized otherwise the findings are comparable.  ______________________________________________________________________________  ANGE  3D image acquisition, reconstruction, and real-time interpretation was  performed. Patient was sedated using Versed 3 mg. The heart rate, respiratory  rate, oxygen saturations, blood pressure, and response to care were monitored  throughout the procedure with the assistance of the nurse. Patient was sedated  using Fentanyl 75 mcg. 15 ml viscous Lidocaine, 0.5 ml Benzocaine spray. I  determined this patient to be an appropriate candidate for the planned  sedation and procedure and have reassessed the patient immediately prior to  sedation and procedure. Total sedation time: 22 minutes of continuous bedside  1:1 monitoring. The procedure was performed in the Heart Catherization Lab.  Informed consent for Transesophegeal echo obtained. The Transducer was  inserted without difficulty . Complications None. The patient tolerated the  procedure well.     Left Ventricle  The left ventricle is normal in size. Left ventricular systolic function is  normal. The visual ejection fraction is 60-65%. No regional wall motion  abnormalities noted.     Right Ventricle  The right ventricle is normal size. Mildly decreased right ventricular  systolic function.     Atria  The left atrium is severely dilated. The right atrium is moderately dilated.  Intact atrial septum. A contrast injection (Bubble Study) was performed that  was negative for flow across the interatrial septum. The left atrial appendage  has been surgically ligated with no significant residual  stump.     Mitral Valve  A 29 mm Katerina-Mohan Magna bioprosthetic mitral valve is present. The  valve struts partially extend into the left ventricular outflow tract causing  outflow tract flow accleration although there is no obstruction. The  prosthetic lealfets are severely thickened with restricted opening. There is  severe prosthetic stenosis with a peak inflow velocity of 3.2 m/s, mean inflow  gradient 23 mmHg at a heart rate of 84 bpm and mitral valve area of 0.8 cm2 by  planimetry. Trace prosthetic regurgitation is present.     Tricuspid Valve  Normal tricuspid valve. There is mild to moderate (1-2+) tricuspid  regurgitation. The right ventricular systolic pressure is elevated at 90.3  mmHg. Severe (>55mmHg) pulmonary hypertension is present. There is no  tricuspid stenosis.     Aortic Valve  Thickened aortic valve leaflets. There is moderate (2+) aortic regurgitation.  No aortic stenosis is present.     Pulmonic Valve  The pulmonic valve is not well seen, but is grossly normal. There is mild (1+)  pulmonic valvular regurgitation. There is no pulmonic valvular stenosis.     Vessels  Normal size aorta. Mild atherosclerotic plaque(s) in the ascending aorta.  Normal pulmonary venous drainage.     Pericardial/Pleural  There is no pericardial effusion.     Rhythm  Sinus rhythm was noted.  ______________________________________________________________________________  MMode/2D Measurements & Calculations  Ao root diam: 3.3 cm  asc Aorta Diam: 3.2 cm  Ao root diam index Ht(cm/m): 2.1  Ao root diam index BSA (cm/m2): 2.5  Asc Ao diam index BSA (cm/m2): 2.4  Asc Ao diam index Ht(cm/m): 2.1     Doppler Measurements & Calculations  MV max P.0 mmHg  MV mean P.0 mmHg  MV V2 VTI: 95.4 cm  MV P1/2t max gio: 318.0 cm/sec  MV P1/2t: 214.6 msec  MVA(P1/2t): 1.0 cm2  MV dec slope: 434.0 cm/sec2  Ao V2 max: 197.0 cm/sec  Ao max P.0 mmHg  Ao V2 mean: 125.0 cm/sec  Ao mean P.0 mmHg  Ao V2 VTI: 32.3 cm  AI  P1/2t: 227.6 msec  LV V1 max P.5 mmHg  TR max gio: 475.0 cm/sec  TR max P.3 mmHg       Please do not hesitate to contact me if you have any questions/concerns.     Sincerely,     Reid Hinson MD

## 2024-10-22 NOTE — PATIENT INSTRUCTIONS
You were seen today in the Pulmonary Hypertension Clinic at the [facility].     Cardiology Provider you saw during your visit:    Dr. Hinson    Medication Changes:  - None    Results:   Component      Latest Ref Rng 10/22/2024  9:44 AM   WBC      4.0 - 11.0 10e3/uL 6.3    RBC Count      3.80 - 5.20 10e6/uL 3.46 (L)    Hemoglobin      11.7 - 15.7 g/dL 10.3 (L)    Hematocrit      35.0 - 47.0 % 32.6 (L)    MCV      78 - 100 fL 94    MCH      26.5 - 33.0 pg 29.8    MCHC      31.5 - 36.5 g/dL 31.6    RDW      10.0 - 15.0 % 14.0    Platelet Count      150 - 450 10e3/uL 210    % Neutrophils      % 68    % Lymphocytes      % 25    % Monocytes      % 5    % Eosinophils      % 1    % Basophils      % 1    % Immature Granulocytes      % 0    NRBCs per 100 WBC      <1 /100 0    Absolute Neutrophils      1.6 - 8.3 10e3/uL 4.3    Absolute Lymphocytes      0.8 - 5.3 10e3/uL 1.6    Absolute Monocytes      0.0 - 1.3 10e3/uL 0.3    Absolute Eosinophils      0.0 - 0.7 10e3/uL 0.1    Absolute Basophils      0.0 - 0.2 10e3/uL 0.1    Absolute Immature Granulocytes      <=0.4 10e3/uL 0.0    Absolute NRBCs      10e3/uL 0.0    Cholesterol      <200 mg/dL 166    Triglycerides      <150 mg/dL 92    HDL Cholesterol      >=50 mg/dL 61    LDL Cholesterol Calculated      <100 mg/dL 87    Non HDL Cholesterol      <130 mg/dL 105    Patient Fasting? Unknown    Iron      37 - 145 ug/dL 47    Iron Binding Capacity      240 - 430 ug/dL 369    Iron Sat Index      15 - 46 % 13 (L)    Ferritin      11 - 328 ng/mL 138    INR      0.85 - 1.15  0.98    N-Terminal Pro Bnp      0 - 900 pg/mL 1,392 (H)    TSH      0.30 - 4.20 uIU/mL 2.90    CRP Inflammation      <5.00 mg/L <3.00    SPECIMEN EXPIRATION DATE [id number] (P)   Bilirubin Direct      0.00 - 0.30 mg/dL 0.25       Legend:  (L) Low  (H) High  (P) Preliminary    Recommendations:   - CT mitral valve angiogram    Follow-up:   - Follow up with Dr. Hinson after testing    Please call us  immediately if you have any syncope (fainting or passing out), chest pain, edema (swelling or weight gain), or decline in your functional status (general decline in how you are feeling).    If you have emergent concerns after hours or on the weekend, please call our on-call Cardiologist at [phone], option 4. For emergencies call 911.     Thank you for allowing us to be a part of your care here at the [facility]    If you have questions or concerns please contact us at:    Lesly Cisneros RN (P: [phone])    Nurse Coordinator       Pulmonary Hypertension     [facility]         Esme Moreira, YVONNE Fitzpatrick   (Prior Auths & Pt Assistance)   ()  Clinic   Clinic   Pulmonary Hypertension   Pulmonary Hypertension  [facility]  (P)[phone]    (P) [phone]  (F) [phone]

## 2024-10-22 NOTE — NURSING NOTE
Follow Up Plan:  - CT angiogram TMVR  - Follow up after testing with Dr. Hinson    Orders placed and patient escorted to Pods to schedule F/U testing. Skye Xie RN on 10/22/2024 at 11:42 AM

## 2024-10-22 NOTE — NURSING NOTE
Chief Complaint   Patient presents with    New Patient     NEW PULMONARY HYPERTENSION     Vitals were taken and medications reconciled.    Armando Lopez, EMT  10:33 AM

## 2024-10-23 LAB
ANA PAT SER IF-IMP: ABNORMAL
ANA SER QL IF: ABNORMAL
ANA TITR SER IF: ABNORMAL {TITER}
BACTERIA UR CULT: NORMAL
DLCOCOR-%PRED-PRE: 67 %
DLCOCOR-PRE: 12.07 ML/MIN/MMHG
DLCOUNC-%PRED-PRE: 60 %
DLCOUNC-PRE: 10.74 ML/MIN/MMHG
DLCOUNC-PRED: 17.87 ML/MIN/MMHG
DRVVT SCREEN RATIO: 1.01
ERV-%PRED-PRE: 57 %
ERV-PRE: 0.53 L
ERV-PRED: 0.92 L
EXPTIME-PRE: 2.78 SEC
FEF2575-%PRED-PRE: 72 %
FEF2575-PRE: 1.3 L/SEC
FEF2575-PRED: 1.81 L/SEC
FEFMAX-%PRED-PRE: 65 %
FEFMAX-PRE: 3.6 L/SEC
FEFMAX-PRED: 5.5 L/SEC
FEV1-%PRED-PRE: 72 %
FEV1-PRE: 1.43 L
FEV1FEV6-PRE: 82 %
FEV1FEV6-PRED: 80 %
FEV1FVC-PRE: 82 %
FEV1FVC-PRED: 80 %
FEV1SVC-PRE: 60 %
FEV1SVC-PRED: 69 %
FIFMAX-PRE: 2.15 L/SEC
FIO2-PRE: 21 %
FRCPLETH-%PRED-PRE: 108 %
FRCPLETH-PRE: 2.76 L
FRCPLETH-PRED: 2.54 L
FVC-%PRED-PRE: 70 %
FVC-PRE: 1.74 L
FVC-PRED: 2.47 L
IC-%PRED-PRE: 102 %
IC-PRE: 1.87 L
IC-PRED: 1.84 L
IL6 SERPL-MCNC: 5.01 PG/ML
LA PPP-IMP: NEGATIVE
LUPUS INTERPRETATION: NORMAL
PTT RATIO: 1.2
RVPLETH-%PRED-PRE: 120 %
RVPLETH-PRE: 2.24 L
RVPLETH-PRED: 1.85 L
THROMBIN TIME: 17.6 SECONDS (ref 13–19)
TLCPLETH-%PRED-PRE: 104 %
TLCPLETH-PRE: 4.63 L
TLCPLETH-PRED: 4.44 L
VA-%PRED-PRE: 80 %
VA-PRE: 3.33 L
VC-%PRED-PRE: 84 %
VC-PRE: 2.4 L
VC-PRED: 2.84 L

## 2024-10-23 NOTE — TELEPHONE ENCOUNTER
See 10/18 MyChart encounter with [facility] and 10/18 Telephone encounter with Dr. Haynes for follow-up. Pt completed pre-op ANGE and Coronary Angio 10/17/24 with RA. Dr. Haynes's team is working pt up for redo MVR at [facility]

## 2024-10-24 LAB — STFR SERPL-MCNC: 5.3 MG/L

## 2024-10-29 ENCOUNTER — APPOINTMENT (OUTPATIENT)
Dept: LAB | Facility: CLINIC | Age: 64
End: 2024-10-29
Payer: MEDICARE

## 2024-11-04 NOTE — TELEPHONE ENCOUNTER
[facility] called to cancel pts surgery. They decided to go with [facility] to see if they qualify for Transcatheter Mitral valve replacement

## 2024-11-07 ENCOUNTER — THERAPY VISIT (OUTPATIENT)
Dept: OCCUPATIONAL THERAPY | Facility: CLINIC | Age: 64
End: 2024-11-07
Attending: FAMILY MEDICINE
Payer: MEDICARE

## 2024-11-07 DIAGNOSIS — Z74.09 IMPAIRED MOBILITY AND ADLS: Primary | ICD-10-CM

## 2024-11-07 DIAGNOSIS — Z78.9 IMPAIRED MOBILITY AND ADLS: Primary | ICD-10-CM

## 2024-11-07 DIAGNOSIS — I63.9 CEREBROVASCULAR ACCIDENT (CVA), UNSPECIFIED MECHANISM (H): ICD-10-CM

## 2024-11-07 DIAGNOSIS — R42 DIZZINESS: ICD-10-CM

## 2024-11-07 DIAGNOSIS — S04.892S: ICD-10-CM

## 2024-11-07 DIAGNOSIS — R26.89 IMPAIRED GAIT AND MOBILITY: ICD-10-CM

## 2024-11-07 PROCEDURE — 97542 WHEELCHAIR MNGMENT TRAINING: CPT | Mod: GO | Performed by: OCCUPATIONAL THERAPIST

## 2024-11-07 NOTE — PROGRESS NOTES
"                                                                             SEATING AND WHEELED MOBILITY ASSESSMENT    [facility]  Occupational Therapy   Date of service: November 7, 2024    Referring provider:   Christina Andrew DO     Order Date 10/1/24  Onset Date: 10/1/24    Order Details: scoxana     Funding:Medicare/[employer]    Others present at visit:  Personal care attendant / caregiver  poa    Height/Weight: 5'1\" / 82    Medical diagnosis:   Nervous and Auditory  Injury of left hypoglossal nerve, sequela     Respiratory  Acute pulmonary insufficiency     Endocrine  Mixed hyperlipidemia  Dyslipidemia     Circulatory    Aortic valve sclerosis    Chronic diastolic CHF (congestive heart failure) (H)    LAE (left atrial enlargement)  Acute systolic heart failure (H)  Persistent atrial fibrillation (H)  Essential hypertension  Lt ICA 70-99% stenosis  Carotid stenosis  Pulmonary hypertension (H)     Hematologic  Acute blood loss anemia     Behavioral  MDD (major depressive disorder)     Other  S/P mitral valve replacement  Pre-operative cardiovascular examination  S/P MVR (mitral valve replacement)  Status post coronary angiogram  Prosthetic valve dysfunction, subsequent encounter    Patient concerns/goals: scooter    Living environment:  Condo    Living environment barriers:  None      Current assistance/living environment:  Lives alone    Community Mobility:  Transportation: Car  Community Mobility Requirements: Medical Appointments, Shopping    Fall Risk Screen:   Has the patient fallen 2 or more times in the last year? No      Has the patient fallen and had an injury in the past year? No        Is the patient a fall risk? 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  Leisure: puzzles    Evaluation     Pain assessment:  Pain denied    Cognition:  aphasia    Vision: glasses    Hearing: wnl    Fatigue:  Reported Problems: limited endurance due to cardiac issues    Balance:  Unsupported Sitting Balance: Uses UE for Balance  Sitting Balance in Chair: Uses UE for Balance  Standing Balance:  supervision    Ambulation:  Level of [place]: Independent, short distances then needs a break, walkigns with R side dragging/lagging behind    Transfers:   ind    Neuromuscular:    Coordination:  Slowed      Impairments:  Fatigue  Muscle atrophy  Coordination     Treatment diagnosis:  Impaired mobility  Impaired activities of daily living    Recommendations/Plan of care:  Patient would benefit from interventions to enhance safety and independence.  Occupational therapy intervention for  wheelchair management.    Goals:   Target date:   Patient, family and/or caregiver will verbalize/demonstrate understanding of compensatory methods /equipment to enhance functional independence and safety.    Educational assessment/barriers to learning:  Language    Treatment provided this date:   Wheelchair management, 40 minutes   Patient upset about wait time to get equipment via insurance and refuses and further assist from therapist.  Patient and friend will go look for scooter to private pay and [employer].    Response to treatment/recommendations: frustrated    Goal attainment:  All goals met    Risks and benefits of evaluation/treatment have been explained.  Patient, family and/or caregiver are in agreement with Plan of Care.     Timed Code Treatment Minutes: 40  Total Treatment Time (sum of timed and untimed services): 40    Electronically signed by:  Celi OLIVARES, ATP      Occupational Therapist, Assistive Technology  Professional  [phone]      fax: [phone]      [email or web address], [address]  [place]   [address]  November 7, 2024

## 2024-11-18 ENCOUNTER — TELEPHONE (OUTPATIENT)
Dept: FAMILY MEDICINE | Facility: CLINIC | Age: 64
End: 2024-11-18
Payer: MEDICARE

## 2024-11-18 NOTE — TELEPHONE ENCOUNTER
Home Care is calling regarding an established patient with [facility].       Requesting orders from: Christina Andrew  Provider is following patient: Yes  Is this a 60-day recertification request?  No    Orders Requested    Physical Therapy  Request for discontinuation of care   Patient wants discharge from home care     Occupational Therapy  Request for discontinuation of care   Patient wants discharge from home care     Skilled Nursing  Request for discontinuation of care   Patient wants discharge from home care     Information was gathered and will be sent to provider for review.  RN will contact Home Care with information after provider review.

## 2024-11-18 NOTE — TELEPHONE ENCOUNTER
Home Health Care    Reason for call:      Patient wants discharge from home care, discharging agency today 11/18/2024    Pt Provider:    Christina Andrew, DO       Phone Number Homecare Nurse can be reached at:     [facility]  Call back  [phone]

## 2024-12-10 DIAGNOSIS — I63.9 CEREBROVASCULAR ACCIDENT (CVA), UNSPECIFIED MECHANISM (H): ICD-10-CM

## 2024-12-11 RX ORDER — GABAPENTIN 300 MG/1
300 CAPSULE ORAL 2 TIMES DAILY
Qty: 180 CAPSULE | Refills: 1 | Status: SHIPPED | OUTPATIENT
Start: 2024-12-11

## 2024-12-11 NOTE — TELEPHONE ENCOUNTER
FAX [facility] Refill Request    Disp Refills Start End PATRICIA    gabapentin (NEURONTIN) 300 MG capsule 180 capsule 1 4/16/2024 -- No   Sig - Route: Take 1 capsule (300 mg) by mouth 2 times daily - Oral   Sent to pharmacy as: Gabapentin 300 MG Oral Capsule (NEURONTIN)

## 2025-01-06 ENCOUNTER — TRANSFERRED RECORDS (OUTPATIENT)
Dept: HEALTH INFORMATION MANAGEMENT | Facility: CLINIC | Age: 65
End: 2025-01-06

## 2025-01-06 ENCOUNTER — HOSPITAL ENCOUNTER (INPATIENT)
Facility: CLINIC | Age: 65
LOS: 3 days | Discharge: HOSPICE | End: 2025-01-09
Attending: EMERGENCY MEDICINE
Payer: MEDICARE

## 2025-01-06 ENCOUNTER — APPOINTMENT (OUTPATIENT)
Dept: GENERAL RADIOLOGY | Facility: CLINIC | Age: 65
DRG: 280 | End: 2025-01-06
Payer: MEDICARE

## 2025-01-06 DIAGNOSIS — E87.6 HYPOKALEMIA: ICD-10-CM

## 2025-01-06 DIAGNOSIS — I48.91 ATRIAL FIBRILLATION WITH RVR (H): ICD-10-CM

## 2025-01-06 DIAGNOSIS — N17.9 AKI (ACUTE KIDNEY INJURY): ICD-10-CM

## 2025-01-06 DIAGNOSIS — Z51.5 HOSPICE CARE PATIENT: Primary | ICD-10-CM

## 2025-01-06 LAB
ANION GAP SERPL CALCULATED.3IONS-SCNC: 14 MMOL/L (ref 7–15)
BASOPHILS # BLD AUTO: 0.1 10E3/UL (ref 0–0.2)
BASOPHILS NFR BLD AUTO: 1 %
BUN SERPL-MCNC: 37.2 MG/DL (ref 8–23)
CALCIUM SERPL-MCNC: 8.8 MG/DL (ref 8.8–10.4)
CHLORIDE SERPL-SCNC: 92 MMOL/L (ref 98–107)
CREAT SERPL-MCNC: 1.14 MG/DL (ref 0.51–0.95)
EGFRCR SERPLBLD CKD-EPI 2021: 53 ML/MIN/1.73M2
EOSINOPHIL # BLD AUTO: 0 10E3/UL (ref 0–0.7)
EOSINOPHIL NFR BLD AUTO: 1 %
ERYTHROCYTE [DISTWIDTH] IN BLOOD BY AUTOMATED COUNT: 14.8 % (ref 10–15)
GLUCOSE SERPL-MCNC: 92 MG/DL (ref 70–99)
HCO3 SERPL-SCNC: 26 MMOL/L (ref 22–29)
HCT VFR BLD AUTO: 29.4 % (ref 35–47)
HGB BLD-MCNC: 9.6 G/DL (ref 11.7–15.7)
IMM GRANULOCYTES # BLD: 0 10E3/UL
IMM GRANULOCYTES NFR BLD: 0 %
LYMPHOCYTES # BLD AUTO: 2.2 10E3/UL (ref 0.8–5.3)
LYMPHOCYTES NFR BLD AUTO: 28 %
MAGNESIUM SERPL-MCNC: 2.1 MG/DL (ref 1.7–2.3)
MCH RBC QN AUTO: 28.2 PG (ref 26.5–33)
MCHC RBC AUTO-ENTMCNC: 32.7 G/DL (ref 31.5–36.5)
MCV RBC AUTO: 86 FL (ref 78–100)
MONOCYTES # BLD AUTO: 0.5 10E3/UL (ref 0–1.3)
MONOCYTES NFR BLD AUTO: 7 %
NEUTROPHILS # BLD AUTO: 5 10E3/UL (ref 1.6–8.3)
NEUTROPHILS NFR BLD AUTO: 64 %
NRBC # BLD AUTO: 0 10E3/UL
NRBC BLD AUTO-RTO: 0 /100
PLATELET # BLD AUTO: 242 10E3/UL (ref 150–450)
POTASSIUM SERPL-SCNC: 2.8 MMOL/L (ref 3.4–5.3)
RBC # BLD AUTO: 3.41 10E6/UL (ref 3.8–5.2)
SODIUM SERPL-SCNC: 132 MMOL/L (ref 135–145)
TROPONIN T SERPL HS-MCNC: 46 NG/L
WBC # BLD AUTO: 7.9 10E3/UL (ref 4–11)

## 2025-01-06 PROCEDURE — 250N000013 HC RX MED GY IP 250 OP 250 PS 637

## 2025-01-06 PROCEDURE — 93005 ELECTROCARDIOGRAM TRACING: CPT

## 2025-01-06 PROCEDURE — 36415 COLL VENOUS BLD VENIPUNCTURE: CPT | Performed by: EMERGENCY MEDICINE

## 2025-01-06 PROCEDURE — 85014 HEMATOCRIT: CPT | Performed by: EMERGENCY MEDICINE

## 2025-01-06 PROCEDURE — 84484 ASSAY OF TROPONIN QUANT: CPT | Performed by: EMERGENCY MEDICINE

## 2025-01-06 PROCEDURE — 99291 CRITICAL CARE FIRST HOUR: CPT | Mod: 25

## 2025-01-06 PROCEDURE — 258N000003 HC RX IP 258 OP 636: Performed by: EMERGENCY MEDICINE

## 2025-01-06 PROCEDURE — 96375 TX/PRO/DX INJ NEW DRUG ADDON: CPT

## 2025-01-06 PROCEDURE — 83735 ASSAY OF MAGNESIUM: CPT | Performed by: EMERGENCY MEDICINE

## 2025-01-06 PROCEDURE — 82565 ASSAY OF CREATININE: CPT | Performed by: EMERGENCY MEDICINE

## 2025-01-06 PROCEDURE — 85041 AUTOMATED RBC COUNT: CPT | Performed by: EMERGENCY MEDICINE

## 2025-01-06 PROCEDURE — 250N000013 HC RX MED GY IP 250 OP 250 PS 637: Performed by: EMERGENCY MEDICINE

## 2025-01-06 PROCEDURE — 85025 COMPLETE CBC W/AUTO DIFF WBC: CPT | Performed by: EMERGENCY MEDICINE

## 2025-01-06 PROCEDURE — 96365 THER/PROPH/DIAG IV INF INIT: CPT | Mod: 59

## 2025-01-06 PROCEDURE — 250N000009 HC RX 250: Performed by: EMERGENCY MEDICINE

## 2025-01-06 PROCEDURE — 99223 1ST HOSP IP/OBS HIGH 75: CPT | Mod: AI

## 2025-01-06 PROCEDURE — 71045 X-RAY EXAM CHEST 1 VIEW: CPT

## 2025-01-06 PROCEDURE — 250N000011 HC RX IP 250 OP 636: Performed by: EMERGENCY MEDICINE

## 2025-01-06 PROCEDURE — 80048 BASIC METABOLIC PNL TOTAL CA: CPT | Performed by: EMERGENCY MEDICINE

## 2025-01-06 PROCEDURE — 210N000001 HC R&B IMCU HEART CARE

## 2025-01-06 RX ORDER — HEPARIN SODIUM 10000 [USP'U]/100ML
0-5000 INJECTION, SOLUTION INTRAVENOUS CONTINUOUS
Status: DISCONTINUED | OUTPATIENT
Start: 2025-01-06 | End: 2025-01-08

## 2025-01-06 RX ORDER — POTASSIUM CHLORIDE 7.45 MG/ML
10 INJECTION INTRAVENOUS
Status: COMPLETED | OUTPATIENT
Start: 2025-01-06 | End: 2025-01-06

## 2025-01-06 RX ORDER — POTASSIUM CHLORIDE 1500 MG/1
40 TABLET, EXTENDED RELEASE ORAL ONCE
Status: COMPLETED | OUTPATIENT
Start: 2025-01-06 | End: 2025-01-06

## 2025-01-06 RX ORDER — ACETAMINOPHEN 325 MG/1
975 TABLET ORAL EVERY 4 HOURS PRN
Status: DISCONTINUED | OUTPATIENT
Start: 2025-01-06 | End: 2025-01-09 | Stop reason: HOSPADM

## 2025-01-06 RX ORDER — DIGOXIN 0.25 MG/ML
250 INJECTION INTRAMUSCULAR; INTRAVENOUS ONCE
Status: COMPLETED | OUTPATIENT
Start: 2025-01-06 | End: 2025-01-06

## 2025-01-06 RX ADMIN — POTASSIUM CHLORIDE 40 MEQ: 1500 TABLET, EXTENDED RELEASE ORAL at 20:32

## 2025-01-06 RX ADMIN — ACETAMINOPHEN 975 MG: 325 TABLET, FILM COATED ORAL at 23:24

## 2025-01-06 RX ADMIN — SODIUM CHLORIDE 1000 ML: 9 INJECTION, SOLUTION INTRAVENOUS at 20:35

## 2025-01-06 RX ADMIN — DILTIAZEM HYDROCHLORIDE 5 MG/HR: 5 INJECTION, SOLUTION INTRAVENOUS at 21:02

## 2025-01-06 RX ADMIN — POTASSIUM CHLORIDE 10 MEQ: 7.46 INJECTION, SOLUTION INTRAVENOUS at 22:05

## 2025-01-06 RX ADMIN — DIGOXIN 250 MCG: 0.25 INJECTION INTRAMUSCULAR; INTRAVENOUS at 20:45

## 2025-01-06 RX ADMIN — POTASSIUM CHLORIDE 10 MEQ: 7.46 INJECTION, SOLUTION INTRAVENOUS at 20:59

## 2025-01-06 RX ADMIN — POTASSIUM CHLORIDE 40 MEQ: 1500 TABLET, EXTENDED RELEASE ORAL at 22:06

## 2025-01-06 RX ADMIN — HEPARIN SODIUM 450 UNITS/HR: 10000 INJECTION, SOLUTION INTRAVENOUS at 21:09

## 2025-01-06 ASSESSMENT — COLUMBIA-SUICIDE SEVERITY RATING SCALE - C-SSRS
1. IN THE PAST MONTH, HAVE YOU WISHED YOU WERE DEAD OR WISHED YOU COULD GO TO SLEEP AND NOT WAKE UP?: NO
2. HAVE YOU ACTUALLY HAD ANY THOUGHTS OF KILLING YOURSELF IN THE PAST MONTH?: NO
6. HAVE YOU EVER DONE ANYTHING, STARTED TO DO ANYTHING, OR PREPARED TO DO ANYTHING TO END YOUR LIFE?: NO

## 2025-01-06 ASSESSMENT — ACTIVITIES OF DAILY LIVING (ADL)
ADLS_ACUITY_SCORE: 60

## 2025-01-07 ENCOUNTER — ANESTHESIA (OUTPATIENT)
Dept: SURGERY | Facility: CLINIC | Age: 65
End: 2025-01-07
Payer: MEDICARE

## 2025-01-07 ENCOUNTER — APPOINTMENT (OUTPATIENT)
Dept: CARDIOLOGY | Facility: CLINIC | Age: 65
DRG: 280 | End: 2025-01-07
Attending: INTERNAL MEDICINE
Payer: MEDICARE

## 2025-01-07 ENCOUNTER — ANESTHESIA EVENT (OUTPATIENT)
Dept: SURGERY | Facility: CLINIC | Age: 65
End: 2025-01-07
Payer: MEDICARE

## 2025-01-07 PROBLEM — Z95.2 S/P MITRAL VALVE REPLACEMENT: Status: ACTIVE | Noted: 2017-06-17

## 2025-01-07 PROBLEM — I65.22 MORE THAN 50 PERCENT STENOSIS OF LEFT INTERNAL CAROTID ARTERY: Status: ACTIVE | Noted: 2024-08-29

## 2025-01-07 PROBLEM — I50.32 CHRONIC DIASTOLIC CHF (CONGESTIVE HEART FAILURE) (H): Chronic | Status: ACTIVE | Noted: 2017-04-17

## 2025-01-07 PROBLEM — I27.20 PULMONARY HYPERTENSION (H): Status: ACTIVE | Noted: 2024-09-19

## 2025-01-07 PROBLEM — I34.2 NONRHEUMATIC MITRAL VALVE STENOSIS: Status: ACTIVE | Noted: 2025-01-07

## 2025-01-07 PROBLEM — I69.351 HEMIPARESIS AFFECTING RIGHT SIDE AS LATE EFFECT OF CEREBROVASCULAR ACCIDENT (CVA) (H): Status: ACTIVE | Noted: 2025-01-07

## 2025-01-07 LAB
ALBUMIN SERPL BCG-MCNC: 3.3 G/DL (ref 3.5–5.2)
ALBUMIN SERPL BCG-MCNC: 3.5 G/DL (ref 3.5–5.2)
ALP SERPL-CCNC: 179 U/L (ref 40–150)
ALP SERPL-CCNC: 189 U/L (ref 40–150)
ALT SERPL W P-5'-P-CCNC: 1120 U/L (ref 0–50)
ALT SERPL W P-5'-P-CCNC: 766 U/L (ref 0–50)
ANION GAP SERPL CALCULATED.3IONS-SCNC: 12 MMOL/L (ref 7–15)
ANION GAP SERPL CALCULATED.3IONS-SCNC: 14 MMOL/L (ref 7–15)
AST SERPL W P-5'-P-CCNC: 1137 U/L (ref 0–45)
AST SERPL W P-5'-P-CCNC: 640 U/L (ref 0–45)
ATRIAL RATE - MUSE: 141 BPM
ATRIAL RATE - MUSE: 300 BPM
ATRIAL RATE - MUSE: 344 BPM
BILIRUB SERPL-MCNC: 1.1 MG/DL
BILIRUB SERPL-MCNC: 1.1 MG/DL
BUN SERPL-MCNC: 34.4 MG/DL (ref 8–23)
BUN SERPL-MCNC: 34.5 MG/DL (ref 8–23)
CALCIUM SERPL-MCNC: 8.3 MG/DL (ref 8.8–10.4)
CALCIUM SERPL-MCNC: 8.5 MG/DL (ref 8.8–10.4)
CHLORIDE SERPL-SCNC: 102 MMOL/L (ref 98–107)
CHLORIDE SERPL-SCNC: 99 MMOL/L (ref 98–107)
CREAT SERPL-MCNC: 1.21 MG/DL (ref 0.51–0.95)
CREAT SERPL-MCNC: 1.26 MG/DL (ref 0.51–0.95)
DIASTOLIC BLOOD PRESSURE - MUSE: NORMAL MMHG
DIGOXIN SERPL-MCNC: 4.2 NG/ML (ref 0.6–1.2)
EGFRCR SERPLBLD CKD-EPI 2021: 47 ML/MIN/1.73M2
EGFRCR SERPLBLD CKD-EPI 2021: 49 ML/MIN/1.73M2
ERYTHROCYTE [DISTWIDTH] IN BLOOD BY AUTOMATED COUNT: 14.9 % (ref 10–15)
GLUCOSE SERPL-MCNC: 81 MG/DL (ref 70–99)
GLUCOSE SERPL-MCNC: 99 MG/DL (ref 70–99)
HCO3 SERPL-SCNC: 20 MMOL/L (ref 22–29)
HCO3 SERPL-SCNC: 23 MMOL/L (ref 22–29)
HCT VFR BLD AUTO: 31.7 % (ref 35–47)
HGB BLD-MCNC: 10.4 G/DL (ref 11.7–15.7)
INTERPRETATION ECG - MUSE: NORMAL
LACTATE SERPL-SCNC: 2 MMOL/L (ref 0.7–2)
LVEF ECHO: NORMAL
MAGNESIUM SERPL-MCNC: 2.1 MG/DL (ref 1.7–2.3)
MCH RBC QN AUTO: 28.3 PG (ref 26.5–33)
MCHC RBC AUTO-ENTMCNC: 32.8 G/DL (ref 31.5–36.5)
MCV RBC AUTO: 86 FL (ref 78–100)
P AXIS - MUSE: NORMAL DEGREES
PLATELET # BLD AUTO: 251 10E3/UL (ref 150–450)
POTASSIUM SERPL-SCNC: 5.5 MMOL/L (ref 3.4–5.3)
POTASSIUM SERPL-SCNC: 5.8 MMOL/L (ref 3.4–5.3)
PR INTERVAL - MUSE: NORMAL MS
PROT SERPL-MCNC: 6 G/DL (ref 6.4–8.3)
PROT SERPL-MCNC: 6.5 G/DL (ref 6.4–8.3)
QRS DURATION - MUSE: 80 MS
QT - MUSE: 296 MS
QT - MUSE: 310 MS
QT - MUSE: 322 MS
QTC - MUSE: 448 MS
QTC - MUSE: 466 MS
QTC - MUSE: 485 MS
R AXIS - MUSE: 79 DEGREES
R AXIS - MUSE: 90 DEGREES
R AXIS - MUSE: 97 DEGREES
RBC # BLD AUTO: 3.68 10E6/UL (ref 3.8–5.2)
SODIUM SERPL-SCNC: 134 MMOL/L (ref 135–145)
SODIUM SERPL-SCNC: 136 MMOL/L (ref 135–145)
SYSTOLIC BLOOD PRESSURE - MUSE: NORMAL MMHG
T AXIS - MUSE: 143 DEGREES
T AXIS - MUSE: 149 DEGREES
T AXIS - MUSE: 269 DEGREES
TROPONIN T SERPL HS-MCNC: 50 NG/L
UFH PPP CHRO-ACNC: 0.11 IU/ML
UFH PPP CHRO-ACNC: 0.18 IU/ML
UFH PPP CHRO-ACNC: 0.45 IU/ML
VENTRICULAR RATE- MUSE: 126 BPM
VENTRICULAR RATE- MUSE: 138 BPM
VENTRICULAR RATE- MUSE: 147 BPM
WBC # BLD AUTO: 10.3 10E3/UL (ref 4–11)

## 2025-01-07 PROCEDURE — 84155 ASSAY OF PROTEIN SERUM: CPT

## 2025-01-07 PROCEDURE — 36415 COLL VENOUS BLD VENIPUNCTURE: CPT

## 2025-01-07 PROCEDURE — 250N000011 HC RX IP 250 OP 636: Performed by: INTERNAL MEDICINE

## 2025-01-07 PROCEDURE — 250N000011 HC RX IP 250 OP 636

## 2025-01-07 PROCEDURE — 85520 HEPARIN ASSAY: CPT

## 2025-01-07 PROCEDURE — 76376 3D RENDER W/INTRP POSTPROCES: CPT

## 2025-01-07 PROCEDURE — 99233 SBSQ HOSP IP/OBS HIGH 50: CPT | Performed by: INTERNAL MEDICINE

## 2025-01-07 PROCEDURE — 93010 ELECTROCARDIOGRAM REPORT: CPT | Mod: 76 | Performed by: INTERNAL MEDICINE

## 2025-01-07 PROCEDURE — 93312 ECHO TRANSESOPHAGEAL: CPT | Mod: 26 | Performed by: INTERNAL MEDICINE

## 2025-01-07 PROCEDURE — 84155 ASSAY OF PROTEIN SERUM: CPT | Performed by: INTERNAL MEDICINE

## 2025-01-07 PROCEDURE — 258N000003 HC RX IP 258 OP 636: Performed by: INTERNAL MEDICINE

## 2025-01-07 PROCEDURE — 83605 ASSAY OF LACTIC ACID: CPT

## 2025-01-07 PROCEDURE — 36415 COLL VENOUS BLD VENIPUNCTURE: CPT | Performed by: INTERNAL MEDICINE

## 2025-01-07 PROCEDURE — 999N000183 HC STATISTIC TEE INCLUDES SEDATION

## 2025-01-07 PROCEDURE — 82247 BILIRUBIN TOTAL: CPT | Performed by: INTERNAL MEDICINE

## 2025-01-07 PROCEDURE — 99152 MOD SED SAME PHYS/QHP 5/>YRS: CPT | Performed by: INTERNAL MEDICINE

## 2025-01-07 PROCEDURE — 93325 DOPPLER ECHO COLOR FLOW MAPG: CPT

## 2025-01-07 PROCEDURE — 250N000013 HC RX MED GY IP 250 OP 250 PS 637: Performed by: INTERNAL MEDICINE

## 2025-01-07 PROCEDURE — 92960 CARDIOVERSION ELECTRIC EXT: CPT | Performed by: INTERNAL MEDICINE

## 2025-01-07 PROCEDURE — 999N000127 HC STATISTIC PERIPHERAL IV START W US GUIDANCE

## 2025-01-07 PROCEDURE — 210N000001 HC R&B IMCU HEART CARE

## 2025-01-07 PROCEDURE — 76376 3D RENDER W/INTRP POSTPROCES: CPT | Mod: 26 | Performed by: INTERNAL MEDICINE

## 2025-01-07 PROCEDURE — 82247 BILIRUBIN TOTAL: CPT

## 2025-01-07 PROCEDURE — 370N000017 HC ANESTHESIA TECHNICAL FEE, PER MIN

## 2025-01-07 PROCEDURE — 250N000011 HC RX IP 250 OP 636: Performed by: NURSE ANESTHETIST, CERTIFIED REGISTERED

## 2025-01-07 PROCEDURE — 85018 HEMOGLOBIN: CPT

## 2025-01-07 PROCEDURE — 80162 ASSAY OF DIGOXIN TOTAL: CPT | Performed by: INTERNAL MEDICINE

## 2025-01-07 PROCEDURE — 250N000013 HC RX MED GY IP 250 OP 250 PS 637

## 2025-01-07 PROCEDURE — 99291 CRITICAL CARE FIRST HOUR: CPT | Mod: 25 | Performed by: INTERNAL MEDICINE

## 2025-01-07 PROCEDURE — 93005 ELECTROCARDIOGRAM TRACING: CPT

## 2025-01-07 PROCEDURE — 83735 ASSAY OF MAGNESIUM: CPT

## 2025-01-07 PROCEDURE — 93325 DOPPLER ECHO COLOR FLOW MAPG: CPT | Mod: 26 | Performed by: INTERNAL MEDICINE

## 2025-01-07 PROCEDURE — 5A2204Z RESTORATION OF CARDIAC RHYTHM, SINGLE: ICD-10-PCS | Performed by: INTERNAL MEDICINE

## 2025-01-07 PROCEDURE — 85520 HEPARIN ASSAY: CPT | Performed by: INTERNAL MEDICINE

## 2025-01-07 PROCEDURE — 84484 ASSAY OF TROPONIN QUANT: CPT

## 2025-01-07 PROCEDURE — 999N000040 HC STATISTIC CONSULT NO CHARGE VASC ACCESS

## 2025-01-07 PROCEDURE — 999N000184 HC STATISTIC TELEMETRY

## 2025-01-07 PROCEDURE — 250N000009 HC RX 250: Performed by: INTERNAL MEDICINE

## 2025-01-07 PROCEDURE — 85041 AUTOMATED RBC COUNT: CPT

## 2025-01-07 PROCEDURE — 93320 DOPPLER ECHO COMPLETE: CPT | Mod: 26 | Performed by: INTERNAL MEDICINE

## 2025-01-07 PROCEDURE — 92960 CARDIOVERSION ELECTRIC EXT: CPT

## 2025-01-07 RX ORDER — SODIUM CHLORIDE 9 MG/ML
1000 INJECTION, SOLUTION INTRAVENOUS CONTINUOUS
Status: DISCONTINUED | OUTPATIENT
Start: 2025-01-07 | End: 2025-01-07

## 2025-01-07 RX ORDER — CALCIUM CARBONATE 500 MG/1
1000 TABLET, CHEWABLE ORAL 4 TIMES DAILY PRN
Status: DISCONTINUED | OUTPATIENT
Start: 2025-01-07 | End: 2025-01-09 | Stop reason: HOSPADM

## 2025-01-07 RX ORDER — ASPIRIN 325 MG
325 TABLET, DELAYED RELEASE (ENTERIC COATED) ORAL DAILY
Status: DISCONTINUED | OUTPATIENT
Start: 2025-01-07 | End: 2025-01-08

## 2025-01-07 RX ORDER — POTASSIUM CHLORIDE 1500 MG/1
20 TABLET, EXTENDED RELEASE ORAL
Status: DISCONTINUED | OUTPATIENT
Start: 2025-01-07 | End: 2025-01-07

## 2025-01-07 RX ORDER — LIDOCAINE 40 MG/G
CREAM TOPICAL
Status: DISCONTINUED | OUTPATIENT
Start: 2025-01-07 | End: 2025-01-07

## 2025-01-07 RX ORDER — NALOXONE HYDROCHLORIDE 0.4 MG/ML
0.4 INJECTION, SOLUTION INTRAMUSCULAR; INTRAVENOUS; SUBCUTANEOUS
Status: DISCONTINUED | OUTPATIENT
Start: 2025-01-07 | End: 2025-01-07

## 2025-01-07 RX ORDER — PROPOFOL 10 MG/ML
INJECTION, EMULSION INTRAVENOUS PRN
Status: DISCONTINUED | OUTPATIENT
Start: 2025-01-07 | End: 2025-01-07

## 2025-01-07 RX ORDER — NALOXONE HYDROCHLORIDE 0.4 MG/ML
0.2 INJECTION, SOLUTION INTRAMUSCULAR; INTRAVENOUS; SUBCUTANEOUS
Status: DISCONTINUED | OUTPATIENT
Start: 2025-01-07 | End: 2025-01-07

## 2025-01-07 RX ORDER — GLYCOPYRROLATE 0.2 MG/ML
0.1 INJECTION, SOLUTION INTRAMUSCULAR; INTRAVENOUS ONCE
Status: DISCONTINUED | OUTPATIENT
Start: 2025-01-07 | End: 2025-01-07

## 2025-01-07 RX ORDER — DIGOXIN 0.25 MG/ML
250 INJECTION INTRAMUSCULAR; INTRAVENOUS ONCE
Status: COMPLETED | OUTPATIENT
Start: 2025-01-07 | End: 2025-01-07

## 2025-01-07 RX ORDER — LIDOCAINE 4 G/G
1 PATCH TOPICAL
Status: DISCONTINUED | OUTPATIENT
Start: 2025-01-07 | End: 2025-01-09 | Stop reason: HOSPADM

## 2025-01-07 RX ORDER — AMOXICILLIN 250 MG
2 CAPSULE ORAL 2 TIMES DAILY PRN
Status: DISCONTINUED | OUTPATIENT
Start: 2025-01-07 | End: 2025-01-09 | Stop reason: HOSPADM

## 2025-01-07 RX ORDER — TORSEMIDE 20 MG/1
20 TABLET ORAL DAILY
Status: DISCONTINUED | OUTPATIENT
Start: 2025-01-07 | End: 2025-01-09 | Stop reason: HOSPADM

## 2025-01-07 RX ORDER — ATORVASTATIN CALCIUM 80 MG/1
80 TABLET, FILM COATED ORAL DAILY
Status: DISCONTINUED | OUTPATIENT
Start: 2025-01-07 | End: 2025-01-09 | Stop reason: HOSPADM

## 2025-01-07 RX ORDER — LIDOCAINE 50 MG/G
OINTMENT TOPICAL ONCE
Status: DISCONTINUED | OUTPATIENT
Start: 2025-01-07 | End: 2025-01-07

## 2025-01-07 RX ORDER — AMOXICILLIN 250 MG
1 CAPSULE ORAL 2 TIMES DAILY PRN
Status: DISCONTINUED | OUTPATIENT
Start: 2025-01-07 | End: 2025-01-09 | Stop reason: HOSPADM

## 2025-01-07 RX ORDER — GABAPENTIN 300 MG/1
300 CAPSULE ORAL 2 TIMES DAILY
Status: DISCONTINUED | OUTPATIENT
Start: 2025-01-07 | End: 2025-01-09 | Stop reason: HOSPADM

## 2025-01-07 RX ORDER — LIDOCAINE 40 MG/G
CREAM TOPICAL
Status: DISCONTINUED | OUTPATIENT
Start: 2025-01-07 | End: 2025-01-09 | Stop reason: HOSPADM

## 2025-01-07 RX ORDER — POTASSIUM CHLORIDE 1500 MG/1
40 TABLET, EXTENDED RELEASE ORAL
Status: DISCONTINUED | OUTPATIENT
Start: 2025-01-07 | End: 2025-01-07

## 2025-01-07 RX ORDER — FLUMAZENIL 0.1 MG/ML
0.2 INJECTION, SOLUTION INTRAVENOUS
Status: DISCONTINUED | OUTPATIENT
Start: 2025-01-07 | End: 2025-01-07

## 2025-01-07 RX ORDER — MAGNESIUM SULFATE HEPTAHYDRATE 40 MG/ML
2 INJECTION, SOLUTION INTRAVENOUS
Status: DISCONTINUED | OUTPATIENT
Start: 2025-01-07 | End: 2025-01-09 | Stop reason: HOSPADM

## 2025-01-07 RX ORDER — LIDOCAINE HYDROCHLORIDE 40 MG/ML
1.5 SOLUTION TOPICAL ONCE
Status: DISCONTINUED | OUTPATIENT
Start: 2025-01-07 | End: 2025-01-07

## 2025-01-07 RX ORDER — FENTANYL CITRATE 50 UG/ML
25 INJECTION, SOLUTION INTRAMUSCULAR; INTRAVENOUS
Status: DISCONTINUED | OUTPATIENT
Start: 2025-01-07 | End: 2025-01-07

## 2025-01-07 RX ORDER — DEXTROSE MONOHYDRATE 25 G/50ML
9.5 INJECTION, SOLUTION INTRAVENOUS
Status: DISCONTINUED | OUTPATIENT
Start: 2025-01-07 | End: 2025-01-07

## 2025-01-07 RX ADMIN — SODIUM CHLORIDE: 9 INJECTION, SOLUTION INTRAVENOUS at 13:03

## 2025-01-07 RX ADMIN — ACETAMINOPHEN 975 MG: 325 TABLET, FILM COATED ORAL at 20:06

## 2025-01-07 RX ADMIN — ASPIRIN 325 MG: 325 TABLET, COATED ORAL at 08:36

## 2025-01-07 RX ADMIN — SODIUM CHLORIDE 500 ML: 9 INJECTION, SOLUTION INTRAVENOUS at 09:05

## 2025-01-07 RX ADMIN — MIDAZOLAM 1 MG: 1 INJECTION INTRAMUSCULAR; INTRAVENOUS at 13:24

## 2025-01-07 RX ADMIN — PROPOFOL 10 MG: 10 INJECTION, EMULSION INTRAVENOUS at 13:45

## 2025-01-07 RX ADMIN — LIDOCAINE: 50 OINTMENT TOPICAL at 13:00

## 2025-01-07 RX ADMIN — AMIODARONE HYDROCHLORIDE 1 MG/MIN: 1.8 INJECTION, SOLUTION INTRAVENOUS at 20:02

## 2025-01-07 RX ADMIN — SODIUM CHLORIDE 500 ML: 9 INJECTION, SOLUTION INTRAVENOUS at 05:22

## 2025-01-07 RX ADMIN — MIDAZOLAM 1 MG: 1 INJECTION INTRAMUSCULAR; INTRAVENOUS at 13:29

## 2025-01-07 RX ADMIN — LIDOCAINE 1 PATCH: 4 PATCH TOPICAL at 05:22

## 2025-01-07 RX ADMIN — FENTANYL CITRATE 50 MCG: 50 INJECTION, SOLUTION INTRAMUSCULAR; INTRAVENOUS at 13:25

## 2025-01-07 RX ADMIN — DIGOXIN 250 MCG: 0.25 INJECTION INTRAMUSCULAR; INTRAVENOUS at 08:36

## 2025-01-07 RX ADMIN — AMIODARONE HYDROCHLORIDE 150 MG: 1.5 INJECTION, SOLUTION INTRAVENOUS at 19:37

## 2025-01-07 RX ADMIN — SODIUM CHLORIDE 500 ML: 9 INJECTION, SOLUTION INTRAVENOUS at 03:56

## 2025-01-07 RX ADMIN — TOPICAL ANESTHETIC 1 ML: 200 SPRAY DENTAL; PERIODONTAL at 13:23

## 2025-01-07 ASSESSMENT — ACTIVITIES OF DAILY LIVING (ADL)
WALKING_OR_CLIMBING_STAIRS_DIFFICULTY: YES
DIFFICULTY_EATING/SWALLOWING: NO
CHANGE_IN_FUNCTIONAL_STATUS_SINCE_ONSET_OF_CURRENT_ILLNESS/INJURY: NO
ADLS_ACUITY_SCORE: 55
ADLS_ACUITY_SCORE: 60
WALKING_OR_CLIMBING_STAIRS: AMBULATION DIFFICULTY, REQUIRES EQUIPMENT
ADLS_ACUITY_SCORE: 57
ADLS_ACUITY_SCORE: 57
ADLS_ACUITY_SCORE: 55
ADLS_ACUITY_SCORE: 55
ADLS_ACUITY_SCORE: 60
ADLS_ACUITY_SCORE: 57
ADLS_ACUITY_SCORE: 55
ADLS_ACUITY_SCORE: 55
ADLS_ACUITY_SCORE: 57
ADLS_ACUITY_SCORE: 49
TOILETING_ISSUES: NO
ADLS_ACUITY_SCORE: 54
ADLS_ACUITY_SCORE: 55
ADLS_ACUITY_SCORE: 60
FALL_HISTORY_WITHIN_LAST_SIX_MONTHS: NO
CONCENTRATING,_REMEMBERING_OR_MAKING_DECISIONS_DIFFICULTY: NO
ADLS_ACUITY_SCORE: 55
DRESSING/BATHING_DIFFICULTY: NO
EQUIPMENT_CURRENTLY_USED_AT_HOME: CANE, STRAIGHT
WEAR_GLASSES_OR_BLIND: YES
ADLS_ACUITY_SCORE: 55
DOING_ERRANDS_INDEPENDENTLY_DIFFICULTY: YES
ADLS_ACUITY_SCORE: 60
ADLS_ACUITY_SCORE: 57

## 2025-01-07 ASSESSMENT — ENCOUNTER SYMPTOMS: DYSRHYTHMIAS: 1

## 2025-01-07 ASSESSMENT — LIFESTYLE VARIABLES: TOBACCO_USE: 1

## 2025-01-07 NOTE — ED NOTES
Discussed admission with Dr. Shah. If pt's lactic normal pt may continue to go up to [facility]. Dr. Shah talked with Cardiology who did not recommend cardioversion, amiodarone, or metoprolol. Plan is to watch pt overnight and have a ANGE performed prior to cardioversion.

## 2025-01-07 NOTE — ED NOTES
Pt SHANTEL Fonseca called and given update on patient status.    Anesthesia Volume In Cc: 2 Did You Provide Opioid Counseling: No Repair Type: Complex Repair Suturegard Retention Suture: 2-0 Nylon Retention Suture Bite Size: 3 mm Length To Time In Minutes Device Was In Place: 10 Number Of Hemigard Strips Per Side: 1 Simple / Intermediate / Complex Repair - Final Wound Length In Cm: 3 Complex Requirements: Extensive Undermining Performed?: Yes Distance Of Undermining In Cm (Required): 1.5 Undermining Type: Entire Wound Debridement Text: The wound edges were debrided prior to proceeding with the closure to facilitate wound healing. Helical Rim Text: The closure involved the helical rim. Vermilion Border Text: The closure involved the vermilion border. Nostril Rim Text: The closure involved the nostril rim. Retention Suture Text: Retention sutures were placed to support the closure and prevent dehiscence. Primary Defect Length (In Cm): 0 Skin Substitute: EpiFix Micronized Suture Removal: 14 days Type Of Previous Surgery (Optional- Ie Mohs Surgery): Mohs Pre-Op Size Of Lesion Removed (Optional): 0.7 Mohs Case Number (Optional):  Previous Accession (Optional): [id number] Detail Level: Detailed Anesthesia Type: 1% lidocaine with epinephrine Hemostasis: Electrocautery Estimated Blood Loss (Cc): minimal Brow Lift Text: A midfrontal incision was made medially to the defect to allow access to the tissues just superior to the left eyebrow. Following careful dissection inferiorly in a supraperiosteal plane to the level of the left eyebrow, several 3-0 monocryl sutures were used to resuspend the eyebrow orbicularis oculi muscular unit to the superior frontal bone periosteum. This resulted in an appropriate reapproximation of static eyebrow symmetry and correction of the left brow ptosis. Deep Sutures: 5-0 Vicryl Epidermal Sutures: 5-0 Prolene Epidermal Closure: simple interrupted Wound Care: Vaseline Dressing: pressure dressing with telfa Unna Boot Text: An Unna boot was placed to help immobilize the limb and facilitate more rapid healing. Suturegard Intro: Intraoperative tissue expansion was performed, utilizing the SUTUREGARD device, in order to reduce wound tension. Suturegard Body: The suture ends were repeatedly re-tightened and re-clamped to achieve the desired tissue expansion. Hemigard Intro: Due to skin fragility and wound tension, it was decided to use HEMIGARD adhesive retention suture devices to permit a linear closure. The skin was cleaned and dried for a 6cm distance away from the wound. Excessive hair, if present, was removed to allow for adhesion. Hemigard Postcare Instructions: The HEMIGARD strips are to remain completely dry for at least 5-7 days. Graft Donor Site Bandage (Optional-Leave Blank If You Don't Want In Note): Steri-strips and a pressure bandage were applied to the donor site. Closure 2 Information: This tab is for additional flaps and grafts, including complex repair and grafts and complex repair and flaps. You can also specify a different location for the additional defect, if the location is the same you do not need to select a new one. We will insert the automated text for the repair you select below just as we do for solitary flaps and grafts. Please note that at this time if you select a location with a different insurance zone you will need to override the ICD10 and CPT if appropriate. Closure 3 Information: This tab is for additional flaps and grafts above and beyond our usual structured repairs.  Please note if you enter information here it will not currently bill and you will need to add the billing information manually. Closure 4 Information: This tab is for additional flaps and grafts above and beyond our usual structured repairs.  Please note if you enter information here it will not currently bill and you will need to add the billing information manually. Complex Repair Preamble Text (Leave Blank If You Do Not Want): Extensive wide undermining was performed. Intermediate Repair Preamble Text (Leave Blank If You Do Not Want): Undermining was performed with blunt dissection. Flap Thinning Complex Repair Preamble Text (Leave Blank If You Do Not Want): An incision was made along the previous flap suture line. Undermining was performed beneath the flap and redundant tissue was removed to restore the normal contour of the skin. Non-Graft Cartilage Fenestration Text: The cartilage was fenestrated with a 2mm punch biopsy to help facilitate healing. Graft Cartilage Fenestration Text: The cartilage was fenestrated with a 2mm punch biopsy to help facilitate graft survival and healing. Preparation Of Recipient Site - Flap: The eschar was removed surgically with sharp dissection to facilitate appropriate wound healing of the following adjacent tissue rearrangement. Preparation Of Recipient Site - Graft: The eschar was removed surgically with sharp dissection to facilitate appropriate survival of the following graft. Preparation Of Recipient Site - Flap Takedown: The eschar and granulation tissue was removed surgically with sharp dissection to facilitate appropriate healing after division and inset of the proximal and distal interpolation flap. Secondary Intention Text (Leave Blank If You Do Not Want): The defect will heal with secondary intention. No Repair - Repaired With Adjacent Surgical Defect Text (Leave Blank If You Do Not Want): After obtaining clear surgical margins the defect was repaired concurrently with another surgical defect which was in close approximation. Referred To Oculoplastics For Closure Text (Leave Blank If You Do Not Want): After obtaining clear surgical margins the patient was sent to oculoplastics for surgical repair.  The patient understands they will receive post-surgical care and follow-up from the referring physician's office. Referred To Otolaryngology For Closure Text (Leave Blank If You Do Not Want): After obtaining clear surgical margins the patient was sent to otolaryngology for surgical repair.  The patient understands they will receive post-surgical care and follow-up from the referring physician's office. Referred To Plastics For Closure Text (Leave Blank If You Do Not Want): After obtaining clear surgical margins the patient was sent to plastics for surgical repair.  The patient understands they will receive post-surgical care and follow-up from the referring physician's office. Referred To Asc For Closure Text (Leave Blank If You Do Not Want): After obtaining clear surgical margins the patient was sent to an ASC for surgical repair.  The patient understands they will receive post-surgical care and follow-up from the ASC physician. Referred To Mid-Level For Closure Text (Leave Blank If You Do Not Want): After obtaining clear surgical margins the patient was sent to a mid-level provider for surgical repair.  The patient understands they will receive post-surgical care and follow-up from the mid-level provider. Repair Performed By Another Provider Text (Leave Blank If You Do Not Want): After obtaining clear surgical margins the defect was repaired by another provider. Adjacent Tissue Transfer Text: Adjacent tissue was carried over to close the defect in the following manner.The defect edges were debeveled with a #15 scalpel blade.  Given the location of the defect and the proximity to free margins an adjacent tissue transfer was deemed most appropriate.  Using a sterile surgical marker, an appropriate flap was drawn incorporating the defect and placing the expected incisions within the relaxed skin tension lines where possible.    The area thus outlined was incised deep to adipose tissue with a #15 scalpel blade.  The skin margins were undermined to an appropriate distance in all directions utilizing iris scissors. Advancement Flap (Single) Text: Adjacent tissue was carried over to close the defect in the following manner.The defect edges were debeveled with a #15 scalpel blade.  Given the location of the defect and the proximity to free margins a single advancement flap was deemed most appropriate.  Using a sterile surgical marker, an appropriate advancement flap was drawn incorporating the defect and placing the expected incisions within the relaxed skin tension lines where possible.    The area thus outlined was incised deep to adipose tissue with a #15 scalpel blade.  The skin margins were undermined to an appropriate distance in all directions utilizing iris scissors. Advancement Flap (Double) Text: Adjacent tissue was carried over to close the defect in the following manner.The defect edges were debeveled with a #15 scalpel blade.  Given the location of the defect and the proximity to free margins a double advancement flap was deemed most appropriate.  Using a sterile surgical marker, the appropriate advancement flaps were drawn incorporating the defect and placing the expected incisions within the relaxed skin tension lines where possible.    The area thus outlined was incised deep to adipose tissue with a #15 scalpel blade.  The skin margins were undermined to an appropriate distance in all directions utilizing iris scissors. Burow's Advancement Flap Text: Adjacent tissue was carried over to close the defect in the following manner.The defect edges were debeveled with a #15 scalpel blade.  Given the location of the defect and the proximity to free margins a Burow's advancement flap was deemed most appropriate.  Using a sterile surgical marker, the appropriate advancement flap was drawn incorporating the defect and placing the expected incisions within the relaxed skin tension lines where possible.    The area thus outlined was incised deep to adipose tissue with a #15 scalpel blade.  The skin margins were undermined to an appropriate distance in all directions utilizing iris scissors. Chonodrocutaneous Helical Advancement Flap Text: Adjacent tissue was carried over to close the defect in the following manner.The defect edges were debeveled with a #15 scalpel blade.  Given the location of the defect and the proximity to free margins a chondrocutaneous helical advancement flap was deemed most appropriate.  Using a sterile surgical marker, the appropriate advancement flap was drawn incorporating the defect and placing the expected incisions within the relaxed skin tension lines where possible.    The area thus outlined was incised deep to adipose tissue with a #15 scalpel blade.  The skin margins were undermined to an appropriate distance in all directions utilizing iris scissors. Crescentic Advancement Flap Text: Adjacent tissue was carried over to close the defect in the following manner.The defect edges were debeveled with a #15 scalpel blade.  Given the location of the defect and the proximity to free margins a crescentic advancement flap was deemed most appropriate.  Using a sterile surgical marker, the appropriate advancement flap was drawn incorporating the defect and placing the expected incisions within the relaxed skin tension lines where possible.    The area thus outlined was incised deep to adipose tissue with a #15 scalpel blade.  The skin margins were undermined to an appropriate distance in all directions utilizing iris scissors. A-T Advancement Flap Text: Adjacent tissue was carried over to close the defect in the following manner.The defect edges were debeveled with a #15 scalpel blade.  Given the location of the defect, shape of the defect and the proximity to free margins an A-T advancement flap was deemed most appropriate.  Using a sterile surgical marker, an appropriate advancement flap was drawn incorporating the defect and placing the expected incisions within the relaxed skin tension lines where possible.    The area thus outlined was incised deep to adipose tissue with a #15 scalpel blade.  The skin margins were undermined to an appropriate distance in all directions utilizing iris scissors. O-T Advancement Flap Text: Adjacent tissue was carried over to close the defect in the following manner.The defect edges were debeveled with a #15 scalpel blade.  Given the location of the defect, shape of the defect and the proximity to free margins an O-T advancement flap was deemed most appropriate.  Using a sterile surgical marker, an appropriate advancement flap was drawn incorporating the defect and placing the expected incisions within the relaxed skin tension lines where possible.    The area thus outlined was incised deep to adipose tissue with a #15 scalpel blade.  The skin margins were undermined to an appropriate distance in all directions utilizing iris scissors. O-L Flap Text: Adjacent tissue was carried over to close the defect in the following manner.The defect edges were debeveled with a #15 scalpel blade.  Given the location of the defect, shape of the defect and the proximity to free margins an O-L flap was deemed most appropriate.  Using a sterile surgical marker, an appropriate advancement flap was drawn incorporating the defect and placing the expected incisions within the relaxed skin tension lines where possible.    The area thus outlined was incised deep to adipose tissue with a #15 scalpel blade.  The skin margins were undermined to an appropriate distance in all directions utilizing iris scissors. O-Z Flap Text: Adjacent tissue was carried over to close the defect in the following manner.The defect edges were debeveled with a #15 scalpel blade.  Given the location of the defect, shape of the defect and the proximity to free margins an O-Z flap was deemed most appropriate.  Using a sterile surgical marker, an appropriate transposition flap was drawn incorporating the defect and placing the expected incisions within the relaxed skin tension lines where possible. The area thus outlined was incised deep to adipose tissue with a #15 scalpel blade.  The skin margins were undermined to an appropriate distance in all directions utilizing iris scissors. Double O-Z Flap Text: Adjacent tissue was carried over to close the defect in the following manner.The defect edges were debeveled with a #15 scalpel blade.  Given the location of the defect, shape of the defect and the proximity to free margins a Double O-Z flap was deemed most appropriate.  Using a sterile surgical marker, an appropriate transposition flap was drawn incorporating the defect and placing the expected incisions within the relaxed skin tension lines where possible. The area thus outlined was incised deep to adipose tissue with a #15 scalpel blade.  The skin margins were undermined to an appropriate distance in all directions utilizing iris scissors. V-Y Flap Text: Adjacent tissue was carried over to close the defect in the following manner.The defect edges were debeveled with a #15 scalpel blade.  Given the location of the defect, shape of the defect and the proximity to free margins a V-Y flap was deemed most appropriate.  Using a sterile surgical marker, an appropriate advancement flap was drawn incorporating the defect and placing the expected incisions within the relaxed skin tension lines where possible.    The area thus outlined was incised deep to adipose tissue with a #15 scalpel blade.  The skin margins were undermined to an appropriate distance in all directions utilizing iris scissors. Advancement-Rotation Flap Text: Adjacent tissue was carried over to close the defect in the following manner.The defect edges were debeveled with a #15 scalpel blade.  Given the location of the defect, shape of the defect and the proximity to free margins an advancement-rotation flap was deemed most appropriate.  Using a sterile surgical marker, an appropriate advancement flap was drawn incorporating the defect and placing the expected incisions within the relaxed skin tension lines where possible.    The area thus outlined was incised deep to adipose tissue with a #15 scalpel blade.  The skin margins were undermined to an appropriate distance in all directions utilizing iris scissors. Mercedes Flap Text: Adjacent tissue was carried over to close the defect in the following manner.The defect edges were debeveled with a #15 scalpel blade.  Given the location of the defect, shape of the defect and the proximity to free margins a Mercedes flap was deemed most appropriate.  Using a sterile surgical marker, an appropriate advancement flap was drawn incorporating the defect and placing the expected incisions within the relaxed skin tension lines where possible. The area thus outlined was incised deep to adipose tissue with a #15 scalpel blade.  The skin margins were undermined to an appropriate distance in all directions utilizing iris scissors. Modified Advancement Flap Text: Adjacent tissue was carried over to close the defect in the following manner.The defect edges were debeveled with a #15 scalpel blade.  Given the location of the defect, shape of the defect and the proximity to free margins a modified advancement flap was deemed most appropriate.  Using a sterile surgical marker, an appropriate advancement flap was drawn incorporating the defect and placing the expected incisions within the relaxed skin tension lines where possible.    The area thus outlined was incised deep to adipose tissue with a #15 scalpel blade.  The skin margins were undermined to an appropriate distance in all directions utilizing iris scissors. Mucosal Advancement Flap Text: Adjacent tissue was carried over to close the defect in the following manner.Given the location of the defect, shape of the defect and the proximity to free margins a mucosal advancement flap was deemed most appropriate. Incisions were made with a 15 blade scalpel in the appropriate fashion along the cutaneous vermilion border and the mucosal lip. The remaining actinically damaged mucosal tissue was excised.  The mucosal advancement flap was then elevated to the gingival sulcus with care taken to preserve the neurovascular structures and advanced into the primary defect. Care was taken to ensure that precise realignment of the vermilion border was achieved. Peng Advancement Flap Text: Adjacent tissue was carried over to close the defect in the following manner.The defect edges were debeveled with a #15 scalpel blade.  Given the location of the defect, shape of the defect and the proximity to free margins a Peng advancement flap was deemed most appropriate.  Using a sterile surgical marker, an appropriate advancement flap was drawn incorporating the defect and placing the expected incisions within the relaxed skin tension lines where possible. The area thus outlined was incised deep to adipose tissue with a #15 scalpel blade.  The skin margins were undermined to an appropriate distance in all directions utilizing iris scissors. Hatchet Flap Text: Adjacent tissue was carried over to close the defect in the following manner.The defect edges were debeveled with a #15 scalpel blade.  Given the location of the defect, shape of the defect and the proximity to free margins a hatchet flap was deemed most appropriate.  Using a sterile surgical marker, an appropriate hatchet flap was drawn incorporating the defect and placing the expected incisions within the relaxed skin tension lines where possible.    The area thus outlined was incised deep to adipose tissue with a #15 scalpel blade.  The skin margins were undermined to an appropriate distance in all directions utilizing iris scissors. Rotation Flap Text: Adjacent tissue was carried over to close the defect in the following manner.The defect edges were debeveled with a #15 scalpel blade.  Given the location of the defect, shape of the defect and the proximity to free margins a rotation flap was deemed most appropriate.  Using a sterile surgical marker, an appropriate rotation flap was drawn incorporating the defect and placing the expected incisions within the relaxed skin tension lines where possible.    The area thus outlined was incised deep to adipose tissue with a #15 scalpel blade.  The skin margins were undermined to an appropriate distance in all directions utilizing iris scissors. Spiral Flap Text: Adjacent tissue was carried over to close the defect in the following manner.The defect edges were debeveled with a #15 scalpel blade.  Given the location of the defect, shape of the defect and the proximity to free margins a spiral flap was deemed most appropriate.  Using a sterile surgical marker, an appropriate rotation flap was drawn incorporating the defect and placing the expected incisions within the relaxed skin tension lines where possible. The area thus outlined was incised deep to adipose tissue with a #15 scalpel blade.  The skin margins were undermined to an appropriate distance in all directions utilizing iris scissors. Staged Advancement Flap Text: Adjacent tissue was carried over to close the defect in the following manner.The defect edges were debeveled with a #15 scalpel blade.  Given the location of the defect, shape of the defect and the proximity to free margins a staged advancement flap was deemed most appropriate.  Using a sterile surgical marker, an appropriate advancement flap was drawn incorporating the defect and placing the expected incisions within the relaxed skin tension lines where possible. The area thus outlined was incised deep to adipose tissue with a #15 scalpel blade.  The skin margins were undermined to an appropriate distance in all directions utilizing iris scissors. Star Wedge Flap Text: Adjacent tissue was carried over to close the defect in the following manner.The defect edges were debeveled with a #15 scalpel blade.  Given the location of the defect, shape of the defect and the proximity to free margins a star wedge flap was deemed most appropriate.  Using a sterile surgical marker, an appropriate rotation flap was drawn incorporating the defect and placing the expected incisions within the relaxed skin tension lines where possible. The area thus outlined was incised deep to adipose tissue with a #15 scalpel blade.  The skin margins were undermined to an appropriate distance in all directions utilizing iris scissors. Transposition Flap Text: Adjacent tissue was carried over to close the defect in the following manner.The defect edges were debeveled with a #15 scalpel blade.  Given the location of the defect and the proximity to free margins a transposition flap was deemed most appropriate.  Using a sterile surgical marker, an appropriate transposition flap was drawn incorporating the defect.    The area thus outlined was incised deep to adipose tissue with a #15 scalpel blade.  The skin margins were undermined to an appropriate distance in all directions utilizing iris scissors. Muscle Hinge Flap Text: Adjacent tissue was carried over to close the defect in the following manner.The defect edges were debeveled with a #15 scalpel blade.  Given the size, depth and location of the defect and the proximity to free margins a muscle hinge flap was deemed most appropriate.  Using a sterile surgical marker, an appropriate hinge flap was drawn incorporating the defect. The area thus outlined was incised with a #15 scalpel blade.  The skin margins were undermined to an appropriate distance in all directions utilizing iris scissors. Mustarde Flap Text: Adjacent tissue was carried over to close the defect in the following manner.The defect edges were debeveled with a #15 scalpel blade.  Given the size, depth and location of the defect and the proximity to free margins a Mustarde flap was deemed most appropriate.  Using a sterile surgical marker, an appropriate flap was drawn incorporating the defect. The area thus outlined was incised with a #15 scalpel blade.  The skin margins were undermined to an appropriate distance in all directions utilizing iris scissors. Nasal Turnover Hinge Flap Text: Adjacent tissue was carried over to close the defect in the following manner.The defect edges were debeveled with a #15 scalpel blade.  Given the size, depth, location of the defect and the defect being full thickness a nasal turnover hinge flap was deemed most appropriate.  Using a sterile surgical marker, an appropriate hinge flap was drawn incorporating the defect. The area thus outlined was incised with a #15 scalpel blade. The flap was designed to recreate the nasal mucosal lining and the alar rim. The skin margins were undermined to an appropriate distance in all directions utilizing iris scissors. Nasalis-Muscle-Based Myocutaneous Island Pedicle Flap Text: Adjacent tissue was carried over to close the defect in the following manner.Using a #15 blade, an incision was made around the donor flap to the level of the nasalis muscle. Wide lateral undermining was then performed in both the subcutaneous plane above the nasalis muscle, and in a submuscular plane just above periosteum. This allowed the formation of a free nasalis muscle axial pedicle (based on the angular artery) which was still attached to the actual cutaneous flap, increasing its mobility and vascular viability. Hemostasis was obtained with pinpoint electrocoagulation. The flap was mobilized into position and the pivotal anchor points positioned and stabilized with buried interrupted sutures. Subcutaneous and dermal tissues were closed in a multilayered fashion with sutures. Tissue redundancies were excised, and the epidermal edges were apposed without significant tension and sutured with sutures. Orbicularis Oris Muscle Flap Text: Adjacent tissue was carried over to close the defect in the following manner.The defect edges were debeveled with a #15 scalpel blade.  Given that the defect affected the competency of the oral sphincter an orbicularis oris muscle flap was deemed most appropriate to restore this competency and normal muscle function.  Using a sterile surgical marker, an appropriate flap was drawn incorporating the defect. The area thus outlined was incised with a #15 scalpel blade. Melolabial Transposition Flap Text: Adjacent tissue was carried over to close the defect in the following manner.The defect edges were debeveled with a #15 scalpel blade.  Given the location of the defect and the proximity to free margins a melolabial flap was deemed most appropriate.  Using a sterile surgical marker, an appropriate melolabial transposition flap was drawn incorporating the defect.    The area thus outlined was incised deep to adipose tissue with a #15 scalpel blade.  The skin margins were undermined to an appropriate distance in all directions utilizing iris scissors. Rhombic Flap Text: Adjacent tissue was carried over to close the defect in the following manner.The defect edges were debeveled with a #15 scalpel blade.  Given the location of the defect and the proximity to free margins a rhombic flap was deemed most appropriate.  Using a sterile surgical marker, an appropriate rhombic flap was drawn incorporating the defect.    The area thus outlined was incised deep to adipose tissue with a #15 scalpel blade.  The skin margins were undermined to an appropriate distance in all directions utilizing iris scissors. Rhomboid Transposition Flap Text: Adjacent tissue was carried over to close the defect in the following manner.The defect edges were debeveled with a #15 scalpel blade.  Given the location of the defect and the proximity to free margins a rhomboid transposition flap was deemed most appropriate.  Using a sterile surgical marker, an appropriate rhomboid flap was drawn incorporating the defect.    The area thus outlined was incised deep to adipose tissue with a #15 scalpel blade.  The skin margins were undermined to an appropriate distance in all directions utilizing iris scissors. Bi-Rhombic Flap Text: Adjacent tissue was carried over to close the defect in the following manner.The defect edges were debeveled with a #15 scalpel blade.  Given the location of the defect and the proximity to free margins a bi-rhombic flap was deemed most appropriate.  Using a sterile surgical marker, an appropriate rhombic flap was drawn incorporating the defect. The area thus outlined was incised deep to adipose tissue with a #15 scalpel blade.  The skin margins were undermined to an appropriate distance in all directions utilizing iris scissors. Helical Rim Advancement Flap Text: Adjacent tissue was carried over to close the defect in the following manner.The defect edges were debeveled with a #15 blade scalpel.  Given the location of the defect and the proximity to free margins (helical rim) a double helical rim advancement flap was deemed most appropriate.  Using a sterile surgical marker, the appropriate advancement flaps were drawn incorporating the defect and placing the expected incisions between the helical rim and antihelix where possible.  The area thus outlined was incised through and through with a #15 scalpel blade.  With a skin hook and iris scissors, the flaps were gently and sharply undermined and freed up. Bilateral Helical Rim Advancement Flap Text: Adjacent tissue was carried over to close the defect in the following manner.The defect edges were debeveled with a #15 blade scalpel.  Given the location of the defect and the proximity to free margins (helical rim) a bilateral helical rim advancement flap was deemed most appropriate.  Using a sterile surgical marker, the appropriate advancement flaps were drawn incorporating the defect and placing the expected incisions between the helical rim and antihelix where possible.  The area thus outlined was incised through and through with a #15 scalpel blade.  With a skin hook and iris scissors, the flaps were gently and sharply undermined and freed up. Ear Star Wedge Flap Text: Adjacent tissue was carried over to close the defect in the following manner.The defect edges were debeveled with a #15 blade scalpel.  Given the location of the defect and the proximity to free margins (helical rim) an ear star wedge flap was deemed most appropriate.  Using a sterile surgical marker, the appropriate flap was drawn incorporating the defect and placing the expected incisions between the helical rim and antihelix where possible.  The area thus outlined was incised through and through with a #15 scalpel blade. Banner Transposition Flap Text: Adjacent tissue was carried over to close the defect in the following manner.The defect edges were debeveled with a #15 scalpel blade.  Given the location of the defect and the proximity to free margins a Banner transposition flap was deemed most appropriate.  Using a sterile surgical marker, an appropriate flap drawn around the defect. The area thus outlined was incised deep to adipose tissue with a #15 scalpel blade.  The skin margins were undermined to an appropriate distance in all directions utilizing iris scissors. Bilobed Flap Text: Adjacent tissue was carried over to close the defect in the following manner.The defect edges were debeveled with a #15 scalpel blade.  Given the location of the defect and the proximity to free margins a bilobe flap was deemed most appropriate.  Using a sterile surgical marker, an appropriate bilobe flap drawn around the defect.    The area thus outlined was incised deep to adipose tissue with a #15 scalpel blade.  The skin margins were undermined to an appropriate distance in all directions utilizing iris scissors. Bilobed Transposition Flap Text: Adjacent tissue was carried over to close the defect in the following manner.The defect edges were debeveled with a #15 scalpel blade.  Given the location of the defect and the proximity to free margins a bilobed transposition flap was deemed most appropriate.  Using a sterile surgical marker, an appropriate bilobe flap drawn around the defect.    The area thus outlined was incised deep to adipose tissue with a #15 scalpel blade.  The skin margins were undermined to an appropriate distance in all directions utilizing iris scissors. Trilobed Flap Text: Adjacent tissue was carried over to close the defect in the following manner.The defect edges were debeveled with a #15 scalpel blade.  Given the location of the defect and the proximity to free margins a trilobed flap was deemed most appropriate.  Using a sterile surgical marker, an appropriate trilobed flap drawn around the defect.    The area thus outlined was incised deep to adipose tissue with a #15 scalpel blade.  The skin margins were undermined to an appropriate distance in all directions utilizing iris scissors. Dorsal Nasal Flap Text: Adjacent tissue was carried over to close the defect in the following manner.The defect edges were debeveled with a #15 scalpel blade.  Given the location of the defect and the proximity to free margins a dorsal nasal flap was deemed most appropriate.  Using a sterile surgical marker, an appropriate dorsal nasal flap was drawn around the defect.    The area thus outlined was incised deep to adipose tissue with a #15 scalpel blade.  The skin margins were undermined to an appropriate distance in all directions utilizing iris scissors. Island Pedicle Flap Text: Adjacent tissue was carried over to close the defect in the following manner.The defect edges were debeveled with a #15 scalpel blade.  Given the location of the defect, shape of the defect and the proximity to free margins an island pedicle advancement flap was deemed most appropriate.  Using a sterile surgical marker, an appropriate advancement flap was drawn incorporating the defect, outlining the appropriate donor tissue and placing the expected incisions within the relaxed skin tension lines where possible.    The area thus outlined was incised deep to adipose tissue with a #15 scalpel blade.  The skin margins were undermined to an appropriate distance in all directions around the primary defect and laterally outward around the island pedicle utilizing iris scissors.  There was minimal undermining beneath the pedicle flap. Island Pedicle Flap With Canthal Suspension Text: Adjacent tissue was carried over to close the defect in the following manner.The defect edges were debeveled with a #15 scalpel blade.  Given the location of the defect, shape of the defect and the proximity to free margins an island pedicle advancement flap was deemed most appropriate.  Using a sterile surgical marker, an appropriate advancement flap was drawn incorporating the defect, outlining the appropriate donor tissue and placing the expected incisions within the relaxed skin tension lines where possible. The area thus outlined was incised deep to adipose tissue with a #15 scalpel blade.  The skin margins were undermined to an appropriate distance in all directions around the primary defect and laterally outward around the island pedicle utilizing iris scissors.  There was minimal undermining beneath the pedicle flap. A suspension suture was placed in the canthal tendon to prevent tension and prevent ectropion. Alar Island Pedicle Flap Text: Adjacent tissue was carried over to close the defect in the following manner.The defect edges were debeveled with a #15 scalpel blade.  Given the location of the defect, shape of the defect and the proximity to the alar rim an island pedicle advancement flap was deemed most appropriate.  Using a sterile surgical marker, an appropriate advancement flap was drawn incorporating the defect, outlining the appropriate donor tissue and placing the expected incisions within the nasal ala running parallel to the alar rim. The area thus outlined was incised with a #15 scalpel blade.  The skin margins were undermined minimally to an appropriate distance in all directions around the primary defect and laterally outward around the island pedicle utilizing iris scissors.  There was minimal undermining beneath the pedicle flap. Double Island Pedicle Flap Text: Adjacent tissue was carried over to close the defect in the following manner.The defect edges were debeveled with a #15 scalpel blade.  Given the location of the defect, shape of the defect and the proximity to free margins a double island pedicle advancement flap was deemed most appropriate.  Using a sterile surgical marker, an appropriate advancement flap was drawn incorporating the defect, outlining the appropriate donor tissue and placing the expected incisions within the relaxed skin tension lines where possible.    The area thus outlined was incised deep to adipose tissue with a #15 scalpel blade.  The skin margins were undermined to an appropriate distance in all directions around the primary defect and laterally outward around the island pedicle utilizing iris scissors.  There was minimal undermining beneath the pedicle flap. Island Pedicle Flap-Requiring Vessel Identification Text: Adjacent tissue was carried over to close the defect in the following manner.The defect edges were debeveled with a #15 scalpel blade.  Given the location of the defect, shape of the defect and the proximity to free margins an island pedicle advancement flap was deemed most appropriate.  Using a sterile surgical marker, an appropriate advancement flap was drawn, based on the axial vessel mentioned above, incorporating the defect, outlining the appropriate donor tissue and placing the expected incisions within the relaxed skin tension lines where possible.    The area thus outlined was incised deep to adipose tissue with a #15 scalpel blade.  The skin margins were undermined to an appropriate distance in all directions around the primary defect and laterally outward around the island pedicle utilizing iris scissors.  There was minimal undermining beneath the pedicle flap. Keystone Flap Text: Adjacent tissue was carried over to close the defect in the following manner.The defect edges were debeveled with a #15 scalpel blade.  Given the location of the defect, shape of the defect a keystone flap was deemed most appropriate.  Using a sterile surgical marker, an appropriate keystone flap was drawn incorporating the defect, outlining the appropriate donor tissue and placing the expected incisions within the relaxed skin tension lines where possible. The area thus outlined was incised deep to adipose tissue with a #15 scalpel blade.  The skin margins were undermined to an appropriate distance in all directions around the primary defect and laterally outward around the flap utilizing iris scissors. O-T Plasty Text: Adjacent tissue was carried over to close the defect in the following manner.The defect edges were debeveled with a #15 scalpel blade.  Given the location of the defect, shape of the defect and the proximity to free margins an O-T plasty was deemed most appropriate.  Using a sterile surgical marker, an appropriate O-T plasty was drawn incorporating the defect and placing the expected incisions within the relaxed skin tension lines where possible.    The area thus outlined was incised deep to adipose tissue with a #15 scalpel blade.  The skin margins were undermined to an appropriate distance in all directions utilizing iris scissors. O-Z Plasty Text: Adjacent tissue was carried over to close the defect in the following manner.The defect edges were debeveled with a #15 scalpel blade.  Given the location of the defect, shape of the defect and the proximity to free margins an O-Z plasty (double transposition flap) was deemed most appropriate.  Using a sterile surgical marker, the appropriate transposition flaps were drawn incorporating the defect and placing the expected incisions within the relaxed skin tension lines where possible.    The area thus outlined was incised deep to adipose tissue with a #15 scalpel blade.  The skin margins were undermined to an appropriate distance in all directions utilizing iris scissors.  Hemostasis was achieved with electrocautery.  The flaps were then transposed into place, one clockwise and the other counterclockwise, and anchored with interrupted buried subcutaneous sutures. Double O-Z Plasty Text: Adjacent tissue was carried over to close the defect in the following manner.The defect edges were debeveled with a #15 scalpel blade.  Given the location of the defect, shape of the defect and the proximity to free margins a Double O-Z plasty (double transposition flap) was deemed most appropriate.  Using a sterile surgical marker, the appropriate transposition flaps were drawn incorporating the defect and placing the expected incisions within the relaxed skin tension lines where possible. The area thus outlined was incised deep to adipose tissue with a #15 scalpel blade.  The skin margins were undermined to an appropriate distance in all directions utilizing iris scissors.  Hemostasis was achieved with electrocautery.  The flaps were then transposed into place, one clockwise and the other counterclockwise, and anchored with interrupted buried subcutaneous sutures. V-Y Plasty Text: Adjacent tissue was carried over to close the defect in the following manner.The defect edges were debeveled with a #15 scalpel blade.  Given the location of the defect, shape of the defect and the proximity to free margins an V-Y advancement flap was deemed most appropriate.  Using a sterile surgical marker, an appropriate advancement flap was drawn incorporating the defect and placing the expected incisions within the relaxed skin tension lines where possible.    The area thus outlined was incised deep to adipose tissue with a #15 scalpel blade.  The skin margins were undermined to an appropriate distance in all directions utilizing iris scissors. H Plasty Text: Adjacent tissue was carried over to close the defect in the following manner.Given the location of the defect, shape of the defect and the proximity to free margins a H-plasty was deemed most appropriate for repair.  Using a sterile surgical marker, the appropriate advancement arms of the H-plasty were drawn incorporating the defect and placing the expected incisions within the relaxed skin tension lines where possible. The area thus outlined was incised deep to adipose tissue with a #15 scalpel blade. The skin margins were undermined to an appropriate distance in all directions utilizing iris scissors.  The opposing advancement arms were then advanced into place in opposite direction and anchored with interrupted buried subcutaneous sutures. W Plasty Text: Adjacent tissue was carried over to close the defect in the following manner.The lesion was extirpated to the level of the fat with a #15 scalpel blade.  Given the location of the defect, shape of the defect and the proximity to free margins a W-plasty was deemed most appropriate for repair.  Using a sterile surgical marker, the appropriate transposition arms of the W-plasty were drawn incorporating the defect and placing the expected incisions within the relaxed skin tension lines where possible.    The area thus outlined was incised deep to adipose tissue with a #15 scalpel blade.  The skin margins were undermined to an appropriate distance in all directions utilizing iris scissors.  The opposing transposition arms were then transposed into place in opposite direction and anchored with interrupted buried subcutaneous sutures. Z Plasty Text: Adjacent tissue was carried over to close the defect in the following manner.The lesion was extirpated to the level of the fat with a #15 scalpel blade.  Given the location of the defect, shape of the defect and the proximity to free margins a Z-plasty was deemed most appropriate for repair.  Using a sterile surgical marker, the appropriate transposition arms of the Z-plasty were drawn incorporating the defect and placing the expected incisions within the relaxed skin tension lines where possible.    The area thus outlined was incised deep to adipose tissue with a #15 scalpel blade.  The skin margins were undermined to an appropriate distance in all directions utilizing iris scissors.  The opposing transposition arms were then transposed into place in opposite direction and anchored with interrupted buried subcutaneous sutures. Zygomaticofacial Flap Text: Adjacent tissue was carried over to close the defect in the following manner.Given the location of the defect, shape of the defect and the proximity to free margins a zygomaticofacial flap was deemed most appropriate for repair.  Using a sterile surgical marker, the appropriate flap was drawn incorporating the defect and placing the expected incisions within the relaxed skin tension lines where possible. The area thus outlined was incised deep to adipose tissue with a #15 scalpel blade with preservation of a vascular pedicle.  The skin margins were undermined to an appropriate distance in all directions utilizing iris scissors.  The flap was then placed into the defect and anchored with interrupted buried subcutaneous sutures. Cheek Interpolation Flap Text: Adjacent tissue was carried over to close the defect in the following manner.A decision was made to reconstruct the defect utilizing an interpolation axial flap and a staged reconstruction.  A telfa template was made of the defect.  This telfa template was then used to outline the Cheek Interpolation flap.  The donor area for the pedicle flap was then injected with anesthesia.  The flap was excised through the skin and subcutaneous tissue down to the layer of the underlying musculature.  The interpolation flap was carefully excised within this deep plane to maintain its blood supply.  The edges of the donor site were undermined.   The donor site was closed in a primary fashion.  The pedicle was then rotated into position and sutured.  Once the tube was sutured into place, adequate blood supply was confirmed with blanching and refill.  The pedicle was then wrapped with xeroform gauze and dressed appropriately with a telfa and gauze bandage to ensure continued blood supply and protect the attached pedicle. Cheek-To-Nose Interpolation Flap Text: Adjacent tissue was carried over to close the defect in the following manner.A decision was made to reconstruct the defect utilizing an interpolation axial flap and a staged reconstruction.  A telfa template was made of the defect.  This telfa template was then used to outline the Cheek-To-Nose Interpolation flap.  The donor area for the pedicle flap was then injected with anesthesia.  The flap was excised through the skin and subcutaneous tissue down to the layer of the underlying musculature.  The interpolation flap was carefully excised within this deep plane to maintain its blood supply.  The edges of the donor site were undermined.   The donor site was closed in a primary fashion.  The pedicle was then rotated into position and sutured.  Once the tube was sutured into place, adequate blood supply was confirmed with blanching and refill.  The pedicle was then wrapped with xeroform gauze and dressed appropriately with a telfa and gauze bandage to ensure continued blood supply and protect the attached pedicle. Interpolation Flap Text: Adjacent tissue was carried over to close the defect in the following manner.A decision was made to reconstruct the defect utilizing an interpolation axial flap and a staged reconstruction.  A telfa template was made of the defect.  This telfa template was then used to outline the interpolation flap.  The donor area for the pedicle flap was then injected with anesthesia.  The flap was excised through the skin and subcutaneous tissue down to the layer of the underlying musculature.  The interpolation flap was carefully excised within this deep plane to maintain its blood supply.  The edges of the donor site were undermined.   The donor site was closed in a primary fashion.  The pedicle was then rotated into position and sutured.  Once the tube was sutured into place, adequate blood supply was confirmed with blanching and refill.  The pedicle was then wrapped with xeroform gauze and dressed appropriately with a telfa and gauze bandage to ensure continued blood supply and protect the attached pedicle. Melolabial Interpolation Flap Text: Adjacent tissue was carried over to close the defect in the following manner.A decision was made to reconstruct the defect utilizing an interpolation axial flap and a staged reconstruction.  A telfa template was made of the defect.  This telfa template was then used to outline the melolabial interpolation flap.  The donor area for the pedicle flap was then injected with anesthesia.  The flap was excised through the skin and subcutaneous tissue down to the layer of the underlying musculature.  The pedicle flap was carefully excised within this deep plane to maintain its blood supply.  The edges of the donor site were undermined.   The donor site was closed in a primary fashion.  The pedicle was then rotated into position and sutured.  Once the tube was sutured into place, adequate blood supply was confirmed with blanching and refill.  The pedicle was then wrapped with xeroform gauze and dressed appropriately with a telfa and gauze bandage to ensure continued blood supply and protect the attached pedicle. Mastoid Interpolation Flap Text: Adjacent tissue was carried over to close the defect in the following manner.A decision was made to reconstruct the defect utilizing an interpolation axial flap and a staged reconstruction.  A telfa template was made of the defect.  This telfa template was then used to outline the mastoid interpolation flap.  The donor area for the pedicle flap was then injected with anesthesia.  The flap was excised through the skin and subcutaneous tissue down to the layer of the underlying musculature.  The pedicle flap was carefully excised within this deep plane to maintain its blood supply.  The edges of the donor site were undermined.   The donor site was closed in a primary fashion.  The pedicle was then rotated into position and sutured.  Once the tube was sutured into place, adequate blood supply was confirmed with blanching and refill.  The pedicle was then wrapped with xeroform gauze and dressed appropriately with a telfa and gauze bandage to ensure continued blood supply and protect the attached pedicle. Posterior Auricular Interpolation Flap Text: Adjacent tissue was carried over to close the defect in the following manner.A decision was made to reconstruct the defect utilizing an interpolation axial flap and a staged reconstruction.  A telfa template was made of the defect.  This telfa template was then used to outline the posterior auricular interpolation flap.  The donor area for the pedicle flap was then injected with anesthesia.  The flap was excised through the skin and subcutaneous tissue down to the layer of the underlying musculature.  The pedicle flap was carefully excised within this deep plane to maintain its blood supply.  The edges of the donor site were undermined.   The donor site was closed in a primary fashion.  The pedicle was then rotated into position and sutured.  Once the tube was sutured into place, adequate blood supply was confirmed with blanching and refill.  The pedicle was then wrapped with xeroform gauze and dressed appropriately with a telfa and gauze bandage to ensure continued blood supply and protect the attached pedicle. Paramedian Forehead Flap Text: A decision was made to reconstruct the defect utilizing an interpolation axial flap and a staged reconstruction.  A telfa template was made of the defect.  This telfa template was then used to outline the paramedian forehead pedicle flap.  The donor area for the pedicle flap was then injected with anesthesia.  The flap was excised through the skin and subcutaneous tissue down to the layer of the underlying musculature.  The pedicle flap was carefully excised within this deep plane to maintain its blood supply.  The edges of the donor site were undermined.   The donor site was closed in a primary fashion.  The pedicle was then rotated into position and sutured.  Once the tube was sutured into place, adequate blood supply was confirmed with blanching and refill.  The pedicle was then wrapped with xeroform gauze and dressed appropriately with a telfa and gauze bandage to ensure continued blood supply and protect the attached pedicle. Abbe Flap (Upper To Lower Lip) Text: Adjacent tissue was carried over to close the defect in the following manner. The defect of the lower lip was assessed and measured.  Given the location and size of the defect, an Abbe flap was deemed most appropriate.  Using a sterile surgical marker, an appropriate Abbe flap was measured and drawn on the upper lip. Local anesthesia was then infiltrated.  A scalpel was then used to incise the upper lip through and through the skin, vermilion, muscle and mucosa, leaving the flap pedicled on the opposite side.  The flap was then rotated and transferred to the lower lip defect.  The flap was then sutured into place with a three layer technique, closing the orbicularis oris muscle layer with subcutaneous buried sutures, followed by a mucosal layer and an epidermal layer. Abbe Flap (Lower To Upper Lip) Text: Adjacent tissue was carried over to close the defect in the following manner. The defect of the upper lip was assessed and measured.  Given the location and size of the defect, an Abbe flap was deemed most appropriate.  Using a sterile surgical marker, an appropriate Abbe flap was measured and drawn on the lower lip. Local anesthesia was then infiltrated. A scalpel was then used to incise the upper lip through and through the skin, vermilion, muscle and mucosa, leaving the flap pedicled on the opposite side.  The flap was then rotated and transferred to the lower lip defect.  The flap was then sutured into place with a three layer technique, closing the orbicularis oris muscle layer with subcutaneous buried sutures, followed by a mucosal layer and an epidermal layer. Estlander Flap (Upper To Lower Lip) Text: Adjacent tissue was carried over to close the defect in the following manner. The defect of the lower lip was assessed and measured.  Given the location and size of the defect, an Estlander flap was deemed most appropriate.  Using a sterile surgical marker, an appropriate Estlander flap was measured and drawn on the upper lip. Local anesthesia was then infiltrated. A scalpel was then used to incise the lateral aspect of the flap, through skin, muscle and mucosa, leaving the flap pedicled medially.  The flap was then rotated and positioned to fill the lower lip defect.  The flap was then sutured into place with a three layer technique, closing the orbicularis oris muscle layer with subcutaneous buried sutures, followed by a mucosal layer and an epidermal layer. Estlander Flap (Lower To Upper Lip) Text: Adjacent tissue was carried over to close the defect in the following manner. The defect of the lower lip was assessed and measured.  Given the location and size of the defect, an Estlander flap was deemed most appropriate.  Using a sterile surgical marker, an appropriate Estlander flap was measured and drawn on the upper lip. Local anesthesia was then infiltrated. A scalpel was then used to incise the lateral aspect of the flap, through skin, muscle and mucosa, leaving the flap pedicled medially.  The flap was then rotated and positioned to fill the lower lip defect.  The flap was then sutured into place with a three layer technique, closing the orbicularis oris muscle layer with subcutaneous buried sutures, followed by a mucosal layer and an epidermal layer. Cheiloplasty (Less Than 50%) Text: Adjacent tissue was carried over to close the defect in the following manner. A decision was made to reconstruct the defect with a  cheiloplasty.  The defect was undermined extensively.  Additional obicularis oris muscle was excised with a 15 blade scalpel.  The defect was converted into a full thickness wedge, of less than 50% of the vertical height of the lip, to facilite a better cosmetic result.  Small vessels were then tied off with 5-0 monocyrl. The obicularis oris, superficial fascia, adipose and dermis were then reapproximated.  After the deeper layers were approximated the epidermis was reapproximated with particular care given to realign the vermilion border. Cheiloplasty (Complex) Text: Adjacent tissue was carried over to close the defect in the following manner. A decision was made to reconstruct the defect with a  cheiloplasty.  The defect was undermined extensively.  Additional obicularis oris muscle was excised with a 15 blade scalpel.  The defect was converted into a full thickness wedge to facilite a better cosmetic result.  Small vessels were then tied off with 5-0 monocyrl. The obicularis oris, superficial fascia, adipose and dermis were then reapproximated.  After the deeper layers were approximated the epidermis was reapproximated with particular care given to realign the vermilion border. Ear Wedge Repair Text: A wedge excision was completed by carrying down an excision through the full thickness of the ear and cartilage with an inward facing Burow's triangle. The wound was then closed in a layered fashion. Full Thickness Lip Wedge Repair (Flap) Text: Given the location of the defect and the proximity to free margins a full thickness wedge repair was deemed most appropriate.  Using a sterile surgical marker, the appropriate repair was drawn incorporating the defect and placing the expected incisions perpendicular to the vermilion border.  The vermilion border was also meticulously outlined to ensure appropriate reapproximation during the repair.  The area thus outlined was incised through and through with a #15 scalpel blade.  The muscularis and dermis were reaproximated with deep sutures following hemostasis. Care was taken to realign the vermilion border before proceeding with the superficial closure.  Once the vermilion was realigned the superfical and mucosal closure was finished. Ftsg Text: The defect edges were debeveled with a #15 scalpel blade.  Given the location of the defect, shape of the defect and the proximity to free margins a full thickness skin graft was deemed most appropriate.  Using a sterile surgical marker, the primary defect shape was transferred to the donor site. The area thus outlined was incised deep to adipose tissue with a #15 scalpel blade.  The harvested graft was then trimmed of adipose tissue until only dermis and epidermis was left.  The skin margins of the secondary defect were undermined to an appropriate distance in all directions utilizing iris scissors.  The secondary defect was closed with interrupted buried subcutaneous sutures.  The skin edges were then re-apposed with running  sutures.  The skin graft was then placed in the primary defect and oriented appropriately. Split-Thickness Skin Graft Text: The defect edges were debeveled with a #15 scalpel blade.  Given the location of the defect, shape of the defect and the proximity to free margins a split thickness skin graft was deemed most appropriate.  Using a sterile surgical marker, the primary defect shape was transferred to the donor site. The split thickness graft was then harvested.  The skin graft was then placed in the primary defect and oriented appropriately. Burow's Graft Text: The defect edges were debeveled with a #15 scalpel blade.  Given the location of the defect, shape of the defect, the proximity to free margins and the presence of a standing cone deformity a Burow's skin graft was deemed most appropriate. The standing cone was removed and this tissue was then trimmed to the shape of the primary defect. The adipose tissue was also removed until only dermis and epidermis were left.  The skin margins of the secondary defect were undermined to an appropriate distance in all directions utilizing iris scissors.  The secondary defect was closed with interrupted buried subcutaneous sutures.  The skin edges were then re-apposed with running  sutures.  The skin graft was then placed in the primary defect and oriented appropriately. Cartilage Graft Text: The defect edges were debeveled with a #15 scalpel blade.  Given the location of the defect, shape of the defect, the fact the defect involved a full thickness cartilage defect a cartilage graft was deemed most appropriate.  An appropriate donor site was identified, cleansed, and anesthetized. The cartilage graft was then harvested and transferred to the recipient site, oriented appropriately and then sutured into place.  The secondary defect was then repaired using a primary closure. Composite Graft Text: The defect edges were debeveled with a #15 scalpel blade.  Given the location of the defect, shape of the defect, the proximity to free margins and the fact the defect was full thickness a composite graft was deemed most appropriate.  The defect was outline and then transferred to the donor site.  A full thickness graft was then excised from the donor site. The graft was then placed in the primary defect, oriented appropriately and then sutured into place.  The secondary defect was then repaired using a primary closure. Epidermal Autograft Text: The defect edges were debeveled with a #15 scalpel blade.  Given the location of the defect, shape of the defect and the proximity to free margins an epidermal autograft was deemed most appropriate.  Using a sterile surgical marker, the primary defect shape was transferred to the donor site. The epidermal graft was then harvested.  The skin graft was then placed in the primary defect and oriented appropriately. Dermal Autograft Text: The defect edges were debeveled with a #15 scalpel blade.  Given the location of the defect, shape of the defect and the proximity to free margins a dermal autograft was deemed most appropriate.  Using a sterile surgical marker, the primary defect shape was transferred to the donor site. The area thus outlined was incised deep to adipose tissue with a #15 scalpel blade.  The harvested graft was then trimmed of adipose and epidermal tissue until only dermis was left.  The skin graft was then placed in the primary defect and oriented appropriately. Skin Substitute Text: The defect edges were debeveled with a #15 scalpel blade.  Given the location of the defect, shape of the defect and the proximity to free margins a skin substitute graft was deemed most appropriate.  The graft material was trimmed to fit the size of the defect. The graft was then placed in the primary defect and oriented appropriately. Skin Substitute Paste Text: The defect edges were debeveled with a #15 scalpel blade.  Given the location of the defect, shape of the defect and the proximity to free margins a skin substitute micronized graft was deemed most appropriate.  The entire vial contents were admixed with 0.5ccs of sterile saline, formed into a paste and then evenly spread over the entire wound bed. Skin Substitute Injection Text: The defect edges were debeveled with a #15 scalpel blade.  Given the location of the defect, shape of the defect and the proximity to free margins a skin substitute micronized graft was deemed most appropriate.  The entire vial contents were admixed with 3.0ccs of sterile saline and then injected subcutaneously throughout the entire wound bed. Tissue Cultured Epidermal Autograft Text: The defect edges were debeveled with a #15 scalpel blade.  Given the location of the defect, shape of the defect and the proximity to free margins a tissue cultured epidermal autograft was deemed most appropriate.  The graft was then trimmed to fit the size of the defect.  The graft was then placed in the primary defect and oriented appropriately. Xenograft Text: The defect edges were debeveled with a #15 scalpel blade.  Given the location of the defect, shape of the defect and the proximity to free margins a xenograft was deemed most appropriate.  The graft was then trimmed to fit the size of the defect.  The graft was then placed in the primary defect and oriented appropriately. Purse String (Simple) Text: Given the location of the defect and the characteristics of the surrounding skin a pursestring closure was deemed most appropriate.  Undermining was performed circumfirentially around the surgical defect.  A purstring suture was then placed and tightened. Purse String (Intermediate) Text: Given the location of the defect and the characteristics of the surrounding skin a pursestring intermediate closure was deemed most appropriate.  Undermining was performed circumfirentially around the surgical defect.  A purstring suture was then placed and tightened. Partial Purse String (Simple) Text: Given the location of the defect and the characteristics of the surrounding skin a simple purse string closure was deemed most appropriate.  Undermining was performed circumfirentially around the surgical defect.  A purse string suture was then placed and tightened. Wound tension only allowed a partial closure of the circular defect. Partial Purse String (Intermediate) Text: Given the location of the defect and the characteristics of the surrounding skin an intermediate purse string closure was deemed most appropriate.  Undermining was performed circumfirentially around the surgical defect.  A purse string suture was then placed and tightened. Wound tension only allowed a partial closure of the circular defect. Localized Dermabrasion Text: The patient was draped in routine manner.  Localized dermabrasion using 3 x 17 mm wire brush was performed in routine manner to papillary dermis. This spot dermabrasion is being performed to complete skin cancer reconstruction. It also will eliminate the other sun damaged precancerous cells that are known to be part of the regional effect of a lifetime's worth of sun exposure. This localized dermabrasion is therapeutic and should not be considered cosmetic in any regard. Tarsorrhaphy Text: A tarsorrhaphy was performed using Frost sutures. Complex Repair And Flap Additional Text (Will Appearing After The Standard Complex Repair Text): The complex repair was not sufficient to completely close the primary defect. The remaining additional defect was repaired with the flap mentioned below. Complex Repair And Graft Additional Text (Will Appearing After The Standard Complex Repair Text): The complex repair was not sufficient to completely close the primary defect. The remaining additional defect was repaired with the graft mentioned below. Cheek Interpolation Flap Division And Inset Text: Division and inset of the cheek interpolation flap was performed to achieve optimal aesthetic result, restore normal anatomic appearance and avoid distortion of normal anatomy, expedite and facilitate wound healing, achieve optimal functional result and because linear closure either not possible or would produce suboptimal result. The patient was prepped and draped in the usual manner. The pedicle was infiltrated with local anesthesia. The pedicle was sectioned with a #15 blade. The pedicle was de-bulked and trimmed to match the shape of the defect. Hemostasis was achieved. The flap donor site and free margin of the flap were secured with deep buried sutures and the wound edges were re-approximated. Cheek To Nose Interpolation Flap Division And Inset Text: Division and inset of the cheek to nose interpolation flap was performed to achieve optimal aesthetic result, restore normal anatomic appearance and avoid distortion of normal anatomy, expedite and facilitate wound healing, achieve optimal functional result and because linear closure either not possible or would produce suboptimal result. The patient was prepped and draped in the usual manner. The pedicle was infiltrated with local anesthesia. The pedicle was sectioned with a #15 blade. The pedicle was de-bulked and trimmed to match the shape of the defect. Hemostasis was achieved. The flap donor site and free margin of the flap were secured with deep buried sutures and the wound edges were re-approximated. Melolabial Interpolation Flap Division And Inset Text: Division and inset of the melolabial interpolation flap was performed to achieve optimal aesthetic result, restore normal anatomic appearance and avoid distortion of normal anatomy, expedite and facilitate wound healing, achieve optimal functional result and because linear closure either not possible or would produce suboptimal result. The patient was prepped and draped in the usual manner. The pedicle was infiltrated with local anesthesia. The pedicle was sectioned with a #15 blade. The pedicle was de-bulked and trimmed to match the shape of the defect. Hemostasis was achieved. The flap donor site and free margin of the flap were secured with deep buried sutures and the wound edges were re-approximated. Mastoid Interpolation Flap Division And Inset Text: Division and inset of the mastoid interpolation flap was performed to achieve optimal aesthetic result, restore normal anatomic appearance and avoid distortion of normal anatomy, expedite and facilitate wound healing, achieve optimal functional result and because linear closure either not possible or would produce suboptimal result. The patient was prepped and draped in the usual manner. The pedicle was infiltrated with local anesthesia. The pedicle was sectioned with a #15 blade. The pedicle was de-bulked and trimmed to match the shape of the defect. Hemostasis was achieved. The flap donor site and free margin of the flap were secured with deep buried sutures and the wound edges were re-approximated. Paramedian Forehead Flap Division And Inset Text: Division and inset of the paramedian forehead flap was performed to achieve optimal aesthetic result, restore normal anatomic appearance and avoid distortion of normal anatomy, expedite and facilitate wound healing, achieve optimal functional result and because linear closure either not possible or would produce suboptimal result. The patient was prepped and draped in the usual manner. The pedicle was infiltrated with local anesthesia. The pedicle was sectioned with a #15 blade. The pedicle was de-bulked and trimmed to match the shape of the defect. Hemostasis was achieved. The flap donor site and free margin of the flap were secured with deep buried sutures and the wound edges were re-approximated. Posterior Auricular Interpolation Flap Division And Inset Text: Division and inset of the posterior auricular interpolation flap was performed to achieve optimal aesthetic result, restore normal anatomic appearance and avoid distortion of normal anatomy, expedite and facilitate wound healing, achieve optimal functional result and because linear closure either not possible or would produce suboptimal result. The patient was prepped and draped in the usual manner. The pedicle was infiltrated with local anesthesia. The pedicle was sectioned with a #15 blade. The pedicle was de-bulked and trimmed to match the shape of the defect. Hemostasis was achieved. The flap donor site and free margin of the flap were secured with deep buried sutures and the wound edges were re-approximated. Manual Repair Warning Statement: We plan on removing the manually selected variable below in favor of our much easier automatic structured text blocks found in the previous tab. We decided to do this to help make the flow better and give you the full power of structured data. Manual selection is never going to be ideal in our platform and I would encourage you to avoid using manual selection from this point on, especially since I will be sunsetting this feature. It is important that you do one of two things with the customized text below. First, you can save all of the text in a word file so you can have it for future reference. Second, transfer the text to the appropriate area in the Library tab. Lastly, if there is a flap or graft type which we do not have you need to let us know right away so I can add it in before the variable is hidden. No need to panic, we plan to give you roughly 6 months to make the change. Consent: The rationale for Repairs was explained to the patient and consent was obtained. The risks, benefits and alternatives to therapy were discussed in detail. Specifically, the risks of infection, scarring, bleeding, prolonged wound healing, incomplete removal, allergy to anesthesia, nerve injury and recurrence were addressed. Prior to the procedure, the treatment site was clearly identified and confirmed by the patient. All components of Universal Protocol/PAUSE Rule completed. Post-Care Instructions: I reviewed with the patient in detail post-care instructions. Patient is not to engage in any heavy lifting, exercise, or swimming for the next 14 days. Should the patient develop any fevers, chills, bleeding, severe pain patient will contact the office immediately. Pain Refusal Text: I offered to prescribe pain medication but the patient refused to take this medication. Where Do You Want The Question To Include Opioid Counseling Located?: Case Summary Tab Information: Selecting Yes will display possible errors in your note based on the variables you have selected. This validation is only offered as a suggestion for you. PLEASE NOTE THAT THE VALIDATION TEXT WILL BE REMOVED WHEN YOU FINALIZE YOUR NOTE. IF YOU WANT TO FAX A PRELIMINARY NOTE YOU WILL NEED TO TOGGLE THIS TO 'NO' IF YOU DO NOT WANT IT IN YOUR FAXED NOTE.

## 2025-01-07 NOTE — ANESTHESIA POSTPROCEDURE EVALUATION
Patient: Ernestine Morales    Procedure: Procedure(s):  Transesophageal echocardiogram intraoperative       Anesthesia Type:  No value filed.    Note:     Postop Pain Control: Uneventful            Sign Out: Well controlled pain   PONV:    Neuro/Psych: Uneventful            Sign Out: Acceptable/Baseline neuro status   Airway/Respiratory: Uneventful            Sign Out: Acceptable/Baseline resp. status   CV/Hemodynamics: Uneventful            Sign Out: Acceptable CV status; No obvious hypovolemia; No obvious fluid overload   Other NRE:    DID A NON-ROUTINE EVENT OCCUR?            Last vitals:  Vitals:    01/07/25 1400 01/07/25 1410 01/07/25 1420   BP: 93/65 90/61 92/60   Pulse: 82 83 85   Resp: 16 16 16   Temp:      SpO2: 99% 94% 94%       Electronically Signed By: Westley Pratt MD  January 7, 2025  2:30 PM

## 2025-01-07 NOTE — ED PROVIDER NOTES
Emergency Department Note      History of Present Illness     Chief Complaint   Palpitations      HPI   Ernestine Morales is a 64 year old female with a history of atrial fibrillation s/p MAZE procedure, mitral valve replacement with closure of ARIANNA now with mitral stenosis, pulmonary hypertension, anemia, aphasia, CVA, diastolic CHF, hemiplegia, hyperlipidemia, hypertension, and more who presents to the ED today for evaluation of palpitations and blood pressure concerns. The patient reports her blood pressure has been low and she's felt her heart racing for the past five days. She denies any shortness of breath or chest pain associated with this. Per Urgency Room note, where she was earlier today, the patient also reports weakness, fatigue, and one episode of vomiting yesterday. Her pressures over the last 4-5 days have been around 78/50, with her heart rate ranging from  bpm. She reports that she takes a full dose aspirin everyday, but not any anticoagulants for her atrial fibrillation. She denies any fever, cough, cold symptoms, abdominal pain, diarrhea, loss of consciousness, dizziness, issues eating or drinking, or any other symptoms.     Independent Historian   None    Review of External Notes   Reviewed urgency room note from earlier today as well as multiple cardiology notes including 11/25/24, 11/22/24, recent echo and coronary angiogram notes.     Pt is scheduled for transcutaneous mitral valve replacement on 1/24/25 @ [facility].     Past Medical History     Medical History and Problem List   Anemia  Aphasia  Atrial fibrillation  Cancer  Carotid artery stenosis   CVA  CHF   Hyperlipidemia   Hypertension  Mitral regurgitation  CAD  Hemiplegia    CKD  MDD  Aortic sclerosis     Medications   Aspirin 325 mg  Lipitor  Gabapentin  Torsemide   Losartan  Amlodipine  Pravastatin   Metoprolol tartrate  Fluoxetine   Amiodarone  Duloxetine  Warfarin   Senokot S    Surgical History   Coronary angiogram  Left  "heart catheterization  Right heart catheterization  Endarterectomy carotid   PICC triple lumen placement   Replace mitral valve  Shoulder acromioclavicular separation repair   Tonsillectomy  Cardioversion    Physical Exam     Patient Vitals for the past 24 hrs:   BP Temp Temp src Pulse Resp SpO2 Height Weight   01/06/25 2101 103/74 -- -- (!) 147 16 97 % -- --   01/06/25 2039 92/69 -- -- (!) 149 18 97 % -- --   01/06/25 1943 94/78 -- -- (!) 151 18 -- -- --   01/06/25 1928 -- -- -- (!) 134 18 99 % -- --   01/06/25 1856 124/74 99.2  F (37.3  C) Oral -- 16 98 % 1.549 m (5' 1\") 36.3 kg (80 lb)     Physical Exam  Nursing note and vitals reviewed.  HENT:   Mouth/Throat: dry mucous membranes.   Eyes: EOMI, nonicteric sclera  Cardiovascular: tachycardic, irregularly irregular rhythm, systolic murmur noted.   Pulmonary/Chest: Effort normal and breath sounds normal. No respiratory distress. No wheezes. No rales.   Musculoskeletal: Normal range of motion.   Neurological: Alert. Moves all extremities spontaneously.   Skin: Skin is warm and dry. No rash noted.         Diagnostics     Lab Results   Labs Ordered and Resulted from Time of ED Arrival to Time of ED Departure   BASIC METABOLIC PANEL - Abnormal       Result Value    Sodium 132 (*)     Potassium 2.8 (*)     Chloride 92 (*)     Carbon Dioxide (CO2) 26      Anion Gap 14      Urea Nitrogen 37.2 (*)     Creatinine 1.14 (*)     GFR Estimate 53 (*)     Calcium 8.8      Glucose 92     TROPONIN T, HIGH SENSITIVITY - Abnormal    Troponin T, High Sensitivity 46 (*)    CBC WITH PLATELETS AND DIFFERENTIAL - Abnormal    WBC Count 7.9      RBC Count 3.41 (*)     Hemoglobin 9.6 (*)     Hematocrit 29.4 (*)     MCV 86      MCH 28.2      MCHC 32.7      RDW 14.8      Platelet Count 242      % Neutrophils 64      % Lymphocytes 28      % Monocytes 7      % Eosinophils 1      % Basophils 1      % Immature Granulocytes 0      NRBCs per 100 WBC 0      Absolute Neutrophils 5.0      Absolute " Lymphocytes 2.2      Absolute Monocytes 0.5      Absolute Eosinophils 0.0      Absolute Basophils 0.1      Absolute Immature Granulocytes 0.0      Absolute NRBCs 0.0     MAGNESIUM - Normal    Magnesium 2.1         Imaging   None      EKG   ECG taken at 1946, ECG read at 1950  Atrial flutter with variable AV block  ST & T wave abnormality, consider lateral ischemia   A-fib/flutter new as compared to prior, dated 10/17/24.  Rate 147 bpm. CA interval * ms. QRS duration 80 ms. QT/QTc 310/485 ms. P-R-T axes * 79 149.    Independent Interpretation   None    ED Course      Medications Administered   Medications   sodium chloride 0.9% BOLUS 1,000 mL (1,000 mLs Intravenous $New Bag 1/6/25 2035)   diltiazem (CARDIZEM) 125 mg in sodium chloride 0.9 % 125 mL infusion (5 mg/hr Intravenous $New Bag 1/6/25 2102)   heparin 25,000 units in 0.45% NaCl 250 mL ANTICOAGULANT infusion (450 Units/hr Intravenous $New Bag 1/6/25 2109)   potassium chloride 10 mEq in 100 mL sterile water infusion (10 mEq Intravenous $New Bag 1/6/25 2059)   potassium chloride errol ER (KLOR-CON M20) CR tablet 40 mEq (has no administration in time range)   digoxin (LANOXIN) injection 250 mcg (250 mcg Intravenous $Given 1/6/25 2045)   potassium chloride errol ER (KLOR-CON M20) CR tablet 40 mEq (40 mEq Oral $Given 1/6/25 2032)   heparin loading dose for LOW INTENSITY TREATMENT * Give BEFORE starting heparin infusion (2,200 Units Intravenous $Given 1/6/25 2108)     Discussion of Management   Cardiology, Dr. Benjamin  Admitting hospitalist,     ED Course   ED Course as of 01/06/25 2122 Mon Jan 06, 2025 1905 I obtained history and examined the patient as noted above.    1951 I spoke with Dr. Benjamin, cardiology, on the phone regarding the patient.    2030 I spoke with Dr. Shah, admitting hospitalist, on the phone regarding the patient.    2058 I spoke with Dr. Shah on the phone again regarding the patient. They accepted the patient for admission.         Additional Documentation  None    Medical Decision Making / Diagnosis     CMS Diagnoses: None    MIPS       None    MDM   Ernestine Morales is a 64 year old female who presents with low blood pressure and rapid heart rate.  Patient in A-fib with RVR.  She was at urgency room earlier today and was there for quite a few hours before transfer.  Cardizem was trialed without significant benefit, but patient had hypotension.  On arrival here, she is tachycardic with soft BP.  She denies any current symptoms.  EKG confirms A-fib/flutter with RVR.  She is not currently anticoagulated and has likely been in A-fib for at least the last 5 days.  Given her hypotension, and history of left atrial appendage closure, I did consider ED cardioversion, but SBP is largely greater than 90 and patient is not having ischemic symptoms.  Cardizem already attempted earlier today without significant benefit.  Hypotension that was noted earlier at urgent care appears to be improved.  Patient appears dry on exam and had JAMES on labs earlier today suggesting at least some component of hypovolemia.  She received 500 mL of fluid at the emergency room.  Given her complex cardiac history, I contacted cardiology and spoke with Dr. Benjamin, regarding immediate management.  She recommended initiation of IV heparin given patient has history of CVA and dilated atria.  She recommended against cardioversion either electric or with amiodarone due to this increased risk of stroke.  She recommended initiating IV digoxin 250 mg IV now and another 250 mg IV in 12 hours.  She also recommended starting diltiazem without a bolus at 5 mg/h as well as giving a 1L saline bolus over 4 hours.  Potassium was also replaced as patient is hypokalemic.  Patient is largely asymptomatic and has likely been hypotensive and tachycardic for the last 5 days.  Troponin is only very mildly elevated.  Anticipate patient may need ANGE cardioversion and cardiology recommended  making patient n.p.o. after midnight in the event this is needed.  I discussed with admitting hospitalist, Dr. Shah who accepted pt for admission. I updated pt as to plan and answered all questions. Pt will be going to [facility] bed overnight.     Critical care time: 40 minutes excluding procedures.    Disposition   The patient was admitted to the hospital.     Diagnosis     ICD-10-CM    1. Atrial fibrillation with RVR (H)  I48.91       2. JAMES (acute kidney injury) (H)  N17.9       3. Hypokalemia  E87.6              Scribe Disclosure:  I, Anastasia Raymond, am serving as a scribe at 7:14 PM on 1/6/2025 to document services personally performed by David Mccormack MD based on my observations and the provider's statements to me.        David Mccormack MD  01/07/25 0015

## 2025-01-07 NOTE — PRE-PROCEDURE
GENERAL PRE-PROCEDURE:   Procedure:  Transesophageal echocardiogram guided cardioversion.  Date/Time:  1/7/2025 1:00 PM    Written consent obtained?: Yes    Risks and benefits: Risks, benefits and alternatives were discussed    Consent given by:  Patient  Patient states understanding of procedure being performed: Yes    Patient's understanding of procedure matches consent: Yes    Procedure consent matches procedure scheduled: Yes    Expected level of sedation:  Moderate  Appropriately NPO:  Yes  ASA Class:  1  Mallampati  :  Grade 1- soft palate, uvula, tonsillar pillars, and posterior pharyngeal wall visible  Lungs:  Lungs clear with good breath sounds bilaterally  Heart:  A-fib and diastolic murmur  History & Physical reviewed:  History and physical reviewed and no updates needed  Statement of review:  I have reviewed the lab findings, diagnostic data, medications, and the plan for sedation

## 2025-01-07 NOTE — ED NOTES
[facility]  ED Nurse Handoff Report    ED Chief complaint: Palpitations      ED Diagnosis:   Final diagnoses:   Atrial fibrillation with RVR (H)   JAMES (acute kidney injury) (H)       Code Status: to be addressed    Allergies: No Known Allergies    Patient Story: Pt has a hx of anemia, previous stroke with aphasia, cancer, CHF, who presents to ed to be evaluated for palpitations. Pt reports she feels that her heart has been racing for the past 5 days, and also endorses that the BP has been low. Pt was seen at  earlier today, and sent here.     Focused Assessment:    Respiratory: On RA, stating in the 90s  Cardiac: a-fib, on diltazem drip, digoxin given   Neuro: aphasia and hemiplegia from previous CVA, alert    Treatments and/or interventions provided:   Labs Ordered and Resulted from Time of ED Arrival to Time of ED Departure   BASIC METABOLIC PANEL - Abnormal       Result Value    Sodium 132 (*)     Potassium 2.8 (*)     Chloride 92 (*)     Carbon Dioxide (CO2) 26      Anion Gap 14      Urea Nitrogen 37.2 (*)     Creatinine 1.14 (*)     GFR Estimate 53 (*)     Calcium 8.8      Glucose 92     TROPONIN T, HIGH SENSITIVITY - Abnormal    Troponin T, High Sensitivity 46 (*)    CBC WITH PLATELETS AND DIFFERENTIAL - Abnormal    WBC Count 7.9      RBC Count 3.41 (*)     Hemoglobin 9.6 (*)     Hematocrit 29.4 (*)     MCV 86      MCH 28.2      MCHC 32.7      RDW 14.8      Platelet Count 242      % Neutrophils 64      % Lymphocytes 28      % Monocytes 7      % Eosinophils 1      % Basophils 1      % Immature Granulocytes 0      NRBCs per 100 WBC 0      Absolute Neutrophils 5.0      Absolute Lymphocytes 2.2      Absolute Monocytes 0.5      Absolute Eosinophils 0.0      Absolute Basophils 0.1      Absolute Immature Granulocytes 0.0      Absolute NRBCs 0.0     MAGNESIUM - Normal    Magnesium 2.1        No orders to display      Patient's response to treatments and/or interventions: tolerated    To be  done/followed up on inpatient unit:  tele, repeat labs and imaging     Does this patient have any cognitive concerns?:  none    Activity level - Baseline/Home:  Independent  Activity Level - Current:   Unknown    Patient's Preferred language: English   Needed?: No    Isolation: None  Infection: Not Applicable  Patient tested for COVID 19 prior to admission: NO  Bariatric?: No    Vital Signs:   Vitals:    01/06/25 1943 01/06/25 2039 01/06/25 2101 01/06/25 2204   BP: 94/78 92/69 103/74 (!) 82/68   Pulse: (!) 151 (!) 149 (!) 147 (!) 121   Resp: 18 18 16 16   Temp:       TempSrc:       SpO2:  97% 97% 97%   Weight:       Height:           Cardiac Rhythm:     Was the PSS-3 completed:   Yes  What interventions are required if any?               Family Comments:       For the majority of the shift this patient's behavior was Green.   Behavioral interventions performed were .    ED NURSE PHONE NUMBER: *[phone]

## 2025-01-07 NOTE — ED TRIAGE NOTES
Pt arrived today via EMS from urgent care, pt was having palpitations and went into urgent care, found to be in A fib RVRT, Dilt given x2, 15 mg and 10 mg, no effect.      Triage Assessment (Adult)       Row Name 01/06/25 0584          Triage Assessment    Airway WDL WDL        Respiratory WDL    Respiratory WDL WDL        Skin Circulation/Temperature WDL    Skin Circulation/Temperature WDL WDL        Cardiac WDL    Cardiac WDL X;rhythm     Pulse Rate & Regularity apical pulse irregular;tachycardic        Peripheral/Neurovascular WDL    Peripheral Neurovascular WDL WDL        Cognitive/Neuro/Behavioral WDL    Cognitive/Neuro/Behavioral WDL WDL

## 2025-01-07 NOTE — PROGRESS NOTES
ANGE performed.  No clot.  Cardioversion successful.  Will start amiodarone to maintain sinus rhythm.  Give amiodarone IV today and then switch to p.o. tomorrow.  Switch heparin to Eliquis tomorrow.  While on Eliquis, decrease aspirin to baby aspirin.

## 2025-01-07 NOTE — CONSULTS
[facility]    Cardiology Consultation     Date of Admission:  1/6/2025    Assessment & Plan   Ernestine Morales is a 64 year old female who was admitted on 1/6/2025.    Symptomatic acute atrial fibrillation with rapid ventricular rate, patient has had history of maze procedure in 2017 with her mitral valve replacement and also her left atrial appendage ligation  Hypotension, likely combination of hypovolemic and cardiogenic shock, blood pressure improved to 95 systolic  Hypokalemia on admission with hyponatremia  Severe bioprosthetic mitral valve stenosis  Severe secondary pulmonary hypertension  Severe tricuspid regurgitation  Moderate aortic regurgitation  Single-vessel coronary disease, 70% circumflex  Elevated troponins, type II MI related to hypotension and rapid atrial fibrillation  10.  Prior CVA in 2017  11.  Elevated LFTs, likely shock liver  12.  Acute renal failure. Hold torsemide. Avoid nephrotoxins. Prn fluid bolus    Discussion  At this time, patient continues to have rapid atrial fibrillation with soft blood pressures limiting her use of AV noemi blockers.  Therefore I would recommend proceeding with cardioversion.  I would also recommend transesophageal echocardiogram to rule out intra atrial clots.  Although she has had left atrial appendage ligation, confirmed by CT done last year, given her mitral valve disease and bypass and mitral stenosis, she is also at high risk for left atrial clots and therefore would be ideal to extubate that if feasible.  Given soft blood pressures, she this procedure is best done with assistant of our anesthesia colleagues.  Following cardioversion, she will benefit from at least a month of anticoagulation.  The risks and benefits of ANGE has been discussed with the patient and/or relatives in detail include approximate 1 in 5000 risk of serious complications including rare risk of death, esophageal tear, pharyngeal hematoma, methemoglobinemia,  GI bleed etc. The patient is willing to proceed with it.  The risks and benefits of DCCV has been explained to patient in details including but not limited to arrhythmia, pauses, death, burns, respiratory failure (secondary to concsious sedation/anesthesia), etc. Patient and/family understand it and wish to proceed.      Plan  ANGE with cardioversion with anesthesia support  Further recommendation based on results of the ANGE including LV function assessment  May need anticoagulation for at least a month after cardioversion.  While on anticoagulation will decrease aspirin to baby aspirin dose.  Critical care time of 55 minutes spent in evaluation management of this patient with rapid atrial fibrillation with hemodynamic compromise and mild hypotension, severe bioprosthetic valve stenosis as well as severe pulmonary hypertension.      Dedrick Maki MD  Primary Care Physician   [facility] ALBRIGHT    Reason for Consult   Reason for consult: I was asked by hospitalist to evaluate this patient for rapid.    History of Present Illness   Ernestine Morales is a 64 year old female with significant cardiac history who presents to the ED with 4-5 days of palpitations, dizziness and hypotension.     She has a past medical history of mitral  valve disease status post bioprosthetic mitral valve replacement in 2017 with minithoracotomy in [place].  More recently she has been following with Dr. Douglass at [facility].  He notes really she has developed severe bioprosthetic mitral valve stenosis and has been evaluated in [place] for transcutaneous valve in valve repair.  Echo at [place] revealed increased mean grain across the mitral valve bioprosthesis of 21 mm.  Severe pulmonary hypertension was noted.  In October, she had a cardiac catheterization which revealed 70% mid segment stenosis which was managed medically.  There was severely elevated left-sided filling pressures suggesting that the pulmonary hypertension was  postcapillary.  Yes is otherwise urinating in addition patient was noted to have severe tricuspid regurgitation and moderate aortic valve regurgitation at [facility].  She is scheduled for valve repair on 28th January.    Now she presents with increasing palpitations lightheadedness and weakness.      Patient also history of peripheral arterial disease with severe left internal carotid artery stenosis previous carotid endarterectomy in September 2024.  She had a left hypoglossal injury related to the surgery. History of CVA in 2017.  She has obstructive sleep apnea and hyperlipidemia.    In the emergency room she was noted to have atrial fibrillation heart rate up to 165 bpm and hypotension.  She was given fluid bolus and IV Cardizem followed by another dose of Cardizem.  Patient continues to have soft blood pressures and has received additional dose of digoxin but the heart rate is still quite elevated.  She denies any recent fever, chills, cough or chest pain or shortness of breath.    Due to her previous CVA and speech difficulty, it is sometimes difficult to understand her but she is able to communicate well and answer questions.    Past Medical History   Past Medical History:   Diagnosis Date    Anemia     Aphasia     Atrial fibrillation (H)     Cancer (H) 11.17.2022    Squamous cell carcinoma nRight Cheek    Carotid artery stenosis     Cerebrovascular accident (H) 01/09/2017    Congestive heart failure (H)     HLD (hyperlipidemia)     HTN (hypertension)     Mitral regurgitation          Past Surgical History   Past Surgical History:   Procedure Laterality Date    BIOPSY  11.18.2022    Right Cheek    CV CORONARY ANGIOGRAM N/A 10/17/2024    Procedure: Coronary Angiogram;  Surgeon: Amilcar Pierre MD;  Location: [facility] CATH LAB CV    CV LEFT HEART CATH N/A 10/17/2024    Procedure: Left Heart Catheterization;  Surgeon: Amilcar Pierre MD;  Location: [facility] CATH LAB CV    CV RIGHT HEART CATH MEASUREMENTS  RECORDED N/A 9/19/2024    Procedure: Right Heart Catheterization;  Surgeon: Amilcar Pierre MD;  Location: [facility] CATH LAB CV    ENDARTERECTOMY CAROTID Left 9/13/2024    Procedure: ENDARTERECTOMY, CAROTID WITH NEURO MONITORING;  Surgeon: Margarita Tobin MD;  Location: [facility] Main OR    PICC TRIPLE LUMEN PLACEMENT  9/14/2024    REPLACE VALVE MITRAL  06/09/2017    bovine mitral valve with Maze and ARIANNA ligation         Prior to Admission Medications   Prior to Admission Medications   Prescriptions Last Dose Informant Patient Reported? Taking?   aspirin (ASA) 325 MG EC tablet 1/6/2025 Morning  Yes Yes   Sig: Take 325 mg by mouth daily   atorvastatin (LIPITOR) 80 MG tablet 1/6/2025 Morning  No Yes   Sig: Take 1 tablet (80 mg) by mouth daily.   gabapentin (NEURONTIN) 300 MG capsule 1/6/2025 Morning  No Yes   Sig: Take 1 capsule (300 mg) by mouth 2 times daily.   torsemide (DEMADEX) 20 MG tablet 1/6/2025 Morning  No Yes   Sig: Take 1 tablet (20 mg) by mouth daily.      Facility-Administered Medications: None     Current Facility-Administered Medications   Medication Dose Route Frequency Provider Last Rate Last Admin    acetaminophen (TYLENOL) tablet 975 mg  975 mg Oral Q4H PRN Jackson Shah MD   975 mg at 01/06/25 2324    aspirin (ASA) EC tablet 325 mg  325 mg Oral Daily Jackson Shah MD        [Held by provider] atorvastatin (LIPITOR) tablet 80 mg  80 mg Oral Daily Jackson Shah MD        calcium carbonate (TUMS) chewable tablet 1,000 mg  1,000 mg Oral 4x Daily PRN Jackson Shah MD        digoxin (LANOXIN) injection 250 mcg  250 mcg Intravenous Once Jackson Shah MD        [Held by provider] diltiazem (CARDIZEM) 125 mg in sodium chloride 0.9 % 125 mL infusion  5 mg/hr Intravenous Continuous Jackson Shah MD   Stopped at 01/07/25 0031    [Held by provider] gabapentin (NEURONTIN) capsule 300 mg  300 mg Oral BID Jackson Shah MD        heparin 25,000 units in 0.45% NaCl 250 mL ANTICOAGULANT infusion   0-5,000 Units/hr Intravenous Continuous Jackson Shah MD 6 mL/hr at 01/07/25 0333 600 Units/hr at 01/07/25 0333    Lidocaine (LIDOCARE) 4 % Patch 1 patch  1 patch Transdermal Q24H Jairo Philippe MD   1 patch at 01/07/25 0522    lidocaine (LMX4) cream   Topical Q1H PRN Jackson Shah MD        lidocaine 1 % 0.1-1 mL  0.1-1 mL Other Q1H PRN Jackson Shah MD        melatonin tablet 1 mg  1 mg Oral At Bedtime PRN Jackson Shah MD        Patient is already receiving anticoagulation with heparin, enoxaparin (LOVENOX), warfarin (COUMADIN)  or other anticoagulant medication   Does not apply Continuous PRN Jackson Shah MD        senna-docusate (SENOKOT-S/PERICOLACE) 8.6-50 MG per tablet 1 tablet  1 tablet Oral BID PRN Jackson Shah MD        Or    senna-docusate (SENOKOT-S/PERICOLACE) 8.6-50 MG per tablet 2 tablet  2 tablet Oral BID PRN Jackson Shah MD        sodium chloride (PF) 0.9% PF flush 3 mL  3 mL Intracatheter Q8H Jackson Shah MD        sodium chloride (PF) 0.9% PF flush 3 mL  3 mL Intracatheter q1 min prn Jackson Shah MD        [Held by provider] torsemide (DEMADEX) tablet 20 mg  20 mg Oral Daily Jackson Shah MD         Current Facility-Administered Medications   Medication Dose Route Frequency Provider Last Rate Last Admin    acetaminophen (TYLENOL) tablet 975 mg  975 mg Oral Q4H PRN Jackson Shah MD   975 mg at 01/06/25 2324    aspirin (ASA) EC tablet 325 mg  325 mg Oral Daily Jackson Shah MD        [Held by provider] atorvastatin (LIPITOR) tablet 80 mg  80 mg Oral Daily Jackson Shah MD        calcium carbonate (TUMS) chewable tablet 1,000 mg  1,000 mg Oral 4x Daily PRN Jackson Shah MD        digoxin (LANOXIN) injection 250 mcg  250 mcg Intravenous Once Jackson Shah MD        [Held by provider] diltiazem (CARDIZEM) 125 mg in sodium chloride 0.9 % 125 mL infusion  5 mg/hr Intravenous Continuous Jackson Shah MD   Stopped at 01/07/25 0031    [Held by provider] gabapentin (NEURONTIN)  capsule 300 mg  300 mg Oral BID Jackson Shah MD        heparin 25,000 units in 0.45% NaCl 250 mL ANTICOAGULANT infusion  0-5,000 Units/hr Intravenous Continuous Jackson Shah MD 6 mL/hr at 01/07/25 0333 600 Units/hr at 01/07/25 0333    Lidocaine (LIDOCARE) 4 % Patch 1 patch  1 patch Transdermal Q24H Jairo Philippe MD   1 patch at 01/07/25 0522    lidocaine (LMX4) cream   Topical Q1H PRN Jackson Shah MD        lidocaine 1 % 0.1-1 mL  0.1-1 mL Other Q1H PRN Jackson Shah MD        melatonin tablet 1 mg  1 mg Oral At Bedtime PRN Jackson Shah MD        Patient is already receiving anticoagulation with heparin, enoxaparin (LOVENOX), warfarin (COUMADIN)  or other anticoagulant medication   Does not apply Continuous PRN Jackson Shah MD        senna-docusate (SENOKOT-S/PERICOLACE) 8.6-50 MG per tablet 1 tablet  1 tablet Oral BID PRN Jackson Shah MD        Or    senna-docusate (SENOKOT-S/PERICOLACE) 8.6-50 MG per tablet 2 tablet  2 tablet Oral BID PRN Jackson Shah MD        sodium chloride (PF) 0.9% PF flush 3 mL  3 mL Intracatheter Q8H Jackson Shah MD        sodium chloride (PF) 0.9% PF flush 3 mL  3 mL Intracatheter q1 min prn Jackson Shah MD        [Held by provider] torsemide (DEMADEX) tablet 20 mg  20 mg Oral Daily Jackson Shah MD         Allergies   No Known Allergies    Social History    reports that she has quit smoking. Her smoking use included cigarettes. She started smoking about 40 years ago. She has a 35 pack-year smoking history. She has never been exposed to tobacco smoke. She has never used smokeless tobacco. She reports that she does not currently use alcohol. She reports that she does not use drugs.      Family History   I have reviewed this patient's family history and updated it with pertinent information if needed.  Family History   Problem Relation Age of Onset    Hypertension Mother           Review of Systems   A comprehensive review of system was performed and is negative  "other than that noted in the HPI or here.     Physical Exam   Vital Signs with Ranges  Temp:  [98.4  F (36.9  C)-99.2  F (37.3  C)] 98.4  F (36.9  C)  Pulse:  [] 146  Resp:  [13-27] 20  BP: ()/() 86/68  SpO2:  [90 %-100 %] 100 %  Wt Readings from Last 4 Encounters:   01/07/25 38.1 kg (84 lb 1.6 oz)   11/22/24 37.6 kg (82 lb 12.8 oz)   10/22/24 39.2 kg (86 lb 6.4 oz)   10/17/24 39.5 kg (87 lb)     No intake/output data recorded.      Vitals: BP (!) 86/68   Pulse (!) 146   Temp 98.4  F (36.9  C) (Oral)   Resp 20   Ht 1.549 m (5' 1\")   Wt 38.1 kg (84 lb 1.6 oz)   SpO2 100%   BMI 15.89 kg/m      Physical Exam:   General - Alert and oriented to time place and person in no acute distress  Eyes - No scleral icterus  HEENT - Neck supple, moist mucous membranes  Cardiovascular -irregular S1-S2 with a 2 x 6 mid systolic  murmur in the Tarea  Extremities - There is no edema  Respiratory -Rales in Rt LZ/MZ  Skin - No pallor or cyanosis  Gastrointestinal - Non tender and non distended without rebound or guarding  Psych - Appropriate affect   Neurological - mild residual mamta  No lab results found in last 7 days.    Invalid input(s): \"TROPONINIES\"    Recent Labs   Lab 01/07/25  0622 01/07/25  0225 01/06/25 1957   WBC 10.3  --  7.9   HGB 10.4*  --  9.6*   MCV 86  --  86     --  242    134* 132*   POTASSIUM 5.5* 5.8* 2.8*   CHLORIDE 102 99 92*   CO2 20* 23 26   BUN 34.5* 34.4* 37.2*   CR 1.21* 1.26* 1.14*   GFRESTIMATED 49* 47* 53*   ANIONGAP 14 12 14   BISHOP 8.3* 8.5* 8.8   GLC 81 99 92   ALBUMIN 3.3* 3.5  --    PROTTOTAL 6.0* 6.5  --    BILITOTAL 1.1 1.1  --    ALKPHOS 189* 179*  --    ALT 1,120* 766*  --    AST 1,137* 640*  --      Recent Labs   Lab Test 10/22/24  0944 08/26/24  1208   CHOL 166 166   HDL 61 53   LDL 87 97   TRIG 92 80     Recent Labs   Lab 01/07/25  0622 01/06/25 1957   WBC 10.3 7.9   HGB 10.4* 9.6*   HCT 31.7* 29.4*   MCV 86 86    242     No results for input(s): " "\"PH\", \"PHV\", \"PO2\", \"PO2V\", \"SAT\", \"PCO2\", \"PCO2V\", \"HCO3\", \"HCO3V\" in the last 168 hours.  No results for input(s): \"NTBNPI\", \"NTBNP\" in the last 168 hours.  No results for input(s): \"DD\" in the last 168 hours.  No results for input(s): \"SED\", \"CRP\" in the last 168 hours.  Recent Labs   Lab 01/07/25  0622 01/06/25 1957    242     No results for input(s): \"TSH\" in the last 168 hours.  No results for input(s): \"COLOR\", \"APPEARANCE\", \"URINEGLC\", \"URINEBILI\", \"URINEKETONE\", \"SG\", \"UBLD\", \"URINEPH\", \"PROTEIN\", \"UROBILINOGEN\", \"NITRITE\", \"LEUKEST\", \"RBCU\", \"WBCU\" in the last 168 hours.    Imaging:  Recent Results (from the past 48 hours)   XR Chest Port 1 View    Narrative    EXAM: XR CHEST PORT 1 VIEW  LOCATION: [facility]  DATE: 1/7/2025    INDICATION: CHF evaluate for pulmonary edema  COMPARISON: 09/14/2024      Impression    IMPRESSION: Stable cardia mediastinal silhouette. Pulmonary vascular congestion. Cardiac valve replacement and left atrial appendage clip. Electronic common device left chest. Atherosclerotic aorta. Clips left neck.       Echo:  No results found for this or any previous visit (from the past 4320 hours).    Clinically Significant Risk Factors Present on Admission        # Hypokalemia: Lowest K = 2.8 mmol/L in last 2 days, will replace as needed  # Hyperkalemia: Highest K = 5.8 mmol/L in last 2 days, will monitor as appropriate  # Hyponatremia: Lowest Na = 132 mmol/L in last 2 days, will monitor as appropriate  # Hypochloremia: Lowest Cl = 92 mmol/L in last 2 days, will monitor as appropriate  # Hypocalcemia: Lowest Ca = 8.3 mg/dL in last 2 days, will monitor and replace as appropriate     # Hypoalbuminemia: Lowest albumin = 3.3 g/dL at 1/7/2025  6:22 AM, will monitor as appropriate   # Drug Induced Platelet Defect: home medication list includes an antiplatelet medication   # Hypertension: Noted on problem list  # Chronic heart failure with preserved ejection " "fraction: heart failure noted on problem list and last echo with EF >50%     # Anemia: based on hgb <11       # Cachexia: Estimated body mass index is 15.89 kg/m  as calculated from the following:    Height as of this encounter: 1.549 m (5' 1\").    Weight as of this encounter: 38.1 kg (84 lb 1.6 oz).             Cardiac Arrhythmia: Atrial fibrillation: Paroxysmal  Heart Valve Replacement  Peripheral vascular disease (PVD)  Hypotension, shock    Acute kidney failure, unspecified    GI, significantly elevated LFTs suggestive of shock liver      Pulmonary Heart Disease (Pulmonary hypertension or Cor pulmonale): Pulmonary Hypertension, unspecified                    "

## 2025-01-07 NOTE — PLAN OF CARE
Care plan note:      Recent Vitals:  Temp: 99.2  F (37.3  C) Temp src: Oral BP: (!) 82/66 Pulse: (!) 130   Resp: 20 SpO2: 100 % O2 Device: None (Room air)      Orientation/Neuro: Alert and Oriented x 4, Right sided Extremity Weakness , Expressive aphasia residual from previous stroke  Pain: Pain is controlled with current analgesics.  Medication(s) being used: acetaminophen and lidocaine patch   Tele: Atrial fibrillation - rapid   IV medications: Normal Saline and Heparin   Mobility: Assist of 1 and activity minimized   Skin: With in normal limits    GI: WDL and no complaints  : Due to Void (bladder scan at 06:10 for 426 mL) and Using Purewick     Diet: Tolerating diet:   NPO, tolerated sips of water  Orders Placed This Encounter      NPO per Anesthesia Guidelines for Procedure/Surgery Except for: Meds      Safety/Concerns:  Fall Risk and Aspiration precautions  Aggression Color: Green    Plan: ANGE and cardioversion when able, continue to monitor BP closely   Continue to monitor.      Zahira Mccormick RN

## 2025-01-07 NOTE — ED NOTES
Talked with Dr. Shah about pt's BPs dropping into the 70s systolic again after diltiazem gtt restarted. Diltiazem gtt stopped.

## 2025-01-07 NOTE — H&P
[facility]    History and Physical - Hospitalist Service       Date of Admission:  1/6/2025    Assessment & Plan    Ernestine Morales is a 64 year old female with PMH A-fib status post maze/ARIANNA closure, CHF, MVR with prosthetic valve stenosis, severe pulmonary hypertension, moderate aortic stenosis, sleep apnea, history of CVA, severe left ICA stenosis status post CEA 9/2024 admitted on 1/6/2025 with A-fib RVR.     Afib/flutter with RVR  Chronic CHF  H/o MVR with prosthetic valve stenosis  Severe pulmonary hypertension  Hypotension, labile BP  Hypotensive but MAP >65, symptomatic- discussed with cardiology avoid cardioversion with high risk of stroke- even with history of left atrial appendage closure, atria are significantly enlarged and patient has had previous stroke.  Concern for labile BP, does not appear to be acutely fluid overloaded, hypotensive but mentating well, lactate remains normal. Initially plan for straight rate diltiazem, patient unable to tolerate with hypotension.  *H/o maze/ARIANNA clipping in 4/2024, MVR in 2017 now with prosthetic valve stenosis  *TTE last 11/25 EF 57%, RVSP 74mmHG, mitral valve prosthesis gradient 21mmHg, mildly enlarged/reduced function of RV, severe TR  -Admit to [facility]  -Cardiology consulted, appreciate recommendations  -NPO for likely ANGE cardioversion   -hold diltiazem drip - unable to tolerate straight rate 5mg   -Given digoxin load 250 mcg x 1, plan for second load 250 mcg in 12 hours at 9am  -Defer TTE pending cardiology's plan- may need ANGE/cardioversion  -Continue heparin GTT  -K>4, Mg>2- repletion as below   -1 L fluid over 4 hours  - Hold PTA torsemide  -Telemetry  -**avoid amiodarone and DCCV per cardiology unless cardiogenic shock (change in mental status) or symptomatic     Hypokalemia, severe  In the setting of arrhythmia, minimal p.o. intake. Given p.o. 40 mEq x 2, IV 20 mEq over 2 hours in ED  -Repeat potassium after repletion  -RN high  "intensity potassium protocol    Elevated Troponin suspect Type II MI   Troponin elevated/flat 46 --> 50, EKG without obvious ischemia, no chest pain. Overall suspect demand ischemia in the setting of afib RVR.   - Monitor for chest pain   - Treat arrhythmia as above    CAD  Coronary angiogram 10/17/2024 with single-vessel obstructive disease involving mid leftcircumflex- no stent placed, and mild nonobstructive disease elsewhere  - PTA statin    HTN  Not on PTA antihypertensives, hypotensive currently.     Severe left ICA stenosis s/p CEA 9/2024  H/o CVA  HLD  L sided MCA stroke in 2017, has residual expressive aphasia  -Continue PTA atorvastatin    Neuropathic pain  - Hold PTA gabapentin for now          Diet: NPO per Anesthesia Guidelines for Procedure/Surgery Except for: Meds    DVT Prophylaxis: heparin gtt  Nuñez Catheter: Not present  Lines: None     Cardiac Monitoring: None  Code Status:  FULL, confirmed on admission    Clinically Significant Risk Factors Present on Admission        # Hypokalemia: Lowest K = 2.8 mmol/L in last 2 days, will replace as needed  # Hyponatremia: Lowest Na = 132 mmol/L in last 2 days, will monitor as appropriate  # Hypochloremia: Lowest Cl = 92 mmol/L in last 2 days, will monitor as appropriate        # Drug Induced Platelet Defect: home medication list includes an antiplatelet medication   # Hypertension: Noted on problem list  # Chronic heart failure with preserved ejection fraction: heart failure noted on problem list and last echo with EF >50%     # Anemia: based on hgb <11       # Cachexia: Estimated body mass index is 15.12 kg/m  as calculated from the following:    Height as of this encounter: 1.549 m (5' 1\").    Weight as of this encounter: 36.3 kg (80 lb).              Disposition Plan     Medically Ready for Discharge: Anticipated in 2-4 Days         Jackson Shah MD  Hospitalist Service  [facility]  Securely message with [employer] (more info)  Text " page via [facility] Paging/Directory     ______________________________________________________________________    Chief Complaint   Dizziness, palpitations    History is obtained from the patient    History of Present Illness   Ernestine Moraels is a 64 year old female with significant cardiac history who presents to the ED with 4-5 days of palpitations, dizziness and hypotension.     Patient reports feeling her heart racing intermittently for about 4 to 5 days, associated with weakness and dizziness and low blood pressure readings.  She denies any chest pain, shortness of breath, lower extremity swelling/weight gain, nausea or vomiting (though emergency room note notes 1 episode of vomiting yesterday), fevers or chills, dysuria/increased urinary frequency or other concerns.  She reports compliance with her medications.  She had planned to have a transcatheter mitral valve repair with [facility] 1/28/2025.  She takes a full dose aspirin daily, not on anticoagulation otherwise has a history of Maze procedure with left atrial appendage closure, is not rate control medicines.    She was seen in urgent care earlier today, heart rates noted to be up to 165 and A-fib, with blood pressures initially 78 systolic.  She was given 10 mg IV Cardizem with 250 cc bolus with some improvement in blood pressure without change in heart rate, then given second dose of Cardizem 50 mg and additional to 50 cc bolus which tried pressures to 60s systolic, recovered without additional intervention.  She was transferred to [facility] for further care as no beds available with [facility] system.      ED Course  VS: Tmax 99.2, HR 130s-170s, BP 120s => 90s/70s => 60s/40s, RR 18, SpO2 greater than 98% on RA  Workup:   Labs: K2.8, , CR 1.14 (baseline less than 1), troponin 46, magnesium 2.1, hemoglobin 9.6 (baseline 8-10)  Imaging: No imaging, EKG with a flutter versus A-fib with variable AV conduction, rate 140s  Interventions:   IV  fluid bolus, diltiazem drip, KCl 40meq  -ED discussed with cardiology Dr. Benjamin who recommended straight rate diltiazem, digoxin load to 50 mcg x 2 12 hours apart, IV fluid, avoid DCCV and amiodarone if possible due to high stroke risk  - ED initially called for admission HR 140s-170s, BP 60s/40s - started IVF, digoxin and flat rate diltiazem. BP improved 90s/70s, remained tachycardic up to 150s.    Past Medical History    Past Medical History:   Diagnosis Date    Anemia     Aphasia     Atrial fibrillation (H)     Cancer (H) 11.17.2022    Squamous cell carcinoma nRight Cheek    Carotid artery stenosis     Cerebrovascular accident (H) 01/09/2017    Congestive heart failure (H)     HLD (hyperlipidemia)     HTN (hypertension)     Mitral regurgitation        Past Surgical History   Past Surgical History:   Procedure Laterality Date    BIOPSY  11.18.2022    Right Cheek    CV CORONARY ANGIOGRAM N/A 10/17/2024    Procedure: Coronary Angiogram;  Surgeon: Amilcar Pierre MD;  Location: [facility] CATH LAB CV    CV LEFT HEART CATH N/A 10/17/2024    Procedure: Left Heart Catheterization;  Surgeon: Amilcar Pierre MD;  Location: [facility] CATH LAB CV    CV RIGHT HEART CATH MEASUREMENTS RECORDED N/A 9/19/2024    Procedure: Right Heart Catheterization;  Surgeon: Amilcar Pierre MD;  Location: [facility] CATH LAB CV    ENDARTERECTOMY CAROTID Left 9/13/2024    Procedure: ENDARTERECTOMY, CAROTID WITH NEURO MONITORING;  Surgeon: Margarita Tobin MD;  Location: [facility] Main OR    PICC TRIPLE LUMEN PLACEMENT  9/14/2024    REPLACE VALVE MITRAL  06/09/2017    bovine mitral valve with Maze and ARIANNA ligation       Prior to Admission Medications   Prior to Admission Medications   Prescriptions Last Dose Informant Patient Reported? Taking?   aspirin (ASA) 325 MG EC tablet   Yes No   Sig: Take 325 mg by mouth daily   atorvastatin (LIPITOR) 80 MG tablet   No No   Sig: Take 1 tablet (80 mg) by mouth daily.   gabapentin (NEURONTIN)  300 MG capsule   No No   Sig: Take 1 capsule (300 mg) by mouth 2 times daily.   torsemide (DEMADEX) 20 MG tablet   No No   Sig: Take 1 tablet (20 mg) by mouth daily.      Facility-Administered Medications: None        Review of Systems    The 10 point Review of Systems is negative other than noted in the HPI    Social History   I have reviewed this patient's social history and updated it with pertinent information if needed.  Social History     Tobacco Use    Smoking status: Former     Current packs/day: 0.25     Average packs/day: 0.9 packs/day for 41.0 years (35.0 ttl pk-yrs)     Types: Cigarettes     Start date: 1/19/1984     Passive exposure: Never    Smokeless tobacco: Never    Tobacco comments:     I've cut down to just a few a day   Substance Use Topics    Alcohol use: Not Currently    Drug use: Never        Physical Exam   Vital Signs: Temp: 99.2  F (37.3  C) Temp src: Oral BP: 94/78 Pulse: (!) 151   Resp: 18 SpO2: 99 %      Weight: 80 lbs 0 oz    Constitutional: awake, alert, cooperative, mild distress, laying flat in bed   HEENT: normocephalic, anicteric sclerae, MMM   Pulmonary: no increased work of breathing on room air, lungs clear to auscultation bilaterally    Cardiovascular: JVP elevated, irregular regular, tachycardic, no clear murmur  GI: BS+, soft, non-tender, non-distended, without guarding or rigidity  Skin: warm, dry  MSK: no peripheral edema bilaterally   Neuro: AAOx3, no gross focal neurologic deficits noted        Medical Decision Making       80 MINUTES SPENT BY ME on the date of service doing chart review, history, exam, documentation & further activities per the note.      Data     I have personally reviewed the following data over the past 24 hrs:    7.9  \   9.6 (L)   / 242     132 (L) 92 (L) 37.2 (H) /  92   2.8 (L) 26 1.14 (H) \     Trop: 46 (H) BNP: N/A       Imaging results reviewed over the past 24 hrs:   No results found for this or any previous visit (from the past 24 hours).

## 2025-01-07 NOTE — ANESTHESIA CARE TRANSFER NOTE
Patient: Ernestine Morales    Procedure: Procedure(s):  Transesophageal echocardiogram intraoperative       Diagnosis: Atrial fibrillation (H) [I48.91]  Diagnosis Additional Information: No value filed.    Anesthesia Type:   No value filed.     Note:    Oropharynx: oropharynx clear of all foreign objects and spontaneously breathing  Level of Consciousness: drowsy  Oxygen Supplementation: nasal cannula  Level of Supplemental Oxygen (L/min / FiO2): 4  Independent Airway: airway patency satisfactory and stable  Dentition: dentition unchanged  Vital Signs Stable: post-procedure vital signs reviewed and stable  Report to RN Given: handoff report given  Destination: care suites annex.  Comments: At end of procedure, spontaneous respirations, patient alert to voice, able to follow commands. Oxygen via nasal cannula at 4 liters per minute to PACU. .        Vitals:  Vitals Value Taken Time   BP 93/72 01/07/25 1340   Temp     Pulse 140 01/07/25 1340   Resp 16 01/07/25 1340   SpO2 98 % 01/07/25 1340       Electronically Signed By: ANNA Figueroa CRNA  January 7, 2025  1:50 PM

## 2025-01-07 NOTE — PROCEDURES
[facility]    Procedure: Cardioversion    Date/Time: 1/7/2025 1:25 PM    Performed by: Paul Benjamin MD  Authorized by: Dedrick Maki MD      UNIVERSAL PROTOCOL   Site Marked: NA  Prior Images Obtained and Reviewed:  Yes  Required items: Required blood products, implants, devices and special equipment available    Patient identity confirmed:  Verbally with patient, arm band, provided demographic data and hospital-assigned identification number  Patient was reevaluated immediately before administering moderate or deep sedation or anesthesia  Confirmation Checklist:  Patient's identity using two indicators, relevant allergies, procedure was appropriate and matched the consent or emergent situation and correct equipment/implants were available  Time out: Immediately prior to the procedure a time out was called    Universal Protocol: the Joint Commission Universal Protocol was followed       ANESTHESIA    Anesthesia was administered and monitored by anesthesiology.  See anesthesia documentation for details.    SEDATION  Patient Sedated: Yes    Sedation Type:  Moderate (conscious) sedation  Vital signs: Vital signs monitored during sedation      PROCEDURE DETAILS  Cardioversion basis: emergent  Pre-procedure rhythm: atrial fibrillation  Patient position: patient was placed in a supine position  Chest area: chest area exposed  Electrodes: pads  Electrodes placed: anterior-posterior  Number of attempts: 1    Details of Attempts:  Precardioversion ANGE ruled out intracardiac thrombus and confirmed known severe bioprosthetic mitral valve stenosis and moderately severe tricuspid valve regurgitation.  See separate report for details.  Patient on IV heparin infusion started yesterday.  /60 mmHg.  Anesthesia team present in the room.      Post-procedure rhythm: normal sinus rhythm  Complications: no complications      PROCEDURE  Describe Procedure: Patient was successfully  cardioverted from rapid atrial fibrillation to sinus rhythm with a single 200 J synchronized shock.   No complications.  See separate ANGE report for details.    1.  ANGE report and procedural details directly communicated to referring cardiologist, Dr. Dedrick Maki.  2.  Continue uninterrupted IV heparin.  3.  Patient to be transferred back to cardiology bed in hospital.  Ongoing management per inpatient cardiology team.

## 2025-01-07 NOTE — PROGRESS NOTES
Care Suites Procedure Nursing Note    Patient Information  Name: Ernestine Morales  Age: 64 year old    Procedure  Procedure: ANGE and Cardioversion  Procedure start time: 13:20  Procedure complete time: 13:55    ANGE: Pt tolerated well. VSS.   Total sedation given 2 mg Versed & 50 mcg Fentanyl.     CDV: Pt tolerated well. CDV x 1 @ 150 joules. See rhythm strips. Total sedation given by anesthesia - Propofol 10 mg.      Waiting to give report to RN.  Will transport back to room 262 per cart.  Patient A & O.  VSS.

## 2025-01-07 NOTE — PROGRESS NOTES
[facility]    Medicine Progress Note - Hospitalist Service    Date of Admission:  1/6/2025    Assessment & Plan    Ernestine Morales is a 64 year old female with PMH A-fib status post maze/ARIANNA closure, CHF, MVR with prosthetic valve stenosis, severe pulmonary hypertension, moderate aortic stenosis, sleep apnea, history of CVA, severe left ICA stenosis status post CEA 9/2024 admitted on 1/6/2025 with A-fib RVR.     Afib/flutter with RVR  Chronic CHF  Severe pulmonary hypertension  Hypotension, labile BP  Hypotensive but MAP >65, symptomatic, lower BPs correlate  with high HR  Unable to tolerate diltiazem:  Loaded with digoxin 250 mgX1, plan to repeat dose 12h later  *TTE last 11/25 EF 57%, RVSP 74mmHG, mitral valve prosthesis gradient 21mmHg, mildly enlarged/reduced function of RV, severe TR  - Heparin GTT  - ANGE with cardioversion likely today via Cardiology consult, appreciate recommendations  -K>4, Mg>2- repletion as below   -1 L fluid over 4 hours initially, another 500 cc NS bolus overnight.   - Hold PTA torsemide  -Telemetry  -**avoid amiodarone and DCCV per cardiology unless cardiogenic shock (change in mental status) or symptomatic       H/o MVR with bioprosthetic valve Replacement - now with severe MV stensosis(2017)  H/o Maze/ARIANNA clipping 4/2024  Severe Pulm HTN (secondary to MV Stenosis)  -- She had planned to have a transcatheter mitral valve repair with [facility] 1/28/2025.   (not felt to be a candidate for open surgical treatment. Perioperative mortality risks  high)  -- pta:  takes a full dose aspirin daily, not on anticoagulation otherwise (s/p MAZE), not on rate control meds.       Hypokalemia, severe  In the setting of arrhythmia, minimal p.o. intake. Given p.o. 40 mEq x 2, IV 20 mEq over 2 hours in ED  -Repeat potassium after repletion is 5.8 ->5.5.   -change to RN low intensity potassium protocol    Elevated Troponin suspect Type II MI   Troponin elevated/flat 46 -->  50, EKG without obvious ischemia, no chest pain. Overall suspect demand ischemia in the setting of afib RVR.   - Monitor for chest pain   - Treat arrhythmia as above    CAD  Coronary angiogram 10/17/2024 with single-vessel obstructive disease involving mid leftcircumflex- no stent placed, and mild nonobstructive disease elsewhere  - PTA statin    HTN  Not on PTA antihypertensives, hypotensive currently.     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 Indication: Tachyarrhythmias, acute (48 hours)  Code Status: Full Code      Clinically Significant Risk Factors Present on Admission        # Hypokalemia: Lowest K = 2.8 mmol/L in last 2 days, will replace as needed  # Hyperkalemia: Highest K = 5.8 mmol/L in last 2 days, will monitor as appropriate  # Hyponatremia: Lowest Na = 132 mmol/L in last 2 days, will monitor as appropriate  # Hypochloremia: Lowest Cl = 92 mmol/L in last 2 days, will monitor as appropriate  # Hypocalcemia: Lowest Ca = 8.3 mg/dL in last 2 days, will monitor and replace as appropriate     # Hypoalbuminemia: Lowest albumin = 3.3 g/dL at 1/7/2025  6:22 AM, will monitor as appropriate   # Drug Induced Platelet Defect: home medication list includes an antiplatelet medication   # Hypertension: Noted on problem list  # Chronic heart failure with preserved ejection fraction: heart failure noted on problem list and last echo with EF >50%     # Anemia: based on hgb <11       # Cachexia: Estimated body mass index is 15.89 kg/m  as calculated from the following:    Height as of this encounter:  "1.549 m (5' 1\").    Weight as of this encounter: 38.1 kg (84 lb 1.6 oz).              Social Drivers of Health    Depression: At risk (10/1/2024)    PHQ-2     PHQ-2 Score: 3   Housing Stability: Low Risk  (11/20/2024)    Received from [facility]    Housing Stability     What is your living situation today?: I have a steady place to live   Recent Concern: Housing Stability - High Risk (9/19/2024)    Housing Stability     Do you have housing? : No     Are you worried about losing your housing?: No   Tobacco Use: Medium Risk (11/22/2024)    Patient History     Smoking Tobacco Use: Former     Smokeless Tobacco Use: Never     Passive Exposure: Never   Transportation Needs: Unmet Transportation Needs (11/20/2024)    Received from [facility]    PRAPARE - Transportation     Lack of Transportation (Medical): No     Lack of Transportation (Non-Medical): Yes   Physical Activity: Inactive (11/20/2024)    Received from [facility]    Exercise Vital Sign     Days of Exercise per Week: 0 days     Minutes of Exercise per Session: 0 min   Social Connections: Unknown (4/9/2024)    Social Connection and Isolation Panel [NHANES]     Frequency of Social Gatherings with Friends and Family: More than three times a week          Disposition Plan     Medically Ready for Discharge: Anticipated in 2-4 Days- cardiac stability             Linda Barriga MD  Hospitalist Service  [facility]  Securely message with [employer] (more info)  Text page via [facility] Paging/Directory   ______________________________________________________________________    Interval History   Continues to have 's - 140.   BPs are soft.  Patient feels unwell, no specific symptoms of chest pain, SOB nor palpitation.     Physical Exam   Vital Signs: Temp: 99.2  F (37.3  C) Temp src: Oral BP: 90/55 Pulse: 116   Resp: 20 SpO2: 100 % O2 Device: None (Room air)    Weight: 84 lbs 1.6 oz  Constitutional: awake, alert, cooperative, mild distress, " laying flat in bed    thin, frail, chronically ill appearance   lying on R side, flat, no dyspnea   HEENT: cachexic, anicteric sclerae, MMM   Pulmonary: no increased work of breathing on room air, lungs clear to auscultation bilaterally    Cardiovascular:   irregular regular, tachycardic,  soft systolic murmur heard intermittently  GI: BS+, soft, non-tender, non-distended, without guarding or rigidity  Skin: warm, dry  MSK: no peripheral edema bilaterally   Neuro: AXOx3, no gross focal neurologic deficits noted       Medical Decision Making       50 MINUTES SPENT BY ME on the date of service doing chart review, history, exam, documentation & further activities per the note.      Data     I have personally reviewed the following data over the past 24 hrs:    10.3  \   10.4 (L)   / 251     136 102 34.5 (H) /  81   5.5 (H) 20 (L) 1.21 (H) \     ALT: 1,120 (HH) AST: 1,137 (HH) AP: 189 (H) TBILI: 1.1   ALB: 3.3 (L) TOT PROTEIN: 6.0 (L) LIPASE: N/A     Trop: 50 (H) BNP: N/A     Procal: N/A CRP: N/A Lactic Acid: 2.0         Imaging results reviewed over the past 24 hrs:   Recent Results (from the past 24 hours)   XR Chest Port 1 View    Narrative    EXAM: XR CHEST PORT 1 VIEW  LOCATION: [facility]  DATE: 1/7/2025    INDICATION: CHF evaluate for pulmonary edema  COMPARISON: 09/14/2024      Impression    IMPRESSION: Stable cardia mediastinal silhouette. Pulmonary vascular congestion. Cardiac valve replacement and left atrial appendage clip. Electronic common device left chest. Atherosclerotic aorta. Clips left neck.

## 2025-01-07 NOTE — PHARMACY-ADMISSION MEDICATION HISTORY
Pharmacist Admission Medication History    Admission medication history is complete. The information provided in this note is only as accurate as the sources available at the time of the update.    Information Source(s): Jaden/Dennise and called pt's friend, Marian, per pt's request  via in-person and phone    Pertinent Information:     Changes made to PTA medication list:  Added: None  Deleted: None  Changed: None    Allergies reviewed with patient and updates made in EHR: no    Medication History Completed By: Akil Aliheyder, RPH 1/6/2025 10:04 PM    PTA Med List   Medication Sig Last Dose/Taking    aspirin (ASA) 325 MG EC tablet Take 325 mg by mouth daily 1/6/2025 Morning    atorvastatin (LIPITOR) 80 MG tablet Take 1 tablet (80 mg) by mouth daily. 1/6/2025 Morning    gabapentin (NEURONTIN) 300 MG capsule Take 1 capsule (300 mg) by mouth 2 times daily. 1/6/2025 Morning    torsemide (DEMADEX) 20 MG tablet Take 1 tablet (20 mg) by mouth daily. 1/6/2025 Morning

## 2025-01-07 NOTE — ANESTHESIA PREPROCEDURE EVALUATION
Anesthesia Pre-Procedure Evaluation    Patient: Ernestine Morales   MRN: [id number] : 1960        Procedure : Procedure(s):  Transesophageal echocardiogram intraoperative          Past Medical History:   Diagnosis Date    Anemia     Aphasia     Atrial fibrillation (H)     Cancer (H) 2022    Squamous cell carcinoma nRight Cheek    Carotid artery stenosis     Cerebrovascular accident (H) 2017    Congestive heart failure (H)     HLD (hyperlipidemia)     HTN (hypertension)     Mitral regurgitation       Past Surgical History:   Procedure Laterality Date    BIOPSY  2022    Right Cheek    CV CORONARY ANGIOGRAM N/A 10/17/2024    Procedure: Coronary Angiogram;  Surgeon: Amilcar Pierre MD;  Location: [facility] CATH LAB CV    CV LEFT HEART CATH N/A 10/17/2024    Procedure: Left Heart Catheterization;  Surgeon: Amilcar Pierre MD;  Location: [facility] CATH LAB CV    CV RIGHT HEART CATH MEASUREMENTS RECORDED N/A 2024    Procedure: Right Heart Catheterization;  Surgeon: Amilcar Pierre MD;  Location: [facility] CATH LAB CV    ENDARTERECTOMY CAROTID Left 2024    Procedure: ENDARTERECTOMY, CAROTID WITH NEURO MONITORING;  Surgeon: Margarita Tobin MD;  Location: [facility] OR    PICC TRIPLE LUMEN PLACEMENT  2024    REPLACE VALVE MITRAL  2017    bovine mitral valve with Maze and ARIANNA ligation      No Known Allergies   Social History     Tobacco Use    Smoking status: Former     Current packs/day: 0.25     Average packs/day: 0.9 packs/day for 41.0 years (35.0 ttl pk-yrs)     Types: Cigarettes     Start date: 1984     Passive exposure: Never    Smokeless tobacco: Never    Tobacco comments:     I've cut down to just a few a day   Substance Use Topics    Alcohol use: Not Currently      Wt Readings from Last 1 Encounters:   25 38.1 kg (84 lb 1.6 oz)        Anesthesia Evaluation            ROS/MED HX  ENT/Pulmonary:     (+)                tobacco use, Past use,                        Neurologic:     (+)          CVA,                      Cardiovascular:     (+) Dyslipidemia hypertension- -   -  - -      CHF                  dysrhythmias, a-fib,  valvular problems/murmurs type: MR s/p MVR, now with severe MS.   pulmonary hypertension, Previous cardiac testing   Echo: Date: 11/24 Results:  Final Impressions   1. Small left ventricular chamber size. Abnormal ventricular septal motion (post-operative) without other regional wall motion abnormalities. Calculated left ventricular ejection fraction 57%.   2. Left ventricular cardiac index 3.27 l/min/m2. Stroke volume index 43 mL/m2.   3. Mildly enlarged right ventricular chamber size with mildly reduced systolic function.   4. Estimated right ventricular systolic pressure 74 mmHg (right atrial pressure of 10 mmHg).   5. Status post 29 mm Katerina-Mohan porcine mitral valve prosthesis (2017, elsewhere). The prosthetic leaflets are thickened and fused with very limited motion. Severe prosthetic valve stenosis with diastolic mean gradient 21 mmHg (heart rate 75   BPM), MV/LVOT TVI ratio 4.1, and  msec. Prosthetic orifice area 0.56 cm2. Trivial prosthetic regurgitation, no periprosthetic regurgitation.   6. Sclerotic aortic valve with moderate central regurgitation secondary to restricted leaflet motion. EROA (PISA) 21 mm2, AR volume (PISA) 40 mL.   7. Severe tricuspid valve regurgitation (secondary to left sided valve disease and pulmonary hypertension). EROA (PISA) 40 mm2, TR volume (PISA) 52 mL.   8. Normal inferior vena cava size with reduced inspiratory collapse (<50%).   9. No  pericardial effusion.   10. There are no previous [facility] echocardiograms available for comparison.     Stress Test:  Date: Results:    ECG Reviewed:  Date: 1/25 Results:  Atrial fibrillation at 138 bpm  Cath:  Date: Results:      METS/Exercise Tolerance:     Hematologic:     (+)      anemia,          Musculoskeletal:       GI/Hepatic:      "  Renal/Genitourinary:       Endo:       Psychiatric/Substance Use:       Infectious Disease:       Malignancy:       Other:            Physical Exam    Airway        Mallampati: II   TM distance: > 3 FB   Neck ROM: full   Mouth opening: > 3 cm    Respiratory Devices and Support         Dental  no notable dental history     (+) Minor Abnormalities - some fillings, tiny chips      Cardiovascular          Rhythm and rate: regular     Pulmonary           (+) decreased breath sounds           OUTSIDE LABS:  CBC:   Lab Results   Component Value Date    WBC 10.3 01/07/2025    WBC 7.9 01/06/2025    HGB 10.4 (L) 01/07/2025    HGB 9.6 (L) 01/06/2025    HCT 31.7 (L) 01/07/2025    HCT 29.4 (L) 01/06/2025     01/07/2025     01/06/2025     BMP:   Lab Results   Component Value Date     01/07/2025     (L) 01/07/2025    POTASSIUM 5.5 (H) 01/07/2025    POTASSIUM 5.8 (H) 01/07/2025    CHLORIDE 102 01/07/2025    CHLORIDE 99 01/07/2025    CO2 20 (L) 01/07/2025    CO2 23 01/07/2025    BUN 34.5 (H) 01/07/2025    BUN 34.4 (H) 01/07/2025    CR 1.21 (H) 01/07/2025    CR 1.26 (H) 01/07/2025    GLC 81 01/07/2025    GLC 99 01/07/2025     COAGS:   Lab Results   Component Value Date    INR 0.98 10/22/2024     POC: No results found for: \"BGM\", \"HCG\", \"HCGS\"  HEPATIC:   Lab Results   Component Value Date    ALBUMIN 3.3 (L) 01/07/2025    PROTTOTAL 6.0 (L) 01/07/2025    ALT 1,120 (HH) 01/07/2025    AST 1,137 (HH) 01/07/2025    ALKPHOS 189 (H) 01/07/2025    BILITOTAL 1.1 01/07/2025     OTHER:   Lab Results   Component Value Date    PH 7.41 09/14/2024    LACT 2.0 01/07/2025    A1C 4.9 10/22/2024    BISHOP 8.3 (L) 01/07/2025    PHOS 4.6 (H) 09/14/2024    MAG 2.1 01/07/2025    TSH 2.90 10/22/2024       Anesthesia Plan    ASA Status:  4       Anesthesia Type: MAC.              Consents    Anesthesia Plan(s) and associated risks, benefits, and realistic alternatives discussed. Questions answered and patient/representative(s) " "expressed understanding.     - Discussed:     - Discussed with:  Patient            Postoperative Care       PONV prophylaxis: Ondansetron (or other 5HT-3)     Comments:               Westley Pratt MD    I have reviewed the pertinent notes and labs in the chart from the past 30 days and (re)examined the patient.  Any updates or changes from those notes are reflected in this note.    # Hypokalemia: Lowest K = 2.8 mmol/L in last 2 days, will replace as needed  # Hyperkalemia: Highest K = 5.8 mmol/L in last 2 days, will monitor as appropriate  # Hyponatremia: Lowest Na = 132 mmol/L in last 2 days, will monitor as appropriate  # Hypochloremia: Lowest Cl = 92 mmol/L in last 2 days, will monitor as appropriate  # Hypocalcemia: Lowest Ca = 8.3 mg/dL in last 2 days, will monitor and replace as appropriate     # Hypoalbuminemia: Lowest albumin = 3.3 g/dL at 1/7/2025  6:22 AM, will monitor as appropriate   # Drug Induced Platelet Defect: home medication list includes an antiplatelet medication   # Hypertension: Noted on problem list  # Chronic heart failure with preserved ejection fraction: heart failure noted on problem list and last echo with EF >50%     # Anemia: based on hgb <11       # Cachexia: Estimated body mass index is 15.89 kg/m  as calculated from the following:    Height as of this encounter: 1.549 m (5' 1\").    Weight as of this encounter: 38.1 kg (84 lb 1.6 oz).             "

## 2025-01-08 ENCOUNTER — ANESTHESIA (OUTPATIENT)
Dept: GASTROENTEROLOGY | Facility: CLINIC | Age: 65
End: 2025-01-08
Payer: MEDICARE

## 2025-01-08 ENCOUNTER — ANESTHESIA EVENT (OUTPATIENT)
Dept: GASTROENTEROLOGY | Facility: CLINIC | Age: 65
End: 2025-01-08
Payer: MEDICARE

## 2025-01-08 ENCOUNTER — APPOINTMENT (OUTPATIENT)
Dept: ULTRASOUND IMAGING | Facility: CLINIC | Age: 65
DRG: 280 | End: 2025-01-08
Attending: INTERNAL MEDICINE
Payer: MEDICARE

## 2025-01-08 PROBLEM — K31.89 EROSIVE GASTROPATHY: Status: ACTIVE | Noted: 2025-01-08

## 2025-01-08 LAB
ALBUMIN SERPL BCG-MCNC: 3.3 G/DL (ref 3.5–5.2)
ALBUMIN SERPL BCG-MCNC: 3.4 G/DL (ref 3.5–5.2)
ALBUMIN SERPL BCG-MCNC: 3.5 G/DL (ref 3.5–5.2)
ALP SERPL-CCNC: 197 U/L (ref 40–150)
ALP SERPL-CCNC: 198 U/L (ref 40–150)
ALP SERPL-CCNC: 199 U/L (ref 40–150)
ALT SERPL W P-5'-P-CCNC: 1556 U/L (ref 0–50)
ALT SERPL W P-5'-P-CCNC: 2115 U/L (ref 0–50)
ALT SERPL W P-5'-P-CCNC: 2155 U/L (ref 0–50)
ANION GAP SERPL CALCULATED.3IONS-SCNC: 14 MMOL/L (ref 7–15)
ANION GAP SERPL CALCULATED.3IONS-SCNC: 18 MMOL/L (ref 7–15)
AST SERPL W P-5'-P-CCNC: 1400 U/L (ref 0–45)
AST SERPL W P-5'-P-CCNC: 1910 U/L (ref 0–45)
AST SERPL W P-5'-P-CCNC: 2029 U/L (ref 0–45)
BILIRUB DIRECT SERPL-MCNC: 0.59 MG/DL (ref 0–0.3)
BILIRUB DIRECT SERPL-MCNC: 0.64 MG/DL (ref 0–0.3)
BILIRUB SERPL-MCNC: 1.2 MG/DL
BUN SERPL-MCNC: 37.8 MG/DL (ref 8–23)
BUN SERPL-MCNC: 37.8 MG/DL (ref 8–23)
CALCIUM SERPL-MCNC: 8.4 MG/DL (ref 8.8–10.4)
CALCIUM SERPL-MCNC: 8.7 MG/DL (ref 8.8–10.4)
CHLORIDE SERPL-SCNC: 103 MMOL/L (ref 98–107)
CHLORIDE SERPL-SCNC: 104 MMOL/L (ref 98–107)
CREAT SERPL-MCNC: 1.2 MG/DL (ref 0.51–0.95)
CREAT SERPL-MCNC: 1.21 MG/DL (ref 0.51–0.95)
EGFRCR SERPLBLD CKD-EPI 2021: 49 ML/MIN/1.73M2
EGFRCR SERPLBLD CKD-EPI 2021: 50 ML/MIN/1.73M2
ERYTHROCYTE [DISTWIDTH] IN BLOOD BY AUTOMATED COUNT: 14.9 % (ref 10–15)
GLUCOSE SERPL-MCNC: 107 MG/DL (ref 70–99)
GLUCOSE SERPL-MCNC: 117 MG/DL (ref 70–99)
HCO3 SERPL-SCNC: 17 MMOL/L (ref 22–29)
HCO3 SERPL-SCNC: 20 MMOL/L (ref 22–29)
HCT VFR BLD AUTO: 27.8 % (ref 35–47)
HGB BLD-MCNC: 8.9 G/DL (ref 11.7–15.7)
HGB BLD-MCNC: 9 G/DL (ref 11.7–15.7)
HGB BLD-MCNC: 9.3 G/DL (ref 11.7–15.7)
HGB BLD-MCNC: 9.8 G/DL (ref 11.7–15.7)
INR PPP: 1.95 (ref 0.85–1.15)
LIPASE SERPL-CCNC: 19 U/L (ref 13–60)
MAGNESIUM SERPL-MCNC: 2.1 MG/DL (ref 1.7–2.3)
MCH RBC QN AUTO: 28.1 PG (ref 26.5–33)
MCHC RBC AUTO-ENTMCNC: 32 G/DL (ref 31.5–36.5)
MCV RBC AUTO: 88 FL (ref 78–100)
PLATELET # BLD AUTO: 226 10E3/UL (ref 150–450)
POTASSIUM SERPL-SCNC: 4.3 MMOL/L (ref 3.4–5.3)
POTASSIUM SERPL-SCNC: 4.8 MMOL/L (ref 3.4–5.3)
PROT SERPL-MCNC: 5.9 G/DL (ref 6.4–8.3)
PROT SERPL-MCNC: 6.2 G/DL (ref 6.4–8.3)
PROT SERPL-MCNC: 6.4 G/DL (ref 6.4–8.3)
RBC # BLD AUTO: 3.17 10E6/UL (ref 3.8–5.2)
SODIUM SERPL-SCNC: 137 MMOL/L (ref 135–145)
SODIUM SERPL-SCNC: 139 MMOL/L (ref 135–145)
UFH PPP CHRO-ACNC: 0.4 IU/ML
UPPER GI ENDOSCOPY: NORMAL
WBC # BLD AUTO: 11.8 10E3/UL (ref 4–11)

## 2025-01-08 PROCEDURE — 83735 ASSAY OF MAGNESIUM: CPT

## 2025-01-08 PROCEDURE — 250N000013 HC RX MED GY IP 250 OP 250 PS 637: Performed by: INTERNAL MEDICINE

## 2025-01-08 PROCEDURE — 250N000013 HC RX MED GY IP 250 OP 250 PS 637

## 2025-01-08 PROCEDURE — 210N000001 HC R&B IMCU HEART CARE

## 2025-01-08 PROCEDURE — 36415 COLL VENOUS BLD VENIPUNCTURE: CPT | Performed by: INTERNAL MEDICINE

## 2025-01-08 PROCEDURE — 83690 ASSAY OF LIPASE: CPT | Performed by: INTERNAL MEDICINE

## 2025-01-08 PROCEDURE — 999N000010 HC STATISTIC ANES STAT CODE-CRNA PER MINUTE: Performed by: INTERNAL MEDICINE

## 2025-01-08 PROCEDURE — 36415 COLL VENOUS BLD VENIPUNCTURE: CPT

## 2025-01-08 PROCEDURE — 82565 ASSAY OF CREATININE: CPT | Performed by: INTERNAL MEDICINE

## 2025-01-08 PROCEDURE — 370N000017 HC ANESTHESIA TECHNICAL FEE, PER MIN: Performed by: INTERNAL MEDICINE

## 2025-01-08 PROCEDURE — 99233 SBSQ HOSP IP/OBS HIGH 50: CPT | Mod: FS

## 2025-01-08 PROCEDURE — 85018 HEMOGLOBIN: CPT | Performed by: INTERNAL MEDICINE

## 2025-01-08 PROCEDURE — 250N000009 HC RX 250: Performed by: NURSE ANESTHETIST, CERTIFIED REGISTERED

## 2025-01-08 PROCEDURE — 82248 BILIRUBIN DIRECT: CPT | Performed by: INTERNAL MEDICINE

## 2025-01-08 PROCEDURE — 99233 SBSQ HOSP IP/OBS HIGH 50: CPT | Performed by: INTERNAL MEDICINE

## 2025-01-08 PROCEDURE — 85014 HEMATOCRIT: CPT | Performed by: INTERNAL MEDICINE

## 2025-01-08 PROCEDURE — 80053 COMPREHEN METABOLIC PANEL: CPT | Performed by: INTERNAL MEDICINE

## 2025-01-08 PROCEDURE — 82040 ASSAY OF SERUM ALBUMIN: CPT | Performed by: INTERNAL MEDICINE

## 2025-01-08 PROCEDURE — 76705 ECHO EXAM OF ABDOMEN: CPT

## 2025-01-08 PROCEDURE — 80048 BASIC METABOLIC PNL TOTAL CA: CPT | Performed by: INTERNAL MEDICINE

## 2025-01-08 PROCEDURE — 250N000009 HC RX 250: Performed by: INTERNAL MEDICINE

## 2025-01-08 PROCEDURE — 43235 EGD DIAGNOSTIC BRUSH WASH: CPT | Performed by: INTERNAL MEDICINE

## 2025-01-08 PROCEDURE — 82248 BILIRUBIN DIRECT: CPT

## 2025-01-08 PROCEDURE — 258N000003 HC RX IP 258 OP 636: Performed by: INTERNAL MEDICINE

## 2025-01-08 PROCEDURE — 250N000011 HC RX IP 250 OP 636: Performed by: INTERNAL MEDICINE

## 2025-01-08 PROCEDURE — 85048 AUTOMATED LEUKOCYTE COUNT: CPT | Performed by: INTERNAL MEDICINE

## 2025-01-08 PROCEDURE — 85610 PROTHROMBIN TIME: CPT | Performed by: INTERNAL MEDICINE

## 2025-01-08 PROCEDURE — 0DJ08ZZ INSPECTION OF UPPER INTESTINAL TRACT, VIA NATURAL OR ARTIFICIAL OPENING ENDOSCOPIC: ICD-10-PCS | Performed by: INTERNAL MEDICINE

## 2025-01-08 PROCEDURE — 85520 HEPARIN ASSAY: CPT

## 2025-01-08 PROCEDURE — 250N000011 HC RX IP 250 OP 636: Performed by: NURSE ANESTHETIST, CERTIFIED REGISTERED

## 2025-01-08 PROCEDURE — 99418 PROLNG IP/OBS E/M EA 15 MIN: CPT | Performed by: INTERNAL MEDICINE

## 2025-01-08 RX ORDER — NALOXONE HYDROCHLORIDE 0.4 MG/ML
0.4 INJECTION, SOLUTION INTRAMUSCULAR; INTRAVENOUS; SUBCUTANEOUS
Status: DISCONTINUED | OUTPATIENT
Start: 2025-01-08 | End: 2025-01-09

## 2025-01-08 RX ORDER — PROPOFOL 10 MG/ML
INJECTION, EMULSION INTRAVENOUS PRN
Status: DISCONTINUED | OUTPATIENT
Start: 2025-01-08 | End: 2025-01-08

## 2025-01-08 RX ORDER — NALOXONE HYDROCHLORIDE 0.4 MG/ML
0.2 INJECTION, SOLUTION INTRAMUSCULAR; INTRAVENOUS; SUBCUTANEOUS
Status: DISCONTINUED | OUTPATIENT
Start: 2025-01-08 | End: 2025-01-09

## 2025-01-08 RX ORDER — PROCHLORPERAZINE MALEATE 5 MG/1
5 TABLET ORAL EVERY 6 HOURS PRN
Status: DISCONTINUED | OUTPATIENT
Start: 2025-01-08 | End: 2025-01-09 | Stop reason: HOSPADM

## 2025-01-08 RX ORDER — BISACODYL 5 MG
5 TABLET, DELAYED RELEASE (ENTERIC COATED) ORAL DAILY PRN
Status: DISCONTINUED | OUTPATIENT
Start: 2025-01-08 | End: 2025-01-09 | Stop reason: HOSPADM

## 2025-01-08 RX ORDER — AMIODARONE HYDROCHLORIDE 200 MG/1
200 TABLET ORAL DAILY
Status: DISCONTINUED | OUTPATIENT
Start: 2025-01-22 | End: 2025-01-08

## 2025-01-08 RX ORDER — ONDANSETRON 2 MG/ML
4 INJECTION INTRAMUSCULAR; INTRAVENOUS EVERY 6 HOURS PRN
Status: DISCONTINUED | OUTPATIENT
Start: 2025-01-08 | End: 2025-01-09 | Stop reason: HOSPADM

## 2025-01-08 RX ORDER — ONDANSETRON 4 MG/1
4 TABLET, ORALLY DISINTEGRATING ORAL EVERY 6 HOURS PRN
Status: DISCONTINUED | OUTPATIENT
Start: 2025-01-08 | End: 2025-01-09 | Stop reason: HOSPADM

## 2025-01-08 RX ORDER — FLUMAZENIL 0.1 MG/ML
0.2 INJECTION, SOLUTION INTRAVENOUS
Status: ACTIVE | OUTPATIENT
Start: 2025-01-08 | End: 2025-01-09

## 2025-01-08 RX ORDER — SODIUM CHLORIDE 9 MG/ML
INJECTION, SOLUTION INTRAVENOUS CONTINUOUS
Status: DISCONTINUED | OUTPATIENT
Start: 2025-01-08 | End: 2025-01-08

## 2025-01-08 RX ORDER — ASPIRIN 81 MG/1
81 TABLET ORAL DAILY
Status: DISCONTINUED | OUTPATIENT
Start: 2025-01-08 | End: 2025-01-08

## 2025-01-08 RX ORDER — LIDOCAINE 40 MG/G
CREAM TOPICAL
Status: DISCONTINUED | OUTPATIENT
Start: 2025-01-08 | End: 2025-01-08 | Stop reason: HOSPADM

## 2025-01-08 RX ORDER — AMIODARONE HYDROCHLORIDE 200 MG/1
400 TABLET ORAL 2 TIMES DAILY
Status: DISCONTINUED | OUTPATIENT
Start: 2025-01-08 | End: 2025-01-08

## 2025-01-08 RX ORDER — AMIODARONE HYDROCHLORIDE 200 MG/1
200 TABLET ORAL 2 TIMES DAILY
Status: DISCONTINUED | OUTPATIENT
Start: 2025-01-15 | End: 2025-01-08

## 2025-01-08 RX ORDER — METOPROLOL SUCCINATE 25 MG/1
25 TABLET, EXTENDED RELEASE ORAL 2 TIMES DAILY
Status: DISCONTINUED | OUTPATIENT
Start: 2025-01-08 | End: 2025-01-09 | Stop reason: HOSPADM

## 2025-01-08 RX ORDER — SODIUM CHLORIDE 9 MG/ML
INJECTION, SOLUTION INTRAVENOUS CONTINUOUS
Status: ACTIVE | OUTPATIENT
Start: 2025-01-08 | End: 2025-01-09

## 2025-01-08 RX ADMIN — PANTOPRAZOLE SODIUM 40 MG: 40 INJECTION, POWDER, FOR SOLUTION INTRAVENOUS at 21:03

## 2025-01-08 RX ADMIN — PROPOFOL 10 MG: 10 INJECTION, EMULSION INTRAVENOUS at 11:51

## 2025-01-08 RX ADMIN — TOPICAL ANESTHETIC 1 SPRAY: 200 SPRAY DENTAL; PERIODONTAL at 11:48

## 2025-01-08 RX ADMIN — Medication 1 MG: at 22:16

## 2025-01-08 RX ADMIN — METOPROLOL SUCCINATE 25 MG: 25 TABLET, EXTENDED RELEASE ORAL at 13:24

## 2025-01-08 RX ADMIN — SODIUM CHLORIDE: 9 INJECTION, SOLUTION INTRAVENOUS at 15:40

## 2025-01-08 RX ADMIN — MIDAZOLAM 1 MG: 1 INJECTION INTRAMUSCULAR; INTRAVENOUS at 11:46

## 2025-01-08 RX ADMIN — AMIODARONE HYDROCHLORIDE 1 MG/MIN: 1.8 INJECTION, SOLUTION INTRAVENOUS at 01:35

## 2025-01-08 RX ADMIN — APIXABAN 5 MG: 5 TABLET, FILM COATED ORAL at 21:00

## 2025-01-08 RX ADMIN — SODIUM CHLORIDE, PRESERVATIVE FREE: 5 INJECTION INTRAVENOUS at 08:03

## 2025-01-08 RX ADMIN — GABAPENTIN 300 MG: 300 CAPSULE ORAL at 21:00

## 2025-01-08 RX ADMIN — ONDANSETRON 4 MG: 2 INJECTION, SOLUTION INTRAMUSCULAR; INTRAVENOUS at 10:39

## 2025-01-08 RX ADMIN — PANTOPRAZOLE SODIUM 40 MG: 40 INJECTION, POWDER, FOR SOLUTION INTRAVENOUS at 01:39

## 2025-01-08 RX ADMIN — METOPROLOL SUCCINATE 25 MG: 25 TABLET, EXTENDED RELEASE ORAL at 21:00

## 2025-01-08 RX ADMIN — LIDOCAINE 1 PATCH: 4 PATCH TOPICAL at 08:03

## 2025-01-08 RX ADMIN — ONDANSETRON 4 MG: 2 INJECTION, SOLUTION INTRAMUSCULAR; INTRAVENOUS at 15:36

## 2025-01-08 RX ADMIN — PANTOPRAZOLE SODIUM 40 MG: 40 INJECTION, POWDER, FOR SOLUTION INTRAVENOUS at 08:03

## 2025-01-08 RX ADMIN — PROPOFOL 5 MG: 10 INJECTION, EMULSION INTRAVENOUS at 11:53

## 2025-01-08 ASSESSMENT — ACTIVITIES OF DAILY LIVING (ADL)
ADLS_ACUITY_SCORE: 54

## 2025-01-08 ASSESSMENT — LIFESTYLE VARIABLES: TOBACCO_USE: 1

## 2025-01-08 ASSESSMENT — ENCOUNTER SYMPTOMS: DYSRHYTHMIAS: 1

## 2025-01-08 NOTE — PLAN OF CARE
Alert and oriented x 3, forgetful. BP soft, on amiodarone gtt. Coffee ground episode, heparin gtt stopped. Tele- SR, up with assist of 1 with belt to BSC.Right sided weakness.  Plan: NPO for GI consult

## 2025-01-08 NOTE — PROGRESS NOTES
"[facility]    Medicine Progress Note - Hospitalist Service    Date of Admission:  1/6/2025    Assessment & Plan   Ernestine Morales is a 64 year old female with PMH A-fib status post maze/ARIANNA closure, CHF, MVR with prosthetic valve stenosis, severe pulmonary hypertension, moderate aortic stenosis, sleep apnea, history of CVA, severe left ICA stenosis status post CEA 9/2024 admitted on 1/6/2025 with A-fib RVR.     Past 24 hours:   1/7/25 She had emergent cardioversion due to unstable BP, ,  ANGE ruled out IC thrombus  1/8/2025 after midnight, developed one episode coffee ground emesis      Afib/flutter with RVR  S/p emergent Cardioversion 1/7/25   Chronic CHF   (1/7/25 EF 25%)  Severe pulmonary hypertension  Hypotension, labile BP   - 1/7/25 ANGE   = EF 25%, 3+ TVR, prosthetic MValve with severe stenosis    - Low EF likely due to arrhythmia.    -*TTE last 11/25 EF 57%, RVSP 74mmHG, mitral valve prosthesis gradient 21mmHg, mildly enlarged/reduced function of RV, severe TR    - Heparin GTT held since early `1/8 due to coffee ground emesis   - on IV amiodarone  - s/p digoxin loading:  further dosing per cardiology  -K>4, Mg>2- repletion as below   - NS @ 75/hr due to ongoing NPO status, coffee ground emesis.   Re evaluate dailyh.   - Hold PTA torsemide  -Telemetry    - cardiology involved.          Elevated transaminases  Coffee Ground Emesis  Erosive gastropathy  - stop heparin early 1/8/25  -   EGD per GI consult on 1/8/25   \"Moderate erosive gastropathy without bleeding -                             this is a source of coffee-ground emesis on heparin\".   - serial hgb q6h X 6 ordered  - Protonix bid while IP, then qday  -  INR 1.95, likely due to liver insult  - transaminitis likely due to cardiogenic shock  - follow INR, CMP  - consider vitamin K if emesis recurs despite stopping heparin    She is taking po 1/8/25 afternoon    H/o MVR with bioprosthetic valve Replacement - now with " severe MV stensosis(2017)  H/o Maze/ARIANNA clipping 4/2024  Severe Pulm HTN (secondary to MV Stenosis)  -- She had planned to have a transcatheter mitral valve repair with [facility] 1/28/2025.   (not felt to be a candidate for open surgical treatment. Perioperative mortality risks  high)  -- pta:  takes a full dose aspirin daily, not on anticoagulation otherwise (s/p MAZE), not on rate control meds.       Hypokalemia, severe  JAMES  Baseline creat 0.7-0.9    has been 132 this admission  In the setting of arrhythmia, minimal p.o. intake pta, now NPO status.-change to RN low intensity potassium protocol    Elevated Troponin suspect Type II MI   Troponin elevated/flat 46 --> 50, EKG without obvious ischemia, no chest pain. Overall suspect demand ischemia in the setting of afib RVR.   - Monitor for chest pain   - Treat arrhythmia as above    CAD  Coronary angiogram 10/17/2024 with single-vessel obstructive disease involving mid leftcircumflex- no stent placed, and mild nonobstructive disease elsewhere  - PTA statin    HTN  Not on PTA antihypertensives, hypotensive currently.     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   Discussed with Marian at bedside 1/8/25 4:10 pm          Diet: NPO per Anesthesia Guidelines for Procedure/Surgery Except for: Meds    DVT Prophylaxis: Pneumatic Compression Devices  Nuñez Catheter: Not present  Lines: None     Cardiac Monitoring: ACTIVE order. Indication: Tachyarrhythmias, acute (48 hours)  Code Status: Full Code      Clinically Significant Risk Factors        # Hypokalemia: Lowest K = 2.8 mmol/L in  "last 2 days, will replace as needed  # Hyperkalemia: Highest K = 5.8 mmol/L in last 2 days, will monitor as appropriate  # Hyponatremia: Lowest Na = 132 mmol/L in last 2 days, will monitor as appropriate  # Hypochloremia: Lowest Cl = 92 mmol/L in last 2 days, will monitor as appropriate  # Hypocalcemia: Lowest Ca = 8.3 mg/dL in last 2 days, will monitor and replace as appropriate     # Hypoalbuminemia: Lowest albumin = 3.3 g/dL at 1/8/2025  1:30 AM, will monitor as appropriate  # Coagulation Defect: INR = 1.95 (Ref range: 0.85 - 1.15) and/or PTT = N/A, will monitor for bleeding    # Hypertension: Noted on problem list  # Chronic heart failure with reduced ejection fraction: last echo with EF <40%           # Cachexia: Estimated body mass index is 15.93 kg/m  as calculated from the following:    Height as of this encounter: 1.549 m (5' 1\").    Weight as of this encounter: 38.2 kg (84 lb 4.8 oz)., PRESENT ON ADMISSION            Social Drivers of Health    Depression: At risk (10/1/2024)    PHQ-2     PHQ-2 Score: 3   Tobacco Use: Medium Risk (11/22/2024)    Patient History     Smoking Tobacco Use: Former     Smokeless Tobacco Use: Never     Passive Exposure: Never   Transportation Needs: Low Risk  (1/7/2025)    Transportation Needs     Within the past 12 months, has lack of transportation kept you from medical appointments, getting your medicines, non-medical meetings or appointments, work, or from getting things that you need?: No   Recent Concern: Transportation Needs - Unmet Transportation Needs (11/20/2024)    Received from [facility]    PRAKARIE - Transportation     Lack of Transportation (Medical): No     Lack of Transportation (Non-Medical): Yes   Physical Activity: Inactive (11/20/2024)    Received from [facility]    Exercise Vital Sign     Days of Exercise per Week: 0 days     Minutes of Exercise per Session: 0 min   Social Connections: Unknown (4/9/2024)    Social Connection and Isolation Panel [NHANES]     " "Frequency of Social Gatherings with Friends and Family: More than three times a week          Disposition Plan     Medically Ready for Discharge: Anticipated in 2-4 Days    Updated daughter Marian per phone 1/8/25 am         Linda Barriga MD  Hospitalist Service  [facility]  Securely message with [employer] (more info)  Text page via [facility] Paging/Directory   ______________________________________________________________________    Interval History   Thirsty    no palpitations    had coffee ground emesis ON    no chest pain, no epigastric pain    Physical Exam   Vital Signs: Temp: 97.5  F (36.4  C) Temp src: Oral BP: 121/71 Pulse: 71   Resp: 16 SpO2: 100 % O2 Device: None (Room air) Oxygen Delivery: 2 LPM  Weight: 84 lbs 4.8 oz    Constitutional: awake, sitting in chair alert, cooperative, mild distress, laying flat in bed    thin, frail, chronically ill appearance   lying on R side, flat, no dyspnea   HEENT: cachexic, anicteric sclerae, MMM   dry oral mucous membranes  Pulmonary: no increased work of breathing on room air, lungs clear to auscultation bilaterally    Cardiovascular:  rrr   2/6 systolic murmur heard   GI: BS+, soft, non-tender, non-distended, without guarding or rigidity  Skin: warm, dry    scattered SKs  MSK: no peripheral edema bilaterally   Neuro: AXOx3, no gross focal neurologic deficits noted   \"thirsty\"    Medical Decision Making       75 MINUTES SPENT BY ME on the date of service doing chart review, history, exam, documentation & further activities per the note.      Data     I have personally reviewed the following data over the past 24 hrs:    11.8 (H)  \   8.9 (L)   / 226     137 103 37.8 (H) /  107 (H)   4.3 20 (L) 1.21 (H) \     ALT: 1,556 (HH) AST: 1,400 (HH) AP: 199 (H) TBILI: 1.2   ALB: 3.3 (L) TOT PROTEIN: 5.9 (L) LIPASE: 19     INR:  1.95 (H) PTT:  N/A   D-dimer:  N/A Fibrinogen:  N/A       Imaging results reviewed over the past 24 hrs:   Recent Results (from " the past 24 hours)   Cardioversion    Narrative    Paul Benjamin MD     1/7/2025  4:37 PM  [facility]    Procedure: Cardioversion    Date/Time: 1/7/2025 1:25 PM    Performed by: Paul Benjamin MD  Authorized by: Dedrick Maki MD      UNIVERSAL PROTOCOL   Site Marked: NA  Prior Images Obtained and Reviewed:  Yes  Required items: Required blood products, implants, devices and special   equipment available    Patient identity confirmed:  Verbally with patient, arm band, provided   demographic data and hospital-assigned identification number  Patient was reevaluated immediately before administering moderate or deep   sedation or anesthesia  Confirmation Checklist:  Patient's identity using two indicators, relevant   allergies, procedure was appropriate and matched the consent or emergent   situation and correct equipment/implants were available  Time out: Immediately prior to the procedure a time out was called    Universal Protocol: the Joint Commission Universal Protocol was followed       ANESTHESIA    Anesthesia was administered and monitored by anesthesiology.  See   anesthesia documentation for details.    SEDATION  Patient Sedated: Yes    Sedation Type:  Moderate (conscious) sedation  Vital signs: Vital signs monitored during sedation      PROCEDURE DETAILS  Cardioversion basis: emergent  Pre-procedure rhythm: atrial fibrillation  Patient position: patient was placed in a supine position  Chest area: chest area exposed  Electrodes: pads  Electrodes placed: anterior-posterior  Number of attempts: 1    Details of Attempts:  Precardioversion ANGE ruled out intracardiac thrombus   and confirmed known severe bioprosthetic mitral valve stenosis and   moderately severe tricuspid valve regurgitation.  See separate report for   details.  Patient on IV heparin infusion started yesterday.  /60   mmHg.  Anesthesia team present in the room.      Post-procedure rhythm:  normal sinus rhythm  Complications: no complications      PROCEDURE  Describe Procedure: Patient was successfully cardioverted from rapid   atrial fibrillation to sinus rhythm with a single 200 J synchronized   shock.   No complications.  See separate ANGE report for details.    1.  ANGE report and procedural details directly communicated to referring   cardiologist, Dr. Dedrick Maki.  2.  Continue uninterrupted IV heparin.  3.  Patient to be transferred back to cardiology bed in hospital.  Ongoing   management per inpatient cardiology team.   Transesophageal Echocardiogram   Result Value    LVEF  25-30%    [facility]    [id number]  [id number]  [id number]^BRUCE^DEDRICK^CHARBEL     [facility]  Echocardiography Laboratory  [address] [place] [id number]     Name: MAHSA GARVIN  MRN: [id number]  : 1960  Study Date: 2025 12:58 PM  Age: 64 yrs  Gender: Female  Patient Location: [facility]  Reason For Study: Afib  Ordering Physician: DEDRICK MAKI  Performed By: Haven Maradiaga     BSA: 1.3 m2  Height: 61 in  Weight: 84 lb  HR: 141  BP: 100/80 mmHg  ______________________________________________________________________________  Procedure  Transesophageal Echocardiogram with two-dimensional, color and spectral  Doppler. 3D image acquisition, reconstruction, and real-time interpretation  was performed. ANGE Probe #[id number] was also used during the procedure.  ______________________________________________________________________________  Interpretation Summary     Precardioversion ANGE prior to successful electrical cardioversion (see  separate report for details).  Patient in rapid atrial fibrillation at 150 bpm and hemodynamically unstable  with BP of 100/60.  No intracardiac mass or thrombus.  Severe biatrial enlargement.  Status post previous left atrial appendage surgical ligation. The appendage is  small and diminutive with no obvious thrombus.  Status post 29 mm  Katerina-Mohan porcine mitral valve prosthesis (2017,  elsewhere).  Severe bioprosthetic valve stenosis with trace regurgitation. Mean diastolic  gradient of 23 mmHg (rapid atrial fibrillation at 150 bpm).  The leaflets are fused with minimal motion.  Moderately severe tricuspid valve regurgitation, functional.  Aortic valve sclerosis with mild regurgitation, no significant stenosis.  Severely reduced left ventricular systolic function. Visually estimated LVEF  25-30%.  Mildly reduced right ventricular systolic function.     Findings discussed with Dr. Dedrick Maki.  ______________________________________________________________________________  ANGE  Consent to the procedure was obtained prior to sedation. The transesophageal  probe was passed without difficulty. I determined this patient to be an  appropriate candidate for the planned sedation and procedure and have  reassessed the patient immediately prior to sedation and procedure. Total  sedation time: 16 minutes of continuous bedside 1:1 monitoring. Versed (2mg)  was given intravenously. Fentanyl (50mcg) was given intravenously. 3D image  acquisition, reconstruction, and real-time interpretation was performed. Prior  to the exam, the oral cavity was checked and no overcrowding was noted. There  were no complications associated with this procedure.     Left Ventricle  The left ventricle is normal in size. Severely decreased left ventricular  systolic function. The visual ejection fraction is 25-30%. There is no  thrombus seen in the left ventricle.     Right Ventricle  Mildly decreased right ventricular systolic function. There is no mass or  thrombus in the right ventricle.     Atria  There is severe biatrial enlargement. No left atrial mass or thrombus  visualized. No evidence of right atrial mass/thrombus. There is no atrial  shunt seen. The left atrial appendage was well visualized and free of  thrombus.     Mitral Valve  There is a bioprosthetic mitral  valve. Severe prosthetic mitral valve  stenosis. There is trace to mild prosthetic mitral valve regurgitation.     Tricuspid Valve  Normal tricuspid valve. There is moderately severe (3+) tricuspid  regurgitation.     Aortic Valve  The aortic valve is trileaflet with aortic valve sclerosis. There is mild (1+)  aortic regurgitation. No hemodynamically significant valvular aortic stenosis.     Pulmonic Valve  Normal pulmonic valve. There is trace pulmonic valvular regurgitation.     Vessels  The aortic root is normal size. Normal size ascending aorta. Normal ascending,  transverse (arch), and descending aorta. Normal pulmonary venous drainage.     Pericardial/Pleural  There is no pericardial effusion.     Rhythm  The rhythm was rapid atrial fibrillation.  ______________________________________________________________________________  Doppler Measurements & Calculations  MV max P.6 mmHg  MV mean P.0 mmHg  MV V2 VTI: 47.8 cm  TR max gio: 323.1 cm/sec  TR max P.8 mmHg     ______________________________________________________________________________  Report approved by: Dr Paul Benjamin on 2025 04:32 PM

## 2025-01-08 NOTE — PLAN OF CARE
A&Ox4 - expressive aphasia. RA. R side hemapheresis. Tele: SR 80-100s. BP stable. No CP/SOB reported. Generalized pain when patient is touched, declined pain medication. Called Marian FUNES ~1730 r/t patient refusing all medications and an IV site for amio gtt and amio bolus. ~1845 patient agreeable to additional IV and amio infusion. SBA to commode. 1 BM this shift. Successful cardioversion.   Plan: amio gtt and transition to PO

## 2025-01-08 NOTE — ANESTHESIA PREPROCEDURE EVALUATION
Anesthesia Pre-Procedure Evaluation    Patient: Ernestine Morales   MRN: [id number] : 1960        Procedure : Procedure(s):  Esophagoscopy, gastroscopy, duodenoscopy (EGD), combined          Past Medical History:   Diagnosis Date    Anemia     Aphasia     Atrial fibrillation (H)     Cancer (H) 2022    Squamous cell carcinoma nRight Cheek    Carotid artery stenosis     Cerebrovascular accident (H) 2017    Congestive heart failure (H)     HLD (hyperlipidemia)     HTN (hypertension)     Mitral regurgitation       Past Surgical History:   Procedure Laterality Date    BIOPSY  2022    Right Cheek    CV CORONARY ANGIOGRAM N/A 10/17/2024    Procedure: Coronary Angiogram;  Surgeon: Amilcar Pierre MD;  Location: [facility] CATH LAB CV    CV LEFT HEART CATH N/A 10/17/2024    Procedure: Left Heart Catheterization;  Surgeon: Amilcar Pierre MD;  Location: [facility] CATH LAB CV    CV RIGHT HEART CATH MEASUREMENTS RECORDED N/A 2024    Procedure: Right Heart Catheterization;  Surgeon: Amilcar Pierre MD;  Location: [facility] CATH LAB CV    ENDARTERECTOMY CAROTID Left 2024    Procedure: ENDARTERECTOMY, CAROTID WITH NEURO MONITORING;  Surgeon: Margarita Tobin MD;  Location: [facility]    PICC TRIPLE LUMEN PLACEMENT  2024    REPLACE VALVE MITRAL  2017    bovine mitral valve with Maze and ARIANNA ligation      No Known Allergies   Social History     Tobacco Use    Smoking status: Former     Current packs/day: 0.25     Average packs/day: 0.9 packs/day for 41.0 years (35.0 ttl pk-yrs)     Types: Cigarettes     Start date: 1984     Passive exposure: Never    Smokeless tobacco: Never    Tobacco comments:     I've cut down to just a few a day   Substance Use Topics    Alcohol use: Not Currently      Wt Readings from Last 1 Encounters:   25 38.2 kg (84 lb 4.8 oz)        Anesthesia Evaluation            ROS/MED HX  ENT/Pulmonary:     (+)                tobacco use,  Past use,                       Neurologic:     (+)          CVA,                      Cardiovascular:     (+) Dyslipidemia hypertension- -   -  - -      CHF                  dysrhythmias, a-fib,  valvular problems/murmurs type: MR s/p MVR, now with severe MS.   pulmonary hypertension, Previous cardiac testing   Echo: Date: 1/7/25 Results:  Transesophageal Echocardiogram with two-dimensional, color and spectral  Doppler. 3D image acquisition, reconstruction, and real-time interpretation  was performed. ANGE Probe #[id number] was also used during the procedure.  ______________________________________________________________________________  Interpretation Summary     Precardioversion ANGE prior to successful electrical cardioversion (see  separate report for details).  Patient in rapid atrial fibrillation at 150 bpm and hemodynamically unstable  with BP of 100/60.  No intracardiac mass or thrombus.  Severe biatrial enlargement.  Status post previous left atrial appendage surgical ligation. The appendage is  small and diminutive with no obvious thrombus.  Status post 29 mm Katerina-Mohan porcine mitral valve prosthesis (2017,  elsewhere).  Severe bioprosthetic valve stenosis with trace regurgitation. Mean diastolic  gradient of 23 mmHg (rapid atrial fibrillation at 150 bpm).  The leaflets are fused with minimal motion.  Moderately severe tricuspid valve regurgitation, functional.  Aortic valve sclerosis with mild regurgitation, no significant stenosis.  Severely reduced left ventricular systolic function. Visually estimated LVEF  25-30%.  Mildly reduced right ventricular systolic function.     Findings discussed with Dr. Dedrick Maki.    Stress Test:  Date: Results:    ECG Reviewed:  Date: 1/25 Results:  Atrial fibrillation at 138 bpm  Cath:  Date: Results:      METS/Exercise Tolerance:     Hematologic:     (+)      anemia,          Musculoskeletal:       GI/Hepatic:       Renal/Genitourinary:       Endo:      "  Psychiatric/Substance Use:       Infectious Disease:       Malignancy:       Other:            Physical Exam    Airway        Mallampati: II   TM distance: > 3 FB   Neck ROM: full   Mouth opening: > 3 cm    Respiratory Devices and Support         Dental  no notable dental history     (+) Minor Abnormalities - some fillings, tiny chips      Cardiovascular          Rhythm and rate: regular and normal   (+) murmur       Pulmonary   pulmonary exam normal        (+) decreased breath sounds           OUTSIDE LABS:  CBC:   Lab Results   Component Value Date    WBC 11.8 (H) 01/08/2025    WBC 10.3 01/07/2025    HGB 8.9 (L) 01/08/2025    HGB 9.3 (L) 01/08/2025    HCT 27.8 (L) 01/08/2025    HCT 31.7 (L) 01/07/2025     01/08/2025     01/07/2025     BMP:   Lab Results   Component Value Date     01/08/2025     01/08/2025    POTASSIUM 4.3 01/08/2025    POTASSIUM 4.8 01/08/2025    CHLORIDE 103 01/08/2025    CHLORIDE 104 01/08/2025    CO2 20 (L) 01/08/2025    CO2 17 (L) 01/08/2025    BUN 37.8 (H) 01/08/2025    BUN 37.8 (H) 01/08/2025    CR 1.21 (H) 01/08/2025    CR 1.20 (H) 01/08/2025     (H) 01/08/2025     (H) 01/08/2025     COAGS:   Lab Results   Component Value Date    INR 1.95 (H) 01/08/2025     POC: No results found for: \"BGM\", \"HCG\", \"HCGS\"  HEPATIC:   Lab Results   Component Value Date    ALBUMIN 3.3 (L) 01/08/2025    PROTTOTAL 5.9 (L) 01/08/2025    ALT 1,556 (HH) 01/08/2025    AST 1,400 (HH) 01/08/2025    ALKPHOS 199 (H) 01/08/2025    BILITOTAL 1.2 01/08/2025     OTHER:   Lab Results   Component Value Date    PH 7.41 09/14/2024    LACT 2.0 01/07/2025    A1C 4.9 10/22/2024    BISHOP 8.7 (L) 01/08/2025    PHOS 4.6 (H) 09/14/2024    MAG 2.1 01/08/2025    LIPASE 19 01/08/2025    TSH 2.90 10/22/2024       Anesthesia Plan    ASA Status:  4    NPO Status:  NPO Appropriate    Anesthesia Type: MAC.     - Reason for MAC: chronic cardiopulmonary disease, straight local not clinically adequate " "  Induction: N/a.   Maintenance: TIVA.        Consents    Anesthesia Plan(s) and associated risks, benefits, and realistic alternatives discussed. Questions answered and patient/representative(s) expressed understanding.     - Discussed:     - Discussed with:  Patient            Postoperative Care       PONV prophylaxis: Ondansetron (or other 5HT-3)     Comments:               Cordell Hoover MD    I have reviewed the pertinent notes and labs in the chart from the past 30 days and (re)examined the patient.  Any updates or changes from those notes are reflected in this note.    # Hypokalemia: Lowest K = 2.8 mmol/L in last 2 days, will replace as needed  # Hyperkalemia: Highest K = 5.8 mmol/L in last 2 days, will monitor as appropriate  # Hyponatremia: Lowest Na = 132 mmol/L in last 2 days, will monitor as appropriate  # Hypochloremia: Lowest Cl = 92 mmol/L in last 2 days, will monitor as appropriate  # Hypocalcemia: Lowest Ca = 8.3 mg/dL in last 2 days, will monitor and replace as appropriate     # Hypoalbuminemia: Lowest albumin = 3.3 g/dL at 1/8/2025  1:30 AM, will monitor as appropriate  # Coagulation Defect: INR = 1.95 (Ref range: 0.85 - 1.15) and/or PTT = N/A, will monitor for bleeding    # Hypertension: Noted on problem list  # Chronic heart failure with reduced ejection fraction: last echo with EF <40%           # Cachexia: Estimated body mass index is 15.93 kg/m  as calculated from the following:    Height as of this encounter: 1.549 m (5' 1\").    Weight as of this encounter: 38.2 kg (84 lb 4.8 oz)., PRESENT ON ADMISSION           "

## 2025-01-08 NOTE — CONSULTS
Discharge Pharmacy Test Claim    Patient's [employer] Medicare Part D plan covers Eliquis, Xarelto, and Entresto. Patient has not met their plan's deductible for 2025. The drug deductible for this plan is $590. Copays before and after the drug deductible are listed below. If patient reaches $2000 out of pocket, copays reduce to $0.    Test Claim Initial Copay Copay after deductible is met   Eliquis [phone]     Xarelto 540.64 132.83     Entresto 605.67 [phone]         [facility] has one-time use 30-day free trial vouchers available for eliquis or xarelto.      Completed on behalf of ALBERTO Chahal 1/08/2025    Misty Burgos  [facility] Liaison  Phone: [phone] Fax: [phone]  Available on Teams & Vocera  Disclaimer: Pharmacy test claims are estimates and may not reflect final costs. Suggested alternatives aim to be cost-effective but may not be therapeutically equivalent as this consult is informational and does not constitute medical advice. Clinical decisions should be made by qualified healthcare providers.

## 2025-01-08 NOTE — ANESTHESIA CARE TRANSFER NOTE
Patient: Ernestine Morales    Procedure: Procedure(s):  Esophagoscopy, gastroscopy, duodenoscopy (EGD), combined       Diagnosis: Coffee ground emesis [K92.0]  Diagnosis Additional Information: No value filed.    Anesthesia Type:   MAC     Note:    Oropharynx: oropharynx clear of all foreign objects  Level of Consciousness: awake  Oxygen Supplementation: room air    Independent Airway: airway patency satisfactory and stable    Vital Signs Stable: post-procedure vital signs reviewed and stable  Report to RN Given: handoff report given  Patient transferred to: Phase II    Handoff Report: Identifed the Patient, Identified the Reponsible Provider, Reviewed the pertinent medical history, Discussed the surgical course, Reviewed Intra-OP anesthesia mangement and issues during anesthesia, Set expectations for post-procedure period and Allowed opportunity for questions and acknowledgement of understanding      Vitals:  Vitals Value Taken Time   /71    Temp 97.5 F    Pulse 74 01/08/25 1203   Resp 36 01/08/25 1203   SpO2 100 % 01/08/25 1203   Vitals shown include unfiled device data.    Electronically Signed By: ANNA Byrd CRNA  January 8, 2025  12:04 PM

## 2025-01-08 NOTE — PROGRESS NOTES
Pt had a hepatic panel added on to morning labs at 0647 this morning 1/8/25. Nursing staff have called lab 3 times to see when results would be completed. First phone (1230) call was that the specimen would be pushed through to be resulted soon. Second phone (1300) nurse was told that the machine was broken and they'd try their best to get the results. Third call 1400 nursing staff was told that the results were too high and that's why it hasn't been resulted yet. Pharmacist and cardiology waiting on labs in order to decide medication management. They were made aware of nursing staff contacting lab regarding results.

## 2025-01-08 NOTE — ANESTHESIA POSTPROCEDURE EVALUATION
Patient: Ernestine Morales    Procedure: Procedure(s):  Esophagoscopy, gastroscopy, duodenoscopy (EGD), combined       Anesthesia Type:  MAC    Note:  Disposition: Inpatient   Postop Pain Control: Uneventful            Sign Out: Well controlled pain   PONV: No   Neuro/Psych: Uneventful            Sign Out: Acceptable/Baseline neuro status   Airway/Respiratory: Uneventful            Sign Out: Acceptable/Baseline resp. status   CV/Hemodynamics: Uneventful            Sign Out: Acceptable CV status   Other NRE: NONE   DID A NON-ROUTINE EVENT OCCUR? No           Last vitals:  Vitals Value Taken Time   /72 01/08/25 1400   Temp     Pulse 76 01/08/25 1400   Resp 23 01/08/25 1225   SpO2 99 % 01/08/25 1353   Vitals shown include unfiled device data.    Electronically Signed By: Cordell Hoover MD  January 8, 2025  2:08 PM

## 2025-01-08 NOTE — CONSULTS
GASTROENTEROLOGY CONSULTATION      Ernestine Morales  [address]  [facility] [address]  64 year old female     Admission Date/Time: 1/6/2025  Primary Care Provider: Christina Andrew     We were asked to see the patient in consultation by Dr. Barriga for evaluation of coffee-ground emesis.    ASSESSMENT:    1.  Coffee-ground emesis-this occurred after the ANGE and cardioversion yesterday.  Suspect this may be secondary to trauma from that ANGE, however, differential diagnosis includes Vandana-Mendoza tear, erosive gastritis, reflux esophagitis, peptic ulcer disease, AVM, less likely malignancy or varices.  No melena.  Hemoglobin decreased from 10.4-8.9.  Vital stable.  - Reviewed patient with Dr. Maki, from cardiology, he felt the patient was cleared from a cardiology standpoint to pursue upper endoscopy if this was clinically indicated.  - Reviewed the increased risks of the MAC sedation EGD with the patient as well as Marian Gallego, the patient's power of , at the request of the patient.  We discussed that given her underlying heart disease, there is increased risk from a cardiac standpoint for MI, even death.  Also, increased risk for stroke.  We discussed risk for bleeding, perforation, lung complication, medication or sedation reaction, as well.  They are okay with proceeding with the endoscopy.    2.  Increased LFTs-suspect this is secondary to ischemic hepatitis.  Abdominal ultrasound will be done and we will monitor labs for now.    RECOMMENDATIONS:  -Upper endoscopy with MAC sedation today  - Continue to hold heparin, until after EGD  - N.p.o.  - Monitor hemoglobin, transfuse as necessary  - Pantoprazole twice a day  - Monitor LFTs    Gibson Valencia MD   [facility]  [phone]      ________________________________________________________________________        HPI:  Ernestine Morales is a 64 year old female who has a history of atrial fibrillation, status post  maze/ARIANNA closure, CHF, MVR with prosthetic valve stenosis, severe pulmonary pretension, moderate aortic stenosis, sleep apnea, CVA, severe left ICA stenosis status post CEA September 2024, admitted with A-fib RVR.  Also, elevated troponin, suspect type II MI patient underwent ANGE yesterday and cardioversion.  She was placed on a heparin.  She developed coffee-ground emesis, no melena.  She denies any abdominal pain.  History is primarily from chart, patient did not want to answer significant questions at this time, recommended that I call Marian Gallego to discuss procedure.  I did discuss the procedure with the patient as well.  We did did state that the patient can sign her own consents.     ROS: Unable to complete, patient did not want to answer significant questions.    PAST MEDICAL HISTORY:  Past Medical History:   Diagnosis Date    Anemia     Aphasia     Atrial fibrillation (H)     Cancer (H) 11.17.2022    Squamous cell carcinoma nRight Cheek    Carotid artery stenosis     Cerebrovascular accident (H) 01/09/2017    Congestive heart failure (H)     HLD (hyperlipidemia)     HTN (hypertension)     Mitral regurgitation      PAST SURGICAL HISTORY:  Past Surgical History:   Procedure Laterality Date    BIOPSY  11.18.2022    Right Cheek    CV CORONARY ANGIOGRAM N/A 10/17/2024    Procedure: Coronary Angiogram;  Surgeon: Amilcar Pierre MD;  Location: [facility] LAB CV    CV LEFT HEART CATH N/A 10/17/2024    Procedure: Left Heart Catheterization;  Surgeon: Amilcar Pierre MD;  Location: [facility] LAB CV    CV RIGHT HEART CATH MEASUREMENTS RECORDED N/A 9/19/2024    Procedure: Right Heart Catheterization;  Surgeon: Amilcar Pierre MD;  Location: [facility] CATH LAB CV    ENDARTERECTOMY CAROTID Left 9/13/2024    Procedure: ENDARTERECTOMY, CAROTID WITH NEURO MONITORING;  Surgeon: Margarita Tobin MD;  [place] [facility] [place]    PICC TRIPLE LUMEN PLACEMENT  9/14/2024    REPLACE VALVE MITRAL   "06/09/2017    bovine mitral valve with Maze and ARIANNA ligation     SOCIAL HISTORY:  Social History     Tobacco Use    Smoking status: Former     Current packs/day: 0.25     Average packs/day: 0.9 packs/day for 41.0 years (35.0 ttl pk-yrs)     Types: Cigarettes     Start date: 1/19/1984     Passive exposure: Never    Smokeless tobacco: Never    Tobacco comments:     I've cut down to just a few a day   Substance Use Topics    Alcohol use: Not Currently    Drug use: Never     FAMILY HISTORY:  Family History   Problem Relation Age of Onset    Hypertension Mother      ALLERGIES: No Known Allergies  MEDICATIONS:   Prior to Admission medications    Medication Sig Start Date End Date Taking? Authorizing Provider   aspirin (ASA) 325 MG EC tablet Take 325 mg by mouth daily   Yes Reported, Patient   atorvastatin (LIPITOR) 80 MG tablet Take 1 tablet (80 mg) by mouth daily. 8/29/24  Yes Андрей Burden MD   gabapentin (NEURONTIN) 300 MG capsule Take 1 capsule (300 mg) by mouth 2 times daily. 12/11/24  Yes Librado-JaelChristina T, DO   torsemide (DEMADEX) 20 MG tablet Take 1 tablet (20 mg) by mouth daily. 10/14/24  Yes Jorge Douglass MD     PHYSICAL EXAM:   /65   Pulse 71   Temp 97.5  F (36.4  C) (Oral)   Resp 16   Ht 1.549 m (5' 1\")   Wt 38.2 kg (84 lb 4.8 oz)   SpO2 100%   BMI 15.93 kg/m     GEN: Alert, NAD.  CAROLYN: AT, anicteric, OP without erythema, exudate, or ulcers.    LYMPH: No LAD noted.  HRT: RRR, no TELLO  LUNGS: CTA  ABD: +BS, non-tender, non-distended, no rebound or guarding.  SKIN: No rash, jaundice   NEURO: MS intact       ADDITIONAL DATA:   I reviewed the patient's new clinical lab test results.   Recent Labs   Lab Test 01/08/25  0647 01/08/25  0130 01/07/25  0622 01/06/25  1957 10/22/24  0944   WBC 11.8*  --  10.3 7.9 6.3   HGB 8.9* 9.3* 10.4* 9.6* 10.3*   MCV 88  --  86 86 94     --  251 242 210   INR  --  1.95*  --   --  0.98     Recent Labs   Lab Test 01/08/25  0647 01/08/25  0130 01/07/25  0622 "    139 136   POTASSIUM 4.3 4.8 5.5*   CHLORIDE 103 104 102   CO2 20* 17* 20*   BUN 37.8* 37.8* 34.5*   CR 1.21* 1.20* 1.21*   ANIONGAP 14 18* 14   BISHOP 8.7* 8.4* 8.3*   * 117* 81     Recent Labs   Lab Test 01/08/25  0130 01/07/25  0622 01/07/25  0225 10/22/24  1205 09/04/24  1329 07/31/24  1310   ALBUMIN 3.3* 3.3* 3.5  --    < >  --    BILITOTAL 1.2 1.1 1.1  --    < >  --    ALT 1,556* 1,120* 766*  --    < >  --    AST 1,400* 1,137* 640*  --    < >  --    PROTEIN  --   --   --  Negative  --  10*   LIPASE 19  --   --   --   --   --     < > = values in this interval not displayed.        I reviewed the patient's new imaging results - no GI imaging

## 2025-01-08 NOTE — PROGRESS NOTES
[facility] Cardiology Progress Note    Date of Admission: 1/6/2025  Service Date: 01/08/2025     Hospital Summary:  Ms. 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 She was hypotensive, which limited medical therapies for afib RVR. She underwent ANGE with successful DCCV on 1/7. She was initiated on IV amiodarone for loading, and transitioned to a PO taper on 1/8.      Interval History:  HR 60-70s. -130s. Net -600 mL yesterday . Weight is stable. K 4.3. Cr 1.21, Stable. Mg 2.1. LFTs elevated and increased with AM labs, ALT 1556 and AST 1400. Per RN, had an episode of coffee ground emesis and concern for GI bleed. GI plans to proceed with EGD today if patient POA is agreeable.    Patient is not able to formulate complete sentences due to prior stroke, although can clearly make out words. Seems frustrated she can't have water or food. Otherwise was wanting to go home today. Feels better from a cardiac perspective.    New Changes:  Stop amiodarone due to elevated LFTs, resume PO taper once LFTs are improved  Metoprolol succinate 25 mg BID for heart failure and afib  GDMT as tolerated, although suspect quick LVEF recovery now that NSR has been achieved  Discontinue ASA due to gastritis  Start Eliquis 5 mg BID for anticoagulation, discussed costs with patient and she was agreeable  Resume statin when LFTs return to normal      Assessment & Plan:  Atrial fibrillation with RVR  History of afib s/p MAZE and LAAL in 2017  ANGE yesterday- no clot, LVEF 25-30%, mod-severe TR, severe bioprosthetic valve stenosis with trace regurgitation- gradient 23  Previous LVEF  "normal  Start Eliquis for anticoagulation  Toprol XL 25 mg BID  Elevated troponin  46 > 50  EKG without ST changes  No anginal symptoms  Likely demand ischemia related to afib RVR  Elevated transaminases  Likely due to reduced perfusion with prolonged afib RVR  Continue to monitor LFTs  Coffee ground emesis  EGD today without erosive gastritis, no sign of bleeding  Severe bioprosthetic MV stenosis  S/p bioprosthetic MVR in 2017, scheduled for noninvasive TMVR at [facility] end of this month  CAD  Single-vessel, 70% circumflex on angio 10/17/2024  ASA 81 mg daily  Hyperlipidemia  Holding statin due to elevated LFTs  JAMES  Holding torsemide  History of CVA  Left MCA stroke in 2017  Residual expressive aphasia, R hemiparesis  Carotid stenosis  S/p left carotid endarterectomy 9/2024  Complicated by L hypoglossal nerve injury  Neuropathic pain  Takes gabapentin    Thank you for the opportunity to participate in this pleasant patient's care.     Evelin Petersen PA-C   [facility]  Securely message via [employer] (8am - 5pm, M-F)      Objective:    Vitals: /65   Pulse 71   Temp 97.5  F (36.4  C) (Oral)   Resp 16   Ht 1.549 m (5' 1\")   Wt 38.2 kg (84 lb 4.8 oz)   SpO2 100%   BMI 15.93 kg/m    Wt Readings from Last 4 Encounters:   01/08/25 38.2 kg (84 lb 4.8 oz)   11/22/24 37.6 kg (82 lb 12.8 oz)   10/22/24 39.2 kg (86 lb 6.4 oz)   10/17/24 39.5 kg (87 lb)     I/O last 3 completed shifts:  In: -   Out: 800 [Urine:800]    Physical Exam:  General: Awake and alert. No acute distress.  Skin: Warm and dry. Appropriate color. No lesions or rashes noted.  HEENT: Normocephalic and atraumatic. EOM intact. PERRL. Trachea midline. Right-sided JVD: none.  Cardiac: Normal S1 and S2, no murmurs, rubs, or gallops noted. Regular rate and rhythm .  Lung: Regular work of breathing without accessory muscle use. Clear to auscultation bilaterally.  Abdomen: No distension.  Extremities: Bilateral lower extremity edema: " none.  Neuro: A & O. No focal deficits noted. Moves all extremities appropriately.      Imaging:  Recent Results (from the past 48 hours)   XR Chest Port 1 View    Narrative    EXAM: XR CHEST PORT 1 VIEW  LOCATION: [facility]  DATE: 1/7/2025    INDICATION: CHF evaluate for pulmonary edema  COMPARISON: 09/14/2024      Impression    IMPRESSION: Stable cardia mediastinal silhouette. Pulmonary vascular congestion. Cardiac valve replacement and left atrial appendage clip. Electronic common device left chest. Atherosclerotic aorta. Clips left neck.   Cardioversion    Narrative    Paul Benjamin MD     1/7/2025  4:37 PM  [facility]    Procedure: Cardioversion    Date/Time: 1/7/2025 1:25 PM    Performed by: Paul Benjamin MD  Authorized by: Dedrick Maki MD      UNIVERSAL PROTOCOL   Site Marked: NA  Prior Images Obtained and Reviewed:  Yes  Required items: Required blood products, implants, devices and special   equipment available    Patient identity confirmed:  Verbally with patient, arm band, provided   demographic data and hospital-assigned identification number  Patient was reevaluated immediately before administering moderate or deep   sedation or anesthesia  Confirmation Checklist:  Patient's identity using two indicators, relevant   allergies, procedure was appropriate and matched the consent or emergent   situation and correct equipment/implants were available  Time out: Immediately prior to the procedure a time out was called    Universal Protocol: the Joint Commission Universal Protocol was followed       ANESTHESIA    Anesthesia was administered and monitored by anesthesiology.  See   anesthesia documentation for details.    SEDATION  Patient Sedated: Yes    Sedation Type:  Moderate (conscious) sedation  Vital signs: Vital signs monitored during sedation      PROCEDURE DETAILS  Cardioversion basis: emergent  Pre-procedure rhythm: atrial  fibrillation  Patient position: patient was placed in a supine position  Chest area: chest area exposed  Electrodes: pads  Electrodes placed: anterior-posterior  Number of attempts: 1    Details of Attempts:  Precardioversion ANGE ruled out intracardiac thrombus   and confirmed known severe bioprosthetic mitral valve stenosis and   moderately severe tricuspid valve regurgitation.  See separate report for   details.  Patient on IV heparin infusion started yesterday.  /60   mmHg.  Anesthesia team present in the room.      Post-procedure rhythm: normal sinus rhythm  Complications: no complications      PROCEDURE  Describe Procedure: Patient was successfully cardioverted from rapid   atrial fibrillation to sinus rhythm with a single 200 J synchronized   shock.   No complications.  See separate ANGE report for details.    1.  ANGE report and procedural details directly communicated to referring   cardiologist, Dr. Dedrick Maki.  2.  Continue uninterrupted IV heparin.  3.  Patient to be transferred back to cardiology bed in hospital.  Ongoing   management per inpatient cardiology team.   Transesophageal Echocardiogram   Result Value    LVEF  25-30%    Narrative    [id number]  [id number]  [id number]^BRUCE^DEDRICK^CHARBEL     [facility]  Echocardiography Laboratory  [address] [place] [address]     Name: MAHSA GARVIN  MRN: [id number]  : 1960  Study Date: 2025 12:58 PM  Age: 64 yrs  Gender: Female  Patient Location: [facility]  Reason For Study: Afib  Ordering Physician: DEDRICK MAKI  Performed By: Haven Maradiaga     BSA: 1.3 m2  Height: 61 in  Weight: 84 lb  HR: 141  BP: 100/80 mmHg  ______________________________________________________________________________  Procedure  Transesophageal Echocardiogram with two-dimensional, color and spectral  Doppler. 3D image acquisition, reconstruction, and real-time interpretation  was performed. ANGE Probe #[id number] was also  used during the procedure.  ______________________________________________________________________________  Interpretation Summary     Precardioversion ANGE prior to successful electrical cardioversion (see  separate report for details).  Patient in rapid atrial fibrillation at 150 bpm and hemodynamically unstable  with BP of 100/60.  No intracardiac mass or thrombus.  Severe biatrial enlargement.  Status post previous left atrial appendage surgical ligation. The appendage is  small and diminutive with no obvious thrombus.  Status post 29 mm Katerina-Mohan porcine mitral valve prosthesis (2017,  elsewhere).  Severe bioprosthetic valve stenosis with trace regurgitation. Mean diastolic  gradient of 23 mmHg (rapid atrial fibrillation at 150 bpm).  The leaflets are fused with minimal motion.  Moderately severe tricuspid valve regurgitation, functional.  Aortic valve sclerosis with mild regurgitation, no significant stenosis.  Severely reduced left ventricular systolic function. Visually estimated LVEF  25-30%.  Mildly reduced right ventricular systolic function.     Findings discussed with Dr. Dedrick Maki.  ______________________________________________________________________________  ANGE  Consent to the procedure was obtained prior to sedation. The transesophageal  probe was passed without difficulty. I determined this patient to be an  appropriate candidate for the planned sedation and procedure and have  reassessed the patient immediately prior to sedation and procedure. Total  sedation time: 16 minutes of continuous bedside 1:1 monitoring. Versed (2mg)  was given intravenously. Fentanyl (50mcg) was given intravenously. 3D image  acquisition, reconstruction, and real-time interpretation was performed. Prior  to the exam, the oral cavity was checked and no overcrowding was noted. There  were no complications associated with this procedure.     Left Ventricle  The left ventricle is normal in size. Severely  decreased left ventricular  systolic function. The visual ejection fraction is 25-30%. There is no  thrombus seen in the left ventricle.     Right Ventricle  Mildly decreased right ventricular systolic function. There is no mass or  thrombus in the right ventricle.     Atria  There is severe biatrial enlargement. No left atrial mass or thrombus  visualized. No evidence of right atrial mass/thrombus. There is no atrial  shunt seen. The left atrial appendage was well visualized and free of  thrombus.     Mitral Valve  There is a bioprosthetic mitral valve. Severe prosthetic mitral valve  stenosis. There is trace to mild prosthetic mitral valve regurgitation.     Tricuspid Valve  Normal tricuspid valve. There is moderately severe (3+) tricuspid  regurgitation.     Aortic Valve  The aortic valve is trileaflet with aortic valve sclerosis. There is mild (1+)  aortic regurgitation. No hemodynamically significant valvular aortic stenosis.     Pulmonic Valve  Normal pulmonic valve. There is trace pulmonic valvular regurgitation.     Vessels  The aortic root is normal size. Normal size ascending aorta. Normal ascending,  transverse (arch), and descending aorta. Normal pulmonary venous drainage.     Pericardial/Pleural  There is no pericardial effusion.     Rhythm  The rhythm was rapid atrial fibrillation.  ______________________________________________________________________________  Doppler Measurements & Calculations  MV max P.6 mmHg  MV mean P.0 mmHg  MV V2 VTI: 47.8 cm  TR max gio: 323.1 cm/sec  TR max P.8 mmHg     ______________________________________________________________________________  Report approved by: Dr Paul Benjamin on 2025 04:32 PM             Echo:  No results found for this or any previous visit (from the past 4320  hours).    @LABRCNTIPR(wbc:3,hgb:3,mcv:3,plt:3,inr:3,na:3,potassium:3,chloride:3,co2:3,bun:3,cr:3,aniongap:3,rosalio:3,glc:3,albumin:2,prottotal:2,bilitotal:2,alkphos:2,alt:2,ast:2,lipase:2,tropi:3,troponin:3,tropr:3)      Data   Recent Labs   Lab 01/08/25  0647 01/08/25  0130 01/07/25 0622 01/06/25 1957   WBC 11.8*  --  10.3 7.9   HGB 8.9* 9.3* 10.4* 9.6*   HCT 27.8*  --  31.7* 29.4*   MCV 88  --  86 86     --  251 242     Recent Labs   Lab 01/08/25 0647 01/08/25 0130 01/07/25 0622    139 136   POTASSIUM 4.3 4.8 5.5*   CHLORIDE 103 104 102   CO2 20* 17* 20*   ANIONGAP 14 18* 14   * 117* 81   BUN 37.8* 37.8* 34.5*   CR 1.21* 1.20* 1.21*   GFRESTIMATED 49* 50* 49*   ROSALIO 8.7* 8.4* 8.3*          Past Medical History   Past Medical History:   Diagnosis Date    Anemia     Aphasia     Atrial fibrillation (H)     Cancer (H) 11.17.2022    Squamous cell carcinoma nRight Cheek    Carotid artery stenosis     Cerebrovascular accident (H) 01/09/2017    Congestive heart failure (H)     HLD (hyperlipidemia)     HTN (hypertension)     Mitral regurgitation        Past Surgical History   Past Surgical History:   Procedure Laterality Date    BIOPSY  11.18.2022    Right Cheek    CV CORONARY ANGIOGRAM N/A 10/17/2024    Procedure: Coronary Angiogram;  Surgeon: Amilcar Pierre MD;  Location: [facility] LAB CV    CV LEFT HEART CATH N/A 10/17/2024    Procedure: Left Heart Catheterization;  Surgeon: Amilcar Pierre MD;  Location: [facility] CV    CV RIGHT HEART CATH MEASUREMENTS RECORDED N/A 9/19/2024    Procedure: Right Heart Catheterization;  Surgeon: Amilcar Pierre MD;  Location: [facility] CATH LAB CV    ENDARTERECTOMY CAROTID Left 9/13/2024    Procedure: ENDARTERECTOMY, CAROTID WITH NEURO MONITORING;  Surgeon: Margarita Tobin MD;  Location: [facility] Main OR    PICC TRIPLE LUMEN PLACEMENT  9/14/2024    REPLACE VALVE MITRAL  06/09/2017    bovine mitral valve with Maze and ARIANNA ligation        Prior to Admission Medications   Prior to Admission Medications   Prescriptions Last Dose Informant Patient Reported? Taking?   aspirin (ASA) 325 MG EC tablet 1/6/2025 Morning  Yes Yes   Sig: Take 325 mg by mouth daily   atorvastatin (LIPITOR) 80 MG tablet 1/6/2025 Morning  No Yes   Sig: Take 1 tablet (80 mg) by mouth daily.   gabapentin (NEURONTIN) 300 MG capsule 1/6/2025 Morning  No Yes   Sig: Take 1 capsule (300 mg) by mouth 2 times daily.   torsemide (DEMADEX) 20 MG tablet 1/6/2025 Morning  No Yes   Sig: Take 1 tablet (20 mg) by mouth daily.      Facility-Administered Medications: None     Current Facility-Administered Medications   Medication Dose Route Frequency Provider Last Rate Last Admin    [Held by provider] acetaminophen (TYLENOL) tablet 975 mg  975 mg Oral Q4H PRN Jackson Shah MD   975 mg at 01/07/25 2006    amiodarone (NEXTERONE) 1.8 mg/mL in dextrose 5% 200 mL ADULT STANDARD infusion  0.5 mg/min Intravenous Continuous Evelin Petersen PA-C 16.7 mL/hr at 01/08/25 0249 0.5 mg/min at 01/08/25 0249    amiodarone (PACERONE) tablet 400 mg  400 mg Oral BID Evelin Petersen PA-C        Followed by    [START ON 1/15/2025] amiodarone (PACERONE) tablet 200 mg  200 mg Oral BID Evelin Petersen PA-C        Followed by    [START ON 1/22/2025] amiodarone (PACERONE) tablet 200 mg  200 mg Oral Daily Evelin Petersen PA-C        [START ON 1/9/2025] aspirin EC tablet 81 mg  81 mg Oral Daily Evelin Petersen PA-C        [Held by provider] atorvastatin (LIPITOR) tablet 80 mg  80 mg Oral Daily Jackson Shah MD        calcium carbonate (TUMS) chewable tablet 1,000 mg  1,000 mg Oral 4x Daily PRN Jackson Shah MD        [Held by provider] gabapentin (NEURONTIN) capsule 300 mg  300 mg Oral BID Jackson Shah MD        [Held by provider] heparin 25,000 units in 0.45% NaCl 250 mL ANTICOAGULANT infusion  0-5,000 Units/hr Intravenous Continuous Jackson Shah MD   Stopped at 01/08/25 0138    Lidocaine  (LIDOCARE) 4 % Patch 1 patch  1 patch Transdermal Q24H Philippe, Jairo Arreaga MD   1 patch at 01/08/25 0803    lidocaine (LMX4) cream   Topical Q1H PRN Jackson Shah MD        lidocaine 1 % 0.1-1 mL  0.1-1 mL Other Q1H PRN Jackson Shah MD        magnesium sulfate 2 g in 50 mL sterile water intermittent infusion  2 g Intravenous Once PRN Dedrick Maki MD        melatonin tablet 1 mg  1 mg Oral At Bedtime PRN Jackson Shah MD        pantoprazole (PROTONIX) IV push injection 40 mg  40 mg Intravenous BID Jairo Philippe MD   40 mg at 01/08/25 0803    Patient is already receiving anticoagulation with heparin, enoxaparin (LOVENOX), warfarin (COUMADIN)  or other anticoagulant medication   Does not apply Continuous PRN Jackson Shah MD        senna-docusate (SENOKOT-S/PERICOLACE) 8.6-50 MG per tablet 1 tablet  1 tablet Oral BID PRN Jackson Shah MD        Or    senna-docusate (SENOKOT-S/PERICOLACE) 8.6-50 MG per tablet 2 tablet  2 tablet Oral BID PRN Jackson Shah MD        sodium chloride (PF) 0.9% PF flush 3 mL  3 mL Intracatheter Q8H Jackson Shah MD   3 mL at 01/08/25 0139    sodium chloride (PF) 0.9% PF flush 3 mL  3 mL Intracatheter q1 min prn Jackson Shah MD        sodium chloride (PF) 0.9% PF flush 3 mL  3 mL Intravenous Q1H PRN Dedrick Maki MD        sodium chloride (PF) 0.9% PF flush 3 mL  3 mL Intravenous Q8H Dedrick Maki MD        sodium chloride 0.9 % infusion   Intravenous Continuous Linda Barriga MD 75 mL/hr at 01/08/25 0803 New Bag at 01/08/25 0803    [Held by provider] torsemide (DEMADEX) tablet 20 mg  20 mg Oral Daily Jackson Shah MD         Current Facility-Administered Medications   Medication Dose Route Frequency Provider Last Rate Last Admin    [Held by provider] acetaminophen (TYLENOL) tablet 975 mg  975 mg Oral Q4H PRN Jackson Shah MD   975 mg at 01/07/25 2006    amiodarone (NEXTERONE) 1.8 mg/mL in dextrose 5% 200 mL ADULT STANDARD infusion  0.5 mg/min  Intravenous Continuous Evelin Petersen PA-C 16.7 mL/hr at 01/08/25 0249 0.5 mg/min at 01/08/25 0249    amiodarone (PACERONE) tablet 400 mg  400 mg Oral BID Evelin Petersen PA-C        Followed by    [START ON 1/15/2025] amiodarone (PACERONE) tablet 200 mg  200 mg Oral BID Evelin Petersen PA-C        Followed by    [START ON 1/22/2025] amiodarone (PACERONE) tablet 200 mg  200 mg Oral Daily Evelin Petersen PA-C        [START ON 1/9/2025] aspirin EC tablet 81 mg  81 mg Oral Daily Evelin Petersen PA-C        [Held by provider] atorvastatin (LIPITOR) tablet 80 mg  80 mg Oral Daily Jackson Shah MD        calcium carbonate (TUMS) chewable tablet 1,000 mg  1,000 mg Oral 4x Daily PRN Jackson Shah MD        [Held by provider] gabapentin (NEURONTIN) capsule 300 mg  300 mg Oral BID Jackson Shah MD        [Held by provider] heparin 25,000 units in 0.45% NaCl 250 mL ANTICOAGULANT infusion  0-5,000 Units/hr Intravenous Continuous Jackson Shah MD   Stopped at 01/08/25 0138    Lidocaine (LIDOCARE) 4 % Patch 1 patch  1 patch Transdermal Q24H Philippe, Jairo Arreaga MD   1 patch at 01/08/25 0803    lidocaine (LMX4) cream   Topical Q1H PRN Jackson Shah MD        lidocaine 1 % 0.1-1 mL  0.1-1 mL Other Q1H PRN Jackson Shah MD        magnesium sulfate 2 g in 50 mL sterile water intermittent infusion  2 g Intravenous Once PRN Dedrick Maki MD        melatonin tablet 1 mg  1 mg Oral At Bedtime PRN Jackson Shah MD        pantoprazole (PROTONIX) IV push injection 40 mg  40 mg Intravenous BID Jairo Philippe MD   40 mg at 01/08/25 0803    Patient is already receiving anticoagulation with heparin, enoxaparin (LOVENOX), warfarin (COUMADIN)  or other anticoagulant medication   Does not apply Continuous PRN Jackson Shah MD        senna-docusate (SENOKOT-S/PERICOLACE) 8.6-50 MG per tablet 1 tablet  1 tablet Oral BID GABRIELLAN Jakcson Shah MD        Or    senna-docusate (SENOKOT-S/PERICOLACE) 8.6-50 MG per tablet 2  tablet  2 tablet Oral BID PRN Jackson Shah MD        sodium chloride (PF) 0.9% PF flush 3 mL  3 mL Intracatheter Q8H Jackson Shah MD   3 mL at 01/08/25 0139    sodium chloride (PF) 0.9% PF flush 3 mL  3 mL Intracatheter q1 min prn Jackson Shah MD        sodium chloride (PF) 0.9% PF flush 3 mL  3 mL Intravenous Q1H PRN Dedrick Maki MD        sodium chloride (PF) 0.9% PF flush 3 mL  3 mL Intravenous Q8H Dedrick Maki MD        sodium chloride 0.9 % infusion   Intravenous Continuous Linda Barriga MD 75 mL/hr at 01/08/25 0803 New Bag at 01/08/25 0803    [Held by provider] torsemide (DEMADEX) tablet 20 mg  20 mg Oral Daily Jackson Shah MD         Allergies   No Known Allergies    Social History    reports that she has quit smoking. Her smoking use included cigarettes. She started smoking about 41 years ago. She has a 35 pack-year smoking history. She has never been exposed to tobacco smoke. She has never used smokeless tobacco. She reports that she does not currently use alcohol. She reports that she does not use drugs.    Family History   I have reviewed this patient's family history and updated it with pertinent information if needed.  Family History   Problem Relation Age of Onset    Hypertension Mother           Review of Systems   A comprehensive review of system was performed and is negative other than that noted in the HPI.     Primary Care Physician   ANDREE ALBRIGHT

## 2025-01-09 VITALS
SYSTOLIC BLOOD PRESSURE: 122 MMHG | DIASTOLIC BLOOD PRESSURE: 61 MMHG | HEART RATE: 65 BPM | BODY MASS INDEX: 16.52 KG/M2 | HEIGHT: 61 IN | RESPIRATION RATE: 32 BRPM | WEIGHT: 87.5 LBS | OXYGEN SATURATION: 98 % | TEMPERATURE: 97.3 F

## 2025-01-09 LAB
ALBUMIN SERPL BCG-MCNC: 3.2 G/DL (ref 3.5–5.2)
ALBUMIN UR-MCNC: 30 MG/DL
ALP SERPL-CCNC: 217 U/L (ref 40–150)
ALT SERPL W P-5'-P-CCNC: 2841 U/L (ref 0–50)
ANION GAP SERPL CALCULATED.3IONS-SCNC: 23 MMOL/L (ref 7–15)
APPEARANCE UR: CLEAR
AST SERPL W P-5'-P-CCNC: 2562 U/L (ref 0–45)
BACTERIA #/AREA URNS HPF: ABNORMAL /HPF
BILIRUB SERPL-MCNC: 1.4 MG/DL
BILIRUB UR QL STRIP: ABNORMAL
BUN SERPL-MCNC: 42.4 MG/DL (ref 8–23)
CALCIUM SERPL-MCNC: 8.5 MG/DL (ref 8.8–10.4)
CHLORIDE SERPL-SCNC: 104 MMOL/L (ref 98–107)
COLOR UR AUTO: YELLOW
CREAT SERPL-MCNC: 1.52 MG/DL (ref 0.51–0.95)
EGFRCR SERPLBLD CKD-EPI 2021: 38 ML/MIN/1.73M2
ERYTHROCYTE [DISTWIDTH] IN BLOOD BY AUTOMATED COUNT: 15.2 % (ref 10–15)
GLUCOSE BLDC GLUCOMTR-MCNC: 105 MG/DL (ref 70–99)
GLUCOSE BLDC GLUCOMTR-MCNC: 32 MG/DL (ref 70–99)
GLUCOSE SERPL-MCNC: 73 MG/DL (ref 70–99)
GLUCOSE UR STRIP-MCNC: NEGATIVE MG/DL
HAV IGM SERPL QL IA: NONREACTIVE
HCO3 SERPL-SCNC: 12 MMOL/L (ref 22–29)
HCT VFR BLD AUTO: 31.6 % (ref 35–47)
HGB BLD-MCNC: 9.6 G/DL (ref 11.7–15.7)
HGB BLD-MCNC: 9.7 G/DL (ref 11.7–15.7)
HGB UR QL STRIP: NEGATIVE
HYALINE CASTS: 11 /LPF
INR PPP: 2.99 (ref 0.85–1.15)
KETONES UR STRIP-MCNC: NEGATIVE MG/DL
LACTATE SERPL-SCNC: 11.8 MMOL/L (ref 0.7–2)
LEUKOCYTE ESTERASE UR QL STRIP: NEGATIVE
MAGNESIUM SERPL-MCNC: 2.4 MG/DL (ref 1.7–2.3)
MCH RBC QN AUTO: 28.4 PG (ref 26.5–33)
MCHC RBC AUTO-ENTMCNC: 30.7 G/DL (ref 31.5–36.5)
MCV RBC AUTO: 92 FL (ref 78–100)
MUCOUS THREADS #/AREA URNS LPF: PRESENT /LPF
NITRATE UR QL: NEGATIVE
PH UR STRIP: 6.5 [PH] (ref 5–7)
PLATELET # BLD AUTO: 153 10E3/UL (ref 150–450)
POTASSIUM SERPL-SCNC: 4.9 MMOL/L (ref 3.4–5.3)
PROT SERPL-MCNC: 5.9 G/DL (ref 6.4–8.3)
RBC # BLD AUTO: 3.42 10E6/UL (ref 3.8–5.2)
RBC URINE: 1 /HPF
SODIUM SERPL-SCNC: 139 MMOL/L (ref 135–145)
SP GR UR STRIP: 1.01 (ref 1–1.03)
SQUAMOUS EPITHELIAL: <1 /HPF
UROBILINOGEN UR STRIP-MCNC: NORMAL MG/DL
WBC # BLD AUTO: 18.2 10E3/UL (ref 4–11)
WBC URINE: 4 /HPF

## 2025-01-09 PROCEDURE — 250N000011 HC RX IP 250 OP 636: Performed by: INTERNAL MEDICINE

## 2025-01-09 PROCEDURE — 99232 SBSQ HOSP IP/OBS MODERATE 35: CPT | Mod: FS

## 2025-01-09 PROCEDURE — 85610 PROTHROMBIN TIME: CPT | Performed by: INTERNAL MEDICINE

## 2025-01-09 PROCEDURE — 999N000157 HC STATISTIC RCP TIME EA 10 MIN

## 2025-01-09 PROCEDURE — 99207 PR NO BILLABLE SERVICE THIS VISIT: CPT | Performed by: INTERNAL MEDICINE

## 2025-01-09 PROCEDURE — 36415 COLL VENOUS BLD VENIPUNCTURE: CPT | Performed by: INTERNAL MEDICINE

## 2025-01-09 PROCEDURE — 85018 HEMOGLOBIN: CPT | Performed by: INTERNAL MEDICINE

## 2025-01-09 PROCEDURE — 94660 CPAP INITIATION&MGMT: CPT

## 2025-01-09 PROCEDURE — 83735 ASSAY OF MAGNESIUM: CPT

## 2025-01-09 PROCEDURE — 81001 URINALYSIS AUTO W/SCOPE: CPT

## 2025-01-09 PROCEDURE — 99291 CRITICAL CARE FIRST HOUR: CPT | Performed by: NURSE PRACTITIONER

## 2025-01-09 PROCEDURE — 258N000001 HC RX 258

## 2025-01-09 PROCEDURE — 258N000003 HC RX IP 258 OP 636: Performed by: NURSE PRACTITIONER

## 2025-01-09 PROCEDURE — 86709 HEPATITIS A IGM ANTIBODY: CPT | Performed by: INTERNAL MEDICINE

## 2025-01-09 PROCEDURE — 250N000011 HC RX IP 250 OP 636: Performed by: NURSE PRACTITIONER

## 2025-01-09 PROCEDURE — 83605 ASSAY OF LACTIC ACID: CPT | Performed by: INTERNAL MEDICINE

## 2025-01-09 PROCEDURE — 250N000013 HC RX MED GY IP 250 OP 250 PS 637: Performed by: NURSE PRACTITIONER

## 2025-01-09 PROCEDURE — 85014 HEMATOCRIT: CPT | Performed by: INTERNAL MEDICINE

## 2025-01-09 PROCEDURE — 85048 AUTOMATED LEUKOCYTE COUNT: CPT | Performed by: INTERNAL MEDICINE

## 2025-01-09 PROCEDURE — 80053 COMPREHEN METABOLIC PANEL: CPT | Performed by: INTERNAL MEDICINE

## 2025-01-09 RX ORDER — DEXTROSE MONOHYDRATE 25 G/50ML
INJECTION, SOLUTION INTRAVENOUS
Status: COMPLETED
Start: 2025-01-09 | End: 2025-01-09

## 2025-01-09 RX ORDER — HYDROMORPHONE HCL IN WATER/PF 6 MG/30 ML
0.2 PATIENT CONTROLLED ANALGESIA SYRINGE INTRAVENOUS
Status: DISCONTINUED | OUTPATIENT
Start: 2025-01-09 | End: 2025-01-09 | Stop reason: HOSPADM

## 2025-01-09 RX ORDER — SALIVA STIMULANT COMB. NO.3
2 SPRAY, NON-AEROSOL (ML) MUCOUS MEMBRANE
Status: DISCONTINUED | OUTPATIENT
Start: 2025-01-09 | End: 2025-01-09 | Stop reason: HOSPADM

## 2025-01-09 RX ORDER — NALOXONE HYDROCHLORIDE 0.4 MG/ML
0.1 INJECTION, SOLUTION INTRAMUSCULAR; INTRAVENOUS; SUBCUTANEOUS
Status: DISCONTINUED | OUTPATIENT
Start: 2025-01-09 | End: 2025-01-09 | Stop reason: HOSPADM

## 2025-01-09 RX ORDER — HYDROMORPHONE HYDROCHLORIDE 2 MG/1
2 TABLET ORAL
Status: DISCONTINUED | OUTPATIENT
Start: 2025-01-09 | End: 2025-01-09 | Stop reason: HOSPADM

## 2025-01-09 RX ORDER — SENNOSIDES A AND B 8.6 MG/1
1-2 TABLET, FILM COATED ORAL 2 TIMES DAILY PRN
Qty: 100 TABLET | Refills: 0 | Status: SHIPPED | OUTPATIENT
Start: 2025-01-09

## 2025-01-09 RX ORDER — GLYCOPYRROLATE 0.2 MG/ML
0.2 INJECTION, SOLUTION INTRAMUSCULAR; INTRAVENOUS EVERY 4 HOURS PRN
Status: DISCONTINUED | OUTPATIENT
Start: 2025-01-09 | End: 2025-01-09 | Stop reason: HOSPADM

## 2025-01-09 RX ORDER — BISACODYL 10 MG
10 SUPPOSITORY, RECTAL RECTAL DAILY PRN
Qty: 2 SUPPOSITORY | Refills: 0 | Status: SHIPPED | OUTPATIENT
Start: 2025-01-09

## 2025-01-09 RX ORDER — MINERAL OIL/HYDROPHIL PETROLAT
OINTMENT (GRAM) TOPICAL
Status: DISCONTINUED | OUTPATIENT
Start: 2025-01-09 | End: 2025-01-09 | Stop reason: HOSPADM

## 2025-01-09 RX ORDER — DEXTROSE MONOHYDRATE 25 G/50ML
50 INJECTION, SOLUTION INTRAVENOUS ONCE
Status: COMPLETED | OUTPATIENT
Start: 2025-01-09 | End: 2025-01-09

## 2025-01-09 RX ORDER — BISACODYL 10 MG
10 SUPPOSITORY, RECTAL RECTAL
Status: DISCONTINUED | OUTPATIENT
Start: 2025-01-12 | End: 2025-01-09 | Stop reason: HOSPADM

## 2025-01-09 RX ORDER — HYDROMORPHONE HYDROCHLORIDE 1 MG/ML
1 SOLUTION ORAL
Status: DISCONTINUED | OUTPATIENT
Start: 2025-01-09 | End: 2025-01-09 | Stop reason: HOSPADM

## 2025-01-09 RX ORDER — ATROPINE SULFATE 10 MG/ML
1-2 SOLUTION/ DROPS OPHTHALMIC EVERY 4 HOURS PRN
Qty: 5 ML | Refills: 0 | Status: SHIPPED | OUTPATIENT
Start: 2025-01-09

## 2025-01-09 RX ORDER — LORAZEPAM 0.5 MG/1
.25-.5 TABLET ORAL EVERY 4 HOURS PRN
Qty: 30 TABLET | Refills: 0 | Status: SHIPPED | OUTPATIENT
Start: 2025-01-09

## 2025-01-09 RX ORDER — HYDROMORPHONE HYDROCHLORIDE 1 MG/ML
0.5 INJECTION, SOLUTION INTRAMUSCULAR; INTRAVENOUS; SUBCUTANEOUS
Status: DISCONTINUED | OUTPATIENT
Start: 2025-01-09 | End: 2025-01-09 | Stop reason: HOSPADM

## 2025-01-09 RX ORDER — ACETAMINOPHEN 650 MG/1
650 SUPPOSITORY RECTAL EVERY 4 HOURS PRN
Qty: 4 SUPPOSITORY | Refills: 0 | Status: SHIPPED | OUTPATIENT
Start: 2025-01-09

## 2025-01-09 RX ORDER — SENNOSIDES 8.6 MG
1 TABLET ORAL 2 TIMES DAILY PRN
Status: DISCONTINUED | OUTPATIENT
Start: 2025-01-09 | End: 2025-01-09

## 2025-01-09 RX ORDER — HYDROMORPHONE HYDROCHLORIDE 1 MG/ML
2 SOLUTION ORAL
Status: DISCONTINUED | OUTPATIENT
Start: 2025-01-09 | End: 2025-01-09 | Stop reason: HOSPADM

## 2025-01-09 RX ORDER — HALOPERIDOL 0.5 MG/1
.5-1 TABLET ORAL EVERY 6 HOURS PRN
Qty: 30 TABLET | Refills: 0 | Status: SHIPPED | OUTPATIENT
Start: 2025-01-09

## 2025-01-09 RX ORDER — CARBOXYMETHYLCELLULOSE SODIUM 5 MG/ML
1-2 SOLUTION/ DROPS OPHTHALMIC
Status: DISCONTINUED | OUTPATIENT
Start: 2025-01-09 | End: 2025-01-09 | Stop reason: HOSPADM

## 2025-01-09 RX ORDER — NALOXONE HYDROCHLORIDE 0.4 MG/ML
0.2 INJECTION, SOLUTION INTRAMUSCULAR; INTRAVENOUS; SUBCUTANEOUS
Status: DISCONTINUED | OUTPATIENT
Start: 2025-01-09 | End: 2025-01-09 | Stop reason: HOSPADM

## 2025-01-09 RX ORDER — PIPERACILLIN SODIUM, TAZOBACTAM SODIUM 3; .375 G/15ML; G/15ML
3.38 INJECTION, POWDER, LYOPHILIZED, FOR SOLUTION INTRAVENOUS EVERY 6 HOURS
Status: DISCONTINUED | OUTPATIENT
Start: 2025-01-09 | End: 2025-01-09 | Stop reason: HOSPADM

## 2025-01-09 RX ORDER — HYDROMORPHONE HYDROCHLORIDE 1 MG/ML
1-2 SOLUTION ORAL
Qty: 50 ML | Refills: 0 | Status: SHIPPED | OUTPATIENT
Start: 2025-01-09

## 2025-01-09 RX ADMIN — DEXTROSE MONOHYDRATE 50 ML: 25 INJECTION, SOLUTION INTRAVENOUS at 08:22

## 2025-01-09 RX ADMIN — HYDROMORPHONE HYDROCHLORIDE 0.2 MG: 0.2 INJECTION, SOLUTION INTRAMUSCULAR; INTRAVENOUS; SUBCUTANEOUS at 14:11

## 2025-01-09 RX ADMIN — SODIUM CHLORIDE 500 ML: 9 INJECTION, SOLUTION INTRAVENOUS at 08:50

## 2025-01-09 RX ADMIN — HYDROMORPHONE HYDROCHLORIDE 1 MG: 1 SOLUTION ORAL at 14:31

## 2025-01-09 RX ADMIN — HYDROMORPHONE HYDROCHLORIDE 0.2 MG: 0.2 INJECTION, SOLUTION INTRAMUSCULAR; INTRAVENOUS; SUBCUTANEOUS at 10:07

## 2025-01-09 RX ADMIN — HYDROMORPHONE HYDROCHLORIDE 0.2 MG: 0.2 INJECTION, SOLUTION INTRAMUSCULAR; INTRAVENOUS; SUBCUTANEOUS at 09:06

## 2025-01-09 RX ADMIN — ONDANSETRON 4 MG: 4 TABLET, ORALLY DISINTEGRATING ORAL at 02:29

## 2025-01-09 RX ADMIN — HYDROMORPHONE HYDROCHLORIDE 0.2 MG: 0.2 INJECTION, SOLUTION INTRAMUSCULAR; INTRAVENOUS; SUBCUTANEOUS at 09:31

## 2025-01-09 RX ADMIN — HYDROMORPHONE HYDROCHLORIDE 0.2 MG: 0.2 INJECTION, SOLUTION INTRAMUSCULAR; INTRAVENOUS; SUBCUTANEOUS at 12:40

## 2025-01-09 ASSESSMENT — ACTIVITIES OF DAILY LIVING (ADL)
ADLS_ACUITY_SCORE: 54
DEPENDENT_IADLS:: INDEPENDENT
ADLS_ACUITY_SCORE: 54

## 2025-01-09 NOTE — PROGRESS NOTES
Unable to get continuous SpO2 reading at this time. Patient denies shortness of breath, denies having trouble breathing. Attempted multiple SpO2 sensors: stickers to multiple fingers, SpO2 clip, ear probe attempted multiple times on both ears upper ear and lobe, forehead probe. 2L NC placed for comfort. Attempted to warm extremities with hot packs, warm blankets. No effect on SpO2 signal, low perfusion readings.

## 2025-01-09 NOTE — PROGRESS NOTES
"  [facility] Cardiology Progress Note    Date of Admission: 1/6/2025  Service Date: 01/09/2025     Hospital Summary:  Ms. 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 She was hypotensive, which limited medical therapies for afib RVR. She underwent ANGE with successful DCCV on 1/7. She was initiated on IV amiodarone for loading. Liver transaminases continued to rise and amiodarone was discontinued and metoprolol succinate was added. On 1/9, she became dyspneic, hypotensive, and labs continued to deteriorate. She decided to transition to comfort care without any further treatments and interventions.      Interval History:  HR 60-70s. SBP 90-120s. K 4.9. Cr 1.52, Trending up. Mg 2.4. LFTs increasing with AM labs, ALT 2841 and AST 2562. [facility] of Life RRT called this morning when patient became dyspneic and hypotensive. Patient requested comfort care.    Talya states \"I'm going to die today\" early on during visit today. She wishes for comfort cares only, reporting pain everywhere and wanting something for pain. As of this morning, she is agreeable to continuing metoprolol to prevent afib recurrence because she is very symptomatic with afib; however, no further medication titrations or interventions if she goes into afib again.    Today's Recommendations:  This seems to be progression of cardiogenic shock related to her severe mitral valve disease. Talya is aware of this and prefers to proceed with comfort care at this time instead of MV intervention.  Recommend continuing Metoprolol succinate 25 mg BID for afib if patient continues to be agreeable. Considering she is very " "symptomatic with her afib, it is felt this would be important for keeping her comfortable.  Further comfort care as directed by patient and hospital team.  Cardiology will sign off.      Assessment & Plan:  Atrial fibrillation with RVR  History of afib s/p MAZE and LAAL in 2017  ANGE yesterday- no clot, LVEF 25-30%, mod-severe TR, severe bioprosthetic valve stenosis with trace regurgitation- gradient 23  Previous LVEF normal  Start Eliquis for anticoagulation  Toprol XL 25 mg BID  Elevated troponin  46 > 50  EKG without ST changes  No anginal symptoms  Likely demand ischemia related to afib RVR  Elevated transaminases  Likely shocked liver  Continue to monitor LFTs  Coffee ground emesis  EGD today without erosive gastritis, no sign of bleeding  Severe bioprosthetic MV stenosis  S/p bioprosthetic MVR in 2017, scheduled for noninvasive TMVR at [facility] end of this month  CAD  Single-vessel, 70% circumflex on angio 10/17/2024  ASA 81 mg daily  Hyperlipidemia  Holding statin due to elevated LFTs  JAMES  Holding torsemide  History of CVA  Left MCA stroke in 2017  Residual expressive aphasia, R hemiparesis  Carotid stenosis  S/p left carotid endarterectomy 9/2024  Complicated by L hypoglossal nerve injury  Neuropathic pain  Takes gabapentin    Thank you for the opportunity to participate in this pleasant patient's care.     Evelin Petersen PA-C   [facility]  Securely message via [employer] (8am - 5pm, M-F)      Objective:    Vitals: /61   Pulse 65   Temp 97.3  F (36.3  C) (Rectal)   Resp (!) 32   Ht 1.549 m (5' 1\")   Wt 39.7 kg (87 lb 8 oz)   SpO2 98%   BMI 16.53 kg/m    Wt Readings from Last 4 Encounters:   01/09/25 39.7 kg (87 lb 8 oz)   11/22/24 37.6 kg (82 lb 12.8 oz)   10/22/24 39.2 kg (86 lb 6.4 oz)   10/17/24 39.5 kg (87 lb)     I/O last 3 completed shifts:  In: 625 [I.V.:625]  Out: 350 [Urine:350]    Physical Exam:  General: Awake and alert. No acute distress.  Skin: Warm and dry. " Appropriate color. No lesions or rashes noted.  HEENT: Normocephalic and atraumatic. EOM intact. PERRL. Trachea midline. Right-sided JVD: none.  Cardiac: Normal S1 and S2, no murmurs, rubs, or gallops noted. Regular rate and rhythm .  Lung: Regular work of breathing without accessory muscle use. Clear to auscultation bilaterally.  Abdomen: No distension.  Extremities: Bilateral lower extremity edema: none.  Neuro: A & O. No focal deficits noted. Moves all extremities appropriately.      Imaging:  Recent Results (from the past 48 hours)   XR Chest Port 1 View    Narrative    EXAM: XR CHEST PORT 1 VIEW  LOCATION: [facility]  DATE: 1/7/2025    INDICATION: CHF evaluate for pulmonary edema  COMPARISON: 09/14/2024      Impression    IMPRESSION: Stable cardia mediastinal silhouette. Pulmonary vascular congestion. Cardiac valve replacement and left atrial appendage clip. Electronic common device left chest. Atherosclerotic aorta. Clips left neck.   Cardioversion    Narrative    Paul Benjamin MD     1/7/2025  4:37 PM  [facility]    Procedure: Cardioversion    Date/Time: 1/7/2025 1:25 PM    Performed by: Paul Benjamin MD  Authorized by: Dedrick Maki MD      UNIVERSAL PROTOCOL   Site Marked: NA  Prior Images Obtained and Reviewed:  Yes  Required items: Required blood products, implants, devices and special   equipment available    Patient identity confirmed:  Verbally with patient, arm band, provided   demographic data and hospital-assigned identification number  Patient was reevaluated immediately before administering moderate or deep   sedation or anesthesia  Confirmation Checklist:  Patient's identity using two indicators, relevant   allergies, procedure was appropriate and matched the consent or emergent   situation and correct equipment/implants were available  Time out: Immediately prior to the procedure a time out was called    Universal  Protocol: the Joint Commission Universal Protocol was followed       ANESTHESIA    Anesthesia was administered and monitored by anesthesiology.  See   anesthesia documentation for details.    SEDATION  Patient Sedated: Yes    Sedation Type:  Moderate (conscious) sedation  Vital signs: Vital signs monitored during sedation      PROCEDURE DETAILS  Cardioversion basis: emergent  Pre-procedure rhythm: atrial fibrillation  Patient position: patient was placed in a supine position  Chest area: chest area exposed  Electrodes: pads  Electrodes placed: anterior-posterior  Number of attempts: 1    Details of Attempts:  Precardioversion ANGE ruled out intracardiac thrombus   and confirmed known severe bioprosthetic mitral valve stenosis and   moderately severe tricuspid valve regurgitation.  See separate report for   details.  Patient on IV heparin infusion started yesterday.  /60   mmHg.  Anesthesia team present in the room.      Post-procedure rhythm: normal sinus rhythm  Complications: no complications      PROCEDURE  Describe Procedure: Patient was successfully cardioverted from rapid   atrial fibrillation to sinus rhythm with a single 200 J synchronized   shock.   No complications.  See separate ANGE report for details.    1.  ANGE report and procedural details directly communicated to referring   cardiologist, Dr. Dedrick Maki.  2.  Continue uninterrupted IV heparin.  3.  Patient to be transferred back to cardiology bed in hospital.  Ongoing   management per inpatient cardiology team.   Transesophageal Echocardiogram   Result Value    LVEF  25-30%    Narrative    [id number]  [id number]  [id number]^BRUCE^DEDRICK^CHARBEL     [facility]  Echocardiography Laboratory  [address] [place] [address]     Name: MAHSA GARVIN  MRN: [id number]  : 1960  Study Date: 2025 12:58 PM  Age: 64 yrs  Gender: Female  Patient Location: [facility]  Reason For Study: Afib  Ordering Physician:  DEDRICK MAKI  Performed By: Haven Maradiaga     BSA: 1.3 m2  Height: 61 in  Weight: 84 lb  HR: 141  BP: 100/80 mmHg  ______________________________________________________________________________  Procedure  Transesophageal Echocardiogram with two-dimensional, color and spectral  Doppler. 3D image acquisition, reconstruction, and real-time interpretation  was performed. ANGE Probe #[id number] was also used during the procedure.  ______________________________________________________________________________  Interpretation Summary     Precardioversion ANGE prior to successful electrical cardioversion (see  separate report for details).  Patient in rapid atrial fibrillation at 150 bpm and hemodynamically unstable  with BP of 100/60.  No intracardiac mass or thrombus.  Severe biatrial enlargement.  Status post previous left atrial appendage surgical ligation. The appendage is  small and diminutive with no obvious thrombus.  Status post 29 mm Katerina-Mohan porcine mitral valve prosthesis (2017,  elsewhere).  Severe bioprosthetic valve stenosis with trace regurgitation. Mean diastolic  gradient of 23 mmHg (rapid atrial fibrillation at 150 bpm).  The leaflets are fused with minimal motion.  Moderately severe tricuspid valve regurgitation, functional.  Aortic valve sclerosis with mild regurgitation, no significant stenosis.  Severely reduced left ventricular systolic function. Visually estimated LVEF  25-30%.  Mildly reduced right ventricular systolic function.     Findings discussed with Dr. Dedrick Maki.  ______________________________________________________________________________  ANGE  Consent to the procedure was obtained prior to sedation. The transesophageal  probe was passed without difficulty. I determined this patient to be an  appropriate candidate for the planned sedation and procedure and have  reassessed the patient immediately prior to sedation and procedure. Total  sedation time: 16 minutes of  continuous bedside 1:1 monitoring. Versed (2mg)  was given intravenously. Fentanyl (50mcg) was given intravenously. 3D image  acquisition, reconstruction, and real-time interpretation was performed. Prior  to the exam, the oral cavity was checked and no overcrowding was noted. There  were no complications associated with this procedure.     Left Ventricle  The left ventricle is normal in size. Severely decreased left ventricular  systolic function. The visual ejection fraction is 25-30%. There is no  thrombus seen in the left ventricle.     Right Ventricle  Mildly decreased right ventricular systolic function. There is no mass or  thrombus in the right ventricle.     Atria  There is severe biatrial enlargement. No left atrial mass or thrombus  visualized. No evidence of right atrial mass/thrombus. There is no atrial  shunt seen. The left atrial appendage was well visualized and free of  thrombus.     Mitral Valve  There is a bioprosthetic mitral valve. Severe prosthetic mitral valve  stenosis. There is trace to mild prosthetic mitral valve regurgitation.     Tricuspid Valve  Normal tricuspid valve. There is moderately severe (3+) tricuspid  regurgitation.     Aortic Valve  The aortic valve is trileaflet with aortic valve sclerosis. There is mild (1+)  aortic regurgitation. No hemodynamically significant valvular aortic stenosis.     Pulmonic Valve  Normal pulmonic valve. There is trace pulmonic valvular regurgitation.     Vessels  The aortic root is normal size. Normal size ascending aorta. Normal ascending,  transverse (arch), and descending aorta. Normal pulmonary venous drainage.     Pericardial/Pleural  There is no pericardial effusion.     Rhythm  The rhythm was rapid atrial fibrillation.  ______________________________________________________________________________  Doppler Measurements & Calculations  MV max P.6 mmHg  MV mean P.0 mmHg  MV V2 VTI: 47.8 cm  TR max gio: 323.1 cm/sec  TR max P.8  mmHg     ______________________________________________________________________________  Report approved by: Dr Paul Benjamin on 01/07/2025 04:32 PM             Echo:  No results found for this or any previous visit (from the past 4320 hours).    @LABRCNTIPR(wbc:3,hgb:3,mcv:3,plt:3,inr:3,na:3,potassium:3,chloride:3,co2:3,bun:3,cr:3,aniongap:3,rosalio:3,glc:3,albumin:2,prottotal:2,bilitotal:2,alkphos:2,alt:2,ast:2,lipase:2,tropi:3,troponin:3,tropr:3)      Data   Recent Labs   Lab 01/09/25  0638 01/09/25  0033 01/08/25  2015 01/08/25  1413 01/08/25  0647 01/08/25  0130 01/07/25  0622   WBC 18.2*  --   --   --  11.8*  --  10.3   HGB 9.7* 9.6* 9.0*   < > 8.9*   < > 10.4*   HCT 31.6*  --   --   --  27.8*  --  31.7*   MCV 92  --   --   --  88  --  86     --   --   --  226  --  251    < > = values in this interval not displayed.     Recent Labs   Lab 01/09/25  0838 01/09/25  0816 01/09/25  0554 01/08/25  0647 01/08/25  0130   NA  --   --  139 137 139   POTASSIUM  --   --  4.9 4.3 4.8   CHLORIDE  --   --  104 103 104   CO2  --   --  12* 20* 17*   ANIONGAP  --   --  23* 14 18*   * 32* 73 107* 117*   BUN  --   --  42.4* 37.8* 37.8*   CR  --   --  1.52* 1.21* 1.20*   GFRESTIMATED  --   --  38* 49* 50*   ROSALIO  --   --  8.5* 8.7* 8.4*          Past Medical History   Past Medical History:   Diagnosis Date    Anemia     Aphasia     Atrial fibrillation (H)     Cancer (H) 11.17.2022    Squamous cell carcinoma nRight Cheek    Carotid artery stenosis     Cerebrovascular accident (H) 01/09/2017    Congestive heart failure (H)     HLD (hyperlipidemia)     HTN (hypertension)     Mitral regurgitation        Past Surgical History   Past Surgical History:   Procedure Laterality Date    BIOPSY  11.18.2022    Right Cheek    CV CORONARY ANGIOGRAM N/A 10/17/2024    Procedure: Coronary Angiogram;  Surgeon: Amilcar Pierre MD;  Location: [facility] CATH LAB CV    CV LEFT HEART CATH N/A 10/17/2024    Procedure: Left Heart Catheterization;   Surgeon: Amilcar Pierre MD;  Location: [facility] CATH LAB CV    CV RIGHT HEART CATH MEASUREMENTS RECORDED N/A 9/19/2024    Procedure: Right Heart Catheterization;  Surgeon: Amilcar Pierre MD;  Location: [facility] CATH LAB CV    ENDARTERECTOMY CAROTID Left 9/13/2024    Procedure: ENDARTERECTOMY, CAROTID WITH NEURO MONITORING;  Surgeon: Margarita Tobin MD;  Location: [facility] OR    ESOPHAGOSCOPY, GASTROSCOPY, DUODENOSCOPY (EGD), COMBINED N/A 1/8/2025    Procedure: Esophagoscopy, gastroscopy, duodenoscopy (EGD), combined;  Surgeon: Gibson Valencia MD;  Location:  GI    PICC TRIPLE LUMEN PLACEMENT  9/14/2024    REPLACE VALVE MITRAL  06/09/2017    bovine mitral valve with Maze and ARIANNA ligation    TRANSESOPHAGEAL ECHOCARDIOGRAM INTRAOPERATIVE N/A 1/7/2025    Procedure: Transesophageal echocardiogram intraoperative;  Surgeon: GENERIC ANESTHESIA PROVIDER;  Location:  OR       Prior to Admission Medications   Prior to Admission Medications   Prescriptions Last Dose Informant Patient Reported? Taking?   atorvastatin (LIPITOR) 80 MG tablet 1/6/2025 Morning  No Yes   Sig: Take 1 tablet (80 mg) by mouth daily.   gabapentin (NEURONTIN) 300 MG capsule 1/6/2025 Morning  No Yes   Sig: Take 1 capsule (300 mg) by mouth 2 times daily.   torsemide (DEMADEX) 20 MG tablet 1/6/2025 Morning  No Yes   Sig: Take 1 tablet (20 mg) by mouth daily.      Facility-Administered Medications: None     Current Facility-Administered Medications   Medication Dose Route Frequency Provider Last Rate Last Admin    [Held by provider] acetaminophen (TYLENOL) tablet 975 mg  975 mg Oral Q4H PRN Jackson Shah MD   975 mg at 01/07/25 2006    apixaban ANTICOAGULANT (ELIQUIS) tablet 5 mg  5 mg Oral BID Evelin Petersen PA-C   5 mg at 01/08/25 2100    artificial saliva (BIOTENE MT) solution 2 spray  2 spray Mouth/Throat Q1H PRN Shannan Jeffries APRN CNP        [Held by provider] atorvastatin (LIPITOR) tablet 80 mg  80 mg Oral Daily  Linda Barriga MD        bisacodyl (DULCOLAX) EC tablet 5 mg  5 mg Oral Daily PRN Linda Barriga MD        [START ON 1/12/2025] bisacodyl (DULCOLAX) suppository 10 mg  10 mg Rectal Once PRN Shannan Jeffries APRN CNP        calcium carbonate (TUMS) chewable tablet 1,000 mg  1,000 mg Oral 4x Daily PRN Jackson Shah MD        carboxymethylcellulose PF (REFRESH PLUS) 0.5 % ophthalmic solution 1-2 drop  1-2 drop Both Eyes Q1H PRN Shannan Jeffries APRN CNP        gabapentin (NEURONTIN) capsule 300 mg  300 mg Oral BID Linda Barriga MD   300 mg at 01/08/25 2100    glycopyrrolate (ROBINUL) injection 0.2 mg  0.2 mg Intravenous Q4H PRN Shannan Jeffries APRN CNP        HYDROmorphone (STANDARD CONC) (DILAUDID) oral solution 1 mg  1 mg Oral Q2H PRN Shannan Jeffries APRN CNP        Or    HYDROmorphone (DILAUDID) half-tab 1 mg  1 mg Oral Q2H PRN Shannan Jeffries APRN CNP        HYDROmorphone (DILAUDID) injection 0.2 mg  0.2 mg Intravenous Q15 Min PRN Shannan Jeffries APRN CNP   0.2 mg at 01/09/25 0906    HYDROmorphone (STANDARD CONC) (DILAUDID) oral solution 2 mg  2 mg Oral Q2H PRN Shannan Jeffries APRN CNP        Or    HYDROmorphone (DILAUDID) tablet 2 mg  2 mg Oral Q2H PRN Shannan Jeffries APRN CNP        HYDROmorphone (PF) (DILAUDID) injection 0.5 mg  0.5 mg Intravenous Q15 Min PRN Shannan Jeffries APRN CNP        Lidocaine (LIDOCARE) 4 % Patch 1 patch  1 patch Transdermal Q24H Jairo Philippe MD   1 patch at 01/08/25 0803    lidocaine (LMX4) cream   Topical Q1H PRN Jackson Shah MD        lidocaine 1 % 0.1-1 mL  0.1-1 mL Other Q1H PRN Jackson Shah MD        magnesium sulfate 2 g in 50 mL sterile water intermittent infusion  2 g Intravenous Once PRN Dedrick Maki MD        melatonin tablet 1 mg  1 mg Oral At Bedtime PRN Jackson Shah MD   1 mg at 01/08/25 4185    [Held by provider] metoprolol succinate ER (TOPROL XL) 24 hr tablet 25 mg  25 mg Oral BID Evelin Petersen PA-C   25 mg at 01/08/25  2100    mineral oil-hydrophilic petrolatum (AQUAPHOR)   Topical Q1H PRN Shannan Jeffries APRN CNP        naloxone (NARCAN) injection 0.1 mg  0.1 mg Intravenous Q2 Min PRN Shannan Jeffries APRN CNP        naloxone (NARCAN) injection 0.2 mg  0.2 mg Intravenous Q2 Min PRN Shannan Jeffries APRN CNP        ondansetron (ZOFRAN ODT) ODT tab 4 mg  4 mg Oral Q6H PRN Linda Barriga MD   4 mg at 01/09/25 0229    Or    ondansetron (ZOFRAN) injection 4 mg  4 mg Intravenous Q6H PRN Linda Barriga MD   4 mg at 01/08/25 1536    pantoprazole (PROTONIX) IV push injection 40 mg  40 mg Intravenous BID Jairo Philippe MD   40 mg at 01/08/25 2103    Patient is already receiving anticoagulation with heparin, enoxaparin (LOVENOX), warfarin (COUMADIN)  or other anticoagulant medication   Does not apply Continuous PRN Jackson Shah MD        piperacillin-tazobactam (ZOSYN) 3.375 g vial to attach to  mL bag  3.375 g Intravenous Q6H Shannan Jeffries APRN CNP        prochlorperazine (COMPAZINE) injection 5 mg  5 mg Intravenous Q6H PRN Linda Barriga MD        Or    prochlorperazine (COMPAZINE) tablet 5 mg  5 mg Oral Q6H PRN Linda Barriga MD        senna-docusate (SENOKOT-S/PERICOLACE) 8.6-50 MG per tablet 1 tablet  1 tablet Oral BID PRN Jackson Shah MD        Or    senna-docusate (SENOKOT-S/PERICOLACE) 8.6-50 MG per tablet 2 tablet  2 tablet Oral BID PRJackson Al MD        sodium chloride (PF) 0.9% PF flush 3 mL  3 mL Intracatheter Q8H Jackson Shah MD   3 mL at 01/08/25 0139    sodium chloride (PF) 0.9% PF flush 3 mL  3 mL Intracatheter q1 min prn Jackson Shah MD        sodium chloride 0.9% BOLUS 500 mL  500 mL Intravenous Once Shannan Jeffries, ANNA  mL/hr at 01/09/25 0850 500 mL at 01/09/25 0850    [Held by provider] torsemide (DEMADEX) tablet 20 mg  20 mg Oral Daily Jackson Shah MD         Current Facility-Administered Medications   Medication Dose Route Frequency Provider Last Rate Last  Admin    [Held by provider] acetaminophen (TYLENOL) tablet 975 mg  975 mg Oral Q4H PRN Jackson Shah MD   975 mg at 01/07/25 2006    apixaban ANTICOAGULANT (ELIQUIS) tablet 5 mg  5 mg Oral BID Evelni Petersen PA-C   5 mg at 01/08/25 2100    artificial saliva (BIOTENE MT) solution 2 spray  2 spray Mouth/Throat Q1H PRN Shannan Jeffries APRN CNP        [Held by provider] atorvastatin (LIPITOR) tablet 80 mg  80 mg Oral Daily Linda Barriga MD        bisacodyl (DULCOLAX) EC tablet 5 mg  5 mg Oral Daily PRN Linda Barriga MD        [START ON 1/12/2025] bisacodyl (DULCOLAX) suppository 10 mg  10 mg Rectal Once PRN Shannan Jeffries APRN CNP        calcium carbonate (TUMS) chewable tablet 1,000 mg  1,000 mg Oral 4x Daily PRN Jackson Shah MD        carboxymethylcellulose PF (REFRESH PLUS) 0.5 % ophthalmic solution 1-2 drop  1-2 drop Both Eyes Q1H PRN Shannan Jeffries APRN CNP        gabapentin (NEURONTIN) capsule 300 mg  300 mg Oral BID Linda Barriga MD   300 mg at 01/08/25 2100    glycopyrrolate (ROBINUL) injection 0.2 mg  0.2 mg Intravenous Q4H PRN Shannan Jeffries APRN CNP        HYDROmorphone (STANDARD CONC) (DILAUDID) oral solution 1 mg  1 mg Oral Q2H PRN Shannan Jeffries APRN CNP        Or    HYDROmorphone (DILAUDID) half-tab 1 mg  1 mg Oral Q2H PRN Shannan Jeffries APRN CNP        HYDROmorphone (DILAUDID) injection 0.2 mg  0.2 mg Intravenous Q15 Min PRN Shannan Jeffries APRN CNP   0.2 mg at 01/09/25 0906    HYDROmorphone (STANDARD CONC) (DILAUDID) oral solution 2 mg  2 mg Oral Q2H PRN Shannan Jeffries APRN CNP        Or    HYDROmorphone (DILAUDID) tablet 2 mg  2 mg Oral Q2H PRN Shannan Jeffries APRN CNP        HYDROmorphone (PF) (DILAUDID) injection 0.5 mg  0.5 mg Intravenous Q15 Min PRN Shannan Jeffries APRN CNP        Lidocaine (LIDOCARE) 4 % Patch 1 patch  1 patch Transdermal Q24H Jairo Philpipe MD   1 patch at 01/08/25 0803    lidocaine (LMX4) cream   Topical Q1H PRN Jackson Shah MD         lidocaine 1 % 0.1-1 mL  0.1-1 mL Other Q1H PRN Jackson Shah MD        magnesium sulfate 2 g in 50 mL sterile water intermittent infusion  2 g Intravenous Once PRN Dedrick Maki MD        melatonin tablet 1 mg  1 mg Oral At Bedtime PRN Jackson Shah MD   1 mg at 01/08/25 2216    [Held by provider] metoprolol succinate ER (TOPROL XL) 24 hr tablet 25 mg  25 mg Oral BID Evelin Petersen PA-C   25 mg at 01/08/25 2100    mineral oil-hydrophilic petrolatum (AQUAPHOR)   Topical Q1H PRN Shannan Jeffries APRN CNP        naloxone (NARCAN) injection 0.1 mg  0.1 mg Intravenous Q2 Min PRN Shannan Jeffries APRN CNP        naloxone (NARCAN) injection 0.2 mg  0.2 mg Intravenous Q2 Min PRN Shannan Jeffries APRN CNP        ondansetron (ZOFRAN ODT) ODT tab 4 mg  4 mg Oral Q6H PRN Linda Barriga MD   4 mg at 01/09/25 0229    Or    ondansetron (ZOFRAN) injection 4 mg  4 mg Intravenous Q6H PRN Linda Barriga MD   4 mg at 01/08/25 1536    pantoprazole (PROTONIX) IV push injection 40 mg  40 mg Intravenous BID Philippe, Jairo Arreaga MD   40 mg at 01/08/25 2103    Patient is already receiving anticoagulation with heparin, enoxaparin (LOVENOX), warfarin (COUMADIN)  or other anticoagulant medication   Does not apply Continuous PRN Jackson Shah MD        piperacillin-tazobactam (ZOSYN) 3.375 g vial to attach to  mL bag  3.375 g Intravenous Q6H Shannan Jeffries APRN CNP        prochlorperazine (COMPAZINE) injection 5 mg  5 mg Intravenous Q6H PRN Linda Barriga MD        Or    prochlorperazine (COMPAZINE) tablet 5 mg  5 mg Oral Q6H PRN Linda Barriga MD        senna-docusate (SENOKOT-S/PERICOLACE) 8.6-50 MG per tablet 1 tablet  1 tablet Oral BID PRN Pablo, Jackson, MD        Or    senna-docusate (SENOKOT-S/PERICOLACE) 8.6-50 MG per tablet 2 tablet  2 tablet Oral BID PRJackson Al MD        sodium chloride (PF) 0.9% PF flush 3 mL  3 mL Intracatheter Q8H Jackson Shah MD   3 mL at 01/08/25 0130     sodium chloride (PF) 0.9% PF flush 3 mL  3 mL Intracatheter q1 min prn Jackson Shah MD        sodium chloride 0.9% BOLUS 500 mL  500 mL Intravenous Once Nesha Shannan Peguero, ANNA  mL/hr at 01/09/25 0850 500 mL at 01/09/25 0850    [Held by provider] torsemide (DEMADEX) tablet 20 mg  20 mg Oral Daily Jackson Shah MD         Allergies   No Known Allergies    Social History    reports that she has quit smoking. Her smoking use included cigarettes. She started smoking about 41 years ago. She has a 35 pack-year smoking history. She has never been exposed to tobacco smoke. She has never used smokeless tobacco. She reports that she does not currently use alcohol. She reports that she does not use drugs.    Family History   I have reviewed this patient's family history and updated it with pertinent information if needed.  Family History   Problem Relation Age of Onset    Hypertension Mother           Review of Systems   A comprehensive review of system was performed and is negative other than that noted in the HPI.     Primary Care Physician   ANDREE ALBRIGHT

## 2025-01-09 NOTE — PLAN OF CARE
A&Ox4 - anxious and irritable. R side hemiparesis. Expressive aphasia. Tele: SR. KENZIE. No CP. Dyspnea on exertion with ambulation with bedside commode. Amio gtt discontinued. 75ml/hr NS. Clear liquid diet. Providers aware of elevated liver enzymes. No bloody bowel movements or emesis. Marian, POA at bedside. R shoulder - lidocaine patch. Start eliquis tonight. EGD today (-).   Plan: Abdominal ultrasound ordered - NPO till scan completed. Q6 hemoglobins. Echo tomorrow.

## 2025-01-09 NOTE — DISCHARGE SUMMARY
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    Weight as of this encounter: 39.7 kg (87 lb 8 oz).       Follow-ups Needed After Discharge   Follow-up Appointments       Follow Up      Follow up with hospice team as planned              Unresulted Labs Ordered in the Past 30 Days of this Admission       No orders found from 12/7/2024 to 1/7/2025.          Discharge Disposition   Discharged to home Hospice  Condition at discharge: Critical    Hospital Course   Ernestine Morales is a 64 year old female with PMH A-fib status post maze/ARIANNA closure, CHF, MVR with prosthetic valve stenosis, severe pulmonary hypertension, moderate aortic stenosis, sleep apnea, history of CVA, severe left ICA stenosis status post CEA 9/2024 admitted on 1/6/2025 with A-fib RVR.     1/7/25 She had emergent cardioversion due to unstable BP, ,  ANGE ruled out IC thrombus  1/8/2025 after midnight, developed one " "episode coffee ground emesis     1/9: She had an RRT due to significant hypotension at tachypnea.  See RRT note for details.  Decision was made by her to go comfort cares.  Her nonessential medications were discontinued and she was discharged To home with home hospice.    Previous other management:    Afib/flutter with RVR  S/p emergent Cardioversion 1/7/25   Chronic CHF   (1/7/25 EF 25%)  Severe pulmonary hypertension  Hypotension, labile BP   - 1/7/25 ANGE   = EF 25%, 3+ TVR, prosthetic MValve with severe stenosis    - Low EF likely due to arrhythmia.    -*TTE last 11/25 EF 57%, RVSP 74mmHG, mitral valve prosthesis gradient 21mmHg, mildly enlarged/reduced function of RV, severe TR     - Heparin GTT held since early `1/8 due to coffee ground emesis   - on IV amiodarone  - s/p digoxin loading:  further dosing per cardiology  -K>4, Mg>2- repletion as below   - NS @ 75/hr due to ongoing NPO status, coffee ground emesis.   Re evaluate dailyh.   - Hold PTA torsemide  -Telemetry          Elevated transaminases  Coffee Ground Emesis  Erosive gastropathy  - stop heparin early 1/8/25  -   EGD per GI consult on 1/8/25   \"Moderate erosive gastropathy without bleeding -                             this is a source of coffee-ground emesis on heparin\".   - serial hgb q6h X 6 ordered  - Protonix bid while IP, then qday  -  INR 1.95, likely due to liver insult  - transaminitis likely due to cardiogenic shock  - follow INR, CMP  - consider vitamin K if emesis recurs despite stopping heparin     She is taking po 1/8/25 afternoon     H/o MVR with bioprosthetic valve Replacement - now with severe MV stensosis(2017)  H/o Maze/ARIANNA clipping 4/2024  Severe Pulm HTN (secondary to MV Stenosis)  -- She had planned to have a transcatheter mitral valve repair with [facility] 1/28/2025.   (not felt to be a candidate for open surgical treatment. Perioperative mortality risks  high)  -- pta:  takes a full dose aspirin daily, not on " anticoagulation otherwise (s/p MAZE), not on rate control meds.        Hypokalemia, severe  JAMES  Baseline creat 0.7-0.9    has been 132 this admission  In the setting of arrhythmia, minimal p.o. intake pta, now NPO status.-change to RN low intensity potassium protocol     Elevated Troponin suspect Type II MI   Troponin elevated/flat 46 --> 50, EKG without obvious ischemia, no chest pain. Overall suspect demand ischemia in the setting of afib RVR.   - Monitor for chest pain   - Treat arrhythmia as above     CAD  Coronary angiogram 10/17/2024 with single-vessel obstructive disease involving mid leftcircumflex- no stent placed, and mild nonobstructive disease elsewhere  - PTA statin     HTN  Not on PTA antihypertensives, hypotensive currently.      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   -Continue PTA atorvastatin     Neuropathic pain  - Hold PTA gabapentin for now       Consultations This Hospital Stay   PHARMACY IP CONSULT  CARDIOLOGY IP CONSULT  VASCULAR ACCESS ADULT IP CONSULT  VASCULAR ACCESS ADULT IP CONSULT  GASTROENTEROLOGY IP CONSULT  CARE MANAGEMENT / SOCIAL WORK IP CONSULT  PHARMACY LIAISON FOR MEDICATION COVERAGE CONSULT  SPIRITUAL HEALTH SERVICES IP CONSULT  SPIRITUAL HEALTH SERVICES IP CONSULT    Code Status   No CPR- Do NOT Intubate    Time Spent on this Encounter   I, Chai Guerrero MD, personally saw the patient today and spent greater than 30 minutes discharging this patient.       Chai Guerrero MD  [facility]  [address] [place] [address] AVE, [address]  [facility] [address]  Phone: [phone]  ______________________________________________________________________    Physical Exam   Vital Signs: Temp: 97.3  F (36.3  C) Temp src: Rectal BP: 122/61 Pulse: 65   Resp: (!) 32 SpO2: 98 % O2 Device: (S) BiPAP/CPAP (initiated for  tachypnea during RRT) Oxygen Delivery: 2 LPM  Weight: 87 lbs 8 oz  Comfortable  Primary Care Physician   ANDREE ALBRIGHT    Discharge Orders      Primary Care - Care Coordination Referral      Reason for your hospital stay    Ms. 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 She was hypotensive, which limited medical therapies for afib RVR. She underwent ANGE with successful DCCV on 1/7. She was initiated on IV amiodarone for loading. Liver transaminases continued to rise and amiodarone was discontinued and metoprolol succinate was added. On 1/9, she became dyspneic, hypotensive, and labs continued to deteriorate. She decided to transition to comfort care without any further treatments and interventions.     Activity    Your activity upon discharge: activity as tolerated     NO Follow-up Care Needed    NO follow-up care needed for this patient.     Follow Up    Follow up with hospice team as planned     Diet    Follow this diet upon discharge: Current Diet:Orders Placed This Encounter      Regular Diet Adult       Significant Results and Procedures   Results for orders placed or performed during the hospital encounter of 01/06/25   XR Chest Port 1 View    Narrative    EXAM: XR CHEST PORT 1 VIEW  LOCATION: [facility]  DATE: 1/7/2025    INDICATION: CHF evaluate for pulmonary edema  COMPARISON: 09/14/2024      Impression    IMPRESSION: Stable cardia mediastinal silhouette. Pulmonary vascular congestion. Cardiac valve replacement and left atrial appendage clip. Electronic common device left chest. Atherosclerotic aorta. Clips left neck.   US Abdomen Limited    Narrative    EXAM:  US ABDOMEN LIMITED  LOCATION: [facility]  DATE: 1/8/2025    INDICATION: transaminitis.  suspect shock liver from acute CHF  COMPARISON: None.  TECHNIQUE: Limited abdominal ultrasound.    FINDINGS:    GALLBLADDER: Gallbladder wall thickening measuring 5-6 mm. Adherent sludge or small 4 mm polyp dependently near the gallbladder fundus. Negative sonographic Fuller sign. Trace pericholecystic fluid.    BILE DUCTS: No biliary dilatation. The common duct measures 3 mm.    LIVER: Echogenic. No focal mass.    RIGHT KIDNEY: No hydronephrosis.    PANCREAS: The visualized portions are normal.    No ascites.      Impression    IMPRESSION:  1.  Hepatic steatosis.  2.  Gallbladder wall thickening, nonspecific but can be secondary to hepatitis or CHF, commonly.  3.  Tiny gallbladder polyp or adherent sludge. Negative sonographic Fuller sign.       Cardioversion    Narrative    Paul Benjamin MD     1/7/2025  4:37 PM  [facility]    Procedure: Cardioversion    Date/Time: 1/7/2025 1:25 PM    Performed by: Paul Benjamin MD  Authorized by: Dedrick Maki MD      UNIVERSAL PROTOCOL   Site Marked: NA  Prior Images Obtained and Reviewed:  Yes  Required items: Required blood products, implants, devices and special   equipment available    Patient identity confirmed:  Verbally with patient, arm band, provided   demographic data and hospital-assigned identification number  Patient was reevaluated immediately before administering moderate or deep   sedation or anesthesia  Confirmation Checklist:  Patient's identity using two indicators, relevant   allergies, procedure was appropriate and matched the consent or emergent   situation and correct equipment/implants were available  Time out: Immediately prior to the procedure a time out was called    Universal Protocol: the Joint Commission Universal Protocol was followed       ANESTHESIA    Anesthesia was administered and  monitored by anesthesiology.  See   anesthesia documentation for details.    SEDATION  Patient Sedated: Yes    Sedation Type:  Moderate (conscious) sedation  Vital signs: Vital signs monitored during sedation      PROCEDURE DETAILS  Cardioversion basis: emergent  Pre-procedure rhythm: atrial fibrillation  Patient position: patient was placed in a supine position  Chest area: chest area exposed  Electrodes: pads  Electrodes placed: anterior-posterior  Number of attempts: 1    Details of Attempts:  Precardioversion ANGE ruled out intracardiac thrombus   and confirmed known severe bioprosthetic mitral valve stenosis and   moderately severe tricuspid valve regurgitation.  See separate report for   details.  Patient on IV heparin infusion started yesterday.  /60   mmHg.  Anesthesia team present in the room.      Post-procedure rhythm: normal sinus rhythm  Complications: no complications      PROCEDURE  Describe Procedure: Patient was successfully cardioverted from rapid   atrial fibrillation to sinus rhythm with a single 200 J synchronized   shock.   No complications.  See separate ANGE report for details.    1.  ANGE report and procedural details directly communicated to referring   cardiologist, Dr. Dedrick Maki.  2.  Continue uninterrupted IV heparin.  3.  Patient to be transferred back to cardiology bed in hospital.  Ongoing   management per inpatient cardiology team.   Transesophageal Echocardiogram     Value    LVEF  25-30%    Narrative    [id number]  [id number]  [id number]^BRUCE^DEDRICK^CHARBEL     [facility]  Echocardiography Laboratory  [address] [place] [address]     Name: MAHSA GARVIN  MRN: [id number]  : 1960  Study Date: 2025 12:58 PM  Age: 64 yrs  Gender: Female  Patient [facility]  Reason For Study: Afib  Ordering Physician: DEDRICK MAKI  Performed By: Haven Maradiaga     BSA: 1.3 m2  Height: 61 in  Weight: 84 lb  HR: 141  BP: 100/80  mmHg  ______________________________________________________________________________  Procedure  Transesophageal Echocardiogram with two-dimensional, color and spectral  Doppler. 3D image acquisition, reconstruction, and real-time interpretation  was performed. ANGE Probe #[id number] was also used during the procedure.  ______________________________________________________________________________  Interpretation Summary     Precardioversion ANGE prior to successful electrical cardioversion (see  separate report for details).  Patient in rapid atrial fibrillation at 150 bpm and hemodynamically unstable  with BP of 100/60.  No intracardiac mass or thrombus.  Severe biatrial enlargement.  Status post previous left atrial appendage surgical ligation. The appendage is  small and diminutive with no obvious thrombus.  Status post 29 mm Katerina-Mohan porcine mitral valve prosthesis (2017,  elsewhere).  Severe bioprosthetic valve stenosis with trace regurgitation. Mean diastolic  gradient of 23 mmHg (rapid atrial fibrillation at 150 bpm).  The leaflets are fused with minimal motion.  Moderately severe tricuspid valve regurgitation, functional.  Aortic valve sclerosis with mild regurgitation, no significant stenosis.  Severely reduced left ventricular systolic function. Visually estimated LVEF  25-30%.  Mildly reduced right ventricular systolic function.     Findings discussed with Dr. Dedrick Maki.  ______________________________________________________________________________  ANGE  Consent to the procedure was obtained prior to sedation. The transesophageal  probe was passed without difficulty. I determined this patient to be an  appropriate candidate for the planned sedation and procedure and have  reassessed the patient immediately prior to sedation and procedure. Total  sedation time: 16 minutes of continuous bedside 1:1 monitoring. Versed (2mg)  was given intravenously. Fentanyl (50mcg) was given intravenously. 3D  image  acquisition, reconstruction, and real-time interpretation was performed. Prior  to the exam, the oral cavity was checked and no overcrowding was noted. There  were no complications associated with this procedure.     Left Ventricle  The left ventricle is normal in size. Severely decreased left ventricular  systolic function. The visual ejection fraction is 25-30%. There is no  thrombus seen in the left ventricle.     Right Ventricle  Mildly decreased right ventricular systolic function. There is no mass or  thrombus in the right ventricle.     Atria  There is severe biatrial enlargement. No left atrial mass or thrombus  visualized. No evidence of right atrial mass/thrombus. There is no atrial  shunt seen. The left atrial appendage was well visualized and free of  thrombus.     Mitral Valve  There is a bioprosthetic mitral valve. Severe prosthetic mitral valve  stenosis. There is trace to mild prosthetic mitral valve regurgitation.     Tricuspid Valve  Normal tricuspid valve. There is moderately severe (3+) tricuspid  regurgitation.     Aortic Valve  The aortic valve is trileaflet with aortic valve sclerosis. There is mild (1+)  aortic regurgitation. No hemodynamically significant valvular aortic stenosis.     Pulmonic Valve  Normal pulmonic valve. There is trace pulmonic valvular regurgitation.     Vessels  The aortic root is normal size. Normal size ascending aorta. Normal ascending,  transverse (arch), and descending aorta. Normal pulmonary venous drainage.     Pericardial/Pleural  There is no pericardial effusion.     Rhythm  The rhythm was rapid atrial fibrillation.  ______________________________________________________________________________  Doppler Measurements & Calculations  MV max P.6 mmHg  MV mean P.0 mmHg  MV V2 VTI: 47.8 cm  TR max gio: 323.1 cm/sec  TR max P.8 mmHg     ______________________________________________________________________________  Report approved by: Dr Miller  Sourav on 01/07/2025 04:32 PM             Discharge Medications   Current Discharge Medication List        START taking these medications    Details   acetaminophen (TYLENOL) 650 MG suppository Place 1 suppository (650 mg) rectally every 4 hours as needed for fever.  Qty: 4 suppository, Refills: 0    Associated Diagnoses: Hospice care patient      atropine 1 % ophthalmic solution Take 1-2 drops by mouth, place under tongue or place inside cheek every 4 hours as needed for secretions.  Qty: 5 mL, Refills: 0    Associated Diagnoses: Hospice care patient      bisacodyl (DULCOLAX) 10 MG suppository Place 1 suppository (10 mg) rectally daily as needed for constipation.  Qty: 2 suppository, Refills: 0    Associated Diagnoses: Hospice care patient      haloperidol (HALDOL) 0.5 MG tablet Take 1-2 tablets (0.5-1 mg) by mouth or place under tongue every 6 hours as needed for agitation or other (nausea).  Qty: 30 tablet, Refills: 0    Associated Diagnoses: Hospice care patient      HYDROmorphone, STANDARD CONC, (DILAUDID) 1 MG/ML oral solution Take 1-2 mLs (1-2 mg) by mouth or place under tongue every 2 hours as needed for pain or shortness of breath.  Qty: 50 mL, Refills: 0    Associated Diagnoses: Hospice care patient      LORazepam (ATIVAN) 0.5 MG tablet Take 0.5-1 tablets (0.25-0.5 mg) by mouth or place under tongue every 4 hours as needed for anxiety (restlessness).  Qty: 30 tablet, Refills: 0    Associated Diagnoses: Hospice care patient      senna (SENNA LAXATIVE) 8.6 MG tablet Take 1-2 tablets by mouth 2 times daily as needed for constipation.  Qty: 100 tablet, Refills: 0    Associated Diagnoses: Hospice care patient           STOP taking these medications       atorvastatin (LIPITOR) 80 MG tablet Comments:   Reason for Stopping:         gabapentin (NEURONTIN) 300 MG capsule Comments:   Reason for Stopping:         torsemide (DEMADEX) 20 MG tablet Comments:   Reason for Stopping:             Allergies   No Known  Allergies

## 2025-01-09 NOTE — PROGRESS NOTES
Care Management Discharge Note    Discharge Date: 01/09/2025       Discharge Disposition: Hospice    Discharge Services: None    Discharge DME: Wheelchair    Discharge Transportation: family or friend will provide    Private pay costs discussed:  N/A    Does the patient's insurance plan have a 3 day qualifying hospital stay waiver?  No    PAS Confirmation Code:    Patient/family educated on Medicare website which has current facility and service quality ratings: yes    Education Provided on the Discharge Plan: Yes  Persons Notified of Discharge Plans: patient, krystian, Nursing, hospice  Patient/Family in Agreement with the Plan: yes    Handoff Referral Completed: No, handoff not indicated or clinically appropriate    Additional Information:  Updated from bedside Rn that patient/Krystian do not want to wait for the wheelchair to be delivered to the hospital, however; want one delivered to the house. Krystian will have a friend meet them at the house with a wheelchair to get patient inside.     Writer attempted to call Sindy with [employer] but there was no answer. Spoke with Israel and explained patient/family do not want to wait and asked that they deliver the Wheelchair to patient's address. Israel confirmed this is not a problem and will coordinate with Sindy.        ARNALDO Mohan, MaineGeneral Medical CenterSW  Social Work- Inpatient Care Coordination  [facility]

## 2025-01-09 NOTE — PROGRESS NOTES
RRT called this AM by nursing staff. Tachypnea of ~30 breaths/min noted at this time. BiPAP requested by NP for this reason. Patient started on BiPAP 7/5, 35%. Hypotension present prior to application of BiPAP. Made sure to vocalize concern that BiPAP may worsen hypotension.   RT to follow.    Vignesh Spring, RRT  01/09/25

## 2025-01-09 NOTE — PROGRESS NOTES
Patient discharged with home hospice. Discharged medications with patient and Marian FUNES. All belongings with patient.

## 2025-01-09 NOTE — CONSULTS
"SPIRITUAL HEALTH SERVICES - Consult Note  [facility]  Referral Source/Reason for Visit: Consult for impending death    Summary and Recommendations -  Christina shared that she has been struggling for the last 9 months following a stroke and heart health concern. Reflecting on her current health status and prior health, she said \"had a good life, not good now.\"   Talya shared that she is Faith, but doesn't have a current Christian community. She welcomed the offer of prayer and scripture readings.  Delivered a comfort blanket that Talya wants to give to her dog, Josh, as a way of staying connected to her.    Plan: Spiritual Health remains available for support. Please re-consult as needs arise or as requested by patient/loved ones.    Ledy Loyd M.Div.  Staff     [facility] available 24/7 for emergent requests/referrals, either by paging the on-call  or by entering an ASAP/STAT consult in [employer], which will also page the on-call .    Assessment    Saw pt Ernestine \"Talya\" MK Morales and her friend, Marian, per consult for support following move to comfort focused care.    Patient/Family Understanding of Illness and Goals of Care - Talya shared that she has decided to stop any further treatment and focus on comfort, \"it's been too long and hard.\" She hopes that she might be able to discharge home with hospice and be with her sister, friends, and beloved dog, Josh.    Distress and Loss -   Christina shared that she has been struggling for the last 9 months following a stroke and heart health concern. Reflecting on her current health status and prior health, she said \"had a good life, not good now.\"   Talya has her beloved dog, Josh, that she hopes to be able to be with by going home. \"He's a good boy.\" Plans have been made for Talya's sister, Belkys, to take Josh home with her to [place].  Marian was tearful as she spoke about trying to encourage Talya to \"keep trying\" but listening to Talya's wishes for her goals " "of care, to honor her decision. \"She fought the good fight.\"    Strengths, Coping, and Resources - Talya has the support of friends, especially her lifelong friend, Marian, as well as her sister, Belkys, who is flying in from [place] and will be arriving this afternoon.    Meaning, Beliefs, and Spirituality   Talya shared that she is Latter-day, but doesn't have a current Oriental orthodox community. She welcomed the offer of prayer and scripture readings.  Delivered a comfort blanket that Talya wants to give to her dog, Josh, as a way of staying connected to her.      "

## 2025-01-09 NOTE — PLAN OF CARE
[id number]     Orientation: A&O x 4, anxious. R side hemiparesis. Expressive aphasia.  Vitals: VSS. Difficult to get an O2 reading, multiple finger probes tried, ear lobe tried, pt denied SOB, pt placed on 2L NC for comfort. C/o dizziness while standing.  Pain: C/o pain in R shoulder, heat pack provided.  Tele: SR  Lines/Drains: IV NS @ 75ml/hr  Skin/Wounds: UE scattered bruising. Abrasion R shin  GI/: denied nausea.  Diet: Clear liquid  Labs: Abnormal/Trends - q6 Hgb. Electrolyte replacement - K and Mg not yet resulted.  Ambulation/Assist: x1 assist w/ gait belt. Dizziness while standing, hypotensive when OOB. Alarms on.  Plan: ECHO today

## 2025-01-09 NOTE — PLAN OF CARE
Goal Outcome Evaluation:      Plan of Care Reviewed With: patient          Outcome Evaluation: Discharge home with hospice and family support

## 2025-01-09 NOTE — PROGRESS NOTES
Rapid Response called @ 0800 for BP 70/38, tachypnea RR 30s, abdominal breathing, and impending doom. Unable to get a pulse oximeter reading r/t poor perfusion. Liver enzymes critical resulted this morning. Marian FUNES at bedside. Shannan NP discussed options and patient continued with comfort care. Patient prefers to go home today on home hospice. Plans pending, social work aware.

## 2025-01-09 NOTE — CODE/RAPID RESPONSE
[facility]    RRT Note  1/9/2025   Time Called:     RRT called for: 8:02 AM    HPI:    Ernestine Morales is a 64 year old female w/ PMH of A-fib status post maze/ARIANNA closure, HFrEF, MVR with prosthetic valve stenosis, severe pulmonary hypertension, moderate aortic stenosis, sleep apnea, history of CVA, severe left ICA stenosis status post CEA 9/2024 admitted on 1/6/2025 with A-fib RVR for which she has undergone ANGE with cardioversion 1/7/2024 in which she was started on IV amiodarone.  On my arrival.  Course has included some coffee-ground emesis concern for GI bleeding for which patient underwent EGD on 1/8/2025.  Additionally patient has had acutely elevated transaminases potentially felt to be ischemic hepatitis.    RRT activated on the a.m. of 1/9/2025 for hypotension initially 70s/30s.  This resulted in lactic acid trigger.  Additionally nursing staff are concerned about tachypnea, worsening LFTs and worsening renal function.    On my arrival patient is tachypneic with respiratory rate> 30, BP 93/64, heart rate 60, 100% on 2 L nasal cannula.  She has dry oral mucosa clinically appears due to hypovolemic.  She denies any dyspnea endorses palpitations no chest pain.  Feels a little bit confused, mildly so as per her friend/POA.  Based on her lab data from the morning they are significantly worse than 24 hours prior, acidemia with bicarb of 12, creatinine 1.5 up from 1.2, LFTs 2800 and 2500 range, bilirubin 11.8.  Her leukocytosis has worsened from 11.8 and now 18.2 he is coagulopathic INR 2.9 Actiq acid was drawn prior to my arrival and was not immediately available    Assessment & Plan   multi organ failure potentially either from cardiogenic shock versus septic shock or both    INTERVENTIONS:  -500 mL NS bolus  - Point-of-care glucose 32mg/dL--> 25 g D50 IV x1  - Stat portable chest x-ray  - Start NIPPV 7/5  - Blood cultures stat  - Empiric initiation of Zosyn  -start NICOM to  assist guidance of fluid responsive status and ongoing fluid resuscitation  - UA is already ordered    I was called away to a concurrent emergency, returned at 845.  By that time lactic had resulted 11.8.  I shared my concerns with patient and her friend about risk of dying in the setting multiorgan failure.  -Patient is hopeful to be at home with her dog. she values her independence wants to avoid at all costs TCU nursing home in similar facilities.  This is a line that should not be crossed in concurrence with patient's overall wishes.  -We discussed that even if we pursued aggressive therapy to determine etiology of her current clinical condition that it her recovery would very likely entail a stent of either TCU and/or nursing home.  Patient did not wish to undergo further diagnostics as ordered and of treatment at this time is outweighing benefits  -Verbalized fatigue from the pokes and prodding.  -We discussed end-of-life comfort focused care to which patient is eager to begin.  Patient is oriented and seems capable of making sound decisions  -Comfort focused order set utilized for provision of aggressive symptom management at the end of life    Last 24H PRN:     melatonin tablet 1 mg, 1 mg at 01/08/25 2216    ondansetron (ZOFRAN ODT) ODT tab 4 mg, 4 mg at 01/09/25 0229 **OR** ondansetron (ZOFRAN) injection 4 mg, 4 mg at 01/08/25 1536    Working diagnosis:   Multiorgan failure (acute resp failure, JAMES, acute liver injury) either cardiogenic shock or septic shock or both  Severe lactic acidosis  Coagulopathy  hypoglycemia    At the end of the RRT patient has decided to pursue comfort focused treatment only    disposition continue current level of care    Discussed with and defer further cares to hospitalist attending physician Dr. Chai Guerrero     Code Status: Full Code    Physical Exam   Vital Signs with Ranges:  Temp:  [97.2  F (36.2  C)-97.5  F (36.4  C)] 97.2  F (36.2  C)  Pulse:  [53-97] 67  Resp:   [16-32] 32  BP: ()/(38-81) 70/38  SpO2:  [90 %-100 %] 93 %  I/O last 3 completed shifts:  In: 625 [I.V.:625]  Out: 350 [Urine:350]    Constitutional: vs as above and/or per EMR  General:  adult pt lying in bed with acute distress   GCS:   Motor 6=Obeys commands   Verbal 5=Oriented   Eye Opening 4=Spontaneous   Total: 15     Neuro: +follows commands wiggle toes and show 2 fingers bilat, face symmetric, tongue midline, speech fluent  Eyes pupils equal round 3mm briskly reactive bilat, sclera nonicteric, noninjected, conjunctiva pink,  Head, ENT & mouth: NC/AT,  dry oral mucosa  Neck: supple  CV S1S2 NSR  resp: CTAB upper and lower lobes clear to auscultation bilateral upper and lower lobes  gi:normoactive bowel sounds, soft, nontender, nondisteded  Ext: no edema or mottling  Skin: no rashes on exposed skin  Musculoskeletal no bony joint deformities      Data       IMAGING: (X-ray/CT/MRI)   Recent Results (from the past 24 hours)   US Abdomen Limited    Narrative    EXAM: US ABDOMEN LIMITED  LOCATION: [facility]  DATE: 1/8/2025    INDICATION: transaminitis.  suspect shock liver from acute CHF  COMPARISON: None.  TECHNIQUE: Limited abdominal ultrasound.    FINDINGS:    GALLBLADDER: Gallbladder wall thickening measuring 5-6 mm. Adherent sludge or small 4 mm polyp dependently near the gallbladder fundus. Negative sonographic Fuller sign. Trace pericholecystic fluid.    BILE DUCTS: No biliary dilatation. The common duct measures 3 mm.    LIVER: Echogenic. No focal mass.    RIGHT KIDNEY: No hydronephrosis.    PANCREAS: The visualized portions are normal.    No ascites.      Impression    IMPRESSION:  1.  Hepatic steatosis.  2.  Gallbladder wall thickening, nonspecific but can be secondary to hepatitis or CHF, commonly.  3.  Tiny gallbladder polyp or adherent sludge. Negative sonographic Fuller sign.           CBC with Diff:  Recent Labs   Lab Test 01/09/25  0638 01/09/25  0554   WBC 18.2*  --     HGB 9.7*  --    MCV 92  --      --    INR  --  2.99*        Comprehensive Metabolic Panel:  Recent Labs   Lab 01/09/25  0554      POTASSIUM 4.9   CHLORIDE 104   CO2 12*   ANIONGAP 23*   GLC 73   BUN 42.4*   CR 1.52*   GFRESTIMATED 38*   BISHOP 8.5*   MAG 2.4*   PROTTOTAL 5.9*   ALBUMIN 3.2*   BILITOTAL 1.4*   ALKPHOS 217*   AST 2,562*   ALT 2,841*       INR:    Recent Labs   Lab Test 01/09/25  0554   INR 2.99*     Advance Care Planning Discussion 1/9/2025. I, ANNA Urias CNP met with Patient and friend  today at the hospital to discuss Advance Care Planning. Ernestine Morales does have decisional capacity and was present for this discussion.  Those present were informed of the voluntary nature of this discussion and wished to proceed.  The discussion included:  see above . This discussion began at 845 and ended at 901 for a total of 16 minutes.   Time Spent on this Encounter   I spent 40 minutes of critical care time on the unit/floor managing the care of Ernestine Morales. Upon evaluation, this patient had a high probability of imminent or life-threatening deterioration due to MOF, severe lactic acidosis, coagulopathy, which required my direct attention, intervention, and personal management including advance care planning discussion, initiation of NIPPV 100% of my time was spent at the bedside counseling the patient and/or coordinating care regarding services listed in this note.    ANNA Urias CNP  Hospitalist Service  [facility]  Securely message with [email or web address] (more info)  Text page via [employer] Paging/Directory

## 2025-01-09 NOTE — CONSULTS
Care Management Initial Consult    General Information  Assessment completed with: Patient, Friend, Talya Fonseca  Type of CM/SW Visit: Initial Assessment    Primary Care Provider verified and updated as needed: No   Readmission within the last 30 days: no previous admission in last 30 days         Advance Care Planning: Advance Care Planning Reviewed: present on chart          Communication Assessment  Patient's communication style: spoken language (English or Bilingual)    Hearing Difficulty or Deaf: no   Wear Glasses or Blind: yes    Cognitive  Cognitive/Neuro/Behavioral: .WDL except  Level of Consciousness: lethargic  Arousal Level: opens eyes spontaneously  Orientation: oriented x 4  Mood/Behavior: calm, cooperative  Best Language: 1 - Mild to moderate  Speech: spontaneous    Living Environment:   People in home: alone     Current living Arrangements:        Able to return to prior arrangements: yes       Family/Social Support:  Care provided by: other (see comments) (POA)  Provides care for: no one  Marital Status: Single  Support system: Friend, Sibling(s)          Description of Support System: Supportive, Involved    Support Assessment: Adequate family and caregiver support, Adequate social supports    Current Resources:   Patient receiving home care services: No        Community Resources: None  Equipment currently used at home: cane, straight  Supplies currently used at home: None    Employment/Financial:  Employment Status: retired        Financial Concerns: none   Referral to Financial Worker: No  Finance Comments: Medicare    Does the patient's insurance plan have a 3 day qualifying hospital stay waiver?  No    Lifestyle & Psychosocial Needs:  Social Drivers of Health     Food Insecurity: Low Risk  (1/7/2025)    Food Insecurity     Within the past 12 months, did you worry that your food would run out before you got money to buy more?: No     Within the past 12 months, did the food you bought just not last  and you didn t have money to get more?: No   Depression: At risk (10/1/2024)    PHQ-2     PHQ-2 Score: 3   Housing Stability: Low Risk  (1/7/2025)    Housing Stability     Do you have housing? : Yes     Are you worried about losing your housing?: No   Tobacco Use: Medium Risk (11/22/2024)    Patient History     Smoking Tobacco Use: Former     Smokeless Tobacco Use: Never     Passive Exposure: Never   Financial Resource Strain: Low Risk  (1/7/2025)    Financial Resource Strain     Within the past 12 months, have you or your family members you live with been unable to get utilities (heat, electricity) when it was really needed?: No   Alcohol Use: Not on file   Transportation Needs: Low Risk  (1/7/2025)    Transportation Needs     Within the past 12 months, has lack of transportation kept you from medical appointments, getting your medicines, non-medical meetings or appointments, work, or from getting things that you need?: No   Recent Concern: Transportation Needs - Unmet Transportation Needs (11/20/2024)    Received from [facility]    PRAPARE - Transportation     Lack of Transportation (Medical): No     Lack of Transportation (Non-Medical): Yes   Physical Activity: Inactive (11/20/2024)    Received from [facility]    Exercise Vital Sign     Days of Exercise per Week: 0 days     Minutes of Exercise per Session: 0 min   Interpersonal Safety: Low Risk  (1/7/2025)    Interpersonal Safety     Do you feel physically and emotionally safe where you currently live?: Yes     Within the past 12 months, have you been hit, slapped, kicked or otherwise physically hurt by someone?: No     Within the past 12 months, have you been humiliated or emotionally abused in other ways by your partner or ex-partner?: No   Stress: No Stress Concern Present (4/9/2024)    [place] [employer] - Occupational Stress Questionnaire     Feeling of Stress : Only a little   Social Connections: Unknown (4/9/2024)    Social  "Connection and Isolation Panel [NHANES]     Frequency of Communication with Friends and Family: Not on file     Frequency of Social Gatherings with Friends and Family: More than three times a week     Attends Hoahaoism Services: Not on file     Active Member of Clubs or Organizations: Not on file     Attends Club or Organization Meetings: Not on file     Marital Status: Not on file   Health Literacy: Not on file       Functional Status:  Prior to admission patient needed assistance:   Dependent ADLs:: Independent  Dependent IADLs:: Independent       Mental Health Status:  Mental Health Status: No Current Concerns       Chemical Dependency Status:  Chemical Dependency Status: No Current Concerns             Values/Beliefs:  Spiritual, Cultural Beliefs, Hoahaoism Practices, Values that affect care: no               Discussed  Partnership in Safe Discharge Planning  document with patient/family: No    Additional Information:  Per care management consult for elevated risk, chart was reviewed. Updated from Nursing team and Pyhsician that patient has transitioned to comfort cares and would like to return home with hospice. Per H & P \" PMH A-fib status post maze/ARIANNA closure, CHF, MVR with prosthetic valve stenosis, severe pulmonary hypertension, moderate aortic stenosis, sleep apnea, history of CVA, severe left ICA stenosis status post CEA 9/2024 admitted on 1/6/2025 with A-fib RVR\".     Met with patient and Marian (friend) at bedside to introduce self and role. Patient shares that she would like to return home with hospice. Marian shares that patient's sister is coming into town and should land around 1530 and will be staying with patient and providing cares for her. Patient confirmed she wants to return to her own home on discharge with hospice. Patient confirmed address is [address] [place] [address] [address] in [place]. There are no stairs to enter. Marian confirmed that she would stay with patient until patient's sister arrived to provide " care. Writer discussed what hospice is, average length of visits and frequency. Explained hospice is not 24/7 and care is provided by family or private pay agencies. Writer explained what the team is compiled with. Patient in agreement to move forward with a plan to discharge home with hospice and family support. Writer offered choice of hospice agency. They did not have a preference but wanted to get her home as fast as can. Marian will be the main contact. Marian confirmed that she will want to transport on discharge and feels comfortable to do this. Patient denied needing any equipment except for a wheelchair. Patient aware that they can order other equipment as needed once patient is at home. Referral sent via [employer] to [facility] to see if they can accommodate a same day discharge. Call placed to [place], Liaison with [facility] explaining the need for patient to discharge today. He will send a message to the intake team and will get back to writer    Addendum 1230: Message from Sindy with [facility] that they can accept for an admission today and will plan to come out to meet at patient's home between [phone]. Writer asked Sindy if they are able to deliver W/C at the hospital since patient is not home/no family is available to accept equipment. Sindy stated she would let writer know if this is possible. Paged Dr. Elam for orders. Orders received and faxed via [employer] to [facility]. Updated bedside RN, Riddhi regarding plan for hospice today. Explained we are waiting to hear if the W/C can be delivered to the hospital before patient discharges.       Next Steps: Confirm plan.     ARNALDO Mohan, Southern Maine Health CareSW  Social Work- Inpatient Care Coordination  [facility]

## 2025-01-30 NOTE — TELEPHONE ENCOUNTER
Pt discharged to skilled nursing facility (SNF).    MICHELLE Giles.   Venipuncture obtained from the right arm. Patient tolerated procedure without complications or complaints.

## (undated) DEVICE — CATH ANGIO INFINITI JR4 4FRX100CM 538421

## (undated) DEVICE — MAT INST MAGNETIC 16X20

## (undated) DEVICE — SYR 10ML LL W/O NDL 302995

## (undated) DEVICE — GLOVE BIOGEL INDICATOR 7.5 LF 41675

## (undated) DEVICE — SU PROLENE 6-0 24" C-1 DA TAPER POINT BLUE 8726H

## (undated) DEVICE — PREP CHLORAPREP W/ORANGE TINT 10.5ML 930715

## (undated) DEVICE — NEEDLE HYPO MAGELLAN SAFETY 22GA 1 1/2IN 8881850215

## (undated) DEVICE — SUTURE SILK 0 TIES 30IN SA86G

## (undated) DEVICE — MANIFOLD KIT ANGIO AUTOMATED 014613

## (undated) DEVICE — SYR ANGIOGRAPHY MULTIUSE KIT ACIST 014612

## (undated) DEVICE — PATCH SURGICAL EVARREST FIBRIN SEALANT 4X2IN EVT5024

## (undated) DEVICE — Device

## (undated) DEVICE — CATH PULM ART 7FR X 110CM, SWA

## (undated) DEVICE — COVER ULTRASOUND PROBE FLEXI-FEEL 4X96" 25-FF496

## (undated) DEVICE — SUCTION MANIFOLD NEPTUNE 2 SYS 1 PORT 702-025-000

## (undated) DEVICE — CATH ANGIO 6 FR IMPULSE DIA FL3.5 SINGLE H74916599210

## (undated) DEVICE — SHTH INTRO 0.021IN ID 6FR DIA

## (undated) DEVICE — GOWN IMPERVIOUS BREATHABLE 2XL/XLONG

## (undated) DEVICE — SUTURE SILK 2-0 TIES 30IN SA85H

## (undated) DEVICE — SHEATH GUIDE SLENDER 7FRX10CM SS 80-1070

## (undated) DEVICE — GLOVE UNDER INDICATOR PI SZ 7.0 LF 41670

## (undated) DEVICE — SU DERMABOND ADVANCED .7ML DNX12

## (undated) DEVICE — CUSTOM PACK CORONARY SAN5BCRHEA

## (undated) DEVICE — DRAIN FLAT 10MM FULL PERF SIL 0070440

## (undated) DEVICE — SLEEVE TR BAND RADIAL COMPRESSION DEVICE 24CM TRB24-REG

## (undated) DEVICE — SU PROLENE 7-0 BV-1DA 18" 8701H

## (undated) DEVICE — SU PROLENE 6-0 C-1DA 18" 8718H

## (undated) DEVICE — DRSG GAUZE 2X2" TRAY 1806

## (undated) DEVICE — KIT HAND CONTROL ACIST 014644 AR-P54

## (undated) DEVICE — SOL WATER IRRIG 1000ML BOTTLE 2F7114

## (undated) DEVICE — SOL NACL 0.9% INJ 250ML BAG 2B1322Q

## (undated) DEVICE — GLOVE SURG GAMMEX PI POLYISOPRENE WHITE SZ 8 LF 20685780

## (undated) DEVICE — ESU ELEC BLADE 2.75" COATED/INSULATED E1455

## (undated) DEVICE — LOOP RETRACT-O-TAPE 16GA X 18 QUEST 1012

## (undated) DEVICE — SYR 05ML LL W/O NDL

## (undated) DEVICE — CATH ANGIO JUDKINS R4 6FRX100CM INFINITI 534621T

## (undated) DEVICE — SUTURE VICRYL+ 2-0 27IN SH UND VCP417H

## (undated) DEVICE — CUSTOM PACK CAROTID ENDARTERECTOMY PR

## (undated) DEVICE — DECANTER BAG 2002S

## (undated) DEVICE — IOM CASE FLAT FEE

## (undated) DEVICE — ELECTRODE DEFIB CADENCE 22550R

## (undated) DEVICE — TOWEL SURG 16INW X 26INL WHITE STRL XRAY DETECTABLE X8314W

## (undated) DEVICE — CONTAINER URINE SPEC 4OZ STRL 1053

## (undated) DEVICE — DRAPE STERI FLUOROSCOPE 35X43"1012 LATEX FREE

## (undated) DEVICE — RX SURGIFLO HEMOSTATIC MATRIX W/THROMBIN 8ML 2994

## (undated) DEVICE — SU VICRYL+ 3-0 27IN SH UND VCP416H

## (undated) DEVICE — SU ETHILON 3-0 PS-2 18" 1669H

## (undated) DEVICE — PROTECTOR ARM STANDARD ONE STEP 40433 (COI)

## (undated) DEVICE — DRSG TELFA 3X4" 1050

## (undated) DEVICE — SPONGE KITNER DISSECTING 7102*

## (undated) DEVICE — GOWN LG DISP 9515

## (undated) DEVICE — SOL NACL 0.9% IRRIG 1000ML BOTTLE 2F7124

## (undated) DEVICE — SU MONOCRYL+ 4-0 18IN PS2 UND MCP496G

## (undated) DEVICE — SYR EAR 3OZ BULB IRR STRL DISP BLU PVC 4173

## (undated) DEVICE — DRAIN RESERVOIR 100ML JP 0070740

## (undated) DEVICE — WIPE MICRO-TIP GRAPHIC CONTROL 3300

## (undated) DEVICE — SUTURE SILK 3-0 TIES 18IN A184H

## (undated) DEVICE — SU SILK 4-0 TIE 18' A183H

## (undated) DEVICE — ESU PENCIL SMOKE EVAC W/ROCKER SWITCH 0703-047-000

## (undated) DEVICE — SU SILK 2-0 TIE 18' A185H

## (undated) RX ORDER — BUPIVACAINE HYDROCHLORIDE 2.5 MG/ML
INJECTION, SOLUTION EPIDURAL; INFILTRATION; INTRACAUDAL
Status: DISPENSED
Start: 2024-09-13

## (undated) RX ORDER — EPHEDRINE SULFATE 50 MG/ML
INJECTION, SOLUTION INTRAMUSCULAR; INTRAVENOUS; SUBCUTANEOUS
Status: DISPENSED
Start: 2025-01-07

## (undated) RX ORDER — FENTANYL CITRATE 50 UG/ML
INJECTION, SOLUTION INTRAMUSCULAR; INTRAVENOUS
Status: DISPENSED
Start: 2024-10-17

## (undated) RX ORDER — DEXAMETHASONE SODIUM PHOSPHATE 10 MG/ML
INJECTION, SOLUTION INTRAMUSCULAR; INTRAVENOUS
Status: DISPENSED
Start: 2024-09-13

## (undated) RX ORDER — LIDOCAINE HYDROCHLORIDE 10 MG/ML
INJECTION, SOLUTION EPIDURAL; INFILTRATION; INTRACAUDAL; PERINEURAL
Status: DISPENSED
Start: 2024-09-13

## (undated) RX ORDER — ONDANSETRON 2 MG/ML
INJECTION INTRAMUSCULAR; INTRAVENOUS
Status: DISPENSED
Start: 2024-09-13

## (undated) RX ORDER — NALOXONE HYDROCHLORIDE 0.4 MG/ML
INJECTION, SOLUTION INTRAMUSCULAR; INTRAVENOUS; SUBCUTANEOUS
Status: DISPENSED
Start: 2024-09-13

## (undated) RX ORDER — HEPARIN SODIUM 1000 [USP'U]/ML
INJECTION, SOLUTION INTRAVENOUS; SUBCUTANEOUS
Status: DISPENSED
Start: 2024-09-13

## (undated) RX ORDER — LIDOCAINE 50 MG/G
OINTMENT TOPICAL
Status: DISPENSED
Start: 2025-01-07

## (undated) RX ORDER — FENTANYL CITRATE 50 UG/ML
INJECTION, SOLUTION INTRAMUSCULAR; INTRAVENOUS
Status: DISPENSED
Start: 2024-09-13

## (undated) RX ORDER — FENTANYL CITRATE-0.9 % NACL/PF 10 MCG/ML
PLASTIC BAG, INJECTION (ML) INTRAVENOUS
Status: DISPENSED
Start: 2024-09-13

## (undated) RX ORDER — OXYMETAZOLINE HYDROCHLORIDE 0.05 G/100ML
SPRAY NASAL
Status: DISPENSED
Start: 2024-09-13

## (undated) RX ORDER — ESMOLOL HYDROCHLORIDE 10 MG/ML
INJECTION INTRAVENOUS
Status: DISPENSED
Start: 2024-09-13

## (undated) RX ORDER — LABETALOL HYDROCHLORIDE 5 MG/ML
INJECTION, SOLUTION INTRAVENOUS
Status: DISPENSED
Start: 2024-09-13

## (undated) RX ORDER — PHENYLEPHRINE HYDROCHLORIDE 10 MG/ML
INJECTION INTRAVENOUS
Status: DISPENSED
Start: 2024-09-13

## (undated) RX ORDER — FENTANYL CITRATE 50 UG/ML
INJECTION, SOLUTION INTRAMUSCULAR; INTRAVENOUS
Status: DISPENSED
Start: 2025-01-07

## (undated) RX ORDER — EPHEDRINE SULFATE 50 MG/ML
INJECTION, SOLUTION INTRAMUSCULAR; INTRAVENOUS; SUBCUTANEOUS
Status: DISPENSED
Start: 2024-09-13

## (undated) RX ORDER — PROTAMINE SULFATE 10 MG/ML
INJECTION, SOLUTION INTRAVENOUS
Status: DISPENSED
Start: 2024-09-13